# Patient Record
Sex: FEMALE | Race: WHITE | Employment: OTHER | URBAN - NONMETROPOLITAN AREA
[De-identification: names, ages, dates, MRNs, and addresses within clinical notes are randomized per-mention and may not be internally consistent; named-entity substitution may affect disease eponyms.]

---

## 2019-02-26 ENCOUNTER — OFFICE VISIT (OUTPATIENT)
Dept: OBGYN | Age: 64
End: 2019-02-26
Payer: MEDICARE

## 2019-02-26 VITALS
HEART RATE: 76 BPM | TEMPERATURE: 98.2 F | DIASTOLIC BLOOD PRESSURE: 83 MMHG | SYSTOLIC BLOOD PRESSURE: 143 MMHG | BODY MASS INDEX: 26.36 KG/M2 | HEIGHT: 66 IN | WEIGHT: 164 LBS

## 2019-02-26 DIAGNOSIS — Z12.11 SCREEN FOR COLON CANCER: ICD-10-CM

## 2019-02-26 DIAGNOSIS — R79.89 OTHER SPECIFIED ABNORMAL FINDINGS OF BLOOD CHEMISTRY: ICD-10-CM

## 2019-02-26 DIAGNOSIS — Z01.419 ENCOUNTER FOR ROUTINE GYNECOLOGIC EXAMINATION IN MEDICARE PATIENT: ICD-10-CM

## 2019-02-26 DIAGNOSIS — Z12.39 SCREENING FOR BREAST CANCER: ICD-10-CM

## 2019-02-26 DIAGNOSIS — Z12.4 SCREENING FOR CERVICAL CANCER: ICD-10-CM

## 2019-02-26 DIAGNOSIS — Z01.419 ENCOUNTER FOR ROUTINE GYNECOLOGIC EXAMINATION IN MEDICARE PATIENT: Primary | ICD-10-CM

## 2019-02-26 DIAGNOSIS — Z11.51 SCREENING FOR HPV (HUMAN PAPILLOMAVIRUS): ICD-10-CM

## 2019-02-26 LAB
ALBUMIN SERPL-MCNC: 4.5 G/DL (ref 3.5–5.2)
ALP BLD-CCNC: 69 U/L (ref 35–104)
ALT SERPL-CCNC: 9 U/L (ref 5–33)
ANION GAP SERPL CALCULATED.3IONS-SCNC: 14 MMOL/L (ref 7–19)
AST SERPL-CCNC: 14 U/L (ref 5–32)
BILIRUB SERPL-MCNC: 0.6 MG/DL (ref 0.2–1.2)
BUN BLDV-MCNC: 12 MG/DL (ref 8–23)
CALCIUM SERPL-MCNC: 9.1 MG/DL (ref 8.8–10.2)
CHLORIDE BLD-SCNC: 100 MMOL/L (ref 98–111)
CHOLESTEROL, TOTAL: 194 MG/DL (ref 160–199)
CO2: 26 MMOL/L (ref 22–29)
CREAT SERPL-MCNC: 0.7 MG/DL (ref 0.5–0.9)
GFR NON-AFRICAN AMERICAN: >60
GLUCOSE BLD-MCNC: 102 MG/DL (ref 74–109)
HCT VFR BLD CALC: 45.4 % (ref 37–47)
HDLC SERPL-MCNC: 80 MG/DL (ref 65–121)
HEMOGLOBIN: 15 G/DL (ref 12–16)
LDL CHOLESTEROL CALCULATED: 89 MG/DL
MCH RBC QN AUTO: 33.3 PG (ref 27–31)
MCHC RBC AUTO-ENTMCNC: 33 G/DL (ref 33–37)
MCV RBC AUTO: 100.9 FL (ref 81–99)
PDW BLD-RTO: 13.2 % (ref 11.5–14.5)
PLATELET # BLD: 250 K/UL (ref 130–400)
PMV BLD AUTO: 10.4 FL (ref 9.4–12.3)
POTASSIUM SERPL-SCNC: 4.1 MMOL/L (ref 3.5–5)
RBC # BLD: 4.5 M/UL (ref 4.2–5.4)
SODIUM BLD-SCNC: 140 MMOL/L (ref 136–145)
TOTAL PROTEIN: 7.1 G/DL (ref 6.6–8.7)
TRIGL SERPL-MCNC: 126 MG/DL (ref 0–149)
WBC # BLD: 6.6 K/UL (ref 4.8–10.8)

## 2019-02-26 PROCEDURE — G8419 CALC BMI OUT NRM PARAM NOF/U: HCPCS | Performed by: NURSE PRACTITIONER

## 2019-02-26 PROCEDURE — 1090F PRES/ABSN URINE INCON ASSESS: CPT | Performed by: NURSE PRACTITIONER

## 2019-02-26 PROCEDURE — 99204 OFFICE O/P NEW MOD 45 MIN: CPT | Performed by: NURSE PRACTITIONER

## 2019-02-26 PROCEDURE — G8400 PT W/DXA NO RESULTS DOC: HCPCS | Performed by: NURSE PRACTITIONER

## 2019-02-26 PROCEDURE — 3017F COLORECTAL CA SCREEN DOC REV: CPT | Performed by: NURSE PRACTITIONER

## 2019-02-26 PROCEDURE — 1101F PT FALLS ASSESS-DOCD LE1/YR: CPT | Performed by: NURSE PRACTITIONER

## 2019-02-26 PROCEDURE — G0101 CA SCREEN;PELVIC/BREAST EXAM: HCPCS | Performed by: NURSE PRACTITIONER

## 2019-02-26 PROCEDURE — G8484 FLU IMMUNIZE NO ADMIN: HCPCS | Performed by: NURSE PRACTITIONER

## 2019-02-26 PROCEDURE — 4004F PT TOBACCO SCREEN RCVD TLK: CPT | Performed by: NURSE PRACTITIONER

## 2019-02-26 PROCEDURE — G8427 DOCREV CUR MEDS BY ELIG CLIN: HCPCS | Performed by: NURSE PRACTITIONER

## 2019-02-26 PROCEDURE — 4040F PNEUMOC VAC/ADMIN/RCVD: CPT | Performed by: NURSE PRACTITIONER

## 2019-02-26 PROCEDURE — 1123F ACP DISCUSS/DSCN MKR DOCD: CPT | Performed by: NURSE PRACTITIONER

## 2019-02-26 ASSESSMENT — ENCOUNTER SYMPTOMS
ALLERGIC/IMMUNOLOGIC NEGATIVE: 1
RESPIRATORY NEGATIVE: 1
CONSTIPATION: 0
EYES NEGATIVE: 1
DIARRHEA: 0
GASTROINTESTINAL NEGATIVE: 1

## 2019-03-07 ENCOUNTER — HOSPITAL ENCOUNTER (OUTPATIENT)
Dept: WOMENS IMAGING | Age: 64
Discharge: HOME | End: 2019-03-07
Payer: MEDICARE

## 2019-03-07 DIAGNOSIS — Z12.39 SCREENING FOR BREAST CANCER: ICD-10-CM

## 2019-03-07 PROCEDURE — 77063 BREAST TOMOSYNTHESIS BI: CPT

## 2019-03-27 LAB
HPV TYPE 16: NOT DETECTED
HPV TYPE 18: NOT DETECTED
INTERPRETATION: NORMAL
OTHER HIGH RISK HPV: NOT DETECTED
SOURCE: NORMAL

## 2019-11-12 ENCOUNTER — TRANSCRIBE ORDERS (OUTPATIENT)
Dept: ADMINISTRATIVE | Facility: HOSPITAL | Age: 64
End: 2019-11-12

## 2019-11-12 ENCOUNTER — HOSPITAL ENCOUNTER (OUTPATIENT)
Dept: CT IMAGING | Facility: HOSPITAL | Age: 64
Discharge: HOME | End: 2019-11-12
Admitting: ORTHOPAEDIC SURGERY

## 2019-11-12 DIAGNOSIS — M25.511 ACUTE PAIN OF RIGHT SHOULDER: Primary | ICD-10-CM

## 2019-11-12 DIAGNOSIS — M25.511 ACUTE PAIN OF RIGHT SHOULDER: ICD-10-CM

## 2019-11-12 PROCEDURE — 73200 CT UPPER EXTREMITY W/O DYE: CPT

## 2019-11-14 ENCOUNTER — APPOINTMENT (OUTPATIENT)
Dept: PREADMISSION TESTING | Facility: HOSPITAL | Age: 64
End: 2019-11-14

## 2019-11-14 ENCOUNTER — HOSPITAL ENCOUNTER (OUTPATIENT)
Dept: GENERAL RADIOLOGY | Facility: HOSPITAL | Age: 64
Discharge: HOME | End: 2019-11-14
Admitting: ORTHOPAEDIC SURGERY

## 2019-11-14 VITALS
WEIGHT: 162.7 LBS | BODY MASS INDEX: 27.11 KG/M2 | OXYGEN SATURATION: 100 % | HEART RATE: 80 BPM | HEIGHT: 65 IN | SYSTOLIC BLOOD PRESSURE: 142 MMHG | RESPIRATION RATE: 16 BRPM | DIASTOLIC BLOOD PRESSURE: 68 MMHG

## 2019-11-14 LAB
ALBUMIN SERPL-MCNC: 4.4 G/DL (ref 3.5–5.2)
ALBUMIN/GLOB SERPL: 1.5 G/DL
ALP SERPL-CCNC: 82 U/L (ref 39–117)
ALT SERPL W P-5'-P-CCNC: 22 U/L (ref 1–33)
ANION GAP SERPL CALCULATED.3IONS-SCNC: 11 MMOL/L (ref 5–15)
APTT PPP: 29.4 SECONDS (ref 24.1–35)
AST SERPL-CCNC: 23 U/L (ref 1–32)
BASOPHILS # BLD AUTO: 0.06 10*3/MM3 (ref 0–0.2)
BASOPHILS NFR BLD AUTO: 0.7 % (ref 0–1.5)
BILIRUB SERPL-MCNC: 0.8 MG/DL (ref 0.2–1.2)
BILIRUB UR QL STRIP: NEGATIVE
BUN BLD-MCNC: 11 MG/DL (ref 8–23)
BUN/CREAT SERPL: 17.5 (ref 7–25)
CALCIUM SPEC-SCNC: 9.7 MG/DL (ref 8.6–10.5)
CHLORIDE SERPL-SCNC: 97 MMOL/L (ref 98–107)
CLARITY UR: CLEAR
CO2 SERPL-SCNC: 29 MMOL/L (ref 22–29)
COLOR UR: YELLOW
CREAT BLD-MCNC: 0.63 MG/DL (ref 0.57–1)
DEPRECATED RDW RBC AUTO: 42.9 FL (ref 37–54)
EOSINOPHIL # BLD AUTO: 0.12 10*3/MM3 (ref 0–0.4)
EOSINOPHIL NFR BLD AUTO: 1.4 % (ref 0.3–6.2)
ERYTHROCYTE [DISTWIDTH] IN BLOOD BY AUTOMATED COUNT: 12.4 % (ref 12.3–15.4)
GFR SERPL CREATININE-BSD FRML MDRD: 95 ML/MIN/1.73
GLOBULIN UR ELPH-MCNC: 3 GM/DL
GLUCOSE BLD-MCNC: 119 MG/DL (ref 65–99)
GLUCOSE UR STRIP-MCNC: NEGATIVE MG/DL
HCT VFR BLD AUTO: 40.1 % (ref 34–46.6)
HGB BLD-MCNC: 13.7 G/DL (ref 12–15.9)
HGB UR QL STRIP.AUTO: NEGATIVE
IMM GRANULOCYTES # BLD AUTO: 0.02 10*3/MM3 (ref 0–0.05)
IMM GRANULOCYTES NFR BLD AUTO: 0.2 % (ref 0–0.5)
INR PPP: 0.92 (ref 0.91–1.09)
KETONES UR QL STRIP: ABNORMAL
LEUKOCYTE ESTERASE UR QL STRIP.AUTO: NEGATIVE
LYMPHOCYTES # BLD AUTO: 1.67 10*3/MM3 (ref 0.7–3.1)
LYMPHOCYTES NFR BLD AUTO: 19.5 % (ref 19.6–45.3)
MCH RBC QN AUTO: 32 PG (ref 26.6–33)
MCHC RBC AUTO-ENTMCNC: 34.2 G/DL (ref 31.5–35.7)
MCV RBC AUTO: 93.7 FL (ref 79–97)
MONOCYTES # BLD AUTO: 0.64 10*3/MM3 (ref 0.1–0.9)
MONOCYTES NFR BLD AUTO: 7.5 % (ref 5–12)
NEUTROPHILS # BLD AUTO: 6.06 10*3/MM3 (ref 1.7–7)
NEUTROPHILS NFR BLD AUTO: 70.7 % (ref 42.7–76)
NITRITE UR QL STRIP: NEGATIVE
NRBC BLD AUTO-RTO: 0 /100 WBC (ref 0–0.2)
PH UR STRIP.AUTO: <=5 [PH] (ref 5–8)
PLATELET # BLD AUTO: 361 10*3/MM3 (ref 140–450)
PMV BLD AUTO: 10.3 FL (ref 6–12)
POTASSIUM BLD-SCNC: 4.1 MMOL/L (ref 3.5–5.2)
PROT SERPL-MCNC: 7.4 G/DL (ref 6–8.5)
PROT UR QL STRIP: NEGATIVE
PROTHROMBIN TIME: 12.6 SECONDS (ref 11.9–14.6)
RBC # BLD AUTO: 4.28 10*6/MM3 (ref 3.77–5.28)
SODIUM BLD-SCNC: 137 MMOL/L (ref 136–145)
SP GR UR STRIP: 1.02 (ref 1–1.03)
UROBILINOGEN UR QL STRIP: ABNORMAL
WBC NRBC COR # BLD: 8.57 10*3/MM3 (ref 3.4–10.8)

## 2019-11-14 PROCEDURE — 85730 THROMBOPLASTIN TIME PARTIAL: CPT | Performed by: ORTHOPAEDIC SURGERY

## 2019-11-14 PROCEDURE — 80053 COMPREHEN METABOLIC PANEL: CPT | Performed by: ORTHOPAEDIC SURGERY

## 2019-11-14 PROCEDURE — 85025 COMPLETE CBC W/AUTO DIFF WBC: CPT | Performed by: ORTHOPAEDIC SURGERY

## 2019-11-14 PROCEDURE — 85610 PROTHROMBIN TIME: CPT | Performed by: ORTHOPAEDIC SURGERY

## 2019-11-14 PROCEDURE — 81003 URINALYSIS AUTO W/O SCOPE: CPT | Performed by: ORTHOPAEDIC SURGERY

## 2019-11-14 PROCEDURE — 93010 ELECTROCARDIOGRAM REPORT: CPT | Performed by: INTERNAL MEDICINE

## 2019-11-14 PROCEDURE — 93005 ELECTROCARDIOGRAM TRACING: CPT

## 2019-11-14 PROCEDURE — 36415 COLL VENOUS BLD VENIPUNCTURE: CPT

## 2019-11-14 PROCEDURE — 71046 X-RAY EXAM CHEST 2 VIEWS: CPT

## 2019-11-14 PROCEDURE — 87081 CULTURE SCREEN ONLY: CPT | Performed by: ORTHOPAEDIC SURGERY

## 2019-11-14 RX ORDER — HYDROCODONE BITARTRATE AND ACETAMINOPHEN 7.5; 325 MG/1; MG/1
1 TABLET ORAL EVERY 8 HOURS PRN
COMMUNITY
End: 2019-11-19 | Stop reason: HOSPADM

## 2019-11-14 NOTE — DISCHARGE INSTRUCTIONS
DAY OF SURGERY INSTRUCTIONS        YOUR SURGEON: ***GAGAN     PROCEDURE: ***RIGHT REVERSE TOTAL SHOULDER REPLACEMENT    DATE OF SURGERY: ***11/18/2019    ARRIVAL TIME: AS DIRECTED BY OFFICE    YOU MAY TAKE THE FOLLOWING MEDICATION(S) THE MORNING OF SURGERY WITH A SIP OF WATER: ***NORCO      ALL OTHER HOME MEDICATION CHECK WITH YOUR PHYSICIAN                MANAGING PAIN AFTER SURGERY    We know you are probably wondering what your pain will be like after surgery.  Following surgery it is unrealistic to expect you will not have pain.   Pain is how our bodies let us know that something is wrong or cautions us to be careful.  That said, our goal is to make your pain tolerable.    Methods we may use to treat your pain include (oral or IV medications, PCAs, epidurals, nerve blocks, etc.)   While some procedures require IV pain medications for a short time after surgery, transitioning to pain medications by mouth allows for better management of pain.   Your nurse will encourage you to take oral pain medications whenever possible.  IV medications work almost immediately, but only last a short while.  Taking medications by mouth allows for a more constant level of medication in your blood stream for a longer period of time.      Once your pain is out of control it is harder to get back under control.  It is important you are aware when your next dose of pain medication is due.  If you are admitted, your nurse may write the time of your next dose on the white board in your room to help you remember.      We are interested in your pain and encourage you to inform us about aggravating factors during your visit.   Many times a simple repositioning every few hours can make a big difference.    If your physician says it is okay, do not let your pain prevent you from getting out of bed. Be sure to call your nurse for assistance prior to getting up so you do not fall.      Before surgery, please decide your tolerable pain goal.   These faces help describe the pain ratings we use on a 0-10 scale.   Be prepared to tell us your goal and whether or not you take pain or anxiety medications at home.          BEFORE YOU COME TO THE HOSPITAL  (Pre-op instructions)  • Do not eat, drink, smoke or chew gum after midnight the night before surgery.  This also includes no mints.  • Morning of surgery take only the medicines you have been instructed with a sip of water unless otherwise instructed  by your physician.  • Do not shave, wear makeup or dark nail polish.  • Remove all jewelry including rings.  • Leave anything you consider valuable at home.  • Leave your suitcase in the car until after your surgery.  • Bring the following with you if applicable:  o Picture ID and insurance, Medicare or Medicaid cards  o Co-pay/deductible required by insurance (cash, check, credit card)  o Copy of advance directive, living will or power-of- documents if not brought to PAT  o CPAP or BIPAP mask and tubing  o Relaxation aids ( book, magazine), etc.  o Hearing aids                        ON THE DAY OF SURGERY  · On the day of surgery check in at registration located at the main entrance of the hospital.   ? You will be registered and given a beeper with instructions where to wait in the main lobby.  ? When your beeper lights up and vibrates a member of the Outpatient Surgery staff will meet you at the double doors under the stair steps and escort you to your preoperative room.   · You may have cloth compression devices placed on your legs. These help to prevent blood clots and reduce swelling in your legs.  · An IV may be inserted into one of your veins.  · In the operating room, you may be given one or more of the following:  ? A medicine to help you relax (sedative).  ? A medicine to numb the area (local anesthetic).  ? A medicine to make you fall asleep (general anesthetic).  ? A medicine that is injected into an area of your body to numb everything below  "the injection site (regional anesthetic).  · Your surgical site will be marked or identified.  · You may be given an antibiotic through your IV to help prevent infection.  Contact a health care provider if you:  · Develop a fever of more than 100.4°F (38°C) or other feelings of illness during the 48 hours before your surgery.  · Have symptoms that get worse.  Have questions or concerns about your surgery    General Anesthesia/Surgery, Adult  General anesthesia is the use of medicines to make a person \"go to sleep\" (unconscious) for a medical procedure. General anesthesia must be used for certain procedures, and is often recommended for procedures that:  · Last a long time.  · Require you to be still or in an unusual position.  · Are major and can cause blood loss.  The medicines used for general anesthesia are called general anesthetics. As well as making you unconscious for a certain amount of time, these medicines:  · Prevent pain.  · Control your blood pressure.  · Relax your muscles.  Tell a health care provider about:  · Any allergies you have.  · All medicines you are taking, including vitamins, herbs, eye drops, creams, and over-the-counter medicines.  · Any problems you or family members have had with anesthetic medicines.  · Types of anesthetics you have had in the past.  · Any blood disorders you have.  · Any surgeries you have had.  · Any medical conditions you have.  · Any recent upper respiratory, chest, or ear infections.  · Any history of:  ? Heart or lung conditions, such as heart failure, sleep apnea, asthma, or chronic obstructive pulmonary disease (COPD).  ?  service.  ? Depression or anxiety.  · Any tobacco or drug use, including marijuana or alcohol use.  · Whether you are pregnant or may be pregnant.  What are the risks?  Generally, this is a safe procedure. However, problems may occur, including:  · Allergic reaction.  · Lung and heart problems.  · Inhaling food or liquid from the " stomach into the lungs (aspiration).  · Nerve injury.  · Air in the bloodstream, which can lead to stroke.  · Extreme agitation or confusion (delirium) when you wake up from the anesthetic.  · Waking up during your procedure and being unable to move. This is rare.  These problems are more likely to develop if you are having a major surgery or if you have an advanced or serious medical condition. You can prevent some of these complications by answering all of your health care provider's questions thoroughly and by following all instructions before your procedure.  General anesthesia can cause side effects, including:  · Nausea or vomiting.  · A sore throat from the breathing tube.  · Hoarseness.  · Wheezing or coughing.  · Shaking chills.  · Tiredness.  · Body aches.  · Anxiety.  · Sleepiness or drowsiness.  · Confusion or agitation.  RISKS AND COMPLICATIONS OF SURGERY  Your health care provider will discuss possible risks and complications with you before surgery. Common risks and complications include:    · Problems due to the use of anesthetics.  · Blood loss and replacement (does not apply to minor surgical procedures).  · Temporary increase in pain due to surgery.  · Uncorrected pain or problems that the surgery was meant to correct.  · Infection.  · New damage.    What happens before the procedure?    Medicines  Ask your health care provider about:  · Changing or stopping your regular medicines. This is especially important if you are taking diabetes medicines or blood thinners.  · Taking medicines such as aspirin and ibuprofen. These medicines can thin your blood. Do not take these medicines unless your health care provider tells you to take them.  · Taking over-the-counter medicines, vitamins, herbs, and supplements. Do not take these during the week before your procedure unless your health care provider approves them.  General instructions  · Starting 3-6 weeks before the procedure, do not use any products  that contain nicotine or tobacco, such as cigarettes and e-cigarettes. If you need help quitting, ask your health care provider.  · If you brush your teeth on the morning of the procedure, make sure to spit out all of the toothpaste.  · Tell your health care provider if you become ill or develop a cold, cough, or fever.  · If instructed by your health care provider, bring your sleep apnea device with you on the day of your surgery (if applicable).  · Ask your health care provider if you will be going home the same day, the following day, or after a longer hospital stay.  ? Plan to have someone take you home from the hospital or clinic.  ? Plan to have a responsible adult care for you for at least 24 hours after you leave the hospital or clinic. This is important.  What happens during the procedure?  · You will be given anesthetics through both of the following:  ? A mask placed over your nose and mouth.  ? An IV in one of your veins.  · You may receive a medicine to help you relax (sedative).  · After you are unconscious, a breathing tube may be inserted down your throat to help you breathe. This will be removed before you wake up.  · An anesthesia specialist will stay with you throughout your procedure. He or she will:  ? Keep you comfortable and safe by continuing to give you medicines and adjusting the amount of medicine that you get.  ? Monitor your blood pressure, pulse, and oxygen levels to make sure that the anesthetics do not cause any problems.  The procedure may vary among health care providers and hospitals.  What happens after the procedure?  · Your blood pressure, temperature, heart rate, breathing rate, and blood oxygen level will be monitored until the medicines you were given have worn off.  · You will wake up in a recovery area. You may wake up slowly.  · If you feel anxious or agitated, you may be given medicine to help you calm down.  · If you will be going home the same day, your health care  provider may check to make sure you can walk, drink, and urinate.  · Your health care provider will treat any pain or side effects you have before you go home.  · Do not drive for 24 hours if you were given a sedative.  Summary  · General anesthesia is used to keep you still and prevent pain during a procedure.  · It is important to tell your healthcare provider about your medical history and any surgeries you have had, and previous experience with anesthesia.  · Follow your healthcare provider’s instructions about when to stop eating, drinking, or taking certain medicines before your procedure.  · Plan to have someone take you home from the hospital or clinic.  This information is not intended to replace advice given to you by your health care provider. Make sure you discuss any questions you have with your health care provider.  Document Released: 03/26/2009 Document Revised: 08/03/2018 Document Reviewed: 08/03/2018  [employer] Interactive Patient Education © 2019 [employer]       Fall Prevention in Hospitals, Adult  As a hospital patient, your condition and the treatments you receive can increase your risk for falls. Some additional risk factors for falls in a hospital include:  · Being in an unfamiliar environment.  · Being on bed rest.  · Your surgery.  · Taking certain medicines.  · Your tubing requirements, such as intravenous (IV) therapy or catheters.  It is important that you learn how to decrease fall risks while at the hospital. Below are important tips that can help prevent falls.  SAFETY TIPS FOR PREVENTING FALLS  Talk about your risk of falling.  · Ask your health care provider why you are at risk for falling. Is it your medicine, illness, tubing placement, or something else?  · Make a plan with your health care provider to keep you safe from falls.  · Ask your health care provider or pharmacist about side effects of your medicines. Some medicines can make you dizzy or affect your coordination.  Ask  for help.  · Ask for help before getting out of bed. You may need to press your call button.  · Ask for assistance in getting safely to the toilet.  · Ask for a walker or cane to be put at your bedside. Ask that most of the side rails on your bed be placed up before your health care provider leaves the room.  · Ask family or friends to sit with you.  · Ask for things that are out of your reach, such as your glasses, hearing aids, telephone, bedside table, or call button.  Follow these tips to avoid falling:  · Stay lying or seated, rather than standing, while waiting for help.  · Wear rubber-soled slippers or shoes whenever you walk in the hospital.  · Avoid quick, sudden movements.  ¨ Change positions slowly.  ¨ Sit on the side of your bed before standing.  ¨ Stand up slowly and wait before you start to walk.  · Let your health care provider know if there is a spill on the floor.  · Pay careful attention to the medical equipment, electrical cords, and tubes around you.  · When you need help, use your call button by your bed or in the bathroom. Wait for one of your health care providers to help you.  · If you feel dizzy or unsure of your footing, return to bed and wait for assistance.  · Avoid being distracted by the TV, telephone, or another person in your room.  · Do not lean or support yourself on rolling objects, such as IV poles or bedside tables.     This information is not intended to replace advice given to you by your health care provider. Make sure you discuss any questions you have with your health care provider.     Document Released: 12/15/2001 Document Revised: 01/08/2016 Document Reviewed: 08/25/2013  [employer] Interactive Patient Education ©2016 [employer]       Surgical Site Infections FAQs  What is a Surgical Site Infection (SSI)?  A surgical site infection is an infection that occurs after surgery in the part of the body where the surgery took place. Most patients who have surgery do not develop  an infection. However, infections develop in about 1 to 3 out of every 100 patients who have surgery.  Some of the common symptoms of a surgical site infection are:  · Redness and pain around the area where you had surgery  · Drainage of cloudy fluid from your surgical wound  · Fever  Can SSIs be treated?  Yes. Most surgical site infections can be treated with antibiotics. The antibiotic given to you depends on the bacteria (germs) causing the infection. Sometimes patients with SSIs also need another surgery to treat the infection.  What are some of the things that hospitals are doing to prevent SSIs?  To prevent SSIs, doctors, nurses, and other healthcare providers:  · Clean their hands and arms up to their elbows with an antiseptic agent just before the surgery.  · Clean their hands with soap and water or an alcohol-based hand rub before and after caring for each patient.  · May remove some of your hair immediately before your surgery using electric clippers if the hair is in the same area where the procedure will occur. They should not shave you with a razor.  · Wear special hair covers, masks, gowns, and gloves during surgery to keep the surgery area clean.  · Give you antibiotics before your surgery starts. In most cases, you should get antibiotics within 60 minutes before the surgery starts and the antibiotics should be stopped within 24 hours after surgery.  · Clean the skin at the site of your surgery with a special soap that kills germs.  What can I do to help prevent SSIs?  Before your surgery:  · Tell your doctor about other medical problems you may have. Health problems such as allergies, diabetes, and obesity could affect your surgery and your treatment.  · Quit smoking. Patients who smoke get more infections. Talk to your doctor about how you can quit before your surgery.  · Do not shave near where you will have surgery. Shaving with a razor can irritate your skin and make it easier to develop an  infection.  At the time of your surgery:  · Speak up if someone tries to shave you with a razor before surgery. Ask why you need to be shaved and talk with your surgeon if you have any concerns.  · Ask if you will get antibiotics before surgery.  After your surgery:  · Make sure that your healthcare providers clean their hands before examining you, either with soap and water or an alcohol-based hand rub.  · If you do not see your providers clean their hands, please ask them to do so.  · Family and friends who visit you should not touch the surgical wound or dressings.  · Family and friends should clean their hands with soap and water or an alcohol-based hand rub before and after visiting you. If you do not see them clean their hands, ask them to clean their hands.  What do I need to do when I go home from the hospital?  · Before you go home, your doctor or nurse should explain everything you need to know about taking care of your wound. Make sure you understand how to care for your wound before you leave the hospital.  · Always clean your hands before and after caring for your wound.  · Before you go home, make sure you know who to contact if you have questions or problems after you get home.  · If you have any symptoms of an infection, such as redness and pain at the surgery site, drainage, or fever, call your doctor immediately.  If you have additional questions, please ask your doctor or nurse.  Developed and co-sponsored by The Society for Healthcare Epidemiology of [place] (SHEA); Infectious Diseases Society of [place] (IDSA); American Hospital Association; Association for Professionals in Infection Control and Epidemiology (APIC); Centers for Disease Control and Prevention (CDC); and The Joint Commission.     This information is not intended to replace advice given to you by your health care provider. Make sure you discuss any questions you have with your health care provider.     Document Released: 12/23/2014  Document Revised: 01/08/2016 Document Reviewed: 03/02/2016  [employer] Interactive Patient Education ©2016 [employer]           [facility]  CHG 4% Patient Instruction Sheet    Chlorhexidine Before Surgery  Chlorhexidine gluconate (CHG) is a germ-killing (antiseptic) solution that is used to clean the skin. It gets rid of the bacteria that normally live on the skin. Cleaning your skin with CHG before surgery helps lower the risk for infection after surgery.    How to use CHG solution  · You will take 2 showers, one shower the night before surgery, the second shower the morning of surgery before coming to the hospital.  · Use CHG only as told by your health care provider, and follow the instructions on the label.  · Use CHG solution while taking a shower. Follow these steps when using CHG solution (unless your health care provider gives you different instructions):  1. Start the shower.  2. Use your normal soap and shampoo to wash your face and hair.  3. Turn off the shower or move out of the shower stream.  4. Pour the CHG onto a clean washcloth. Do not use any type of brush or rough-edged sponge.  5. Starting at your neck, lather your body down to your toes. Make sure you:  6. Pay special attention to the part of your body where you will be having surgery. Scrub this area for at least 1 minute.  7. Use the full amount of CHG as directed. Usually, this is one half bottle for each shower.  8. Do not use CHG on your head or face. If the solution gets into your ears or eyes, rinse them well with water.  9. Avoid your genital area.  10. Avoid any areas of skin that have broken skin, cuts, or scrapes.  11. Scrub your back and under your arms. Make sure to wash skin folds.  12. Let the lather sit on your skin for 1-2 minutes or as long as told by your health care  provider.  13. Thoroughly rinse your entire body in the shower. Make sure that all body creases and crevices are rinsed well.  14. Dry off with a  clean towel. Do not put any substances on your body afterward, such as powder, lotion, or perfume.  15. Put on clean clothes or pajamas.  16. If it is the night before your surgery, sleep in clean sheets.    What are the risks?  Risks of using CHG include:  · A skin reaction.  · Hearing loss, if CHG gets in your ears.  · Eye injury, if CHG gets in your eyes and is not rinsed out.  · The CHG product catching fire.  Make sure that you avoid smoking and flames after applying CHG to your skin.  Do not use CHG:  · If you have a chlorhexidine allergy or have previously reacted to chlorhexidine.  · On babies younger than 2 months of age.      On the day of surgery, when you are taken to your room in Outpatient Surgery you will be given a CHG prepackaged cloth to wipe the site for your surgery.  How to use CHG prepackaged cloths  · Follow the instructions on the label.  · Use the CHG cloth on clean, dry skin. Follow these steps when using a CHG cloth (unless your health care provider gives you different instructions):  1. Using the CHG cloth, vigorously scrub the part of your body where you will be having surgery. Scrub using a back-and-forth motion for 3 minutes. The area on your body should be completely wet with CHG when you are finished scrubbing.  2. Do not rinse. Discard the cloth and let the area air-dry for 1 minute. Do not put any substances on your body afterward, such as powder, lotion, or perfume.  Contact a health care provider if:  · Your skin gets irritated after scrubbing.  · You have questions about using your solution or cloth.  Get help right away if:  · Your eyes become very red or swollen.  · Your eyes itch badly.  · Your skin itches badly and is red or swollen.  · Your hearing changes.  · You have trouble seeing.  · You have swelling or tingling in your mouth or throat.  · You have trouble breathing.  · You swallow any chlorhexidine.  Summary  · Chlorhexidine gluconate (CHG) is a germ-killing  (antiseptic) solution that is used to clean the skin. Cleaning your skin with CHG before surgery helps lower the risk for infection after surgery.  · You may be given CHG to use at home. It may be in a bottle or in a prepackaged cloth to use on your skin. Carefully follow your health care provider's instructions and the instructions on the product label.  · Do not use CHG if you have a chlorhexidine allergy.  · Contact your health care provider if your skin gets irritated after scrubbing.  This information is not intended to replace advice given to you by your health care provider. Make sure you discuss any questions you have with your health care provider.  Document Released: 09/11/2013 Document Revised: 11/15/2018 Document Reviewed: 11/15/2018  [employer] Interactive Patient Education © 2019 [employer]          PATIENT/FAMILY/RESPONSIBLE PARTY VERBALIZES UNDERSTANDING OF ABOVE EDUCATION.  COPY OF PAIN SCALE GIVEN AND REVIEWED WITH VERBALIZED UNDERSTANDING.

## 2019-11-15 LAB — MRSA SPEC QL CULT: NORMAL

## 2019-11-18 ENCOUNTER — ANESTHESIA EVENT (OUTPATIENT)
Dept: PERIOP | Facility: HOSPITAL | Age: 64
End: 2019-11-18

## 2019-11-18 ENCOUNTER — APPOINTMENT (OUTPATIENT)
Dept: GENERAL RADIOLOGY | Facility: HOSPITAL | Age: 64
End: 2019-11-18

## 2019-11-18 ENCOUNTER — ANESTHESIA (OUTPATIENT)
Dept: PERIOP | Facility: HOSPITAL | Age: 64
End: 2019-11-18

## 2019-11-18 ENCOUNTER — HOSPITAL ENCOUNTER (INPATIENT)
Facility: HOSPITAL | Age: 64
LOS: 1 days | Discharge: HOME | End: 2019-11-19
Attending: ORTHOPAEDIC SURGERY | Admitting: ORTHOPAEDIC SURGERY

## 2019-11-18 DIAGNOSIS — Z78.9 DECREASED ACTIVITIES OF DAILY LIVING (ADL): ICD-10-CM

## 2019-11-18 PROBLEM — S42.291A CLOSED 4-PART FRACTURE OF PROXIMAL END OF RIGHT HUMERUS: Status: ACTIVE | Noted: 2019-11-18

## 2019-11-18 LAB
ABO GROUP BLD: NORMAL
BLD GP AB SCN SERPL QL: NEGATIVE
RH BLD: POSITIVE
T&S EXPIRATION DATE: NORMAL

## 2019-11-18 PROCEDURE — 25010000002 ONDANSETRON PER 1 MG: Performed by: NURSE ANESTHETIST, CERTIFIED REGISTERED

## 2019-11-18 PROCEDURE — 94760 N-INVAS EAR/PLS OXIMETRY 1: CPT

## 2019-11-18 PROCEDURE — 25010000002 VANCOMYCIN 1 G RECONSTITUTED SOLUTION: Performed by: ORTHOPAEDIC SURGERY

## 2019-11-18 PROCEDURE — 25010000002 FENTANYL CITRATE (PF) 100 MCG/2ML SOLUTION: Performed by: NURSE ANESTHETIST, CERTIFIED REGISTERED

## 2019-11-18 PROCEDURE — C1776 JOINT DEVICE (IMPLANTABLE): HCPCS | Performed by: ORTHOPAEDIC SURGERY

## 2019-11-18 PROCEDURE — 86900 BLOOD TYPING SEROLOGIC ABO: CPT | Performed by: ORTHOPAEDIC SURGERY

## 2019-11-18 PROCEDURE — 86850 RBC ANTIBODY SCREEN: CPT | Performed by: ORTHOPAEDIC SURGERY

## 2019-11-18 PROCEDURE — 86901 BLOOD TYPING SEROLOGIC RH(D): CPT | Performed by: ORTHOPAEDIC SURGERY

## 2019-11-18 PROCEDURE — 25010000002 DEXAMETHASONE PER 1 MG: Performed by: NURSE ANESTHETIST, CERTIFIED REGISTERED

## 2019-11-18 PROCEDURE — 25010000002 ROPIVACAINE PER 1 MG: Performed by: ANESTHESIOLOGY

## 2019-11-18 PROCEDURE — 25010000002 MIDAZOLAM PER 1 MG

## 2019-11-18 PROCEDURE — 25010000002 PROPOFOL 10 MG/ML EMULSION: Performed by: NURSE ANESTHETIST, CERTIFIED REGISTERED

## 2019-11-18 PROCEDURE — 73020 X-RAY EXAM OF SHOULDER: CPT

## 2019-11-18 PROCEDURE — 94799 UNLISTED PULMONARY SVC/PX: CPT

## 2019-11-18 PROCEDURE — 25010000002 NEOSTIGMINE 10 MG/10ML SOLUTION 10 ML VIAL: Performed by: NURSE ANESTHETIST, CERTIFIED REGISTERED

## 2019-11-18 PROCEDURE — 25010000002 VANCOMYCIN 1 G RECONSTITUTED SOLUTION 1 EACH VIAL: Performed by: ORTHOPAEDIC SURGERY

## 2019-11-18 PROCEDURE — C9290 INJ, BUPIVACAINE LIPOSOME: HCPCS | Performed by: ORTHOPAEDIC SURGERY

## 2019-11-18 PROCEDURE — 25010000002 CEFAZOLIN PER 500 MG: Performed by: ORTHOPAEDIC SURGERY

## 2019-11-18 PROCEDURE — 25010000003 BUPIVACAINE LIPOSOME 1.3 % SUSPENSION 20 ML VIAL: Performed by: ORTHOPAEDIC SURGERY

## 2019-11-18 PROCEDURE — 0RRJ00Z REPLACEMENT OF RIGHT SHOULDER JOINT WITH REVERSE BALL AND SOCKET SYNTHETIC SUBSTITUTE, OPEN APPROACH: ICD-10-PCS | Performed by: ORTHOPAEDIC SURGERY

## 2019-11-18 DEVICE — GLENOSPHERE UNIVERS REVERS MODULAR L/24 36PLS4: Type: IMPLANTABLE DEVICE | Status: FUNCTIONAL

## 2019-11-18 DEVICE — IMPLANTABLE DEVICE: Type: IMPLANTABLE DEVICE | Status: FUNCTIONAL

## 2019-11-18 DEVICE — BASEPLT GLEN UNIVERS REVERS MODULAR 24MM: Type: IMPLANTABLE DEVICE | Status: FUNCTIONAL

## 2019-11-18 DEVICE — CUP SUT UNIVERS REVERS 36 NTRL: Type: IMPLANTABLE DEVICE | Status: FUNCTIONAL

## 2019-11-18 DEVICE — SUT FW 2 REV CUT 38IN AR7202: Type: IMPLANTABLE DEVICE | Status: FUNCTIONAL

## 2019-11-18 DEVICE — SCRW LK GLEN UNIVERS REVERS PERIPH 5.5X32MM: Type: IMPLANTABLE DEVICE | Status: FUNCTIONAL

## 2019-11-18 DEVICE — SCRW CENTRL GLEN UNIVERS REVERS 25MM: Type: IMPLANTABLE DEVICE | Status: FUNCTIONAL

## 2019-11-18 DEVICE — STEM HUM/SHLDR UNIVERS REVERS CAP/COAT SZ7: Type: IMPLANTABLE DEVICE | Status: FUNCTIONAL

## 2019-11-18 RX ORDER — OXYCODONE AND ACETAMINOPHEN 10; 325 MG/1; MG/1
1 TABLET ORAL EVERY 4 HOURS PRN
Qty: 70 TABLET | Refills: 0 | Status: ON HOLD | OUTPATIENT
Start: 2019-11-18 | End: 2020-11-30

## 2019-11-18 RX ORDER — SODIUM CHLORIDE 0.9 % (FLUSH) 0.9 %
3 SYRINGE (ML) INJECTION EVERY 12 HOURS SCHEDULED
Status: DISCONTINUED | OUTPATIENT
Start: 2019-11-18 | End: 2019-11-19 | Stop reason: HOSPADM

## 2019-11-18 RX ORDER — ONDANSETRON 4 MG/1
4 TABLET, FILM COATED ORAL EVERY 8 HOURS PRN
Qty: 20 TABLET | Refills: 1 | Status: ON HOLD | OUTPATIENT
Start: 2019-11-18 | End: 2020-11-30

## 2019-11-18 RX ORDER — SODIUM CHLORIDE 0.9 % (FLUSH) 0.9 %
3 SYRINGE (ML) INJECTION AS NEEDED
Status: DISCONTINUED | OUTPATIENT
Start: 2019-11-18 | End: 2019-11-18 | Stop reason: HOSPADM

## 2019-11-18 RX ORDER — ROCURONIUM BROMIDE 10 MG/ML
INJECTION, SOLUTION INTRAVENOUS AS NEEDED
Status: DISCONTINUED | OUTPATIENT
Start: 2019-11-18 | End: 2019-11-18 | Stop reason: SURG

## 2019-11-18 RX ORDER — LIDOCAINE HYDROCHLORIDE 20 MG/ML
INJECTION, SOLUTION INFILTRATION; PERINEURAL AS NEEDED
Status: DISCONTINUED | OUTPATIENT
Start: 2019-11-18 | End: 2019-11-18 | Stop reason: SURG

## 2019-11-18 RX ORDER — NALOXONE HCL 0.4 MG/ML
0.4 VIAL (ML) INJECTION
Status: DISCONTINUED | OUTPATIENT
Start: 2019-11-18 | End: 2019-11-19 | Stop reason: HOSPADM

## 2019-11-18 RX ORDER — NALOXONE HCL 0.4 MG/ML
0.4 VIAL (ML) INJECTION AS NEEDED
Status: DISCONTINUED | OUTPATIENT
Start: 2019-11-18 | End: 2019-11-18 | Stop reason: HOSPADM

## 2019-11-18 RX ORDER — ONDANSETRON 2 MG/ML
INJECTION INTRAMUSCULAR; INTRAVENOUS AS NEEDED
Status: DISCONTINUED | OUTPATIENT
Start: 2019-11-18 | End: 2019-11-18 | Stop reason: SURG

## 2019-11-18 RX ORDER — SODIUM CHLORIDE, SODIUM LACTATE, POTASSIUM CHLORIDE, CALCIUM CHLORIDE 600; 310; 30; 20 MG/100ML; MG/100ML; MG/100ML; MG/100ML
100 INJECTION, SOLUTION INTRAVENOUS CONTINUOUS
Status: DISCONTINUED | OUTPATIENT
Start: 2019-11-18 | End: 2019-11-18 | Stop reason: HOSPADM

## 2019-11-18 RX ORDER — DIAZEPAM 5 MG/1
5 TABLET ORAL EVERY 6 HOURS PRN
Status: DISCONTINUED | OUTPATIENT
Start: 2019-11-18 | End: 2019-11-19 | Stop reason: HOSPADM

## 2019-11-18 RX ORDER — NEOSTIGMINE METHYLSULFATE 1 MG/ML
INJECTION, SOLUTION INTRAVENOUS AS NEEDED
Status: DISCONTINUED | OUTPATIENT
Start: 2019-11-18 | End: 2019-11-18 | Stop reason: SURG

## 2019-11-18 RX ORDER — DIPHENHYDRAMINE HCL 25 MG
25 CAPSULE ORAL EVERY 6 HOURS PRN
Status: DISCONTINUED | OUTPATIENT
Start: 2019-11-18 | End: 2019-11-19 | Stop reason: HOSPADM

## 2019-11-18 RX ORDER — ONDANSETRON 2 MG/ML
4 INJECTION INTRAMUSCULAR; INTRAVENOUS EVERY 6 HOURS PRN
Status: DISCONTINUED | OUTPATIENT
Start: 2019-11-18 | End: 2019-11-19 | Stop reason: HOSPADM

## 2019-11-18 RX ORDER — OXYCODONE HYDROCHLORIDE 5 MG/1
10 TABLET ORAL EVERY 4 HOURS PRN
Status: DISCONTINUED | OUTPATIENT
Start: 2019-11-18 | End: 2019-11-19 | Stop reason: HOSPADM

## 2019-11-18 RX ORDER — SODIUM CHLORIDE 0.9 % (FLUSH) 0.9 %
3 SYRINGE (ML) INJECTION EVERY 12 HOURS SCHEDULED
Status: DISCONTINUED | OUTPATIENT
Start: 2019-11-18 | End: 2019-11-18 | Stop reason: HOSPADM

## 2019-11-18 RX ORDER — FENTANYL CITRATE 50 UG/ML
25 INJECTION, SOLUTION INTRAMUSCULAR; INTRAVENOUS AS NEEDED
Status: DISCONTINUED | OUTPATIENT
Start: 2019-11-18 | End: 2019-11-18 | Stop reason: HOSPADM

## 2019-11-18 RX ORDER — SODIUM CHLORIDE 0.9 % (FLUSH) 0.9 %
10 SYRINGE (ML) INJECTION AS NEEDED
Status: DISCONTINUED | OUTPATIENT
Start: 2019-11-18 | End: 2019-11-19 | Stop reason: HOSPADM

## 2019-11-18 RX ORDER — VANCOMYCIN HYDROCHLORIDE 1 G/20ML
INJECTION, POWDER, LYOPHILIZED, FOR SOLUTION INTRAVENOUS AS NEEDED
Status: DISCONTINUED | OUTPATIENT
Start: 2019-11-18 | End: 2019-11-18 | Stop reason: HOSPADM

## 2019-11-18 RX ORDER — DOCUSATE SODIUM 100 MG/1
100 CAPSULE, LIQUID FILLED ORAL 2 TIMES DAILY PRN
Status: DISCONTINUED | OUTPATIENT
Start: 2019-11-18 | End: 2019-11-19 | Stop reason: HOSPADM

## 2019-11-18 RX ORDER — SODIUM CHLORIDE 0.9 % (FLUSH) 0.9 %
3-10 SYRINGE (ML) INJECTION AS NEEDED
Status: DISCONTINUED | OUTPATIENT
Start: 2019-11-18 | End: 2019-11-18 | Stop reason: HOSPADM

## 2019-11-18 RX ORDER — MIDAZOLAM HYDROCHLORIDE 1 MG/ML
1 INJECTION INTRAMUSCULAR; INTRAVENOUS
Status: DISCONTINUED | OUTPATIENT
Start: 2019-11-18 | End: 2019-11-18 | Stop reason: HOSPADM

## 2019-11-18 RX ORDER — CYCLOBENZAPRINE HCL 10 MG
10 TABLET ORAL 3 TIMES DAILY PRN
Qty: 60 TABLET | Refills: 0 | Status: ON HOLD | OUTPATIENT
Start: 2019-11-18 | End: 2020-11-30

## 2019-11-18 RX ORDER — OXYCODONE AND ACETAMINOPHEN 10; 325 MG/1; MG/1
1 TABLET ORAL EVERY 4 HOURS PRN
Status: DISCONTINUED | OUTPATIENT
Start: 2019-11-18 | End: 2019-11-19 | Stop reason: HOSPADM

## 2019-11-18 RX ORDER — ACETAMINOPHEN 650 MG/1
650 SUPPOSITORY RECTAL EVERY 4 HOURS PRN
Status: DISCONTINUED | OUTPATIENT
Start: 2019-11-18 | End: 2019-11-19 | Stop reason: HOSPADM

## 2019-11-18 RX ORDER — MAGNESIUM HYDROXIDE 1200 MG/15ML
LIQUID ORAL AS NEEDED
Status: DISCONTINUED | OUTPATIENT
Start: 2019-11-18 | End: 2019-11-18 | Stop reason: HOSPADM

## 2019-11-18 RX ORDER — ASPIRIN 325 MG
325 TABLET, DELAYED RELEASE (ENTERIC COATED) ORAL 2 TIMES DAILY
Qty: 84 TABLET | Refills: 0 | Status: SHIPPED | OUTPATIENT
Start: 2019-11-18 | End: 2019-12-30

## 2019-11-18 RX ORDER — ONDANSETRON 4 MG/1
4 TABLET, FILM COATED ORAL EVERY 6 HOURS PRN
Status: DISCONTINUED | OUTPATIENT
Start: 2019-11-18 | End: 2019-11-19 | Stop reason: HOSPADM

## 2019-11-18 RX ORDER — LABETALOL HYDROCHLORIDE 5 MG/ML
5 INJECTION, SOLUTION INTRAVENOUS
Status: DISCONTINUED | OUTPATIENT
Start: 2019-11-18 | End: 2019-11-18 | Stop reason: HOSPADM

## 2019-11-18 RX ORDER — MIDAZOLAM HYDROCHLORIDE 1 MG/ML
INJECTION INTRAMUSCULAR; INTRAVENOUS
Status: COMPLETED
Start: 2019-11-18 | End: 2019-11-18

## 2019-11-18 RX ORDER — ASPIRIN 325 MG
325 TABLET, DELAYED RELEASE (ENTERIC COATED) ORAL EVERY 12 HOURS SCHEDULED
Status: DISCONTINUED | OUTPATIENT
Start: 2019-11-19 | End: 2019-11-19 | Stop reason: HOSPADM

## 2019-11-18 RX ORDER — PROPOFOL 10 MG/ML
VIAL (ML) INTRAVENOUS AS NEEDED
Status: DISCONTINUED | OUTPATIENT
Start: 2019-11-18 | End: 2019-11-18 | Stop reason: SURG

## 2019-11-18 RX ORDER — DEXAMETHASONE SODIUM PHOSPHATE 4 MG/ML
INJECTION, SOLUTION INTRA-ARTICULAR; INTRALESIONAL; INTRAMUSCULAR; INTRAVENOUS; SOFT TISSUE AS NEEDED
Status: DISCONTINUED | OUTPATIENT
Start: 2019-11-18 | End: 2019-11-18 | Stop reason: SURG

## 2019-11-18 RX ORDER — ROPIVACAINE HYDROCHLORIDE 5 MG/ML
INJECTION, SOLUTION EPIDURAL; INFILTRATION; PERINEURAL
Status: COMPLETED | OUTPATIENT
Start: 2019-11-18 | End: 2019-11-18

## 2019-11-18 RX ORDER — BISACODYL 10 MG
10 SUPPOSITORY, RECTAL RECTAL DAILY PRN
Status: DISCONTINUED | OUTPATIENT
Start: 2019-11-18 | End: 2019-11-19 | Stop reason: HOSPADM

## 2019-11-18 RX ORDER — DIPHENHYDRAMINE HYDROCHLORIDE 50 MG/ML
25 INJECTION INTRAMUSCULAR; INTRAVENOUS EVERY 6 HOURS PRN
Status: DISCONTINUED | OUTPATIENT
Start: 2019-11-18 | End: 2019-11-19 | Stop reason: HOSPADM

## 2019-11-18 RX ORDER — MIDAZOLAM HYDROCHLORIDE 1 MG/ML
2 INJECTION INTRAMUSCULAR; INTRAVENOUS
Status: DISCONTINUED | OUTPATIENT
Start: 2019-11-18 | End: 2019-11-18 | Stop reason: HOSPADM

## 2019-11-18 RX ORDER — GLYCOPYRROLATE 0.2 MG/ML
INJECTION INTRAMUSCULAR; INTRAVENOUS AS NEEDED
Status: DISCONTINUED | OUTPATIENT
Start: 2019-11-18 | End: 2019-11-18 | Stop reason: SURG

## 2019-11-18 RX ORDER — BISACODYL 5 MG/1
10 TABLET, DELAYED RELEASE ORAL DAILY PRN
Status: DISCONTINUED | OUTPATIENT
Start: 2019-11-18 | End: 2019-11-19 | Stop reason: HOSPADM

## 2019-11-18 RX ORDER — BUPIVACAINE HCL/0.9 % NACL/PF 0.1 %
2 PLASTIC BAG, INJECTION (ML) EPIDURAL EVERY 8 HOURS
Status: COMPLETED | OUTPATIENT
Start: 2019-11-18 | End: 2019-11-19

## 2019-11-18 RX ORDER — IPRATROPIUM BROMIDE AND ALBUTEROL SULFATE 2.5; .5 MG/3ML; MG/3ML
3 SOLUTION RESPIRATORY (INHALATION) ONCE AS NEEDED
Status: DISCONTINUED | OUTPATIENT
Start: 2019-11-18 | End: 2019-11-18 | Stop reason: HOSPADM

## 2019-11-18 RX ORDER — ONDANSETRON 2 MG/ML
4 INJECTION INTRAMUSCULAR; INTRAVENOUS ONCE AS NEEDED
Status: DISCONTINUED | OUTPATIENT
Start: 2019-11-18 | End: 2019-11-18 | Stop reason: HOSPADM

## 2019-11-18 RX ORDER — ACETAMINOPHEN 325 MG/1
650 TABLET ORAL EVERY 4 HOURS PRN
Status: DISCONTINUED | OUTPATIENT
Start: 2019-11-18 | End: 2019-11-19 | Stop reason: HOSPADM

## 2019-11-18 RX ORDER — DOCUSATE SODIUM 100 MG/1
100 CAPSULE, LIQUID FILLED ORAL 2 TIMES DAILY
Qty: 60 CAPSULE | Refills: 1 | Status: ON HOLD | OUTPATIENT
Start: 2019-11-18 | End: 2020-11-30

## 2019-11-18 RX ORDER — HYDROMORPHONE HYDROCHLORIDE 1 MG/ML
0.5 INJECTION, SOLUTION INTRAMUSCULAR; INTRAVENOUS; SUBCUTANEOUS
Status: DISCONTINUED | OUTPATIENT
Start: 2019-11-18 | End: 2019-11-19 | Stop reason: HOSPADM

## 2019-11-18 RX ORDER — NALOXONE HCL 0.4 MG/ML
0.1 VIAL (ML) INJECTION
Status: DISCONTINUED | OUTPATIENT
Start: 2019-11-18 | End: 2019-11-19 | Stop reason: HOSPADM

## 2019-11-18 RX ORDER — ACETAMINOPHEN 500 MG
1000 TABLET ORAL ONCE
Status: COMPLETED | OUTPATIENT
Start: 2019-11-18 | End: 2019-11-18

## 2019-11-18 RX ORDER — METOCLOPRAMIDE HYDROCHLORIDE 5 MG/ML
5 INJECTION INTRAMUSCULAR; INTRAVENOUS
Status: DISCONTINUED | OUTPATIENT
Start: 2019-11-18 | End: 2019-11-18 | Stop reason: HOSPADM

## 2019-11-18 RX ORDER — EPHEDRINE SULFATE 50 MG/ML
INJECTION INTRAVENOUS AS NEEDED
Status: DISCONTINUED | OUTPATIENT
Start: 2019-11-18 | End: 2019-11-18 | Stop reason: SURG

## 2019-11-18 RX ORDER — SODIUM CHLORIDE, SODIUM LACTATE, POTASSIUM CHLORIDE, CALCIUM CHLORIDE 600; 310; 30; 20 MG/100ML; MG/100ML; MG/100ML; MG/100ML
1000 INJECTION, SOLUTION INTRAVENOUS CONTINUOUS
Status: DISCONTINUED | OUTPATIENT
Start: 2019-11-18 | End: 2019-11-18 | Stop reason: HOSPADM

## 2019-11-18 RX ORDER — FENTANYL CITRATE 50 UG/ML
INJECTION, SOLUTION INTRAMUSCULAR; INTRAVENOUS AS NEEDED
Status: DISCONTINUED | OUTPATIENT
Start: 2019-11-18 | End: 2019-11-18 | Stop reason: SURG

## 2019-11-18 RX ORDER — CYCLOBENZAPRINE HCL 10 MG
10 TABLET ORAL 3 TIMES DAILY PRN
Status: DISCONTINUED | OUTPATIENT
Start: 2019-11-18 | End: 2019-11-19 | Stop reason: HOSPADM

## 2019-11-18 RX ADMIN — ONDANSETRON HYDROCHLORIDE 4 MG: 2 SOLUTION INTRAMUSCULAR; INTRAVENOUS at 14:30

## 2019-11-18 RX ADMIN — VASOPRESSIN 1 ML: 20 INJECTION INTRAVENOUS at 12:26

## 2019-11-18 RX ADMIN — EPHEDRINE SULFATE 15 MG: 50 INJECTION INTRAVENOUS at 12:51

## 2019-11-18 RX ADMIN — FENTANYL CITRATE 25 MCG: 50 INJECTION, SOLUTION INTRAMUSCULAR; INTRAVENOUS at 14:35

## 2019-11-18 RX ADMIN — SODIUM CHLORIDE, PRESERVATIVE FREE 3 ML: 5 INJECTION INTRAVENOUS at 20:50

## 2019-11-18 RX ADMIN — EPHEDRINE SULFATE 15 MG: 50 INJECTION INTRAVENOUS at 13:17

## 2019-11-18 RX ADMIN — SODIUM CHLORIDE, POTASSIUM CHLORIDE, SODIUM LACTATE AND CALCIUM CHLORIDE 1000 ML: 600; 310; 30; 20 INJECTION, SOLUTION INTRAVENOUS at 11:57

## 2019-11-18 RX ADMIN — CEFAZOLIN 1 G: 1 INJECTION, POWDER, FOR SOLUTION INTRAMUSCULAR; INTRAVENOUS; PARENTERAL at 12:37

## 2019-11-18 RX ADMIN — ROPIVACAINE HYDROCHLORIDE 20 ML: 5 INJECTION, SOLUTION EPIDURAL; INFILTRATION; PERINEURAL at 12:08

## 2019-11-18 RX ADMIN — MIDAZOLAM HYDROCHLORIDE 2 MG: 1 INJECTION INTRAMUSCULAR; INTRAVENOUS at 12:02

## 2019-11-18 RX ADMIN — PROPOFOL 150 MG: 10 INJECTION, EMULSION INTRAVENOUS at 12:21

## 2019-11-18 RX ADMIN — MIDAZOLAM 2 MG: 1 INJECTION INTRAMUSCULAR; INTRAVENOUS at 12:02

## 2019-11-18 RX ADMIN — DEXAMETHASONE SODIUM PHOSPHATE 4 MG: 4 INJECTION, SOLUTION INTRAMUSCULAR; INTRAVENOUS at 14:30

## 2019-11-18 RX ADMIN — GLYCOPYRROLATE 0.4 MG: 0.2 INJECTION, SOLUTION INTRAMUSCULAR; INTRAVENOUS at 14:37

## 2019-11-18 RX ADMIN — VASOPRESSIN 1 ML: 20 INJECTION INTRAVENOUS at 12:41

## 2019-11-18 RX ADMIN — ACETAMINOPHEN 1000 MG: 500 TABLET, FILM COATED ORAL at 12:04

## 2019-11-18 RX ADMIN — SODIUM CHLORIDE 2 G: 9 INJECTION, SOLUTION INTRAVENOUS at 20:57

## 2019-11-18 RX ADMIN — SODIUM CHLORIDE, POTASSIUM CHLORIDE, SODIUM LACTATE AND CALCIUM CHLORIDE: 600; 310; 30; 20 INJECTION, SOLUTION INTRAVENOUS at 13:51

## 2019-11-18 RX ADMIN — FENTANYL CITRATE 50 MCG: 50 INJECTION, SOLUTION INTRAMUSCULAR; INTRAVENOUS at 12:21

## 2019-11-18 RX ADMIN — NEOSTIGMINE METHYLSULFATE 2 MG: 1 INJECTION INTRAVENOUS at 14:37

## 2019-11-18 RX ADMIN — FENTANYL CITRATE 25 MCG: 50 INJECTION, SOLUTION INTRAMUSCULAR; INTRAVENOUS at 14:25

## 2019-11-18 RX ADMIN — LIDOCAINE HYDROCHLORIDE 40 MG: 20 INJECTION, SOLUTION INFILTRATION; PERINEURAL at 12:21

## 2019-11-18 RX ADMIN — ROCURONIUM BROMIDE 50 MG: 10 INJECTION INTRAVENOUS at 12:21

## 2019-11-18 NOTE — ANESTHESIA PROCEDURE NOTES
Airway  Urgency: elective    Date/Time: 11/18/2019 12:23 PM  Airway not difficult    General Information and Staff    Patient location during procedure: OR  CRNA: Casi Catherine CRNA    Indications and Patient Condition  Indications for airway management: airway protection    Preoxygenated: yes  Mask difficulty assessment: 1 - vent by mask    Final Airway Details  Final airway type: endotracheal airway      Successful airway: ETT  Cuffed: yes   Successful intubation technique: direct laryngoscopy  Facilitating devices/methods: intubating stylet  Endotracheal tube insertion site: oral  Blade: Diogenes  Blade size: 3.5.  ETT size (mm): 7.0  Cormack-Lehane Classification: grade I - full view of glottis  Placement verified by: chest auscultation and capnometry   Cuff volume (mL): 5  Measured from: lips  ETT/EBT  to lips (cm): 21  Number of attempts at approach: 1  Assessment: lips, teeth, and gum same as pre-op and atraumatic intubation

## 2019-11-18 NOTE — ANESTHESIA POSTPROCEDURE EVALUATION
Patient: Martha Bauer    Procedure Summary     Date:  11/18/19 Room / Location:  [facility] OR  /  [facility] OR    Anesthesia Start:  1218 Anesthesia Stop:  1456    Procedure:  RIGHT REVERSE TOTAL SHOULDER REPLACEMENT (Right Shoulder) Diagnosis:  (RIGHT PROXIMAL HUMERUS FRACTURE)    Surgeon:  Prateek Finley MD Provider:  Casi Catherine CRNA    Anesthesia Type:  general with block ASA Status:  2          Anesthesia Type: general with block  Last vitals  BP   126/60 (11/18/19 1537)   Temp   97.9 °F (36.6 °C) (11/18/19 1537)   Pulse   84 (11/18/19 1537)   Resp   16 (11/18/19 1537)     SpO2   94 % (11/18/19 1537)     Post Anesthesia Care and Evaluation    Patient location during evaluation: PACU  Patient participation: complete - patient participated  Level of consciousness: awake and awake and alert  Pain score: 0  Pain management: adequate  Airway patency: patent  Anesthetic complications: No anesthetic complications  PONV Status: none  Cardiovascular status: acceptable  Respiratory status: acceptable  Hydration status: acceptable    Comments: Patient discharged according to acceptable Arlen score per RN assessment. See nursing records for further information.     Blood pressure 126/60, pulse 84, temperature 97.9 °F (36.6 °C), temperature source Oral, resp. rate 16, SpO2 94 %, not currently breastfeeding.

## 2019-11-18 NOTE — ANESTHESIA PROCEDURE NOTES
Peripheral Block    Pre-sedation assessment completed: 11/18/2019 12:05 PM    Patient reassessed immediately prior to procedure    Patient location during procedure: holding area  Start time: 11/18/2019 12:07 PM  Stop time: 11/18/2019 12:09 PM  Reason for block: procedure for pain, at surgeon's request, post-op pain management and Request by Dr. Finley  Performed by  Anesthesiologist: Chilo Francisco MD  Preanesthetic Checklist  Completed: patient identified, site marked, surgical consent, pre-op evaluation, timeout performed, IV checked, risks and benefits discussed and monitors and equipment checked  Prep:  Pt Position: supine  Sterile barriers:gloves  Prep: ChloraPrep  Patient monitoring: blood pressure monitoring, continuous pulse oximetry and EKG  Procedure  Sedation:yes  Performed under: MAC  Guidance:ultrasound guided, nerve stimulator and Brachial plexus identified and local anesthetic seen surrounding nerves  ULTRASOUND INTERPRETATION. Using ultrasound guidance a 22 G gauge needle was placed in close proximity to the nerve, at which point, under ultrasound guidance anesthetic was injected in the area of the nerve and spread of the anesthesia was seen on ultrasound in close proximity thereto.  There were no abnormalities seen on ultrasound; a digital image was taken; and the patient tolerated the procedure with no complications. Images:still images obtained (picture printed and placed in patients chart)  Loss of twitch: 0.5 mA  Laterality:right  Block Type:interscalene  Injection Technique:single-shot  Needle Type:echogenic  Needle Gauge:22 G  Resistance on Injection: none    Medications Used: ropivacaine (NAROPIN) injection 0.5 %, 20 mL  Med admintered at 11/18/2019 12:08 PM      Post Assessment  Injection Assessment: negative aspiration for heme, no paresthesia on injection and incremental injection  Patient Tolerance:comfortable throughout block  Complications:no

## 2019-11-18 NOTE — BRIEF OP NOTE
TOTAL SHOULDER REVERSE ARTHROPLASTY  Progress Note    Martha Bauer  11/18/2019    Pre-op Diagnosis:   RIGHT PROXIMAL HUMERUS FRACTURE       Post-Op Diagnosis Codes:   SAME, 4-PART    Procedure/CPT® Codes:      Procedure(s):  RIGHT REVERSE TOTAL SHOULDER REPLACEMENT    Surgeon(s):  Prateek Finley MD    Anesthesia: Choice    Staff:   Circulator: Gisel Jurado RN; Monalisa Mclaughlin RN  Scrub Person: Zoë Pruitt; Erick Bales  Assistant: Tej Nice    Estimated Blood Loss: 200ml    Urine Voided: * No values recorded between 11/18/2019 12:17 PM and 11/18/2019  2:51 PM *    Specimens:                None      Drains:  None    Findings: Displaced 4 part right proximal humerus fracture with head split    Complications: Bleeding      Prateek Finley MD     Date: 11/18/2019  Time: 3:12 PM

## 2019-11-18 NOTE — PLAN OF CARE
Problem: Patient Care Overview  Goal: Plan of Care Review  Outcome: Ongoing (interventions implemented as appropriate)   11/18/19 3893   Coping/Psychosocial   Plan of Care Reviewed With patient;family   Plan of Care Review   Progress improving   OTHER   Outcome Summary Pt is A&Ox4. VSS. Denies pain due to nerve block received during surgery. RUE bruised and immobilzier in use. Mepilex dressing CDI. DTV by 9pm. Safety maintained. Will cont to monitor.      Goal: Individualization and Mutuality  Outcome: Ongoing (interventions implemented as appropriate)    Goal: Interprofessional Rounds/Family Conf  Outcome: Ongoing (interventions implemented as appropriate)      Problem: Fall Risk (Adult)  Goal: Identify Related Risk Factors and Signs and Symptoms  Outcome: Ongoing (interventions implemented as appropriate)    Goal: Absence of Fall  Outcome: Ongoing (interventions implemented as appropriate)      Problem: Shoulder Arthroplasty (Adult)  Goal: Signs and Symptoms of Listed Potential Problems Will be Absent, Minimized or Managed (Shoulder Arthroplasty)  Outcome: Ongoing (interventions implemented as appropriate)    Goal: Anesthesia/Sedation Recovery  Outcome: Outcome(s) achieved Date Met: 11/18/19

## 2019-11-18 NOTE — OP NOTE
Patient Name: Angelita  MRN: [id number]  : 1955      DATE of SURGERY: 2019    SURGEON: Prateek Finley MD    ASSISTANT: NONE    PREOPERATIVE DIAGNOSIS: Acute traumatic displaced right 4 part fracture of the surgical neck of the  humerus, initial encounter for closed fracture     POSTOPERATIVE DIAGNOSIS:  Acute traumatic displaced right 4 part fracture of the surgical neck of the  humerus, initial encounter for closed fracture    PROCEDURE PERFORMED: Right Reverse Total Shoulder Arthroplasty    IMPLANTS: [employer] Revers      Baseplate:  MGS 24 mm                Glenosphere: 36 +4 mm Lat      Poly: 36 +3 mm + 9 mm spacer      Suture cup:  36 mm Neutral              Stem: Size 7    ANESTHESIA USED: General endotrachial anesthesia, interscalene block    OPERATIVE INDICATIONS:  63 y.o. female status post a traumatic injury to the shoulder who .  Xrays of the shoulder showed a severely displaced and comminuted proximal humerus fracture. Given the patient's age and fracture displacement, I recommended the above named surgery.  The surgical indications were to relieve pain, improve function, and prevent future disability.  Risks included, but were not limited to, that of anesthesia, bleeding, infection, pain, damage to local structures, need for further surgery, failure of repair, stiffness, failure of implants, and loss of function.      ESTIMATED BLOOD LOSS: 200 mL    DRAINS: none     COMPLICATIONS: Bleeding    SPECIMENS: none    FINDINGS: see op note    PROCEDURE in DETAIL:  The patient was seen in the preoperative holding room, once again the informed consent was reviewed with the patient and signed.  The site of surgery was marked with the patient's agreement.  After being transported to the operating room, a timeout was performed identifying the correct patient as well as the operative site.  Dose appropriate IV antibiotics were given prior to incision.  The patient was positioned in the beach  "chair position, all bony prominences were protected and a sterile prep and drape was performed.  The surgical site was draped with Ioban dressing.    A deltopectoral approach to the right shoulder joint was utilized as soft tissue was dissected down the level of the cephalic vein which was taken laterally along with the deltoid.  The biceps tendon was located, tenodesed to the superior border of the pectoralis major tendon insertion.  The rotator interval was opened noting the severe fracture of the proximal humerus.  A tagging stitch was placed in the subscapularis and with progressive external rotation of the shoulder, the tendon and underlying capsule were peeled from the less tuberosity fragment.  The greater tuberosity and humeral head was removed along with multiple small bone fragments.  The axillary nerve was palpated and protected, verified with a \"tug test.\"      Strategic retractors were placed surround the glenoid, the axillary nerve was protected, and a complete capsulectomy was performed.  The labrum was excised.  A guidepin was placed in the inferior-central aspect of the glenoid, following by a reaming device, and drilling of the central peg hole.  A baseplate was impacted and central, superior, and inferior locking screws were placed.  The glenosphere was then impacted without complication.    Attention was turned back to the humeral side where reamers were inserted into the humeral canal.  The humeral canal was irrigated and dried thoroughly followed by insertion of progressively sized broaches until a stable fit.  It was not felt that cement was needed.  The stem was inserted at 20 degrees of retroversion approximately 5 cm proximal to the pec major insertion.  Trial polyethylenes were placed until range of motion and stability were adequate.  The conjoint tendon showed increased tension. With traction of the shoulder, the entire scapula was translating without dissociation of the " polyethylene.    The final implants were placed and the shoulder was once again reduced showing excellent stability and range of motion.    The incision was thoroughly irrigated, followed by closure in layers.  The skin was closed with adhesive glue.  A sterile dressing and sling were placed.  Counts were correct.    The patient was awakened by anesthesia, transported to the recovery room in stable condition.    POSTOPERATIVE PLAN:  1) Admit inpatient for pain control, neurovascular monitoring  2) Discharge home once pain is controlled  3) Reverse total shoulder protocol     Electronically signed by Prateek Finley MD on 11/18/2019 at 11:59 AM

## 2019-11-18 NOTE — ANESTHESIA PREPROCEDURE EVALUATION
Anesthesia Evaluation     Patient summary reviewed   no history of anesthetic complications:  NPO Solid Status: > 8 hours  NPO Liquid Status: > 8 hours           Airway   Mallampati: II  TM distance: >3 FB  Neck ROM: full  No difficulty expected  Dental          Pulmonary    (+) a smoker Current Abstained day of surgery,   Cardiovascular - negative cardio ROS  Exercise tolerance: good (4-7 METS)        Neuro/Psych- negative ROS  GI/Hepatic/Renal/Endo - negative ROS     Musculoskeletal (-) negative ROS    Abdominal    Substance History      OB/GYN          Other - negative ROS                       Anesthesia Plan    ASA 2     general with block     intravenous induction     Anesthetic plan, all risks, benefits, and alternatives have been provided, discussed and informed consent has been obtained with: patient.

## 2019-11-18 NOTE — DISCHARGE INSTRUCTIONS
UPPER EXTREMITY POST-OP INSTRUCTIONS - DR. FARAH    IMPORTANT PHONE NUMBERS:  • For emergencies, please call [phone]  • You may reach Dr. Farah and clinical staff at [phone]- M-F 8:00 am-5:00 pm  • After 5pm or on the weekends, please call [phone]  • Call immediately if you have any of the following symptoms:     Elevated temperature above 101.5 degrees for more than 48 hours after surgery     Persistent drainage from wound     Severe pain around surgical site    Sling use: The sling is provided for your comfort and to ensure proper healing of your repair following surgery. Please place the abduction pillow with the curved side against your side and the sling on the side of the pillow. Your surgery requires that you wear the sling if noted below.  __X__ At all times except bathing, dressing, and therapy. Also wear the sling during sleep.    Bathing:  ___Keep your dressing in place until follow up and shower on the 4th day after surgery (Ex. Monday surgery, shower on Friday)  ** DO NOT SOAK your dressing or incision in a tub.    Dressings: Keep dressing/splint intact unless instructed otherwise below. SOME DRAINAGE IS NORMAL!    • DO NOT touch or apply ointment to the incision.    • DO NOT remove the steri-strips over the incisions (if you have steri-strips). They will         generally fall off on their own or can be removed 1 weeksafter surgery.    • If you have yellow gauze and it comes off, do not worry about it. Leave them off.   • Signs of infection that warrant a phone call to our clinical line:     o Excessive drainage or redness     o Red streaking coming away from the incision  o Increased pain  o Increased temperature above 101 degrees    Physical Therapy:        *  Your physical therapy status will be discussed with you postoperatively and at your first post-op appointment. Some injuries will not require physical therapy.      *  If you have a shoulder manipulation, please  schedule therapy for the next day    Medications: You will be discharged with the appropriate medications following your surgery. Fill these at the pharmacy and take them as directed on the label. Not all of the medications below may be prescribed. Occasionally, other medications may be prescribed with specific instructions.    Percocet/Lortab (oxycodone/hydrocodone with tylenol) - Pain Medication, will cause drowsiness, possibly itchiness (this is NOT an allergy - use benadryl or an over the counter allergy medication such as Claritin or Zyrtec)     o Take 1-2 tablets every 4-6 hours. DO NOT EXCEED 4,000mg of Tylenol in 24 hours.  **DO NOT MIX WITH ALCOHOL, DRIVE WHILE TAKING, OR TAKE with extra TYLENOL**    Colace (Docusate) - stool softener, used for constipation. Take this only if you feel constipated.    Zofran (Ondansetron) or Phenergan - Anti-nausea medication, will cause drowsiness    **If you are running low on pain medications, please notify us if you need a refill 24-48 hours prior to when you run out, so we can make arrangements to refill the prescription for you if we determine is necessary

## 2019-11-19 VITALS
RESPIRATION RATE: 18 BRPM | HEART RATE: 88 BPM | SYSTOLIC BLOOD PRESSURE: 130 MMHG | TEMPERATURE: 98.9 F | DIASTOLIC BLOOD PRESSURE: 65 MMHG | OXYGEN SATURATION: 94 %

## 2019-11-19 LAB
ANION GAP SERPL CALCULATED.3IONS-SCNC: 10 MMOL/L (ref 5–15)
BUN BLD-MCNC: 11 MG/DL (ref 8–23)
BUN/CREAT SERPL: 17.2 (ref 7–25)
CALCIUM SPEC-SCNC: 8.9 MG/DL (ref 8.6–10.5)
CHLORIDE SERPL-SCNC: 101 MMOL/L (ref 98–107)
CO2 SERPL-SCNC: 26 MMOL/L (ref 22–29)
CREAT BLD-MCNC: 0.64 MG/DL (ref 0.57–1)
GFR SERPL CREATININE-BSD FRML MDRD: 93 ML/MIN/1.73
GLUCOSE BLD-MCNC: 120 MG/DL (ref 65–99)
HCT VFR BLD AUTO: 33.3 % (ref 34–46.6)
HGB BLD-MCNC: 11.5 G/DL (ref 12–15.9)
POTASSIUM BLD-SCNC: 3.8 MMOL/L (ref 3.5–5.2)
SODIUM BLD-SCNC: 137 MMOL/L (ref 136–145)

## 2019-11-19 PROCEDURE — 85018 HEMOGLOBIN: CPT | Performed by: ORTHOPAEDIC SURGERY

## 2019-11-19 PROCEDURE — 97166 OT EVAL MOD COMPLEX 45 MIN: CPT

## 2019-11-19 PROCEDURE — 25010000002 CEFAZOLIN PER 500 MG: Performed by: ORTHOPAEDIC SURGERY

## 2019-11-19 PROCEDURE — 80048 BASIC METABOLIC PNL TOTAL CA: CPT | Performed by: ORTHOPAEDIC SURGERY

## 2019-11-19 PROCEDURE — 85014 HEMATOCRIT: CPT | Performed by: ORTHOPAEDIC SURGERY

## 2019-11-19 PROCEDURE — 25010000002 MORPHINE PER 10 MG: Performed by: ORTHOPAEDIC SURGERY

## 2019-11-19 RX ADMIN — MORPHINE SULFATE 4 MG: 4 INJECTION, SOLUTION INTRAMUSCULAR; INTRAVENOUS at 06:25

## 2019-11-19 RX ADMIN — CYCLOBENZAPRINE 10 MG: 10 TABLET, FILM COATED ORAL at 05:54

## 2019-11-19 RX ADMIN — OXYCODONE HYDROCHLORIDE AND ACETAMINOPHEN 1 TABLET: 10; 325 TABLET ORAL at 04:46

## 2019-11-19 RX ADMIN — ASPIRIN 325 MG: 325 TABLET, COATED ORAL at 09:12

## 2019-11-19 RX ADMIN — SODIUM CHLORIDE 2 G: 9 INJECTION, SOLUTION INTRAVENOUS at 04:46

## 2019-11-19 RX ADMIN — OXYCODONE HYDROCHLORIDE AND ACETAMINOPHEN 1 TABLET: 10; 325 TABLET ORAL at 09:12

## 2019-11-19 NOTE — DISCHARGE SUMMARY
NAME: Martha Bauer  : 1955  MRN: [id number]      Admission Date: 2019    Discharge Date:     Final Diagnoses: Closed 4-part fracture of proximal end of right humerus [S42.291A]    Procedures: R rTSA    Consultations: Medicine    Reason for Admission: R 4-part proximal humerus fracture    Hospital Course:  The patient was admitted with the above named diagnosis, surgery was performed and tolerated well.  At the time of discharge, the patient was afebrile, vitals stable, pain was controlled with oral medication, they were tolerating a by mouth diet, and voiding appropriately. Physical therapy and occupational therapy were consulted. Given these findings they were deemed suitable to be discharged.    Disposition: Home    Activity: NWB w/ NO ROM R SHOULDER    Wound Instructions: see postop instructions    Diet: regular    Resume home meds:   Prior to Admission medications    Medication Sig Start Date End Date Taking? Authorizing Provider   HYDROcodone-acetaminophen (NORCO) 7.5-325 MG per tablet Take 1 tablet by mouth Every 8 (Eight) Hours As Needed for Moderate Pain .   Yes Provider, MD Chris   aspirin  MG tablet Take 1 tablet by mouth 2 (Two) Times a Day for 42 days. 19  Prateek Finley MD   cyclobenzaprine (FLEXERIL) 10 MG tablet Take 1 tablet by mouth 3 (Three) Times a Day As Needed for Muscle Spasms. 19   Prateek Finley MD   docusate sodium (COLACE) 100 MG capsule Take 1 capsule by mouth 2 (Two) Times a Day. 19   Prateek Finley MD   ondansetron (ZOFRAN) 4 MG tablet Take 1 tablet by mouth Every 8 (Eight) Hours As Needed for Nausea or Vomiting. 19   Prateek Finley MD   oxyCODONE-acetaminophen (PERCOCET)  MG per tablet Take 1 tablet by mouth Every 4 (Four) Hours As Needed for Moderate Pain . 19   Prateek Finley MD       Prescriptions for:  Percocet 10/325, #70    Return to Clinic: 2 weeks    Xrays: yes

## 2019-11-19 NOTE — PROGRESS NOTES
Orthopedic Surgery Right rTSA Progress Note        Martha Bauer  2019  POD# 1 Day Post-Op    Subjective:     Interval: Block as worn off and patient demonstrates intact neurovascular status of the right upper extremity    Objective:     General: A&O x3, NAD, No signs or symptoms of PE.   Wound: clean, dry, intact             Dressing: Clean, Dry, Intact   Extremity: Distal NVI, Soft throughout           DVT Exam: No evidence of DVT seen on physical exam.                   Data Review:  Vitals:  /65 (BP Location: Left arm, Patient Position: Lying)   Pulse 88   Temp 98.9 °F (37.2 °C) (Oral)   Resp 18   SpO2 94%   Breastfeeding? No   Temp (24hrs), Av.3 °F (36.8 °C), Min:97.5 °F (36.4 °C), Max:99.4 °F (37.4 °C)      I/O (24Hr):    Intake/Output Summary (Last 24 hours) at 2019 0738  Last data filed at 2019 1922  Gross per 24 hour   Intake 1360 ml   Output --   Net 1360 ml       Labs:  Lab Results (last 72 hours)     Procedure Component Value Units Date/Time    Basic Metabolic Panel [[id number]]  (Abnormal) Collected:  19    Specimen:  Blood Updated:  19     Glucose 120 mg/dL      BUN 11 mg/dL      Creatinine 0.64 mg/dL      Sodium 137 mmol/L      Potassium 3.8 mmol/L      Chloride 101 mmol/L      CO2 26.0 mmol/L      Calcium 8.9 mg/dL      eGFR Non African Amer 93 mL/min/1.73      BUN/Creatinine Ratio 17.2     Anion Gap 10.0 mmol/L     Narrative:       GFR Normal >60  Chronic Kidney Disease <60  Kidney Failure <15    Hemoglobin & Hematocrit, Blood [[id number]]  (Abnormal) Collected:  19    Specimen:  Blood Updated:  19     Hemoglobin 11.5 g/dL      Hematocrit 33.3 %           Assessment:     <principal problem not specified>  POD 1 Day Post-Op RIGHT REVERSE TOTAL SHOULDER REPLACEMENT (Right)    Plan:      1:  DVT prophylaxis, ICE, elevate  2:  Pain control  3:  Physical therapy/Occupation therapy  4:  Anticipate discharge today if pain  well controlled  5:  NWB w/ NO ROM of RIGHT SHOULDER operative extremity    Electronically signed by Prateek Finley MD on 11/19/2019 at 7:38 AM

## 2019-11-19 NOTE — PLAN OF CARE
Problem: Patient Care Overview  Goal: Plan of Care Review  Outcome: Ongoing (interventions implemented as appropriate)   11/19/19 0813   Coping/Psychosocial   Plan of Care Reviewed With patient   Plan of Care Review   Progress improving   OTHER   Outcome Summary VSS. NV check wnl. Drsg c/d/i, immobilizer in place. Up voiding. Block has worn off, using po and iv pain med at this time, with ice. Refused SCD. Safety maintained.      11/19/19 0813   Coping/Psychosocial   Plan of Care Reviewed With patient   Plan of Care Review   Progress improving   OTHER   Outcome Summary VSS. NV check wnl. Drsg c/d/i, immobilizer in place. Up voiding. Block has worn off, using po and iv pain med at this time, with ice. Refused SCD. Safety maintained.       Problem: Fall Risk (Adult)  Goal: Identify Related Risk Factors and Signs and Symptoms  Outcome: Ongoing (interventions implemented as appropriate)    Goal: Absence of Fall  Outcome: Ongoing (interventions implemented as appropriate)      Problem: Shoulder Arthroplasty (Adult)  Goal: Signs and Symptoms of Listed Potential Problems Will be Absent, Minimized or Managed (Shoulder Arthroplasty)  Outcome: Ongoing (interventions implemented as appropriate)

## 2019-11-19 NOTE — THERAPY DISCHARGE NOTE
Acute Care - Occupational Therapy Initial Eval/Discharge  [facility]     Patient Name: Martha Bauer  : 1955  MRN: [id number]  Today's Date: 2019  Onset of Illness/Injury or Date of Surgery: 19  Date of Referral to OT: 19  Referring Physician: Dr. Finley      Admit Date: 2019       ICD-10-CM ICD-9-CM   1. Decreased activities of daily living (ADL) Z78.9 V49.89     Patient Active Problem List   Diagnosis   • Closed 4-part fracture of proximal end of right humerus     Past Medical History:   Diagnosis Date   • Cataract     BIALTERAL     Past Surgical History:   Procedure Laterality Date   • BREAST SURGERY      TIMES 4   •  SECTION     • CYST REMOVAL      LEG   • TONSILLECTOMY     • TOTAL SHOULDER ARTHROPLASTY W/ DISTAL CLAVICLE EXCISION Right 2019    Procedure: RIGHT REVERSE TOTAL SHOULDER REPLACEMENT;  Surgeon: Prateek Finley MD;  Location: [facility];  Service: Orthopedics          OT ASSESSMENT FLOWSHEET (last 12 hours)      Occupational Therapy Evaluation     Row Name 19 0745                   OT Evaluation Time/Intention    Subjective Information  complains of;pain  -CS        Document Type  evaluation  -CS        Mode of Treatment  occupational therapy  -CS        Patient Effort  good  -CS           General Information    Patient Profile Reviewed?  yes  -CS        Onset of Illness/Injury or Date of Surgery  19  -CS        Referring Physician  Dr. Finley  -CS        Patient Observations  alert;cooperative;agree to therapy  -CS        Patient/Family Observations  family present  -CS        General Observations of Patient  Pt fowlers in bed, brace on RUE, cont pulse ox.  -CS        Prior Level of Function  independent:;all household mobility;community mobility;ADL's;dressing;bathing  -CS        Equipment Currently Used at Home  shower chair  -CS        Pertinent History of Current Functional Problem  R humeral neck fx, s/p R reverse TSA  -CS         Existing Precautions/Restrictions  fall;shoulder;non-weight bearing RUE  -CS        Risks Reviewed  patient:;LOB;nausea/vomiting;dizziness;increased discomfort  -CS        Benefits Reviewed  patient:;improve function;increase independence;increase strength;increase balance;decrease pain  -CS        Barriers to Rehab  medically complex  -CS           Relationship/Environment    Lives With  alone  -CS        Family Caregiver if Needed  child(víctor), adult;sibling(s)  -CS           Resource/Environmental Concerns    Current Living Arrangements  home/apartment/condo  -CS           Home Main Entrance    Number of Stairs, Main Entrance  two  -CS           Cognitive Assessment/Interventions    Additional Documentation  Cognitive Assessment/Intervention (Group)  -CS           Cognitive Assessment/Intervention- PT/OT    Affect/Mental Status (Cognitive)  WNL  -CS        Orientation Status (Cognition)  oriented x 4  -CS        Follows Commands (Cognition)  WNL  -CS        Cognitive Function (Cognitive)  WNL  -CS        Personal Safety Interventions  fall prevention program maintained;gait belt;nonskid shoes/slippers when out of bed;supervised activity  -CS           Bed Mobility Assessment/Treatment    Bed Mobility Assessment/Treatment  supine-sit  -CS        Supine-Sit [place] (Bed Mobility)  conditional independence  -CS        Assistive Device (Bed Mobility)  bed rails;head of bed elevated  -CS           Functional Mobility    Functional Mobility- Comment  Deferred due to pt dizziness and light headedness upon standing.  BP in sitting position 105/54.  -CS           Transfer Assessment/Treatment    Transfer Assessment/Treatment  sit-stand transfer;stand-sit transfer  -CS           Sit-Stand Transfer    Sit-Stand [place] (Transfers)  supervision;verbal cues  -CS           Stand-Sit Transfer    Stand-Sit [place] (Transfers)  supervision;verbal cues  -CS           ADL Assessment/Intervention    BADL  Assessment/Intervention  upper body dressing;lower body dressing;bathing  -CS           Bathing Assessment/Intervention    Comment (Bathing)  OT provided edu to pt regarding technique to lean forward allowing gravity to assist her RUE away from her body to bathe axillary region and wanda deodorant  -CS           Upper Body Dressing Assessment/Training    Upper Body Dressing [place] Level  don;front opening garment;minimum assist (75% patient effort);verbal cues  -CS        Assistive Devices (Upper Body Dressing)  one hand technique  -CS        Upper Body Dressing Position  edge of bed sitting  -CS           Lower Body Dressing Assessment/Training    Lower Body Dressing [place] Level  don;doff;shoes/slippers;socks;minimum assist (75% patient effort);verbal cues  -CS        Lower Body Dressing Position  edge of bed sitting  -CS           General ROM    GENERAL ROM COMMENTS  RUE elbow, wrist, and hand AROM WFL, RUE shoulder deferred, LUE AROM WFL  -CS           MMT (Manual Muscle Testing)    General MMT Comments  LUE functional strength: 4+/5, RUE Deferred  -CS           Sensory Assessment/Intervention    Sensory General Assessment  no sensation deficits identified  -CS           Positioning and Restraints    Pre-Treatment Position  in bed  -CS        Post Treatment Position  bed  -CS        In Bed  sitting;call light within reach;encouraged to call for assist;with family/caregiver;with brace  -CS           Pain Assessment    Additional Documentation  Pain Scale: Numbers Pre/Post-Treatment (Group)  -CS           Pain Scale: Numbers Pre/Post-Treatment    Pain Scale: Numbers, Pretreatment  10/10  -CS        Pain Location - Side  Right  -CS        Pain Location  shoulder  -CS        Pain Intervention(s)  Medication (See MAR);Repositioned;Ambulation/increased activity  -CS           Orthotics & Prosthetics Management    Orthosis Location  upper extremity orthosis;arm orthosis  -CS        Additional Documentation   Orthosis Location (Row)  -CS           Upper Extremity Orthosis Management    Type (Upper Extremity Orthosis)  R shoulder immobilizer sling  -CS        Therapeutic Indications (Upper Extremity Orthosis)  post-op positioning/protection;stabilization and support;pain management  -CS        Wearing Schedule (Upper Extremity Orthosis)  wear full time;remove for hygiene/bathing  -CS        Orthosis Training (Upper Extremity Orthosis)  patient and caregiver;all orthosis skills;able to verbalize training  -CS           Wound 11/18/19 1311 Right shoulder Incision    Wound - Properties Group Date first assessed: 11/18/19  - Time first assessed: 1311  -SH Side: Right  -SH Location: shoulder  -SH Primary Wound Type: Incision  -SH       Plan of Care Review    Plan of Care Reviewed With  patient  -CS           Clinical Impression (OT)    Date of Referral to OT  11/18/19  -        OT Diagnosis  Impaired ADLs and functional mobility  -CS        Patient/Family Goals Statement (OT Eval)  to go home  -CS        Criteria for Skilled Therapeutic Interventions Met (OT Eval)  no discharging from hospital  -CS        Therapy Frequency (OT Eval)  evaluation only  -CS        Care Plan Review (OT)  evaluation/treatment results reviewed  -CS        Care Plan Review, Other Participant (OT Eval)  sibling  -CS        Anticipated Discharge Disposition (OT)  home with assist  -CS           Vital Signs    Intra Systolic BP Rehab  105  -CS        Intra Treatment Diastolic BP  54  -CS           Living Environment    Home Accessibility  other (see comments);stairs to enter home walk in shower  -CS          User Key  (r) = Recorded By, (t) = Taken By, (c) = Cosigned By    Initials Name Effective Dates     Shelia Vasquez OTR/L, CNT 04/02/19 -      Gisel Jurado RN 08/02/16 -               OT Recommendation and Plan  Outcome Summary/Treatment Plan (OT)  Anticipated Discharge Disposition (OT): home with assist  Therapy Frequency (OT Eval):  evaluation only  Plan of Care Review  Plan of Care Reviewed With: patient  Plan of Care Reviewed With: patient  Outcome Summary: OT evaluation completed.  Pt demo no further need for inpatient skilled OT services as pt is discharging home with her family today.  Pt required min A for LB dressing, UB dressing with front open garment, mod-max A for adjusting shoulder immobilizer sling, and SBA/CGA for functional transfers.  Pt was unable to complete functional mobility during evaluation due to dizziness, light headedness, and likely drop in BP.  BP after standing and in sitting position 105/54.  Orthostatic hypotension safety education provided to pt regarding slow transitions and waiting prior to steping away from a surface she was sitting on.  Shoulder immobilizer sling adjusted and fit to pt's comfort and pt educated on wear/care and donning/doffing.  OT will sign off at this time as pt is discharging home with assit.         Outcome Measures     Row Name 11/19/19 0723             How much help from another is currently needed...    Putting on and taking off regular lower body clothing?  3  -CS      Bathing (including washing, rinsing, and drying)  3  -CS      Toileting (which includes using toilet bed pan or urinal)  3  -CS      Putting on and taking off regular upper body clothing  3  -CS      Taking care of personal grooming (such as brushing teeth)  3  -CS      Eating meals  3  -CS      AM-PAC 6 Clicks Score (OT)  18  -CS         Functional Assessment    Outcome Measure Options  AM-PAC 6 Clicks Daily Activity (OT)  -CS        User Key  (r) = Recorded By, (t) = Taken By, (c) = Cosigned By    Initials Name Provider Type    CS Shelia Vasquez, OTR/L, CNT Occupational Therapist          Time Calculation:   Time Calculation- OT     Row Name 11/19/19 1213             Time Calculation- OT    OT Start Time  0745  -CS      OT Stop Time  0840  -CS      OT Time Calculation (min)  55 min  -CS      OT Received On  11/19/19   -        User Key  (r) = Recorded By, (t) = Taken By, (c) = Cosigned By    Initials Name Provider Type    CS Shelia Vasquez OTR/L, IBAN Occupational Therapist        Therapy Suggested Charges     Code   Minutes Charges    None           Therapy Charges for Today     Code Description Service Date Service Provider Modifiers Qty    73123169191 HC OT EVAL MOD COMPLEXITY 4 11/19/2019 Shelia Vasquez OTR/L, CNT GO 1               OT Discharge Summary  Anticipated Discharge Disposition (OT): home with assist  Reason for Discharge: Discharge from facility  Outcomes Achieved: Discharge from facility occurred on same date as evluation  Discharge Destination: Home with assist    LELO Nguyen/L, CNT  11/19/2019

## 2019-11-19 NOTE — PLAN OF CARE
Problem: Patient Care Overview  Goal: Plan of Care Review  Outcome: Outcome(s) achieved Date Met: 11/19/19 11/19/19 1210   Coping/Psychosocial   Plan of Care Reviewed With patient   Plan of Care Review   Progress improving   OTHER   Outcome Summary OT evaluation completed. Pt demo no further need for inpatient skilled OT services as pt is discharging home with her family today. Pt required min A for LB dressing, UB dressing with front open garment, mod-max A for adjusting shoulder immobilizer sling, and SBA/CGA for functional transfers. Pt was unable to complete functional mobility during evaluation due to dizziness, light headedness, and likely drop in BP. BP after standing and in sitting position 105/54. Orthostatic hypotension safety education provided to pt regarding slow transitions and waiting prior to steping away from a surface she was sitting on. Shoulder immobilizer sling adjusted and fit to pt's comfort and pt educated on wear/care and donning/doffing. OT will sign off at this time as pt is discharging home with assit.

## 2020-11-26 ENCOUNTER — APPOINTMENT (OUTPATIENT)
Dept: CT IMAGING | Facility: HOSPITAL | Age: 65
End: 2020-11-26

## 2020-11-26 ENCOUNTER — APPOINTMENT (OUTPATIENT)
Dept: CARDIOLOGY | Facility: HOSPITAL | Age: 65
End: 2020-11-26

## 2020-11-26 ENCOUNTER — HOSPITAL ENCOUNTER (INPATIENT)
Facility: HOSPITAL | Age: 65
LOS: 3 days | Discharge: NURSING, REHAB OR LONG-TERM CARE | End: 2020-11-30
Attending: EMERGENCY MEDICINE | Admitting: FAMILY MEDICINE

## 2020-11-26 ENCOUNTER — APPOINTMENT (OUTPATIENT)
Dept: GENERAL RADIOLOGY | Facility: HOSPITAL | Age: 65
End: 2020-11-26

## 2020-11-26 DIAGNOSIS — G45.9 TIA (TRANSIENT ISCHEMIC ATTACK): Primary | ICD-10-CM

## 2020-11-26 DIAGNOSIS — R13.12 OROPHARYNGEAL DYSPHAGIA: ICD-10-CM

## 2020-11-26 DIAGNOSIS — R26.9 GAIT ABNORMALITY: ICD-10-CM

## 2020-11-26 DIAGNOSIS — Z78.9 DECREASED ACTIVITIES OF DAILY LIVING (ADL): ICD-10-CM

## 2020-11-26 DIAGNOSIS — R91.1 PULMONARY NODULE: ICD-10-CM

## 2020-11-26 DIAGNOSIS — Z74.09 IMPAIRED MOBILITY: ICD-10-CM

## 2020-11-26 LAB
ALBUMIN SERPL-MCNC: 4.2 G/DL (ref 3.5–5.2)
ALBUMIN/GLOB SERPL: 1.9 G/DL
ALP SERPL-CCNC: 99 U/L (ref 39–117)
ALT SERPL W P-5'-P-CCNC: 17 U/L (ref 1–33)
AMPHET+METHAMPHET UR QL: NEGATIVE
AMPHETAMINES UR QL: NEGATIVE
ANION GAP SERPL CALCULATED.3IONS-SCNC: 8 MMOL/L (ref 5–15)
APTT PPP: 24.7 SECONDS (ref 24.1–35)
AST SERPL-CCNC: 19 U/L (ref 1–32)
BACTERIA UR QL AUTO: ABNORMAL /HPF
BARBITURATES UR QL SCN: NEGATIVE
BASOPHILS # BLD AUTO: 0.07 10*3/MM3 (ref 0–0.2)
BASOPHILS NFR BLD AUTO: 0.7 % (ref 0–1.5)
BENZODIAZ UR QL SCN: NEGATIVE
BILIRUB SERPL-MCNC: 0.4 MG/DL (ref 0–1.2)
BILIRUB UR QL STRIP: NEGATIVE
BUN SERPL-MCNC: 15 MG/DL (ref 8–23)
BUN/CREAT SERPL: 22.7 (ref 7–25)
BUPRENORPHINE SERPL-MCNC: NEGATIVE NG/ML
CALCIUM SPEC-SCNC: 9.3 MG/DL (ref 8.6–10.5)
CANNABINOIDS SERPL QL: NEGATIVE
CHLORIDE SERPL-SCNC: 106 MMOL/L (ref 98–107)
CLARITY UR: CLEAR
CO2 SERPL-SCNC: 27 MMOL/L (ref 22–29)
COCAINE UR QL: NEGATIVE
COLOR UR: YELLOW
CREAT SERPL-MCNC: 0.66 MG/DL (ref 0.57–1)
DEPRECATED RDW RBC AUTO: 42.9 FL (ref 37–54)
EOSINOPHIL # BLD AUTO: 0.04 10*3/MM3 (ref 0–0.4)
EOSINOPHIL NFR BLD AUTO: 0.4 % (ref 0.3–6.2)
ERYTHROCYTE [DISTWIDTH] IN BLOOD BY AUTOMATED COUNT: 13.3 % (ref 12.3–15.4)
GFR SERPL CREATININE-BSD FRML MDRD: 90 ML/MIN/1.73
GLOBULIN UR ELPH-MCNC: 2.2 GM/DL
GLUCOSE SERPL-MCNC: 127 MG/DL (ref 65–99)
GLUCOSE UR STRIP-MCNC: NEGATIVE MG/DL
HCT VFR BLD AUTO: 41.5 % (ref 34–46.6)
HGB BLD-MCNC: 14 G/DL (ref 12–15.9)
HGB UR QL STRIP.AUTO: NEGATIVE
HYALINE CASTS UR QL AUTO: ABNORMAL /LPF
IMM GRANULOCYTES # BLD AUTO: 0.04 10*3/MM3 (ref 0–0.05)
IMM GRANULOCYTES NFR BLD AUTO: 0.4 % (ref 0–0.5)
INR PPP: 1 (ref 0.91–1.09)
KETONES UR QL STRIP: ABNORMAL
LEUKOCYTE ESTERASE UR QL STRIP.AUTO: ABNORMAL
LYMPHOCYTES # BLD AUTO: 1.08 10*3/MM3 (ref 0.7–3.1)
LYMPHOCYTES NFR BLD AUTO: 11.2 % (ref 19.6–45.3)
MCH RBC QN AUTO: 29.6 PG (ref 26.6–33)
MCHC RBC AUTO-ENTMCNC: 33.7 G/DL (ref 31.5–35.7)
MCV RBC AUTO: 87.7 FL (ref 79–97)
METHADONE UR QL SCN: NEGATIVE
MONOCYTES # BLD AUTO: 0.28 10*3/MM3 (ref 0.1–0.9)
MONOCYTES NFR BLD AUTO: 2.9 % (ref 5–12)
NEUTROPHILS NFR BLD AUTO: 8.11 10*3/MM3 (ref 1.7–7)
NEUTROPHILS NFR BLD AUTO: 84.4 % (ref 42.7–76)
NITRITE UR QL STRIP: NEGATIVE
NRBC BLD AUTO-RTO: 0 /100 WBC (ref 0–0.2)
OPIATES UR QL: POSITIVE
OXYCODONE UR QL SCN: NEGATIVE
PCP UR QL SCN: NEGATIVE
PH UR STRIP.AUTO: 5.5 [PH] (ref 5–8)
PLATELET # BLD AUTO: 283 10*3/MM3 (ref 140–450)
PMV BLD AUTO: 10.2 FL (ref 6–12)
POTASSIUM SERPL-SCNC: 4 MMOL/L (ref 3.5–5.2)
PROPOXYPH UR QL: NEGATIVE
PROT SERPL-MCNC: 6.4 G/DL (ref 6–8.5)
PROT UR QL STRIP: NEGATIVE
PROTHROMBIN TIME: 12.8 SECONDS (ref 11.9–14.6)
RBC # BLD AUTO: 4.73 10*6/MM3 (ref 3.77–5.28)
RBC # UR: ABNORMAL /HPF
REF LAB TEST METHOD: ABNORMAL
SARS-COV-2 RDRP RESP QL NAA+PROBE: NOT DETECTED
SODIUM SERPL-SCNC: 141 MMOL/L (ref 136–145)
SP GR UR STRIP: 1.02 (ref 1–1.03)
SQUAMOUS #/AREA URNS HPF: ABNORMAL /HPF
TRICYCLICS UR QL SCN: NEGATIVE
UROBILINOGEN UR QL STRIP: ABNORMAL
WBC # BLD AUTO: 9.62 10*3/MM3 (ref 3.4–10.8)
WBC UR QL AUTO: ABNORMAL /HPF

## 2020-11-26 PROCEDURE — 93306 TTE W/DOPPLER COMPLETE: CPT | Performed by: INTERNAL MEDICINE

## 2020-11-26 PROCEDURE — 80306 DRUG TEST PRSMV INSTRMNT: CPT | Performed by: PSYCHIATRY & NEUROLOGY

## 2020-11-26 PROCEDURE — 70450 CT HEAD/BRAIN W/O DYE: CPT

## 2020-11-26 PROCEDURE — 99285 EMERGENCY DEPT VISIT HI MDM: CPT

## 2020-11-26 PROCEDURE — G0378 HOSPITAL OBSERVATION PER HR: HCPCS

## 2020-11-26 PROCEDURE — 85025 COMPLETE CBC W/AUTO DIFF WBC: CPT | Performed by: EMERGENCY MEDICINE

## 2020-11-26 PROCEDURE — 81001 URINALYSIS AUTO W/SCOPE: CPT | Performed by: EMERGENCY MEDICINE

## 2020-11-26 PROCEDURE — 85610 PROTHROMBIN TIME: CPT | Performed by: EMERGENCY MEDICINE

## 2020-11-26 PROCEDURE — 85730 THROMBOPLASTIN TIME PARTIAL: CPT | Performed by: EMERGENCY MEDICINE

## 2020-11-26 PROCEDURE — 25010000002 PERFLUTREN 6.52 MG/ML SUSPENSION: Performed by: FAMILY MEDICINE

## 2020-11-26 PROCEDURE — 36415 COLL VENOUS BLD VENIPUNCTURE: CPT

## 2020-11-26 PROCEDURE — 25010000002 MORPHINE SULFATE (PF) 2 MG/ML SOLUTION: Performed by: FAMILY MEDICINE

## 2020-11-26 PROCEDURE — 99223 1ST HOSP IP/OBS HIGH 75: CPT | Performed by: PSYCHIATRY & NEUROLOGY

## 2020-11-26 PROCEDURE — 93010 ELECTROCARDIOGRAM REPORT: CPT | Performed by: INTERNAL MEDICINE

## 2020-11-26 PROCEDURE — 93306 TTE W/DOPPLER COMPLETE: CPT

## 2020-11-26 PROCEDURE — 80053 COMPREHEN METABOLIC PANEL: CPT | Performed by: EMERGENCY MEDICINE

## 2020-11-26 PROCEDURE — 71045 X-RAY EXAM CHEST 1 VIEW: CPT

## 2020-11-26 PROCEDURE — 93005 ELECTROCARDIOGRAM TRACING: CPT | Performed by: EMERGENCY MEDICINE

## 2020-11-26 PROCEDURE — 72125 CT NECK SPINE W/O DYE: CPT

## 2020-11-26 PROCEDURE — 99284 EMERGENCY DEPT VISIT MOD MDM: CPT

## 2020-11-26 PROCEDURE — 87635 SARS-COV-2 COVID-19 AMP PRB: CPT | Performed by: EMERGENCY MEDICINE

## 2020-11-26 RX ORDER — SODIUM CHLORIDE 0.9 % (FLUSH) 0.9 %
10 SYRINGE (ML) INJECTION AS NEEDED
Status: DISCONTINUED | OUTPATIENT
Start: 2020-11-26 | End: 2020-11-30 | Stop reason: HOSPADM

## 2020-11-26 RX ORDER — SODIUM CHLORIDE 0.9 % (FLUSH) 0.9 %
10 SYRINGE (ML) INJECTION AS NEEDED
Status: DISCONTINUED | OUTPATIENT
Start: 2020-11-26 | End: 2020-11-26

## 2020-11-26 RX ORDER — ASPIRIN 300 MG/1
300 SUPPOSITORY RECTAL DAILY
Status: DISCONTINUED | OUTPATIENT
Start: 2020-11-26 | End: 2020-11-30 | Stop reason: HOSPADM

## 2020-11-26 RX ORDER — ASPIRIN 81 MG/1
81 TABLET, CHEWABLE ORAL DAILY
Status: DISCONTINUED | OUTPATIENT
Start: 2020-11-26 | End: 2020-11-30 | Stop reason: HOSPADM

## 2020-11-26 RX ORDER — CYCLOBENZAPRINE HCL 10 MG
10 TABLET ORAL 3 TIMES DAILY PRN
Status: DISCONTINUED | OUTPATIENT
Start: 2020-11-26 | End: 2020-11-30 | Stop reason: HOSPADM

## 2020-11-26 RX ORDER — SODIUM CHLORIDE 0.9 % (FLUSH) 0.9 %
10 SYRINGE (ML) INJECTION EVERY 12 HOURS SCHEDULED
Status: DISCONTINUED | OUTPATIENT
Start: 2020-11-26 | End: 2020-11-30 | Stop reason: HOSPADM

## 2020-11-26 RX ORDER — ATORVASTATIN CALCIUM 40 MG/1
40 TABLET, FILM COATED ORAL NIGHTLY
Status: DISCONTINUED | OUTPATIENT
Start: 2020-11-26 | End: 2020-11-27

## 2020-11-26 RX ORDER — ONDANSETRON 2 MG/ML
4 INJECTION INTRAMUSCULAR; INTRAVENOUS EVERY 6 HOURS PRN
Status: DISCONTINUED | OUTPATIENT
Start: 2020-11-26 | End: 2020-11-30 | Stop reason: HOSPADM

## 2020-11-26 RX ORDER — FAMOTIDINE 20 MG/1
20 TABLET, FILM COATED ORAL 2 TIMES DAILY
Status: DISCONTINUED | OUTPATIENT
Start: 2020-11-26 | End: 2020-11-30 | Stop reason: HOSPADM

## 2020-11-26 RX ORDER — MORPHINE SULFATE 2 MG/ML
1 INJECTION, SOLUTION INTRAMUSCULAR; INTRAVENOUS EVERY 4 HOURS PRN
Status: DISCONTINUED | OUTPATIENT
Start: 2020-11-26 | End: 2020-11-30 | Stop reason: HOSPADM

## 2020-11-26 RX ORDER — SODIUM CHLORIDE 9 MG/ML
40 INJECTION, SOLUTION INTRAVENOUS CONTINUOUS
Status: DISCONTINUED | OUTPATIENT
Start: 2020-11-26 | End: 2020-11-29

## 2020-11-26 RX ADMIN — SODIUM CHLORIDE, PRESERVATIVE FREE 10 ML: 5 INJECTION INTRAVENOUS at 20:31

## 2020-11-26 RX ADMIN — SODIUM CHLORIDE, PRESERVATIVE FREE 10 ML: 5 INJECTION INTRAVENOUS at 14:17

## 2020-11-26 RX ADMIN — ASPIRIN 81 MG: 81 TABLET, CHEWABLE ORAL at 13:58

## 2020-11-26 RX ADMIN — CYCLOBENZAPRINE 10 MG: 10 TABLET, FILM COATED ORAL at 13:58

## 2020-11-26 RX ADMIN — PERFLUTREN 8.48 MG: 6.52 INJECTION, SUSPENSION INTRAVENOUS at 15:17

## 2020-11-26 RX ADMIN — SODIUM CHLORIDE 75 ML/HR: 9 INJECTION, SOLUTION INTRAVENOUS at 21:32

## 2020-11-26 RX ADMIN — MORPHINE SULFATE 1 MG: 2 INJECTION, SOLUTION INTRAMUSCULAR; INTRAVENOUS at 16:50

## 2020-11-27 ENCOUNTER — APPOINTMENT (OUTPATIENT)
Dept: GENERAL RADIOLOGY | Facility: HOSPITAL | Age: 65
End: 2020-11-27

## 2020-11-27 ENCOUNTER — APPOINTMENT (OUTPATIENT)
Dept: ULTRASOUND IMAGING | Facility: HOSPITAL | Age: 65
End: 2020-11-27

## 2020-11-27 ENCOUNTER — APPOINTMENT (OUTPATIENT)
Dept: MRI IMAGING | Facility: HOSPITAL | Age: 65
End: 2020-11-27

## 2020-11-27 PROBLEM — E78.49 OTHER HYPERLIPIDEMIA: Status: ACTIVE | Noted: 2020-11-27

## 2020-11-27 PROBLEM — R13.12 OROPHARYNGEAL DYSPHAGIA: Status: ACTIVE | Noted: 2020-11-27

## 2020-11-27 PROBLEM — Z72.0 TOBACCO USE: Status: ACTIVE | Noted: 2020-11-27

## 2020-11-27 LAB
ALBUMIN SERPL-MCNC: 3.9 G/DL (ref 3.5–5.2)
ALBUMIN/GLOB SERPL: 1.8 G/DL
ALP SERPL-CCNC: 91 U/L (ref 39–117)
ALT SERPL W P-5'-P-CCNC: 14 U/L (ref 1–33)
ANION GAP SERPL CALCULATED.3IONS-SCNC: 8 MMOL/L (ref 5–15)
AST SERPL-CCNC: 16 U/L (ref 1–32)
BH CV ECHO MEAS - AO MAX PG (FULL): -0.81 MMHG
BH CV ECHO MEAS - AO MAX PG: 8.1 MMHG
BH CV ECHO MEAS - AO MEAN PG (FULL): 0 MMHG
BH CV ECHO MEAS - AO MEAN PG: 4 MMHG
BH CV ECHO MEAS - AO ROOT AREA (BSA CORRECTED): 1.7
BH CV ECHO MEAS - AO ROOT AREA: 8 CM^2
BH CV ECHO MEAS - AO ROOT DIAM: 3.2 CM
BH CV ECHO MEAS - AO V2 MAX: 142 CM/SEC
BH CV ECHO MEAS - AO V2 MEAN: 96.7 CM/SEC
BH CV ECHO MEAS - AO V2 VTI: 27.4 CM
BH CV ECHO MEAS - AVA(I,A): 3.6 CM^2
BH CV ECHO MEAS - AVA(I,D): 3.6 CM^2
BH CV ECHO MEAS - AVA(V,A): 3.3 CM^2
BH CV ECHO MEAS - AVA(V,D): 3.3 CM^2
BH CV ECHO MEAS - BSA(HAYCOCK): 1.9 M^2
BH CV ECHO MEAS - BSA: 1.9 M^2
BH CV ECHO MEAS - BZI_BMI: 27.4 KILOGRAMS/M^2
BH CV ECHO MEAS - BZI_METRIC_HEIGHT: 167.6 CM
BH CV ECHO MEAS - BZI_METRIC_WEIGHT: 77.1 KG
BH CV ECHO MEAS - EDV(CUBED): 68.9 ML
BH CV ECHO MEAS - EDV(MOD-SP4): 123 ML
BH CV ECHO MEAS - EDV(TEICH): 74.2 ML
BH CV ECHO MEAS - EF(CUBED): 74.8 %
BH CV ECHO MEAS - EF(MOD-SP4): 85.3 %
BH CV ECHO MEAS - EF(TEICH): 67.2 %
BH CV ECHO MEAS - ESV(CUBED): 17.4 ML
BH CV ECHO MEAS - ESV(MOD-SP4): 18.1 ML
BH CV ECHO MEAS - ESV(TEICH): 24.4 ML
BH CV ECHO MEAS - FS: 36.8 %
BH CV ECHO MEAS - IVS/LVPW: 1.6
BH CV ECHO MEAS - IVSD: 1.3 CM
BH CV ECHO MEAS - LAT PEAK E' VEL: 9.6 CM/SEC
BH CV ECHO MEAS - LV DIASTOLIC VOL/BSA (35-75): 65.9 ML/M^2
BH CV ECHO MEAS - LV MASS(C)D: 146.3 GRAMS
BH CV ECHO MEAS - LV MASS(C)DI: 78.4 GRAMS/M^2
BH CV ECHO MEAS - LV MAX PG: 8.9 MMHG
BH CV ECHO MEAS - LV MEAN PG: 4 MMHG
BH CV ECHO MEAS - LV SYSTOLIC VOL/BSA (12-30): 9.7 ML/M^2
BH CV ECHO MEAS - LV V1 MAX: 149 CM/SEC
BH CV ECHO MEAS - LV V1 MEAN: 95.2 CM/SEC
BH CV ECHO MEAS - LV V1 VTI: 31.5 CM
BH CV ECHO MEAS - LVIDD: 4.1 CM
BH CV ECHO MEAS - LVIDS: 2.6 CM
BH CV ECHO MEAS - LVLD AP4: 8.1 CM
BH CV ECHO MEAS - LVLS AP4: 6 CM
BH CV ECHO MEAS - LVOT AREA (M): 3.1 CM^2
BH CV ECHO MEAS - LVOT AREA: 3.1 CM^2
BH CV ECHO MEAS - LVOT DIAM: 2 CM
BH CV ECHO MEAS - LVPWD: 0.84 CM
BH CV ECHO MEAS - MED PEAK E' VEL: 9.68 CM/SEC
BH CV ECHO MEAS - MV A MAX VEL: 79.1 CM/SEC
BH CV ECHO MEAS - MV DEC SLOPE: 226 CM/SEC^2
BH CV ECHO MEAS - MV DEC TIME: 0.28 SEC
BH CV ECHO MEAS - MV E MAX VEL: 64.5 CM/SEC
BH CV ECHO MEAS - MV E/A: 0.82
BH CV ECHO MEAS - RAP SYSTOLE: 5 MMHG
BH CV ECHO MEAS - RVSP: 14.5 MMHG
BH CV ECHO MEAS - SI(AO): 118.1 ML/M^2
BH CV ECHO MEAS - SI(CUBED): 27.6 ML/M^2
BH CV ECHO MEAS - SI(LVOT): 53 ML/M^2
BH CV ECHO MEAS - SI(MOD-SP4): 56.2 ML/M^2
BH CV ECHO MEAS - SI(TEICH): 26.7 ML/M^2
BH CV ECHO MEAS - SV(AO): 220.4 ML
BH CV ECHO MEAS - SV(CUBED): 51.5 ML
BH CV ECHO MEAS - SV(LVOT): 99 ML
BH CV ECHO MEAS - SV(MOD-SP4): 104.9 ML
BH CV ECHO MEAS - SV(TEICH): 49.9 ML
BH CV ECHO MEAS - TR MAX VEL: 154 CM/SEC
BH CV ECHO MEASUREMENTS AVERAGE E/E' RATIO: 6.69
BILIRUB SERPL-MCNC: 0.6 MG/DL (ref 0–1.2)
BUN SERPL-MCNC: 12 MG/DL (ref 8–23)
BUN/CREAT SERPL: 18.5 (ref 7–25)
CALCIUM SPEC-SCNC: 9 MG/DL (ref 8.6–10.5)
CHLORIDE SERPL-SCNC: 108 MMOL/L (ref 98–107)
CHOLEST SERPL-MCNC: 194 MG/DL (ref 0–200)
CO2 SERPL-SCNC: 27 MMOL/L (ref 22–29)
CREAT SERPL-MCNC: 0.65 MG/DL (ref 0.57–1)
DEPRECATED RDW RBC AUTO: 41.6 FL (ref 37–54)
ERYTHROCYTE [DISTWIDTH] IN BLOOD BY AUTOMATED COUNT: 13.3 % (ref 12.3–15.4)
GFR SERPL CREATININE-BSD FRML MDRD: 91 ML/MIN/1.73
GLOBULIN UR ELPH-MCNC: 2.2 GM/DL
GLUCOSE BLDC GLUCOMTR-MCNC: 113 MG/DL (ref 70–130)
GLUCOSE SERPL-MCNC: 102 MG/DL (ref 65–99)
HBA1C MFR BLD: 5.4 % (ref 4.8–5.6)
HCT VFR BLD AUTO: 38.7 % (ref 34–46.6)
HDLC SERPL-MCNC: 44 MG/DL (ref 40–60)
HGB BLD-MCNC: 13.3 G/DL (ref 12–15.9)
LDLC SERPL CALC-MCNC: 121 MG/DL (ref 0–100)
LDLC/HDLC SERPL: 2.68 {RATIO}
LEFT ATRIUM VOLUME INDEX: 17.2 ML/M2
LEFT ATRIUM VOLUME: 32.1 CM3
MAGNESIUM SERPL-MCNC: 2 MG/DL (ref 1.6–2.4)
MCH RBC QN AUTO: 29.4 PG (ref 26.6–33)
MCHC RBC AUTO-ENTMCNC: 34.4 G/DL (ref 31.5–35.7)
MCV RBC AUTO: 85.6 FL (ref 79–97)
PLATELET # BLD AUTO: 269 10*3/MM3 (ref 140–450)
PMV BLD AUTO: 10.5 FL (ref 6–12)
POTASSIUM SERPL-SCNC: 3.4 MMOL/L (ref 3.5–5.2)
PROT SERPL-MCNC: 6.1 G/DL (ref 6–8.5)
QT INTERVAL: 402 MS
QTC INTERVAL: 442 MS
RBC # BLD AUTO: 4.52 10*6/MM3 (ref 3.77–5.28)
SODIUM SERPL-SCNC: 143 MMOL/L (ref 136–145)
TRIGL SERPL-MCNC: 161 MG/DL (ref 0–150)
TSH SERPL DL<=0.05 MIU/L-ACNC: 1.32 UIU/ML (ref 0.27–4.2)
VLDLC SERPL-MCNC: 29 MG/DL (ref 5–40)
WBC # BLD AUTO: 7.45 10*3/MM3 (ref 3.4–10.8)

## 2020-11-27 PROCEDURE — G0378 HOSPITAL OBSERVATION PER HR: HCPCS

## 2020-11-27 PROCEDURE — 92612 ENDOSCOPY SWALLOW (FEES) VID: CPT | Performed by: SPEECH-LANGUAGE PATHOLOGIST

## 2020-11-27 PROCEDURE — 80061 LIPID PANEL: CPT | Performed by: FAMILY MEDICINE

## 2020-11-27 PROCEDURE — 83735 ASSAY OF MAGNESIUM: CPT | Performed by: FAMILY MEDICINE

## 2020-11-27 PROCEDURE — 99232 SBSQ HOSP IP/OBS MODERATE 35: CPT | Performed by: PSYCHIATRY & NEUROLOGY

## 2020-11-27 PROCEDURE — 93880 EXTRACRANIAL BILAT STUDY: CPT | Performed by: SURGERY

## 2020-11-27 PROCEDURE — 93880 EXTRACRANIAL BILAT STUDY: CPT

## 2020-11-27 PROCEDURE — 80053 COMPREHEN METABOLIC PANEL: CPT | Performed by: FAMILY MEDICINE

## 2020-11-27 PROCEDURE — 84443 ASSAY THYROID STIM HORMONE: CPT | Performed by: FAMILY MEDICINE

## 2020-11-27 PROCEDURE — 83036 HEMOGLOBIN GLYCOSYLATED A1C: CPT | Performed by: FAMILY MEDICINE

## 2020-11-27 PROCEDURE — 97166 OT EVAL MOD COMPLEX 45 MIN: CPT

## 2020-11-27 PROCEDURE — 85027 COMPLETE CBC AUTOMATED: CPT | Performed by: FAMILY MEDICINE

## 2020-11-27 PROCEDURE — 82962 GLUCOSE BLOOD TEST: CPT

## 2020-11-27 PROCEDURE — 92610 EVALUATE SWALLOWING FUNCTION: CPT | Performed by: SPEECH-LANGUAGE PATHOLOGIST

## 2020-11-27 PROCEDURE — 70551 MRI BRAIN STEM W/O DYE: CPT

## 2020-11-27 RX ORDER — POTASSIUM CHLORIDE 750 MG/1
40 CAPSULE, EXTENDED RELEASE ORAL 2 TIMES DAILY
Status: COMPLETED | OUTPATIENT
Start: 2020-11-27 | End: 2020-11-27

## 2020-11-27 RX ORDER — ATORVASTATIN CALCIUM 40 MG/1
80 TABLET, FILM COATED ORAL NIGHTLY
Status: DISCONTINUED | OUTPATIENT
Start: 2020-11-27 | End: 2020-11-30 | Stop reason: HOSPADM

## 2020-11-27 RX ADMIN — POTASSIUM CHLORIDE 40 MEQ: 750 CAPSULE, EXTENDED RELEASE ORAL at 21:04

## 2020-11-27 RX ADMIN — FAMOTIDINE 20 MG: 20 TABLET, FILM COATED ORAL at 21:04

## 2020-11-27 RX ADMIN — ATORVASTATIN CALCIUM 80 MG: 40 TABLET, FILM COATED ORAL at 21:04

## 2020-11-27 RX ADMIN — POTASSIUM CHLORIDE 40 MEQ: 750 CAPSULE, EXTENDED RELEASE ORAL at 12:00

## 2020-11-27 RX ADMIN — SODIUM CHLORIDE, PRESERVATIVE FREE 10 ML: 5 INJECTION INTRAVENOUS at 21:04

## 2020-11-27 RX ADMIN — ASPIRIN 81 MG: 81 TABLET, CHEWABLE ORAL at 12:00

## 2020-11-28 ENCOUNTER — APPOINTMENT (OUTPATIENT)
Dept: CT IMAGING | Facility: HOSPITAL | Age: 65
End: 2020-11-28

## 2020-11-28 PROBLEM — R13.12 OROPHARYNGEAL DYSPHAGIA: Chronic | Status: ACTIVE | Noted: 2020-11-27

## 2020-11-28 PROBLEM — G45.9 TIA (TRANSIENT ISCHEMIC ATTACK): Chronic | Status: ACTIVE | Noted: 2020-11-26

## 2020-11-28 PROBLEM — Z72.0 TOBACCO USE: Chronic | Status: ACTIVE | Noted: 2020-11-27

## 2020-11-28 PROBLEM — E78.49 OTHER HYPERLIPIDEMIA: Chronic | Status: ACTIVE | Noted: 2020-11-27

## 2020-11-28 LAB
ALBUMIN SERPL-MCNC: 4.1 G/DL (ref 3.5–5.2)
ALBUMIN/GLOB SERPL: 2 G/DL
ALP SERPL-CCNC: 88 U/L (ref 39–117)
ALT SERPL W P-5'-P-CCNC: 13 U/L (ref 1–33)
ANION GAP SERPL CALCULATED.3IONS-SCNC: 8 MMOL/L (ref 5–15)
AST SERPL-CCNC: 22 U/L (ref 1–32)
BASOPHILS # BLD AUTO: 0.04 10*3/MM3 (ref 0–0.2)
BASOPHILS NFR BLD AUTO: 0.6 % (ref 0–1.5)
BILIRUB SERPL-MCNC: 0.8 MG/DL (ref 0–1.2)
BUN SERPL-MCNC: 12 MG/DL (ref 8–23)
BUN/CREAT SERPL: 20.7 (ref 7–25)
CALCIUM SPEC-SCNC: 9.1 MG/DL (ref 8.6–10.5)
CHLORIDE SERPL-SCNC: 111 MMOL/L (ref 98–107)
CO2 SERPL-SCNC: 25 MMOL/L (ref 22–29)
CREAT SERPL-MCNC: 0.58 MG/DL (ref 0.57–1)
CRP SERPL-MCNC: 0.31 MG/DL (ref 0–0.5)
DEPRECATED RDW RBC AUTO: 41.7 FL (ref 37–54)
EOSINOPHIL # BLD AUTO: 0.16 10*3/MM3 (ref 0–0.4)
EOSINOPHIL NFR BLD AUTO: 2.5 % (ref 0.3–6.2)
ERYTHROCYTE [DISTWIDTH] IN BLOOD BY AUTOMATED COUNT: 13.4 % (ref 12.3–15.4)
ERYTHROCYTE [SEDIMENTATION RATE] IN BLOOD: 2 MM/HR (ref 0–20)
GFR SERPL CREATININE-BSD FRML MDRD: 104 ML/MIN/1.73
GLOBULIN UR ELPH-MCNC: 2.1 GM/DL
GLUCOSE SERPL-MCNC: 108 MG/DL (ref 65–99)
HCT VFR BLD AUTO: 39.5 % (ref 34–46.6)
HGB BLD-MCNC: 13.5 G/DL (ref 12–15.9)
IMM GRANULOCYTES # BLD AUTO: 0.01 10*3/MM3 (ref 0–0.05)
IMM GRANULOCYTES NFR BLD AUTO: 0.2 % (ref 0–0.5)
LYMPHOCYTES # BLD AUTO: 1.96 10*3/MM3 (ref 0.7–3.1)
LYMPHOCYTES NFR BLD AUTO: 30.6 % (ref 19.6–45.3)
MCH RBC QN AUTO: 29.6 PG (ref 26.6–33)
MCHC RBC AUTO-ENTMCNC: 34.2 G/DL (ref 31.5–35.7)
MCV RBC AUTO: 86.6 FL (ref 79–97)
MONOCYTES # BLD AUTO: 0.47 10*3/MM3 (ref 0.1–0.9)
MONOCYTES NFR BLD AUTO: 7.3 % (ref 5–12)
NEUTROPHILS NFR BLD AUTO: 3.76 10*3/MM3 (ref 1.7–7)
NEUTROPHILS NFR BLD AUTO: 58.8 % (ref 42.7–76)
NRBC BLD AUTO-RTO: 0 /100 WBC (ref 0–0.2)
PLATELET # BLD AUTO: 271 10*3/MM3 (ref 140–450)
PMV BLD AUTO: 10.5 FL (ref 6–12)
POTASSIUM SERPL-SCNC: 4.2 MMOL/L (ref 3.5–5.2)
PROT SERPL-MCNC: 6.2 G/DL (ref 6–8.5)
RBC # BLD AUTO: 4.56 10*6/MM3 (ref 3.77–5.28)
SODIUM SERPL-SCNC: 144 MMOL/L (ref 136–145)
WBC # BLD AUTO: 6.4 10*3/MM3 (ref 3.4–10.8)

## 2020-11-28 PROCEDURE — 85025 COMPLETE CBC W/AUTO DIFF WBC: CPT | Performed by: FAMILY MEDICINE

## 2020-11-28 PROCEDURE — 97110 THERAPEUTIC EXERCISES: CPT

## 2020-11-28 PROCEDURE — 86140 C-REACTIVE PROTEIN: CPT | Performed by: PSYCHIATRY & NEUROLOGY

## 2020-11-28 PROCEDURE — 80053 COMPREHEN METABOLIC PANEL: CPT | Performed by: FAMILY MEDICINE

## 2020-11-28 PROCEDURE — 97162 PT EVAL MOD COMPLEX 30 MIN: CPT

## 2020-11-28 PROCEDURE — 99233 SBSQ HOSP IP/OBS HIGH 50: CPT | Performed by: PSYCHIATRY & NEUROLOGY

## 2020-11-28 PROCEDURE — 70498 CT ANGIOGRAPHY NECK: CPT

## 2020-11-28 PROCEDURE — 70450 CT HEAD/BRAIN W/O DYE: CPT

## 2020-11-28 PROCEDURE — 97530 THERAPEUTIC ACTIVITIES: CPT

## 2020-11-28 PROCEDURE — 70496 CT ANGIOGRAPHY HEAD: CPT

## 2020-11-28 PROCEDURE — 99233 SBSQ HOSP IP/OBS HIGH 50: CPT | Performed by: FAMILY MEDICINE

## 2020-11-28 PROCEDURE — 0 IOPAMIDOL PER 1 ML: Performed by: FAMILY MEDICINE

## 2020-11-28 PROCEDURE — 85651 RBC SED RATE NONAUTOMATED: CPT | Performed by: PSYCHIATRY & NEUROLOGY

## 2020-11-28 RX ORDER — SODIUM CHLORIDE 9 MG/ML
75 INJECTION, SOLUTION INTRAVENOUS CONTINUOUS
Status: DISCONTINUED | OUTPATIENT
Start: 2020-11-28 | End: 2020-11-29

## 2020-11-28 RX ORDER — LEVETIRACETAM 5 MG/ML
500 INJECTION INTRAVASCULAR EVERY 12 HOURS SCHEDULED
Status: DISCONTINUED | OUTPATIENT
Start: 2020-11-28 | End: 2020-11-28

## 2020-11-28 RX ORDER — CLOPIDOGREL BISULFATE 75 MG/1
75 TABLET ORAL DAILY
Status: DISCONTINUED | OUTPATIENT
Start: 2020-11-28 | End: 2020-11-30 | Stop reason: HOSPADM

## 2020-11-28 RX ADMIN — SODIUM CHLORIDE 75 ML/HR: 9 INJECTION, SOLUTION INTRAVENOUS at 16:18

## 2020-11-28 RX ADMIN — CLOPIDOGREL BISULFATE 75 MG: 75 TABLET, FILM COATED ORAL at 16:18

## 2020-11-28 RX ADMIN — FAMOTIDINE 20 MG: 20 TABLET, FILM COATED ORAL at 09:27

## 2020-11-28 RX ADMIN — FAMOTIDINE 20 MG: 20 TABLET, FILM COATED ORAL at 20:16

## 2020-11-28 RX ADMIN — ASPIRIN 81 MG: 81 TABLET, CHEWABLE ORAL at 09:27

## 2020-11-28 RX ADMIN — ATORVASTATIN CALCIUM 80 MG: 40 TABLET, FILM COATED ORAL at 20:16

## 2020-11-28 RX ADMIN — SODIUM CHLORIDE, PRESERVATIVE FREE 10 ML: 5 INJECTION INTRAVENOUS at 09:27

## 2020-11-28 RX ADMIN — IOPAMIDOL 100 ML: 755 INJECTION, SOLUTION INTRAVENOUS at 16:15

## 2020-11-29 ENCOUNTER — APPOINTMENT (OUTPATIENT)
Dept: NEUROLOGY | Facility: HOSPITAL | Age: 65
End: 2020-11-29

## 2020-11-29 PROBLEM — I63.9 LEFT PONTINE STROKE (HCC): Status: ACTIVE | Noted: 2020-11-29

## 2020-11-29 PROBLEM — I63.50 RIGHT PONTINE STROKE (HCC): Status: ACTIVE | Noted: 2020-11-29

## 2020-11-29 PROBLEM — G45.9 TIA (TRANSIENT ISCHEMIC ATTACK): Chronic | Status: RESOLVED | Noted: 2020-11-26 | Resolved: 2020-11-29

## 2020-11-29 LAB
ANION GAP SERPL CALCULATED.3IONS-SCNC: 9 MMOL/L (ref 5–15)
BUN SERPL-MCNC: 13 MG/DL (ref 8–23)
BUN/CREAT SERPL: 21 (ref 7–25)
CALCIUM SPEC-SCNC: 9.3 MG/DL (ref 8.6–10.5)
CHLORIDE SERPL-SCNC: 108 MMOL/L (ref 98–107)
CO2 SERPL-SCNC: 26 MMOL/L (ref 22–29)
CREAT SERPL-MCNC: 0.62 MG/DL (ref 0.57–1)
GFR SERPL CREATININE-BSD FRML MDRD: 96 ML/MIN/1.73
GLUCOSE SERPL-MCNC: 95 MG/DL (ref 65–99)
POTASSIUM SERPL-SCNC: 3.9 MMOL/L (ref 3.5–5.2)
SODIUM SERPL-SCNC: 143 MMOL/L (ref 136–145)

## 2020-11-29 PROCEDURE — 80048 BASIC METABOLIC PNL TOTAL CA: CPT | Performed by: FAMILY MEDICINE

## 2020-11-29 PROCEDURE — 95819 EEG AWAKE AND ASLEEP: CPT

## 2020-11-29 PROCEDURE — 95819 EEG AWAKE AND ASLEEP: CPT | Performed by: PSYCHIATRY & NEUROLOGY

## 2020-11-29 PROCEDURE — 97530 THERAPEUTIC ACTIVITIES: CPT

## 2020-11-29 PROCEDURE — 97535 SELF CARE MNGMENT TRAINING: CPT

## 2020-11-29 RX ADMIN — ATORVASTATIN CALCIUM 80 MG: 40 TABLET, FILM COATED ORAL at 21:28

## 2020-11-29 RX ADMIN — CLOPIDOGREL BISULFATE 75 MG: 75 TABLET, FILM COATED ORAL at 09:13

## 2020-11-29 RX ADMIN — FAMOTIDINE 20 MG: 20 TABLET, FILM COATED ORAL at 21:28

## 2020-11-29 RX ADMIN — FAMOTIDINE 20 MG: 20 TABLET, FILM COATED ORAL at 09:13

## 2020-11-29 RX ADMIN — SODIUM CHLORIDE, PRESERVATIVE FREE 10 ML: 5 INJECTION INTRAVENOUS at 09:13

## 2020-11-29 RX ADMIN — SODIUM CHLORIDE, PRESERVATIVE FREE 10 ML: 5 INJECTION INTRAVENOUS at 21:28

## 2020-11-29 RX ADMIN — ASPIRIN 81 MG: 81 TABLET, CHEWABLE ORAL at 09:13

## 2020-11-29 RX ADMIN — SODIUM CHLORIDE 75 ML/HR: 9 INJECTION, SOLUTION INTRAVENOUS at 05:21

## 2020-11-30 ENCOUNTER — HOSPITAL ENCOUNTER (INPATIENT)
Age: 65
LOS: 17 days | Discharge: HOME HEALTH | DRG: 057 | End: 2020-12-17
Attending: PSYCHIATRY & NEUROLOGY | Admitting: PSYCHIATRY & NEUROLOGY
Payer: MEDICARE

## 2020-11-30 VITALS
RESPIRATION RATE: 16 BRPM | DIASTOLIC BLOOD PRESSURE: 70 MMHG | BODY MASS INDEX: 27.32 KG/M2 | HEIGHT: 66 IN | OXYGEN SATURATION: 100 % | TEMPERATURE: 98.2 F | SYSTOLIC BLOOD PRESSURE: 114 MMHG | WEIGHT: 169.97 LBS | HEART RATE: 78 BPM

## 2020-11-30 PROBLEM — I63.9 ACUTE ISCHEMIC STROKE (HCC): Status: ACTIVE | Noted: 2020-11-30

## 2020-11-30 PROBLEM — R53.1 WEAKNESS: Status: ACTIVE | Noted: 2020-11-30

## 2020-11-30 PROBLEM — I69.391 DYSPHAGIA DUE TO RECENT CEREBRAL INFARCTION: Status: ACTIVE | Noted: 2020-11-30

## 2020-11-30 PROBLEM — R91.1 PULMONARY NODULE: Status: ACTIVE | Noted: 2020-11-30

## 2020-11-30 LAB
ANION GAP SERPL CALCULATED.3IONS-SCNC: 8 MMOL/L (ref 5–15)
BUN SERPL-MCNC: 12 MG/DL (ref 8–23)
BUN/CREAT SERPL: 19.7 (ref 7–25)
CALCIUM SPEC-SCNC: 9.3 MG/DL (ref 8.6–10.5)
CHLORIDE SERPL-SCNC: 106 MMOL/L (ref 98–107)
CO2 SERPL-SCNC: 28 MMOL/L (ref 22–29)
CREAT SERPL-MCNC: 0.61 MG/DL (ref 0.57–1)
GFR SERPL CREATININE-BSD FRML MDRD: 98 ML/MIN/1.73
GLUCOSE SERPL-MCNC: 100 MG/DL (ref 65–99)
POTASSIUM SERPL-SCNC: 4 MMOL/L (ref 3.5–5.2)
SARS-COV-2 RNA PNL SPEC NAA+PROBE: NOT DETECTED
SODIUM SERPL-SCNC: 142 MMOL/L (ref 136–145)

## 2020-11-30 PROCEDURE — 80048 BASIC METABOLIC PNL TOTAL CA: CPT | Performed by: FAMILY MEDICINE

## 2020-11-30 PROCEDURE — 99232 SBSQ HOSP IP/OBS MODERATE 35: CPT | Performed by: CLINICAL NURSE SPECIALIST

## 2020-11-30 PROCEDURE — 92526 ORAL FUNCTION THERAPY: CPT | Performed by: SPEECH-LANGUAGE PATHOLOGIST

## 2020-11-30 PROCEDURE — 97530 THERAPEUTIC ACTIVITIES: CPT

## 2020-11-30 PROCEDURE — 97110 THERAPEUTIC EXERCISES: CPT

## 2020-11-30 PROCEDURE — 1180000000 HC REHAB R&B

## 2020-11-30 PROCEDURE — 87635 SARS-COV-2 COVID-19 AMP PRB: CPT | Performed by: INTERNAL MEDICINE

## 2020-11-30 PROCEDURE — 6370000000 HC RX 637 (ALT 250 FOR IP): Performed by: PSYCHIATRY & NEUROLOGY

## 2020-11-30 PROCEDURE — 6360000002 HC RX W HCPCS: Performed by: PSYCHIATRY & NEUROLOGY

## 2020-11-30 PROCEDURE — 97535 SELF CARE MNGMENT TRAINING: CPT

## 2020-11-30 RX ORDER — CYCLOBENZAPRINE HCL 10 MG
10 TABLET ORAL 3 TIMES DAILY PRN
Status: ON HOLD | COMMUNITY
End: 2020-12-16 | Stop reason: HOSPADM

## 2020-11-30 RX ORDER — POLYETHYLENE GLYCOL 3350 17 G/17G
17 POWDER, FOR SOLUTION ORAL DAILY PRN
Status: DISCONTINUED | OUTPATIENT
Start: 2020-11-30 | End: 2020-12-17 | Stop reason: HOSPADM

## 2020-11-30 RX ORDER — CLOPIDOGREL BISULFATE 75 MG/1
75 TABLET ORAL DAILY
Qty: 30 TABLET
Start: 2020-12-01

## 2020-11-30 RX ORDER — ATORVASTATIN CALCIUM 80 MG/1
80 TABLET, FILM COATED ORAL NIGHTLY
Status: DISCONTINUED | OUTPATIENT
Start: 2020-11-30 | End: 2020-12-17 | Stop reason: HOSPADM

## 2020-11-30 RX ORDER — CLOPIDOGREL BISULFATE 75 MG/1
75 TABLET ORAL DAILY
Status: DISCONTINUED | OUTPATIENT
Start: 2020-12-01 | End: 2020-12-17 | Stop reason: HOSPADM

## 2020-11-30 RX ORDER — CLOPIDOGREL BISULFATE 75 MG/1
75 TABLET ORAL DAILY
Status: ON HOLD | COMMUNITY
Start: 2020-12-01 | End: 2020-12-16 | Stop reason: SDUPTHER

## 2020-11-30 RX ORDER — ATORVASTATIN CALCIUM 80 MG/1
80 TABLET, FILM COATED ORAL NIGHTLY
Status: ON HOLD | COMMUNITY
Start: 2020-11-30 | End: 2020-12-16 | Stop reason: SDUPTHER

## 2020-11-30 RX ORDER — ASPIRIN 81 MG/1
81 TABLET, CHEWABLE ORAL DAILY
Start: 2020-12-01

## 2020-11-30 RX ORDER — CYCLOBENZAPRINE HCL 10 MG
10 TABLET ORAL 3 TIMES DAILY PRN
Qty: 9 TABLET | Refills: 0 | Status: SHIPPED | OUTPATIENT
Start: 2020-11-30

## 2020-11-30 RX ORDER — ATORVASTATIN CALCIUM 80 MG/1
80 TABLET, FILM COATED ORAL NIGHTLY
Start: 2020-11-30

## 2020-11-30 RX ORDER — ASPIRIN 81 MG/1
81 TABLET, CHEWABLE ORAL DAILY
Status: DISCONTINUED | OUTPATIENT
Start: 2020-12-01 | End: 2020-12-17 | Stop reason: HOSPADM

## 2020-11-30 RX ORDER — ACETAMINOPHEN 325 MG/1
650 TABLET ORAL EVERY 4 HOURS PRN
Status: DISCONTINUED | OUTPATIENT
Start: 2020-11-30 | End: 2020-12-17 | Stop reason: HOSPADM

## 2020-11-30 RX ORDER — ASPIRIN 81 MG/1
81 TABLET, CHEWABLE ORAL DAILY
Status: ON HOLD | COMMUNITY
Start: 2020-12-01 | End: 2020-12-16 | Stop reason: SDUPTHER

## 2020-11-30 RX ORDER — CYCLOBENZAPRINE HCL 10 MG
10 TABLET ORAL 3 TIMES DAILY PRN
Status: DISCONTINUED | OUTPATIENT
Start: 2020-11-30 | End: 2020-12-17 | Stop reason: HOSPADM

## 2020-11-30 RX ADMIN — CLOPIDOGREL BISULFATE 75 MG: 75 TABLET, FILM COATED ORAL at 09:04

## 2020-11-30 RX ADMIN — SODIUM CHLORIDE, PRESERVATIVE FREE 10 ML: 5 INJECTION INTRAVENOUS at 09:06

## 2020-11-30 RX ADMIN — ENOXAPARIN SODIUM 40 MG: 40 INJECTION SUBCUTANEOUS at 20:46

## 2020-11-30 RX ADMIN — CYCLOBENZAPRINE 10 MG: 10 TABLET, FILM COATED ORAL at 03:14

## 2020-11-30 RX ADMIN — ASPIRIN 81 MG: 81 TABLET, CHEWABLE ORAL at 09:04

## 2020-11-30 RX ADMIN — FAMOTIDINE 20 MG: 20 TABLET, FILM COATED ORAL at 09:04

## 2020-11-30 RX ADMIN — ATORVASTATIN CALCIUM 80 MG: 80 TABLET, FILM COATED ORAL at 20:46

## 2020-11-30 ASSESSMENT — PAIN SCALES - GENERAL: PAINLEVEL_OUTOF10: 0

## 2020-12-01 PROBLEM — F17.200 SMOKER: Status: ACTIVE | Noted: 2020-12-01

## 2020-12-01 PROBLEM — M25.511 CHRONIC RIGHT SHOULDER PAIN: Status: ACTIVE | Noted: 2020-12-01

## 2020-12-01 PROBLEM — G89.29 CHRONIC RIGHT SHOULDER PAIN: Status: ACTIVE | Noted: 2020-12-01

## 2020-12-01 PROBLEM — R26.9 GAIT ABNORMALITY: Status: ACTIVE | Noted: 2020-12-01

## 2020-12-01 PROBLEM — R47.1 DYSARTHRIA: Status: ACTIVE | Noted: 2020-12-01

## 2020-12-01 LAB
ALBUMIN SERPL-MCNC: 4.1 G/DL (ref 3.5–5.2)
ALP BLD-CCNC: 89 U/L (ref 35–104)
ALT SERPL-CCNC: 11 U/L (ref 5–33)
ANION GAP SERPL CALCULATED.3IONS-SCNC: 10 MMOL/L (ref 7–19)
AST SERPL-CCNC: 15 U/L (ref 5–32)
BACTERIA: NEGATIVE /HPF
BILIRUB SERPL-MCNC: 0.7 MG/DL (ref 0.2–1.2)
BILIRUBIN URINE: NEGATIVE
BLOOD, URINE: NEGATIVE
BUN BLDV-MCNC: 13 MG/DL (ref 8–23)
CALCIUM SERPL-MCNC: 9.4 MG/DL (ref 8.8–10.2)
CHLORIDE BLD-SCNC: 105 MMOL/L (ref 98–111)
CLARITY: CLEAR
CO2: 27 MMOL/L (ref 22–29)
COLOR: YELLOW
CREAT SERPL-MCNC: 0.7 MG/DL (ref 0.5–0.9)
CRYSTALS, UA: ABNORMAL /HPF
EPITHELIAL CELLS, UA: 2 /HPF (ref 0–5)
GFR AFRICAN AMERICAN: >59
GFR NON-AFRICAN AMERICAN: >60
GLUCOSE BLD-MCNC: 95 MG/DL (ref 74–109)
GLUCOSE URINE: NEGATIVE MG/DL
HCT VFR BLD CALC: 41.4 % (ref 37–47)
HEMOGLOBIN: 14 G/DL (ref 12–16)
HYALINE CASTS: 2 /HPF (ref 0–8)
INR BLD: 1.04 (ref 0.88–1.18)
KETONES, URINE: NEGATIVE MG/DL
LEUKOCYTE ESTERASE, URINE: ABNORMAL
MCH RBC QN AUTO: 30 PG (ref 27–31)
MCHC RBC AUTO-ENTMCNC: 33.8 G/DL (ref 33–37)
MCV RBC AUTO: 88.7 FL (ref 81–99)
NITRITE, URINE: NEGATIVE
PDW BLD-RTO: 13.3 % (ref 11.5–14.5)
PH UA: 5 (ref 5–8)
PLATELET # BLD: 259 K/UL (ref 130–400)
PMV BLD AUTO: 10.1 FL (ref 9.4–12.3)
POTASSIUM SERPL-SCNC: 3.6 MMOL/L (ref 3.5–5)
PREALBUMIN: 19 MG/DL (ref 20–40)
PREALBUMIN: 19 MG/DL (ref 20–40)
PROTEIN UA: NEGATIVE MG/DL
PROTHROMBIN TIME: 13.5 SEC (ref 12–14.6)
RBC # BLD: 4.67 M/UL (ref 4.2–5.4)
RBC UA: 2 /HPF (ref 0–4)
SODIUM BLD-SCNC: 142 MMOL/L (ref 136–145)
SPECIFIC GRAVITY UA: 1.02 (ref 1–1.03)
TOTAL PROTEIN: 6.2 G/DL (ref 6.6–8.7)
TSH REFLEX FT4: 3.47 UIU/ML (ref 0.35–5.5)
UROBILINOGEN, URINE: 1 E.U./DL
VITAMIN B-12: 255 PG/ML (ref 211–946)
WBC # BLD: 6.3 K/UL (ref 4.8–10.8)
WBC UA: 10 /HPF (ref 0–5)

## 2020-12-01 PROCEDURE — 81001 URINALYSIS AUTO W/SCOPE: CPT

## 2020-12-01 PROCEDURE — 87086 URINE CULTURE/COLONY COUNT: CPT

## 2020-12-01 PROCEDURE — 97535 SELF CARE MNGMENT TRAINING: CPT

## 2020-12-01 PROCEDURE — 92610 EVALUATE SWALLOWING FUNCTION: CPT

## 2020-12-01 PROCEDURE — 84134 ASSAY OF PREALBUMIN: CPT

## 2020-12-01 PROCEDURE — 84443 ASSAY THYROID STIM HORMONE: CPT

## 2020-12-01 PROCEDURE — 6370000000 HC RX 637 (ALT 250 FOR IP): Performed by: PSYCHIATRY & NEUROLOGY

## 2020-12-01 PROCEDURE — 97116 GAIT TRAINING THERAPY: CPT

## 2020-12-01 PROCEDURE — 85610 PROTHROMBIN TIME: CPT

## 2020-12-01 PROCEDURE — 99223 1ST HOSP IP/OBS HIGH 75: CPT | Performed by: PSYCHIATRY & NEUROLOGY

## 2020-12-01 PROCEDURE — 85027 COMPLETE CBC AUTOMATED: CPT

## 2020-12-01 PROCEDURE — 82607 VITAMIN B-12: CPT

## 2020-12-01 PROCEDURE — 80053 COMPREHEN METABOLIC PANEL: CPT

## 2020-12-01 PROCEDURE — 97161 PT EVAL LOW COMPLEX 20 MIN: CPT

## 2020-12-01 PROCEDURE — 92526 ORAL FUNCTION THERAPY: CPT

## 2020-12-01 PROCEDURE — 92522 EVALUATE SPEECH PRODUCTION: CPT

## 2020-12-01 PROCEDURE — 97110 THERAPEUTIC EXERCISES: CPT

## 2020-12-01 PROCEDURE — 1180000000 HC REHAB R&B

## 2020-12-01 PROCEDURE — 36415 COLL VENOUS BLD VENIPUNCTURE: CPT

## 2020-12-01 PROCEDURE — 6360000002 HC RX W HCPCS: Performed by: PSYCHIATRY & NEUROLOGY

## 2020-12-01 PROCEDURE — 97165 OT EVAL LOW COMPLEX 30 MIN: CPT

## 2020-12-01 RX ADMIN — ENOXAPARIN SODIUM 40 MG: 40 INJECTION SUBCUTANEOUS at 20:47

## 2020-12-01 RX ADMIN — ASPIRIN 81 MG: 81 TABLET, CHEWABLE ORAL at 07:45

## 2020-12-01 RX ADMIN — ATORVASTATIN CALCIUM 80 MG: 80 TABLET, FILM COATED ORAL at 20:47

## 2020-12-01 RX ADMIN — CLOPIDOGREL BISULFATE 75 MG: 75 TABLET ORAL at 07:45

## 2020-12-01 ASSESSMENT — PAIN SCALES - GENERAL
PAINLEVEL_OUTOF10: 0

## 2020-12-01 NOTE — PLAN OF CARE
Problem: Falls - Risk of:  Goal: Will remain free from falls  Description: Will remain free from falls  11/30/2020 2323 by Lester Cancino RN  Outcome: Ongoing  11/30/2020 2002 by Lester Cancino RN  Outcome: Ongoing  Goal: Absence of physical injury  Description: Absence of physical injury  11/30/2020 2323 by Lester Cancino RN  Outcome: Ongoing  11/30/2020 2002 by Lester Cancino RN  Outcome: Ongoing

## 2020-12-01 NOTE — PLAN OF CARE
[address] TREATMENT PLAN      Kelechi Brown    : 1955  Acct #: [de-identified]  MRN: [id number]   PHYSICIAN:  Darren Saavedra MD  Primary Problem    Patient Active Problem List   Diagnosis    Acute ischemic stroke ([facility] [address]    Weakness    Dysphagia due to recent cerebral infarction    Dysarthria    Chronic right shoulder pain    Smoker    Gait abnormality       Rehabilitation Diagnosis:     Acute ischemic stroke ([facility] [address] [I63.9]       ADMIT DATE:2020   CARE PLAN     NURSING:  Naresh Virgenir while on this unit will:     [x] Be continent of bowel and bladder      [x] Have an adequate number of bowel movements   [x] Urinate with no urinary retention >300ml in bladder   [] Complete bladder protocol with servin removal   [x] Maintain O2 SATs at ___%   [x] Have pain managed while on ARU        [x] Be pain free by discharge    [x] Have no skin breakdown while on ARU   [] Have improved skin integrity via wound measurements   [] Have no signs/symptoms of infection at the wound site  [x] Be free from injury during hospitalization   [x] Complete education with patient/family with understanding demonstrated for:  [] Adjustment   [] Other:   Nursing interventions may include bowel/bladder training, education for medical assistive devices, medication education, O2 saturation management, energy conservation, stress management techniques, fall prevention, alarms protocol, seating and positioning, skin/wound care, pressure relief instruction,dressing changes,  infection protection, DVT prophylaxis, and/or assistance with in room safety with transfers to bed, toilet, wheelchair, shower as well as bathroom activities and hygiene.      Patient/caregiver education for:   [] Disease/sustained injury/management      [] Medication Use   [] Surgical intervention   [] Safety   [] Body mechanics and or joint protection   [] Health maintenance       IRF-DELTA  Bladder and Bowel Continence  Bladder Continence: Always continent  Bowel Continence: Always continent  Swallowing/Nutritional Status  Swallowing/Nutritional Status: Modified food consistency/supervision    PHYSICAL THERAPY:  Goals:                  Short term goals  Time Frame for Short term goals: 1 week  Short term goal 1: pt amb 48' with RW, CGA, step-to, 3pt pattern, RLE leading  Short term goal 2: Pt CGA with bed mobility  Short term goal 3: Pt min A with supine <> sit  Short term goal 4: Pt min A with car transfer  Short term goal 5: Pt min A with 4, 4\" steps, step-to gait pattern, LLE leading ascending            Long term goals  Time Frame for Long term goals : 2 weeks  Long term goal 1: pt amb 150' with RW, ind, with step-to gait pattern, RLE leading  Long term goal 2: pt independent with bed mobility  Long term goal 3: pt independent with sit<>stand   Long term goal 4: Pt independent with car TF  Long term goal 5: Pt min A with 4, 4\" steps, step-to gait pattern, LLE leading ascending  These goals were reviewed with this patient at the time of assessment and Ivette Post is in agreement.      Plan of Care: Frequency:  [x] 5 days per week, 60-90 minutes per day                             []  5 days per week, 60 minutes per day               Current Treatment Recommendations: Strengthening, ROM, Balance Training, Functional Mobility Training, Transfer Training, ADL/Self-care Training, Endurance Training, Wheelchair Mobility Training, Gait Training, Stair training, Cognitive Reorientation, Pain Management, Home Exercise Program, Safety Education & Training, Patient/Caregiver Education & Training, Equipment Evaluation, Education, & procurement, Modalities    IRF-DELTA  Roll Left and Right  Assistance Needed: Partial/moderate assistance  CARE Score: 3  Discharge Goal: Independent  Sit to Lying  Assistance Needed: Partial/moderate assistance  CARE Score: 3  Discharge Goal: Independent  Lying to Sitting on Side of Bed  Assistance Needed: Partial/moderate assistance  CARE Score: 3  Discharge Goal: Independent  Sit to Stand  Assistance Needed: Supervision or touching assistance  CARE Score: 4  Discharge Goal: Independent  Chair/Bed-to-Chair Transfer  Assistance Needed: Partial/moderate assistance  CARE Score: 3  Discharge Goal: Independent  Car Transfer  Reason if not Attempted: Not attempted due to medical condition or safety concerns  CARE Score: 88  Discharge Goal: Independent  Walk 10 Feet  Assistance Needed: Partial/moderate assistance  CARE Score: 3  Discharge Goal: Independent  Walk 50 Feet with Two Turns  Reason if not Attempted: Not attempted due to medical condition or safety concerns  CARE Score: 88  Discharge Goal: Independent  Walk 150 Feet  Reason if not Attempted: Not attempted due to medical condition or safety concerns  CARE Score: 88  Discharge Goal: Independent  Walking 10 Feet on Uneven Surfaces  Reason if not Attempted: Not attempted due to medical condition or safety concerns  CARE Score: 88  Discharge Goal: Not Attempted  1 Step (Curb)  Reason if not Attempted: Not attempted due to medical condition or safety concerns  CARE Score: 88  Discharge Goal: Independent  4 Steps  Reason if not Attempted: Not attempted due to medical condition or safety concerns  CARE Score: 88  Discharge Goal: Independent  12 Steps  Reason if not Attempted: Not attempted due to medical condition or safety concerns  CARE Score: 88  Wheel 50 Feet with Two Turns  Reason if not Attempted: Not attempted due to medical condition or safety concerns  CARE Score: 88  Discharge Goal: Independent  Wheel 150 Feet  Reason if not Attempted: Not attempted due to medical condition or safety concerns  CARE Score: 88  Discharge Goal: Independent    OCCUPATIONAL THERAPY:  Goals:             Short term goals  Time Frame for Short term goals: 1 week  Short term goal 1: pt will complete UB dressing with setup  Short term goal 2: pt will complete LB dressing with min A  Short term goal 3: pt will complete overall toileting with min A  Short term goal 4: pt will complete overall bathing with min A  Short term goal 5: pt will complete simple home making task with min A using recommended mobility means  Short term goal 6: pt will complete 1 handed standing level task for 3 mins with min A  Short term goal 7: pt will complete R GM/FM acts x 5 occasions to increase coordination for ADLs :  Long term goals  Time Frame for Long term goals : 3-4 weeks  Long term goal 1: pt will complete overall dressing with modified independence  Long term goal 2: pt will complete overall toileting with modified independence  Long term goal 3: pt will complete overall bathing with modified independence  Long term goal 4: pt will complete simple ambulatory home making task with modified independence  Long term goal 5: pt will complete HEP with independence  Long term goals 6: verbalize DME for home :    These goals were reviewed with this patient at the time of assessment and Isiah Shauna is in agreement    Plan of Care: Frequency:   [x] 5 days per week, 60-90 minutes per day     [] 5 days per week, 60 minutes per day                Plan  Specific instructions for Next Treatment: social functional, 9 hole peg test,  strength  Current Treatment Recommendations: Self-Care / ADL, Safety Education & Training, Endurance Training, Positioning, Functional Mobility Training, Balance Training, Equipment Evaluation, Education, & procurement, ROM, Home Management Training, Patient/Caregiver Education & Training, Strengthening            IRF-DELTA  Eating  Assistance Needed: Supervision or touching assistance  CARE Score: 4  Discharge Goal: Independent  Oral Hygiene  Assistance Needed: Supervision or touching assistance  CARE Score: 4  Discharge Goal: Independent  Toileting Hygiene  Assistance Needed: Substantial/maximal assistance  Comment: assistance with pants up/down  CARE Score: 2  Discharge Goal: Independent  Shower/Bathe Self  Assistance Needed: Partial/moderate assistance  CARE Score: 3  Discharge Goal: Independent  Upper Body Dressing  Assistance Needed: Partial/moderate assistance  CARE Score: 3  Discharge Goal: Independent  Lower Body Dressing  Assistance Needed: Substantial/maximal assistance  CARE Score: 2  Discharge Goal: Independent  Putting On/Taking Off Footwear  Assistance Needed: Substantial/maximal assistance  CARE Score: 2  Discharge Goal: Independent  Toilet Transfer  Assistance Needed: Partial/moderate assistance  CARE Score: 3  Discharge Goal: Independent  Picking Up Object  Reason if not Attempted: Not attempted due to medical condition or safety concerns  CARE Score: 88  Discharge Goal: Set-up or clean-up assistance  Treatments may include IADL retraining, strengthening, safety education and training, patient/caregiver education and training, equipment evaluation/ training/procurement, neuromuscular reeducation, wheelchair mobility training. SPEECH THERAPY:   Plan of Care and Goals:   LTG Goal 1: The patient will develop functional and intelligible speech and utilize compensatory strategiesthrough the use of adequate labial and lingual function, increased articulatory precision. LTG 2: The patient will maintain adequate hydration/nutrition with optimum safety and efficiency ofswallowing function on P.O. intake without overt signs and symptoms of aspiration for thehighest appropriate diet level                  Short-term Goals  Goal 1: The patient will tolerate minced and moist diet consistency with nectar thick liquids with min/no overt s/s of aspiration. Goal 2: The patient will complete pharyngeal strengthening exercises with min verbal cues at 80% accuracy to improve airway protection. Goal 3: The patient will complete daily oral motor exercises to improve labial/lingual/buccal strength and ROM to improve oral phase of swallow and speech intelligibility.   Goal 4: The patient will use the (over articulation/slow rate/writing key word/elongation of thevowel/increased loudness/phrasing) strategy to improve speech intelligibility withwords/phrases/sentences/in conversation with 100% accuracy. Goal 5: Patient will complete repeat FEES for potential upgrade       IRF-DELTA  Hearing, Speech, and Vision  Expression of Ideas and Wants: Without difficulty  Understanding Verbal and Non-Verbal Content: Understands  Cognitive Patterns  Cognitive Pattern Assessment Used: BIMS  Repetition of Three Words (First Attempt): 3  Temporal Orientation: Year: Correct  Temporal Orientation: Month: Accurate within 5 days  Temporal Orientation: Day: Correct  Able to recall \"sock: Yes, no cue required  Able to recall \"blue\": Yes, no cue required  Able to recall \"bed\": Yes, no cue required  BIMS Summary Score: 15          Plan of Care:  Frequency:   [x] 5 days per week, 30 minutes per day                            [] Not appropriate for Speech Therapy  Treatments may include speech/language/communication therapy, cognitive training, group therapy, education, and/or dysphagia therapy based on the above goals. These goals were reviewed with this patient at the time of assessment and Iza Daniels is in agreement. CASE MANAGEMENT:  Goals:   Assist patient/family with discharge planning, patient/family counseling,  and coordination with insurance during ARU stay. Patient Goals: regain control of body, chewing and swallow to be most independent               Activities Prior to Admit:                         Iza Daniels will be seen a minimum of 3 hours of therapy per day/a minimum of 5 out of 7 days per week. [] In this rare instance due to the nature of this patient's medical involvement, this patient will be seen 15 hours per week (900 minutes within a 7 day period).     Treatments may include therapeutic exercises, gait training, neuromuscular re-ed, transfer training, community reintegration, bed Martina Beltran on 12/1/2020 at 4:34 PM    PT:Electronically signed by Kaitlyn Teran, PT on 12/1/2020 at 4:24 PM      RN: Electronically signed by Candance Bathe, RN on 11/30/2020 at 10:42 PM    ST: Electronically signed by NANCY Ramos on 12/1/2020 at 12:34 PM

## 2020-12-01 NOTE — PROGRESS NOTES
4 Eyes Skin Assessment    Steve Monreal is being assessed upon: Admission    I agree that I, HANG Mcfarland, along with Gretchen Menchaca RN (either 2 RN's or 1 LPN and 1 RN) have performed a thorough Head to Toe Skin Assessment on the patient. ALL assessment sites listed below have been assessed. Areas assessed by both nurses:     [x]   Head, Face, and Ears   [x]   Shoulders, Back, and Chest  [x]   Arms, Elbows, and Hands   [x]   Coccyx, Sacrum, and Ischium  [x]   Legs, Feet, and Heels    Does the Patient have Skin Breakdown?  No    Ravi Prevention initiated: Yes  Wound Care Orders initiated: No    WO nurse consulted for Pressure Injury (Stage 3,4, Unstageable, DTI, NWPT, and Complex wounds) and New or Established Ostomies: No        Primary Nurse eSignature: [employer], RN on 11/30/2020 at 8:07 PM      Co-Signer eSignature: Electronically signed by Trina Stanley RN on 12/1/20 at 12:34 AM CST

## 2020-12-01 NOTE — PATIENT CARE CONFERENCE
[facility] ACUTE INPATIENT REHABILITATION  TEAM CONFERENCE NOTE    Date: 2020  Patient Name: Nahed Bourgeois        MRN: [id number]    : 1955  (75 y.o.)  Gender: female             PHYSICAL THERAPY:  Evaluate today      SPEECH THERAPY:  Evaluate today      OCCUPATIONAL THERAPY:  Evaluate today      NUTRITION  Current Wt: Weight: 164 lb (74.4 kg) / Body mass index is 26.47 kg/m². Admission Wt: Admission Body Weight: 164 lb (74.4 kg)  Oral Diet Orders:     Oral Nutrition Supplement (ONS) Orders:    Please see nutrition note for details. NURSING    Wounds/Incisions/Ulcers: No skin issues identified     Ravi Scale Score: 19    Pain: No pain concerns to address    Consultations/Labs/X-rays:     Family Education: Family available and participating in education     Fall Risk:  Nelson Score: 72    Fall in the last week?no    Other Nursing Issues: Dysphagia diet. NTL. ASA/Plavix. Cont B/B. FWB. BM 26. Up x1-2 assist walker. A/O x4. Pills WAS. SOCIAL WORK/CASE MANAGEMENT  Assessment: Lived independently previously; As her home has 4 steps at entrance, she is welcomed to come to her daughter's home for assistance. Has family support    Discharge Plan   Estimated Length of Stay: to be determined  Destination: home health    Pass: No    Services at Discharge: Home Health   Based on needs at discharge    Equipment at Discharge: to be determined    Progress made in the prior week:  1. N/A, new patient  2.   3.  4.  5.      Goals for following week:  1.  Establish treatment plan  2.   3.   4.   5.     Factors facilitating achievement of predicted outcomes: Family support, alert and oriented  Barriers to the achievement of predicted outcomes: Upper extremity weakness, Lower extremity weakness and dysphagia    Team Members Present at Conference:  : Preet [place]   Occupational Therapist: Jonathan Flowers OTR/L  Physical Therapist: Drake Agee PT,DPT  Speech Therapist: Sammy Love

## 2020-12-01 NOTE — PROGRESS NOTES
Nutrition Assessment     Type and Reason for Visit: Initial, Positive Nutrition Screen    Nutrition Assessment:  Positive nutrition screen for difficulty swallowing. Pt being treated by SLP. Minced/moist diet with NTL recommended at this time. Pt presents adequately nourished with PO intake >75% and stable wt status. Will monitor nutrition progression and implement intervention as needed. Malnutrition Assessment:  Malnutrition Status: No malnutrition    Current Nutrition Therapies:    DIET DYSPHAGIA MINCED AND MOIST;  Mildly Thick (Nectar)    Anthropometric Measures:  · Height: 5' 6\" (167.6 cm)  · Current Body Wt: 164 lb (74.4 kg)   · BMI: 26.5    Nutrition Diagnosis:   · Swallowing difficulty related to cognitive or neurological impairment, acute injury/trauma as evidenced by swallow study results      Nutrition Interventions:   Food and/or Nutrient Delivery:  Continue Current Diet  Nutrition Education/Counseling:  No recommendation at this time   Coordination of Nutrition Care:  Continue to monitor while inpatient    Goals:  PO intake >50%, wt stable       Nutrition Monitoring and Evaluation:   Behavioral-Environmental Outcomes:  None Identified   Food/Nutrient Intake Outcomes:  Food and Nutrient Intake  Physical Signs/Symptoms Outcomes:  Biochemical Data, Chewing or Swallowing, Nutrition Focused Physical Findings, Skin, Weight     Discharge Planning:    No discharge needs at this time     Electronically signed by Bernie Mendoza RD, LD on 12/1/20 at 1:46 PM CST    Contact: [phone]

## 2020-12-01 NOTE — PROGRESS NOTES
[id number]. Presented with: cva  Mental Status: Patient is oriented and alert. Vitals:    11/30/20 1838   BP: 130/84   Pulse: 56   Resp: 16   Temp: 96.9 °F (36.1 °C)   SpO2: 94%     Patient safety contract and falls prevention contract reviewed with patient Yes. Oriented Patient and Family to room. Call light within reach. Yes.   Needs, issues or concerns expressed at this time: no.      Electronically signed by Rafa Brannon RN on 11/30/2020 at 7:10 PM

## 2020-12-01 NOTE — THERAPY DISCHARGE NOTE
Acute Care - Physical Therapy Discharge Summary  [facility]       Patient Name: Martha Bauer  : 1955  MRN: [id number]    Today's Date: 2020  Onset of Illness/Injury or Date of Surgery: 20       Referring Physician: Dr. Maxwell      Admit Date: 2020      PT Recommendation and Plan    Visit Dx:    ICD-10-CM ICD-9-CM   1. TIA (transient ischemic attack)  G45.9 435.9   2. Decreased activities of daily living (ADL)  Z78.9 V49.89   3. Oropharyngeal dysphagia  R13.12 787.22   4. Impaired mobility  Z74.09 799.89   5. Gait abnormality  R26.9 781.2   6. Pulmonary nodule  R91.1 793.11       Outcome Measures     Row Name 20 0754 20 0850          How much help from another is currently needed...    Putting on and taking off regular lower body clothing?  2  -TS (r) MR (t) TS (c)  3  -CJ     Bathing (including washing, rinsing, and drying)  3  -TS (r) MR (t) TS (c)  2  -CJ     Toileting (which includes using toilet bed pan or urinal)  3  -TS (r) MR (t) TS (c)  3  -CJ     Putting on and taking off regular upper body clothing  3  -TS (r) MR (t) TS (c)  3  -CJ     Taking care of personal grooming (such as brushing teeth)  3  -TS (r) MR (t) TS (c)  3  -CJ     Eating meals  3  -TS (r) MR (t) TS (c)  3  -CJ     AM-PAC 6 Clicks Score (OT)  17  -TS (r) MR (t)  17  -CJ        Functional Assessment    Outcome Measure Options  --  AM-PAC 6 Clicks Daily Activity (OT)  -CJ       User Key  (r) = Recorded By, (t) = Taken By, (c) = Cosigned By    Initials Name Provider Type    Erick Ferrera COTA/L Occupational Therapy Assistant    TS Casi Mendez COTA/L Occupational Therapy Assistant    Alejandro Allison OT Student              Rehab Goal Summary     Row Name 20 1508 20 0700          Bed Mobility Goal 1 (PT)    Activity/Assistive Device (Bed Mobility Goal 1, PT)  bed mobility activities, all  -AB  --     [place] Level/Cues Needed (Bed Mobility Goal 1, PT)  standby  assist  -AB  --     Time Frame (Bed Mobility Goal 1, PT)  long term goal (LTG);10 days  -AB  --     Progress/Outcomes (Bed Mobility Goal 1, PT)  goal not met  -AB  --        Transfer Goal 1 (PT)    Activity/Assistive Device (Transfer Goal 1, PT)  sit-to-stand/stand-to-sit;bed-to-chair/chair-to-bed  -AB  --     [place] Level/Cues Needed (Transfer Goal 1, PT)  contact guard assist  -AB  --     Time Frame (Transfer Goal 1, PT)  long term goal (LTG);10 days  -AB  --     Progress/Outcome (Transfer Goal 1, PT)  goal not met  -AB  --        Gait Training Goal 1 (PT)    Activity/Assistive Device (Gait Training Goal 1, PT)  gait (walking locomotion);assistive device use;decrease fall risk;diminish gait deviation;improve balance and speed;increase endurance/gait distance;increase energy conservation;walker, sugar  -AB  --     [place] Level (Gait Training Goal 1, PT)  contact guard assist  -AB  --     Distance (Gait Training Goal 1, PT)  15-20 ft  -AB  --     Time Frame (Gait Training Goal 1, PT)  long term goal (LTG);10 days  -AB  --     Progress/Outcome (Gait Training Goal 1, PT)  goal not met  -AB  --        ROM Goal 1 (PT)    ROM Goal 1 (PT)  Balance: pt to maintain static balance w/out UE support w/ CGA  -AB  --     Time Frame (ROM Goal 1, PT)  long-term goal (LTG);other (see comments) 10 days  -AB  --     Progress/Outcome (ROM Goal 1, PT)  goal not met  -AB  --        Grooming Goal 1 (OT)    Activity/Device (Grooming Goal 1, OT)  --  grooming skills, all  -TS     [place] (Grooming Goal 1, OT)  --  standby assist  -TS     Time Frame (Grooming Goal 1, OT)  --  long term goal (LTG);10 days  -TS     Progress/Outcome (Grooming Goal 1, OT)  --  goal not met  -TS        Strength Goal 1 (OT)    Strength Goal 1 (OT)  --  Increase BUE strength to 4+/5  -TS     Time Frame (Strength Goal 1, OT)  --  long term goal (LTG);10 days  -TS     Progress/Outcome (Strength Goal 1, OT)  --  goal not met  -TS        Balance Goal 1  (OT)    Activity/Assistive Device (Balance Goal 1, OT)  --  standing, dynamic  -TS     [place] Level/Cues Needed (Balance Goal 1, OT)  --  contact guard assist  -TS     Time Frame (Balance Goal 1, OT)  --  long term goal (LTG);10 days  -TS     Progress/Outcomes (Balance Goal 1, OT)  --  goal not met  -TS        Coordination Goal 1 (OT)    Activity/Assistive Device (Coordination Goal 1, OT)  --  FM task;GM task  -TS     [place] Level/Cues Needed (Coordination Goal 1, OT)  --  supervision required  -TS     Time Frame (Coordination Goal 1, OT)  --  long term goal (LTG);10 days  -TS     Progress/Outcomes (Coordination Goal 1, OT)  --  goal not met  -TS       User Key  (r) = Recorded By, (t) = Taken By, (c) = Cosigned By    Initials Name Provider Type Discipline    Vanessa Baez PTA Physical Therapy Assistant PT    TS Casi Mendez, CAMPOS/L Occupational Therapy Assistant OT              PT Discharge Summary  Anticipated Discharge Disposition (PT): skilled nursing facility, inpatient rehabilitation facility  Reason for Discharge: Discharge from facility  Outcomes Achieved: Refer to plan of care for updates on goals achieved  Discharge Destination: Inpatient rehabilitation facility      Vanessa Chiang PTA   12/1/2020

## 2020-12-01 NOTE — PROGRESS NOTES
Sensation  Overall Sensation Status: WFL       Eating  Assistance Needed: Supervision or touching assistance  CARE Score: 4  Discharge Goal: Independent    Oral Hygiene  Assistance Needed: Supervision or touching assistance  CARE Score: 4  Discharge Goal: Independent     Toileting Hygiene  Assistance Needed: Substantial/maximal assistance  Comment: assistance with pants up/down  CARE Score: 2  Discharge Goal: Independent     Shower/Bathe Self  Assistance Needed: Partial/moderate assistance  CARE Score: 3  Discharge Goal: Independent     Upper Body Dressing  Assistance Needed: Partial/moderate assistance  CARE Score: 3  Discharge Goal: Independent     Lower Body Dressing  Assistance Needed: Substantial/maximal assistance  CARE Score: 2  Discharge Goal: Independent     Putting On/Taking Off Footwear  Assistance Needed: Substantial/maximal assistance  CARE Score: 2  Discharge Goal: Independent     Toilet Transfer  Assistance Needed: Partial/moderate assistance  CARE Score: 3  Discharge Goal: Independent     Picking Up Object  Reason if not Attempted: Not attempted due to medical condition or safety concerns  CARE Score: 88  Discharge Goal: Set-up or clean-up assistance    12/01/20 0910   Assessment   Performance deficits / Impairments Decreased functional mobility ; Decreased endurance;Decreased coordination;Decreased ADL status; Decreased fine motor control;Decreased high-level IADLs;Decreased strength;Decreased balance;Decreased ROM   Assessment Pt benefits from further skilled therapy to address ADLs, functional mobility, home management, and DME training in order to increase independence for home after d/c   Treatment Diagnosis CVA with right hemiparesis   Prognosis Good   Decision Making Low Complexity   Discharge Recommendations Home with Home health OT         Plan   Plan  Specific instructions for Next Treatment: social functional, 9 hole peg test,  strength  Current Treatment Recommendations: Self-Care / ADL, Safety Education & Training, Endurance Training, Positioning, Functional Mobility Training, Balance Training, Equipment Evaluation, Education, & procurement, ROM, Home Management Training, Patient/Caregiver Education & Training, Strengthening      Goals  Short term goals  Time Frame for Short term goals: 1 week  Short term goal 1: pt will complete UB dressing with setup  Short term goal 2: pt will complete LB dressing with min A  Short term goal 3: pt will complete overall toileting with min A  Short term goal 4: pt will complete overall bathing with min A  Short term goal 5: pt will complete simple home making task with min A using recommended mobility means  Short term goal 6: pt will complete 1 handed standing level task for 3 mins with min A  Short term goal 7: pt will complete R GM/FM acts x 5 occasions to increase coordination for ADLs  Long term goals  Time Frame for Long term goals : 3-4 weeks  Long term goal 1: pt will complete overall dressing with modified independence  Long term goal 2: pt will complete overall toileting with modified independence  Long term goal 3: pt will complete overall bathing with modified independence  Long term goal 4: pt will complete simple ambulatory home making task with modified independence  Long term goal 5: pt will complete HEP with independence  Long term goals 6: verbalize DME for home  Patient Goals   Patient goals : return home       Therapy Time   Individual Concurrent Group Co-treatment   Time In 0900         Time Out 1000         Minutes 60         Timed Code Treatment Minutes: 45 Minutes     Electronically signed by Lizzette Stallings OT on 12/1/2020 at 4:27 PM
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
## 2020-12-01 NOTE — PROGRESS NOTES
[facility]  SPEECH THERAPY  [facility] 8 REHAB UNIT  Speech - Language - Cognitive Evaluation    TIME    [id number]  Timed Code Treatment Minutes: 15 Minutes      Name: Max Cesar  YOB: 1955  Gender: female  Referring Physician: Colby Yuen diagnosis:Cerebral infarction due to unspecified occlusion or stenosis of unspecified cerebral artery     Secondary diagnoses:Hemiplegia and hemiparesis following cerebral infarction affecting left non-dominant side,Dysarthria following cerebral infarction,Dysphagia following cerebral infarction,Dysphagia, oropharyngeal phase,Problems related to living alone,Hyperlipidemia, unspecified,Nicotine dependence, unspecified, uncomplicated    History of Present Illness/Injury: This 75 y.o. female pt admitted to [facility] on 11/26/2020 w/R sided weakness. Pt woke up on am of 11/26 around 5am and got up, went to the kitchen to make coffee and fell after diffuse tingling an R sided weakness noted. At baseline, she does not move her R arm well proximally due to fx in 11/2019. She managed to unlock her door before going back to be, in case she would need help. she stayed in bed for 4 hours and noted she was stronger, however, she  got up again around 10 am and fell again due to R sided weakness. She presented to [facility] ED where on presentation she was out of tPA window and NIHSS was 0, but then she developed worsening R sided weakness again. She was related that she can choke on her saliva when she swallows. Pt was alert & oriented x 3 w/ mild dysarthria. Pt also noted to have new onset tremor. MRI done on 11/27 showed acute ischemia in the bilateral brendon. Over the weekend, she had worsening R sided weakness. EEG was done and read as normal. She is on a mechanical soft diet w/nectar thick liquids. She is now medically stable and is participating with therapy.  She is ready to begin rehab w/plan of returning home after rehab dc w/support from her family (per neurology note)    Prior Level of Functioning: Patient was completing all ADL's independently. Patient lived on her own prior to this. Additional Assessments:   [facility] assessment was administered to assess attention, problem solving/reasoning, simple math calculations, executive function, memory, and orientation. Patient scored a 29/30 which falls in the normal range. Overall impression: Patients expressive/receptive language, and cognition are [facility]. Patient presents with mild dysarthria characterized by imprecise articulations and distortions. Recommend speech services for 5/6x a week for 30 minute sessions to continue to address dysarthria and swallowing. Precautions: Fall/aspiration     Subjective:  Patient alert and cooperative with all evaluation tasks. Patient seen in her room for treatment. Receptive: Auditory Comprehension:  Patient answering simple/complex questions with 100% accuracy. Patient was able to follow simple/complex commands with no difficulty. Visual/Reading Comprehension:   Patient's reading comprehension was [facility]. Patient scanning from L/R during all tasks with no difficulty. Expressive Skills/ Verbal Expression:  Primary Mode of Expression: Verbal  Patient completed divergent naming task within the SLUMS naming over 15 items within a minute. Patient completing repetition, automatic tasks, and convergent/divergent naming tasks with 100% accuracy. Thought organization for conversational discourse was [facility]. Expressive language at this time is [facility]. Motor Speech/Dysarthria:   Motor Speech: Within Functional Limits  Mild dysarthria characterized by imprecise articulations and distortions. Speech intelligibility ranks at % for unknown context and unfamiliar listeners.        Oral Motor:  Labial Strength: Reduced  Labial coordination: Reduced   Lingual Coordination: Reduced      Decreased lingual coordination characterized by slow movements. Attention/ Orientation/ Memory:    Overall Orientation Status: Within Functional Limits  Attention/concentration: Attention: Within Functional Limits  Orientation of person, place, time and biographical information was [facility]. Attention during structured unstructured tasks was [facility]. Patient completed short paragraph recall within the SLUMS with a score of 8/8. Patient recalling 4/5 unrelated objects after distraction and delay. Problem Solving:   Problem Solving: Within Functional Limits    Abstract reasoning:    Abstract Reasoning: Within Functional Limits    Safety Awareness:   Safety/Judgement: Within Functional Limits    Number Sequencing:  Numeric Reasoning: Within Functional Limits    Pragmatics: Pragmatics: Within functional limits    Writing:   Dominant Hand: Right  Within functional limits      REHAB POTENTIAL: [x] Excellent [] Good [] Fair  [] Poor    EDUCATION:   Learner: [x]Patient [] Significant other [] Son/Daughter [] Parent     [] Other:   Education: [x] Reviewed results and recommendations of this evaluation     [] Reviewed diet and strategies     [] Reviewed signs, symptoms and risk of aspiration     [] Demonstrated how to thick liquid appropriately. [] Reviewed goals and Plan of Care     [] OTHER:   Method: [x] Discussion [x] Demonstration [] Hand-out     [] OTHER:   Evaluation of Education:     [x] Verbalizes understanding [] Demonstrates with assistance     [] Demonstrates without assistance []Needs further instruction     [] No evidence of learning  [] Family not present    PLAN / TREATMENT RECOMMENDATIONS:  [] No further speech therapy services indicated. [x]  Further Speech therapy services reccommended       See POC for Goals.

## 2020-12-01 NOTE — PROGRESS NOTES
Details: Pt able to navigate turns but did not demonstrate adequate UE strength d/t fatigue to propel w/c independently   Distance: 30'(1 turn)  WHEELCHAIR PROPULSION 2     AMBULATION 1  Ambulation 1  Surface: level tile  Device: Parallel Bars  Other Apparatus: Wheelchair follow  Assistance: Minimal assistance, Moderate assistance(Assist needed for RLE foot position; pt can advance ft ind )  Quality of Gait: Pt amb with 3 pt, step-to pattern, RLE leading in the II bars. Was able to self-correct trunk and postural alignment during amb, requiring min VC. Pt needed min A with foot placement, and VC to lock Rknee prior to LLE advancement. Pt at first took shortened steps but with VC to maintain upright posture was able to increase RLE step length.    Gait Deviations: Decreased step height, Decreased step length, Slow Mohinder  Distance: 2x12'  Comments: 2nd bout pt amb with improved upright posture and increased RLE step length, as well as increased mohinder compared to first bout; min A needed to place RLE (abd) and block R knee - however pt able to lock out R knee ind if not fatigued   AMBULATION 2     STAIRS       GOALS:  Short term goals  Time Frame for Short term goals: 1 week  Short term goal 1: pt amb 48' with RW, CGA, step-to, 3pt pattern, RLE leading  Short term goal 2: Pt CGA with bed mobility  Short term goal 3: Pt min A with supine <> sit  Short term goal 4: Pt min A with car transfer  Short term goal 5: Pt min A with 4, 4\" steps, step-to gait pattern, LLE leading ascending    Long term goals  Time Frame for Long term goals : 2 weeks  Long term goal 1: pt amb 150' with RW, ind, with step-to gait pattern, RLE leading  Long term goal 2: pt independent with bed mobility  Long term goal 3: pt independent with sit<>stand   Long term goal 4: Pt independent with car TF  Long term goal 5: Pt min A with 4, 4\" steps, step-to gait pattern, LLE leading ascending  HOME LIVING:                    ASSESSMENT (IMPAIRMENTS/BARRIERS): Body structures, Functions, Activity limitations: Decreased functional mobility , Decreased ADL status, Decreased strength, Decreased endurance, Decreased balance, Decreased coordination, Decreased posture  Assessment: Pt would benefit from skilled physical therapy to address deficits in ambulatory endurance and other mobility deficits. Pt was globally Mod A to Min A for TF and ambulation. (See amb notes for more details). At next session continue with gait training in the II bars and LE ex, as well as work on stand pivot TF to the L. Pt may be good canidate for AFO pending how gait training progresses. Activity Tolerance: Patient limited by fatigue, Patient limited by endurance     PLAN:  Plan  Current Treatment Recommendations: Strengthening, ROM, Balance Training, Functional Mobility Training, Transfer Training, ADL/Self-care Training, Endurance Training, Wheelchair Mobility Training, Gait Training, Stair training, Cognitive Reorientation, Pain Management, Home Exercise Program, Safety Education & Training, Patient/Caregiver Education & Training, Equipment Evaluation, Education, & procurement, Modalities     PATIENT REQUIRES AND IS REASONABLY EXPECTED TO ACTIVELY PARTICIPATE IN AT LEAST 3 HOURS OF INTENSIVE THERAPY PER DAY AT LEAST 5 DAYS PER WEEK, AND BE EXPECTED TO MAKE MEASURABLE IMPROVEMENT THAT WILL BE OF PRACTICAL VALUE TO IMPROVE THE PATIENT'S FUNCTIONAL CAPACITY OR ADAPTATION TO IMPAIRMENTS.    PATIENT GOAL FOR REHAB:  RETURN TO PRIOR LEVEL OF FUNCTION       IRF/DELTA  Roll Left and Right  Assistance Needed: Partial/moderate assistance  CARE Score: 3  Discharge Goal: Independent    Sit to Lying  Assistance Needed: Partial/moderate assistance  CARE Score: 3  Discharge Goal: Independent    Lying to Sitting on Side of Bed  Assistance Needed: Partial/moderate assistance  CARE Score: 3  Discharge Goal: Independent    Sit to Stand  Assistance Needed: Supervision or touching assistance  CARE Score: 4  Discharge Goal: Independent    Chair/Bed-to-Chair Transfer  Assistance Needed: Partial/moderate assistance  CARE Score: 3  Discharge Goal: Independent    Car Transfer  Reason if not Attempted: Not attempted due to medical condition or safety concerns  CARE Score: 88  Discharge Goal: Independent    Walk 10 Feet  Assistance Needed: Partial/moderate assistance  CARE Score: 3  Discharge Goal: Independent    Walk 50 Feet with Two Turns  Reason if not Attempted: Not attempted due to medical condition or safety concerns  CARE Score: 88  Discharge Goal: Independent    Walk 150 Feet  Reason if not Attempted: Not attempted due to medical condition or safety concerns  CARE Score: 88  Discharge Goal: Independent    Walking 10 Feet on Uneven Surfaces  Reason if not Attempted: Not attempted due to medical condition or safety concerns  CARE Score: 88  Discharge Goal: Not Attempted    1 Step (Curb)  Reason if not Attempted: Not attempted due to medical condition or safety concerns  CARE Score: 88  Discharge Goal: Independent    4 Steps  Reason if not Attempted: Not attempted due to medical condition or safety concerns  CARE Score: 88  Discharge Goal: Independent    12 Steps  Reason if not Attempted: Not attempted due to medical condition or safety concerns  CARE Score: 88    Wheel 50 Feet with Two Turns  Reason if not Attempted: Not attempted due to medical condition or safety concerns  CARE Score: 88  Discharge Goal: Independent    Wheel 150 Feet  Reason if not Attempted: Not attempted due to medical condition or safety concerns  CARE Score: 88  Discharge Goal: Independent      LAST TREATMENT TIME  PT Individual Minutes  Time In: 1100  Time Out: 1200  Minutes: 60

## 2020-12-01 NOTE — PROGRESS NOTES
4 Eyes Skin Assessment    Gurjit Boone is being assessed upon: Admission    I agree that I, Tim Churchill, along with Roro Hendrickson RN (either 2 RN's or 1 LPN and 1 RN) have performed a thorough Head to Toe Skin Assessment on the patient. ALL assessment sites listed below have been assessed. Areas assessed by both nurses:     [x]   Head, Face, and Ears   [x]   Shoulders, Back, and Chest  [x]   Arms, Elbows, and Hands   [x]   Coccyx, Sacrum, and Ischium  [x]   Legs, Feet, and Heels    Does the Patient have Skin Breakdown?  No    Ravi Prevention initiated: No  Wound Care Orders initiated: No    WOC nurse consulted for Pressure Injury (Stage 3,4, Unstageable, DTI, NWPT, and Complex wounds) and New or Established Ostomies: No        Primary Nurse eSignature: Tim Churchill RN on 11/30/2020 at 8:07 PM      Co-Signer eSignature: {Esignature:[id number]}

## 2020-12-01 NOTE — THERAPY DISCHARGE NOTE
Acute Care - Occupational Therapy Discharge Summary  [facility]     Patient Name: Martha Bauer  : 1955  MRN: [id number]    Today's Date: 2020  Onset of Illness/Injury or Date of Surgery: 20    Date of Referral to OT: 20  Referring Physician: Dr. Maxwell      Admit Date: 2020        OT Recommendation and Plan    Visit Dx:    ICD-10-CM ICD-9-CM   1. TIA (transient ischemic attack)  G45.9 435.9   2. Decreased activities of daily living (ADL)  Z78.9 V49.89   3. Oropharyngeal dysphagia  R13.12 787.22   4. Impaired mobility  Z74.09 799.89   5. Gait abnormality  R26.9 781.2   6. Pulmonary nodule  R91.1 793.11               Rehab Goal Summary     Row Name 20 0700             Grooming Goal 1 (OT)    Activity/Device (Grooming Goal 1, OT)  grooming skills, all  -TS      [place] (Grooming Goal 1, OT)  standby assist  -TS      Time Frame (Grooming Goal 1, OT)  long term goal (LTG);10 days  -TS      Progress/Outcome (Grooming Goal 1, OT)  goal not met  -TS         Strength Goal 1 (OT)    Strength Goal 1 (OT)  Increase BUE strength to 4+/5  -TS      Time Frame (Strength Goal 1, OT)  long term goal (LTG);10 days  -TS      Progress/Outcome (Strength Goal 1, OT)  goal not met  -TS         Balance Goal 1 (OT)    Activity/Assistive Device (Balance Goal 1, OT)  standing, dynamic  -TS      [place] Level/Cues Needed (Balance Goal 1, OT)  contact guard assist  -TS      Time Frame (Balance Goal 1, OT)  long term goal (LTG);10 days  -TS      Progress/Outcomes (Balance Goal 1, OT)  goal not met  -TS         Coordination Goal 1 (OT)    Activity/Assistive Device (Coordination Goal 1, OT)  FM task;GM task  -TS      [place] Level/Cues Needed (Coordination Goal 1, OT)  supervision required  -TS      Time Frame (Coordination Goal 1, OT)  long term goal (LTG);10 days  -TS      Progress/Outcomes (Coordination Goal 1, OT)  goal not met  -TS        User Key  (r) = Recorded By, (t) = Taken By,  (c) = Cosigned By    Initials Name Provider Type Discipline     Casi Mendez COTA/L Occupational Therapy Assistant OT          Outcome Measures     Row Name 11/30/20 0754 11/29/20 0850 11/28/20 1011       How much help from another is currently needed...    Putting on and taking off regular lower body clothing?  2  -TS (r) MR (t) TS (c)  3  -CJ  3  -CJ    Bathing (including washing, rinsing, and drying)  3  -TS (r) MR (t) TS (c)  2  -CJ  3  -CJ    Toileting (which includes using toilet bed pan or urinal)  3  -TS (r) MR (t) TS (c)  3  -CJ  3  -CJ    Putting on and taking off regular upper body clothing  3  -TS (r) MR (t) TS (c)  3  -CJ  3  -CJ    Taking care of personal grooming (such as brushing teeth)  3  -TS (r) MR (t) TS (c)  3  -CJ  3  -CJ    Eating meals  3  -TS (r) MR (t) TS (c)  3  -CJ  3  -CJ    AM-PAC 6 Clicks Score (OT)  17  -TS (r) MR (t)  17  -CJ  18  -CJ       Functional Assessment    Outcome Measure Options  --  AM-PAC 6 Clicks Daily Activity (OT)  -CJ  AM-PAC 6 Clicks Daily Activity (OT)  -CJ      User Key  (r) = Recorded By, (t) = Taken By, (c) = Cosigned By    Initials Name Provider Type    CJ Erick Atkins COTA/L Occupational Therapy Assistant    Casi Can COTA/L Occupational Therapy Assistant    Alejandro Allison OT Student          Timed Therapy Charges  Total Units: 5    Charges  Total Units: 5    Procedure Name Documented Minutes Units Code    HC OT SELF CARE/MGMT/TRAIN EA 15 MIN 69  5    69450 (CPT®)               Documented Minutes  Total Minutes: 69    Therapy Provided Minutes    74892 - OT Self Care/Mgmt Minutes 69                Therapy Charges for Today     Code Description Service Date Service Provider Modifiers Qty    [id number] HC OT SELF CARE/MGMT/TRAIN EA 15 MIN 11/30/2020 Casi Mendez COTA/L GO 5          OT Discharge Summary  Anticipated Discharge Disposition (OT): inpatient rehabilitation facility  Reason for Discharge: Discharge from  facility  Outcomes Achieved: Refer to plan of care for updates on goals achieved  Discharge Destination: Inpatient rehabilitation facility      KAILA Anderson  12/1/2020

## 2020-12-01 NOTE — PROGRESS NOTES
whole with puree. Treatment Plan  Requires SLP Intervention: Yes    Recommended Diet and Intervention  Diet Solids Recommendation: Dysphagia Minced and Moist (Dysphagia II)  Liquid Consistency Recommendation: Mildly Thick (Nectar)  Recommended Form of Meds: Whole with puree    Compensatory Swallowing Strategies  Compensatory Swallowing Strategies: Alternate solids and liquids;Small bites/sips; Remain upright for 30-45 minutes after meals;Upright as possible for all oral intake;Eat/Feed slowly    General  Chart Reviewed: Yes  Behavior/Cognition: Cooperative; Alert  Respiratory Status: Room air  Communication Observation: Functional  Follows Directions: Simple  Patient Positioning: Upright in bed  Consistencies Administered: Dysphagia Minced and Moist (Dysphagia II); Dysphagia Pureed (Dysphagia I); Nectar - cup      Oral Motor Deficits  Oral/Motor  Oral Motor: Exceptions to [facility]  Labial ROM: Reduced right  Labial Strength: Reduced  Lingual Strength: Reduced  Lingual Coordination: Reduced    Oral Phase Dysfunction  Oral Phase  Oral Phase: Exceptions  Oral Phase  Oral Phase - Comment: Mild decreased oral prep and mastication for more solid consistencies. Indicators of Pharyngeal Phase Dysfunction   Pharyngeal Phase  Pharyngeal Phase: Exceptions  Indicators of Pharyngeal Phase Dysfunction  Decreased Laryngeal Elevation: All  Throat Clearing - Delayed: Nectar - cup  Pharyngeal Phase   Pharyngeal: Patient presenting with mild/mod decreased and sluggish laryngeal elevation for airway protection. Did note delayed throat clear after nectar thick liquids via cup x2.     Education  Patient Education ResponseVeryl Kimberton understanding     SLP Total Treatment Time  Timed Code Treatment Minutes: 15 Minutes    Electronically signed by Roxy Cabot, SLP on 12/1/2020 at 12:22 PM

## 2020-12-02 PROCEDURE — 6360000002 HC RX W HCPCS: Performed by: PSYCHIATRY & NEUROLOGY

## 2020-12-02 PROCEDURE — 97116 GAIT TRAINING THERAPY: CPT

## 2020-12-02 PROCEDURE — 92526 ORAL FUNCTION THERAPY: CPT

## 2020-12-02 PROCEDURE — 97110 THERAPEUTIC EXERCISES: CPT

## 2020-12-02 PROCEDURE — 92507 TX SP LANG VOICE COMM INDIV: CPT

## 2020-12-02 PROCEDURE — 99232 SBSQ HOSP IP/OBS MODERATE 35: CPT | Performed by: PSYCHIATRY & NEUROLOGY

## 2020-12-02 PROCEDURE — 6370000000 HC RX 637 (ALT 250 FOR IP): Performed by: PSYCHIATRY & NEUROLOGY

## 2020-12-02 PROCEDURE — 97530 THERAPEUTIC ACTIVITIES: CPT

## 2020-12-02 PROCEDURE — 1180000000 HC REHAB R&B

## 2020-12-02 PROCEDURE — 97535 SELF CARE MNGMENT TRAINING: CPT

## 2020-12-02 RX ORDER — ONDANSETRON 4 MG/1
4 TABLET, ORALLY DISINTEGRATING ORAL EVERY 8 HOURS PRN
Status: DISCONTINUED | OUTPATIENT
Start: 2020-12-02 | End: 2020-12-17 | Stop reason: HOSPADM

## 2020-12-02 RX ADMIN — ATORVASTATIN CALCIUM 80 MG: 80 TABLET, FILM COATED ORAL at 21:27

## 2020-12-02 RX ADMIN — CYCLOBENZAPRINE 10 MG: 10 TABLET, FILM COATED ORAL at 21:32

## 2020-12-02 RX ADMIN — ENOXAPARIN SODIUM 40 MG: 40 INJECTION SUBCUTANEOUS at 21:27

## 2020-12-02 RX ADMIN — ONDANSETRON 4 MG: 4 TABLET, ORALLY DISINTEGRATING ORAL at 04:39

## 2020-12-02 RX ADMIN — ASPIRIN 81 MG: 81 TABLET, CHEWABLE ORAL at 08:23

## 2020-12-02 RX ADMIN — CLOPIDOGREL BISULFATE 75 MG: 75 TABLET ORAL at 08:23

## 2020-12-02 ASSESSMENT — PAIN SCALES - GENERAL
PAINLEVEL_OUTOF10: 0
PAINLEVEL_OUTOF10: 0

## 2020-12-02 NOTE — PROGRESS NOTES
12/02/20 1116   Restrictions/Precautions   Restrictions/Precautions Weight Bearing; Fall Risk   Lower Extremity Weight Bearing Restrictions   Right Lower Extremity Weight Bearing Weight Bearing As Tolerated  (R sided weakness )   Left Lower Extremity Weight Bearing Weight Bearing As Tolerated   General   Additional Pertinent Hx Hemiplegia/hemiparesis following cerebral infarction affecting left non-dominant side (R side UE and LE weakness); Dysarthria; Dysphagia, oropharyngeal phase; Hyperlipidemais, unspecified; Nicotine dependence, unspecified, uncomplicated; Surgical Hx of breast surgery x4, C-sectionx1, cyst removal from leg, tonsillectomy, R total shoulder arthroplasty w/ distal clavicle excision on 11/1/8/2019   Diagnosis CVA (acute ischemia bilateral brendon), R sided weakness    Subjective   Subjective Pt agreed to therapy and stated she had a little difficulty sleeping last night d/t indigestion/food feeling stuck in esophagus. Bed Mobility   Scooting Stand by assistance   Transfers   Sit to Stand Minimal Assistance; Moderate Assistance  (Pt able to position ft,may need min help, VC to lean FWD )   Stand to sit Contact guard assistance  (Pt demonstrates decreased eccentric control, shaheen when tired )   Comment Overall, TF from sit to stand smoother today than yesterday - pt cued to use both legs evenly as much as possible rather than depending soley on L LE with TF, VC to lean forward. Work on lowering slowly with stand to sit to improve eccentric control. Ambulation   Ambulation? Yes   WB Status WBAT   More Ambulation? Yes   Ambulation 1   Surface level tile   Device Parallel Bars   Other Apparatus Wheelchair follow   Assistance Minimal assistance   Quality of Gait Pt amb with 3 pt, step-to pattern, RLE leading in the II bars. Was able to self-correct trunk and postural alignment during amb, requiring min VC.  Pt needs occasional min A with RLE foot placement but has improved in abd RLE and keeping toes facing forward when advancing RLE. Pt took larger steps today (about 1 foot length/step). Still requires min VC to look straight ahead and maintain upright posture but once verbally cued 2x able to maintain. Pt also needed min VC to appropriately position UE/hands on II bars - sometimes forgot to advance them, or advanced them too far forward. Pt demonstrated improved endurance with 3 bouts of walking, and was also able to ind lockout R knee - clinician still gaurded in case of buckling. Pt partially buckled with both LE 1 time - pt was cued to keep feet closer to hip width apart and engage their glutes/quads to stand tall. Gait Deviations Decreased step length   Distance 3x12'   Comments decreased R ankle DF; decreased foot slap on LLE compared to yesterday   Other exercises   Other exercises? Yes   Other exercises 1 Weight shifting in II bars, x2 of 30-40sec    Other exercises 2 Mini Squats in II bars, 2x5     Conditions Requiring Skilled Therapeutic Intervention   Body structures, Functions, Activity limitations Decreased functional mobility ; Decreased ADL status; Decreased strength;Decreased endurance;Decreased balance;Decreased coordination;Decreased posture   Assessment Pt demonstrated improved endurance with amb and WB through RLE with TF today requiring Do to Mod A. Pt needs VC and reminders to lean FWD with sit to stand TF. See amb notes for more details on amb. At next session continue weight shift training and WB through RLE - possibly intro mirror therapy for RLE ankle DF. Treatment Diagnosis Interference with activity   Activity Tolerance   Activity Tolerance Patient Tolerated treatment well;Patient limited by fatigue;Patient limited by endurance   Safety Devices   Type of devices Chair alarm in place; Left in chair;Patient at risk for falls

## 2020-12-02 NOTE — PROGRESS NOTES
Patient:   Uriel Story  MR#:    [id number]   Room:    [address] [place] [address]   YOB: 1955  Date of Progress Note: 12/2/2020  Time of Note                           9:34 AM  Consulting Physician:   Anup Morocho M.D. Attending Physician:  Anup Morocho MD     Chief complaint Acute ischemic stroke     S:This 75 y.o. female  pt admitted to [facility] on 11/26/2020 w/R sided weakness. Pt woke up on am of 11/26 around 5am and got up, went to the kitchen to make coffee and fell after diffuse tingling an R sided weakness noted. At baseline, she does not move her R arm well proximally due to fx in 11/2019. She managed to unlock her door before going back to be, in case she would need help. she stayed in bed for 4 hours and noted she was stronger, however, she  got up again around 10 am and fell again due to R sided weakness. She presented to [facility] ED where on presentation she was out of tPA window and NIHSS was 0, but then she developed worsening R sided weakness again. She was related that she can choke on her saliva when she swallows. Pt was alert & oriented x 3 w/ mild dysarthria. Pt also noted to have new onset tremor. MRI done on 11/27 showed acute ischemia in the bilateral brendon. Over the weekend, she had worsening R sided weakness. EEG was done and read as normal. She is on a mechanical soft diet w/nectar thick liquids. She is now medically stable and is participating with therapy.  She is ready to begin rehab w/plan of returning home after rehab dc w/support from her family    REVIEW OF SYSTEMS:  Constitutional: No fevers No chills  Neck:No stiffness  Respiratory: No shortness of breath  Cardiovascular: No chest pain No palpitations  Gastrointestinal: No abdominal pain    Genitourinary: No Dysuria  Neurological: No headache, no confusion    Past Medical History:      Diagnosis Date    Abnormal Pap smear of cervix     1991    History of cryosurgery 1991       Past Surgical History: Procedure Laterality Date    BREAST CYST EXCISION Bilateral     X4      SECTION  1981    JOINT REPLACEMENT      right shoulder    TONSILLECTOMY         Medications in Hospital:      Current Facility-Administered Medications:     ondansetron (ZOFRAN-ODT) disintegrating tablet 4 mg, 4 mg, Oral, Q8H PRN, Dafne Stapleton MD, 4 mg at 20 0439    aspirin chewable tablet 81 mg, 81 mg, Oral, Daily, Dafne Stapleton MD, 81 mg at 20 0823    atorvastatin (LIPITOR) tablet 80 mg, 80 mg, Oral, Nightly, Dafne Stapleton MD, 80 mg at 20    clopidogrel (PLAVIX) tablet 75 mg, 75 mg, Oral, Daily, Dafne Stapleton MD, 75 mg at 20 7116    cyclobenzaprine (FLEXERIL) tablet 10 mg, 10 mg, Oral, TID PRN, Dafne Stapleton MD    acetaminophen (TYLENOL) tablet 650 mg, 650 mg, Oral, Q4H PRN, Dafne Stapleton MD    polyethylene glycol (GLYCOLAX) packet 17 g, 17 g, Oral, Daily PRN, Dafne Stapleton MD    enoxaparin (LOVENOX) injection 40 mg, 40 mg, Subcutaneous, Q24H, Dafne Stapleton MD, 40 mg at 20    Allergies:  Patient has no known allergies. Social History:   TOBACCO:   reports that she has been smoking. She has never used smokeless tobacco.  ETOH:   reports current alcohol use. Family History:       Problem Relation Age of Onset    Heart Attack Maternal Grandmother     Colon Cancer Father     Colon Cancer Mother          PHYSICAL EXAM:  /72   Pulse 56   Temp 97.2 °F (36.2 °C) (Temporal)   Resp 16   Ht 5' 6\" (1.676 m)   Wt 164 lb (74.4 kg)   SpO2 95%   Breastfeeding No   BMI 26.47 kg/m²     Constitutional - well developed, well nourished.    Eyes - conjunctiva normal.   Ear, nose, throat - No scars, masses, or lesions over external nose or ears, no atrophy of tongue  Neck-symmetric, no masses noted, no jugular vein distension  Respiration- chest wall appears symmetric, good expansion,   normal effort without use of accessory muscles  Musculoskeletal - no significant wasting (64 y.o.,female)  MRN:  [id number]     HEIGHT:  Height: 5' 6\" (167.6 cm)  WEIGHT:  Weight: 164 lb (74.4 kg)     PATIENT DIAGNOSIS(ES):    Diagnosis: CVA (acute ischemia bilateral brendon), R sided weakness      Additional Pertinent Hx: Hemiplegia/hemiparesis following cerebral infarction affecting left non-dominant side (R side UE and LE weakness); Dysarthria; Dysphagia, oropharyngeal phase; Hyperlipidemais, unspecified; Nicotine dependence, unspecified, uncomplicated; Surgical Hx of breast surgery x4, C-sectionx1, cyst removal from leg, tonsillectomy, R total shoulder arthroplasty w/ distal clavicle excision on 11/1/8/2019  RESTRICTIONS/PRECAUTIONS:    Restrictions/Precautions  Restrictions/Precautions: Weight Bearing, Fall Risk(Aspiration risk )  OVERALL  ORIENTATION STATUS:  PAIN:  Pain Level: 0                    NEUROLOGICAL                 Sensation  Overall Sensation Status: WFL  STRENGTH  Strength RLE  Strength RLE: Exception  Comment: Grossly 3/5; DF 2-,+/5  Strength LLE  Strength LLE: Exception  Comment: Grossly 4/5  ROM  POSTURE/BALANCE  Balance  Sitting - Static: Fair, +  Sitting - Dynamic: Fair  Standing - Static: Fair  Standing - Dynamic: Fair, -       ACTIVITY TOLERANCE  Activity Tolerance  Activity Tolerance: Patient limited by fatigue, Patient limited by endurance      BED MOBILITY  Bed Mobility  Bridging: Minimal assistance  Rolling: Moderate assistance  Supine to Sit: Moderate assistance(Assist needed to help manage RLE)  Sit to Supine:  Moderate assistance(Assist needed to help manage RLE)  Scooting: Stand by assistance  TRANSFERS  Sit to Stand: Minimal Assistance      Bed to Chair: Moderate assistance(Squat pivot going to the L )        WHEELCHAIR PROPULSION 1  Propulsion 1  Propulsion: Manual  Level: Level Tile  Method: SILVIANO FORTE  Level of Assistance: Minimal assistance  Description/ Details: Pt able to navigate turns but did not demonstrate adequate UE strength d/t fatigue to propel w/c independently Distance: 30'(1 turn)  WHEELCHAIR PROPULSION 2  AMBULATION 1  Ambulation 1  Surface: level tile  Device: Parallel Bars  Other Apparatus: Wheelchair follow  Assistance: Minimal assistance, Moderate assistance(Assist needed for RLE foot position; pt can advance ft ind )  Quality of Gait: Pt amb with 3 pt, step-to pattern, RLE leading in the II bars. Was able to self-correct trunk and postural alignment during amb, requiring min VC. Pt needed min A with foot placement, and VC to lock Rknee prior to LLE advancement. Pt at first took shortened steps but with VC to maintain upright posture was able to increase RLE step length. Gait Deviations: Decreased step height, Decreased step length, Slow Mohinder  Distance: 2x12'  Comments: 2nd bout pt amb with improved upright posture and increased RLE step length, as well as increased mohinder compared to first bout; min A needed to place RLE (abd) and block R knee - however pt able to lock out R knee ind if not fatigued   AMBULATION 2  STAIRS     GOALS:  Short term goals  Time Frame for Short term goals: 1 week  Short term goal 1: pt amb 48' with RW, CGA, step-to, 3pt pattern, RLE leading  Short term goal 2: Pt CGA with bed mobility  Short term goal 3: Pt min A with supine <> sit  Short term goal 4: Pt min A with car transfer  Short term goal 5: Pt min A with 4, 4\" steps, step-to gait pattern, LLE leading ascending     Long term goals  Time Frame for Long term goals : 2 weeks  Long term goal 1: pt amb 150' with RW, ind, with step-to gait pattern, RLE leading  Long term goal 2: pt independent with bed mobility  Long term goal 3: pt independent with sit<>stand   Long term goal 4: Pt independent with car TF  Long term goal 5: Pt min A with 4, 4\" steps, step-to gait pattern, LLE leading ascending  HOME LIVING:  ASSESSMENT (IMPAIRMENTS/BARRIERS):   Body structures, Functions, Activity limitations: Decreased functional mobility , Decreased ADL status, Decreased strength, Decreased endurance, Decreased balance, Decreased coordination, Decreased posture  Assessment: Pt would benefit from skilled physical therapy to address deficits in ambulatory endurance and other mobility deficits. Pt was globally Mod A to Min A for TF and ambulation. (See amb notes for more details). At next session continue with gait training in the II bars and LE ex, as well as work on stand pivot TF to the L. Pt may be good canidate for AFO pending how gait training progresses. Activity Tolerance: Patient limited by fatigue, Patient limited by endurance  PLAN:  Plan  Current Treatment Recommendations: Strengthening, ROM, Balance Training, Functional Mobility Training, Transfer Training, ADL/Self-care Training, Endurance Training, Wheelchair Mobility Training, Gait Training, Stair training, Cognitive Reorientation, Pain Management, Home Exercise Program, Safety Education & Training, Patient/Caregiver Education & Training, Equipment Evaluation, Education, & procurement, Modalities  PATIENT REQUIRES AND IS REASONABLY EXPECTED TO ACTIVELY PARTICIPATE IN AT LEAST 3 HOURS OF INTENSIVE THERAPY PER DAY AT LEAST 5 DAYS PER WEEK, AND BE EXPECTED TO MAKE MEASURABLE IMPROVEMENT THAT WILL BE OF PRACTICAL VALUE TO IMPROVE THE PATIENT'S FUNCTIONAL CAPACITY OR ADAPTATION TO IMPAIRMENTS.    PATIENT GOAL FOR REHAB:  RETURN TO PRIOR LEVEL OF FUNCTION         IRF/DETLA  Roll Left and Right  Assistance Needed: Partial/moderate assistance  CARE Score: 3  Discharge Goal: Independent     Sit to Lying  Assistance Needed: Partial/moderate assistance  CARE Score: 3  Discharge Goal: Independent     Lying to Sitting on Side of Bed  Assistance Needed: Partial/moderate assistance  CARE Score: 3  Discharge Goal: Independent     Sit to Stand  Assistance Needed: Supervision or touching assistance  CARE Score: 4  Discharge Goal: Independent     Chair/Bed-to-Chair Transfer  Assistance Needed: Partial/moderate assistance  CARE Score: 3  Discharge Goal: Independent     Car Transfer  Reason if not Attempted: Not attempted due to medical condition or safety concerns  CARE Score: 88  Discharge Goal: Independent     Walk 10 Feet  Assistance Needed: Partial/moderate assistance  CARE Score: 3  Discharge Goal: Independent     Walk 50 Feet with Two Turns  Reason if not Attempted: Not attempted due to medical condition or safety concerns  CARE Score: 88  Discharge Goal: Independent     Walk 150 Feet  Reason if not Attempted: Not attempted due to medical condition or safety concerns  CARE Score: 88  Discharge Goal: Independent     Walking 10 Feet on Uneven Surfaces  Reason if not Attempted: Not attempted due to medical condition or safety concerns  CARE Score: 88  Discharge Goal: Not Attempted     1 Step (Curb)  Reason if not Attempted: Not attempted due to medical condition or safety concerns  CARE Score: 88  Discharge Goal: Independent     4 Steps  Reason if not Attempted: Not attempted due to medical condition or safety concerns  CARE Score: 88  Discharge Goal: Independent     12 Steps  Reason if not Attempted: Not attempted due to medical condition or safety concerns  CARE Score: 88     Wheel 50 Feet with Two Turns  Reason if not Attempted: Not attempted due to medical condition or safety concerns  CARE Score: 88  Discharge Goal: Independent     Wheel 150 Feet  Reason if not Attempted: Not attempted due to medical condition or safety concerns  CARE Score: 88  Discharge Goal: Independent        LAST TREATMENT TIME  PT Individual Minutes  Time In: 1100  Time Out: 1200  Minutes: 60                                                   RECORD REVIEW: Previous medical records, medications were reviewed at today's visit    IMPRESSION:   1. Acute ischemic stroke-bilateral brendon-brainstem-on ASA/statin/Plavix  2. Hyperlipidemia-statin  3. DVT prophylaxis-Lovenox  4. GI-bowel regimen  5. Chronic right shoulders-monitor  6. History of chronic opiod use due to shoulder issues  7. Smoker  8.  PT/OT/Speech     Continue care      Expected duration and frequency therapy: 180 minutes per day, 5 days per week    [address] [facility]  [phone] CELL  Dr Luz Moreno

## 2020-12-02 NOTE — PROGRESS NOTES
Occupational Therapy     12/02/20 1000   General   Diagnosis CVA with right hemiparesis   Pain Assessment   Patient Currently in Pain No   Pain Assessment 0-10   Pain Level 0   Balance   Sitting Balance Stand by assistance  (Posterior leaning at times during EOB ADLs. )   Standing Balance Moderate assistance   Functional Mobility   Functional - Mobility Device Wheelchair   Activity Other   Assist Level Minimal assistance   Transfers   Stand Pivot Transfers Moderate assistance   Sit to stand Moderate assistance;Minimal assistance   Stand to sit Minimal assistance   Assessment   Performance deficits / Impairments Decreased functional mobility ; Decreased endurance;Decreased coordination;Decreased ADL status; Decreased fine motor control;Decreased high-level IADLs;Decreased strength;Decreased balance;Decreased ROM   Treatment Diagnosis CVA with right hemiparesis   Prognosis Good   Timed Code Treatment Minutes 60 Minutes   Activity Tolerance   Activity Tolerance Patient Tolerated treatment well   Safety Devices   Safety Devices in place Yes   Type of devices Gait belt  (Given to PT.)   Plan   Current Treatment Recommendations Self-Care / ADL; Safety Education & Training; Endurance Training;Positioning; Functional Mobility Training;Balance Training;Equipment Evaluation, Education, & procurement;ROM; Home Management Training;Patient/Caregiver Education & Training;Strengthening      12/02/20 1000   Oral Hygiene   Assistance Needed Setup or clean-up assistance   CARE Score 5   Shower/Bathe Self   Assistance Needed Partial/moderate assistance   Comment Assistance washing/drying RLE, Mod A for balance during standing portion. CARE Score 3   Upper Body Dressing   Assistance Needed Partial/moderate assistance   Comment Min A, pullover shirt. CARE Score 3   Lower Body Dressing   Assistance Needed Partial/moderate assistance   Comment Assistance donning over RLE, Mod A for balance during standing portion.    CARE Score 3   Putting On/Taking Off Footwear   Assistance Needed Partial/moderate assistance   Comment Using AE, required assistance donning R shoe.    CARE Score 3

## 2020-12-02 NOTE — PLAN OF CARE
Problem: Falls - Risk of:  Goal: Will remain free from falls  Description: Will remain free from falls  Outcome: Ongoing  Goal: Absence of physical injury  Description: Absence of physical injury  Outcome: Ongoing     Problem: ACTIVITY INTOLERANCE/IMPAIRED MOBILITY  Goal: Mobility/activity is maintained at optimum level for patient  Outcome: Ongoing     Problem: Bleeding:  Goal: Will show no signs and symptoms of excessive bleeding  Description: Will show no signs and symptoms of excessive bleeding  Outcome: Ongoing

## 2020-12-02 NOTE — PROGRESS NOTES
Occupational Therapy     12/02/20 1400   Restrictions/Precautions   Restrictions/Precautions Fall Risk   General   Additional Pertinent Hx reverse R shoulder replacement approximately 1 yr ago   Diagnosis CVA with right hemiparesis   ADL   LE Dressing Minimal assistance  (shoes only, able to don and tie L, assist to tie R)   Bed mobility   Scooting Stand by assistance  (cues)   Wheelchair Bed Transfers   Wheelchair/Bed - Technique Stand step   Equipment Used Bed; Wheelchair   Level of Asssistance Contact guard assistance   Type of ROM/Therapeutic Exercise   Type of ROM/Therapeutic Exercise Pulley; Resistive Bands   Comment L UE Min Resistance, distal planes   Coordination   Fine Motor Six pack and other FM ex's with F+ quality   Assessment   Performance deficits / Impairments Decreased functional mobility ; Decreased endurance;Decreased coordination;Decreased ADL status; Decreased fine motor control;Decreased high-level IADLs;Decreased strength;Decreased balance;Decreased ROM   Treatment Diagnosis CVA with right hemiparesis   Timed Code Treatment Minutes 30 Minutes   Activity Tolerance   Activity Tolerance Patient Tolerated treatment well   Safety Devices   Safety Devices in place Yes

## 2020-12-02 NOTE — PROGRESS NOTES
12/02/20 1447   Restrictions/Precautions   Restrictions/Precautions Weight Bearing; Fall Risk   Lower Extremity Weight Bearing Restrictions   Right Lower Extremity Weight Bearing Weight Bearing As Tolerated  (R side weakness )   Left Lower Extremity Weight Bearing Weight Bearing As Tolerated   General   Additional Pertinent Hx Hemiplegia/hemiparesis following cerebral infarction affecting left non-dominant side (R side UE and LE weakness); Dysarthria; Dysphagia, oropharyngeal phase; Hyperlipidemais, unspecified; Nicotine dependence, unspecified, uncomplicated; Surgical Hx of breast surgery x4, C-sectionx1, cyst removal from leg, tonsillectomy, R total shoulder arthroplasty w/ distal clavicle excision on 11/1/8/2019   Diagnosis CVA (acute ischemia bilateral brendon), R sided weakness    Transfers   Sit to Stand Minimal Assistance; Moderate Assistance   Stand to sit Contact guard assistance   Squat Pivot Transfers Moderate Assistance (going to the L)   Ambulation   Ambulation? Yes   WB Status WBAT   More Ambulation? Yes   Ambulation 1   Surface level tile   Device Parallel Bars   Other Apparatus Wheelchair follow   Assistance Minimal assistance   Quality of Gait Pt amb with 3 pt, step-to pattern, RLE leading in the II bars. Was able to self-correct trunk and postural alignment during amb, requiring min VC. Pt needs occasional min A with RLE foot placement but has improved in abd RLE and keeping toes facing forward when advancing RLE. Pt took larger steps today (about 1 foot length/step). Still requires min VC to look straight ahead and maintain upright posture but once verbally cued 2x able to maintain. Pt also needed min VC to appropriately position UE/hands on II bars - sometimes forgot to advance them, or advanced them too far forward. Pt demonstrated improved endurance with 3 bouts of walking, and was also able to ind lockout R knee - clinician still gaurded in case of buckling.  Pt partially buckled with both LE 1 time - pt was cued to keep feet closer to hip width apart and engage their glutes/quads to stand tall. (Pt amb with increased mohinder and overall smoother gait )   Distance 2x12'   Comments decreased R ankle DF; VC to decrease LLE foot slap; focus on locking out L knee at next session    Other exercises   Other exercises? Yes   Other exercises 1 Seated bilateral ex: hip flex/ext, hip abd/add, knee ext/flx (SAQ), ankle DF 1x12    Conditions Requiring Skilled Therapeutic Intervention   Body structures, Functions, Activity limitations Decreased functional mobility ; Decreased ADL status; Decreased strength;Decreased endurance;Decreased balance;Decreased coordination;Decreased posture   Assessment Pt was slightly fatigued d/t a.m. session. Pt was still globally Do with amb in the II bars and modA with squat pivot TF (going to the L). See amb notes for more details. Treatment Diagnosis Interference with activity   Safety Devices   Type of devices Bed alarm in place; Patient at risk for falls; Left in bed

## 2020-12-02 NOTE — PROGRESS NOTES
[facility]  INPATIENT SPEECH THERAPY  [facility] 8 REHAB UNIT    TIME   Time In: 0830  Time Out: 0900  Minutes: 30       [x]Daily Note  []Progress Note    Date: 2020  Patient Name: Stefani Castro        MRN: [id number]    Account #: [de-identified]  : 1955  (75 y.o.)  Gender: female   Primary Provider: Leandra Leach MD  Swallowing Status/Diet: Dysphagia minced and moist, nectar thick liquids    PATIENT DIAGNOSIS(ES):    Diagnosis: CVA (acute ischemia bilateral brendon), R sided weakness      Additional Pertinent Hx: Hemiplegia/hemiparesis following cerebral infarction affecting left non-dominant side (R side UE and LE weakness); Dysarthria; Dysphagia, oropharyngeal phase; Hyperlipidemais, unspecified; Nicotine dependence, unspecified, uncomplicated; Surgical Hx of breast surgery x4, C-sectionx1, cyst removal from leg, tonsillectomy, R total shoulder arthroplasty w/ distal clavicle excision on     RESTRICTIONS/PRECAUTIONS:    Restrictions/Precautions  Restrictions/Precautions: Weight Bearing, Fall Risk(Aspiration risk )  Patient is a silent aspirator. She must remain on nectar thickened liquids      Subjective: The patient was upright in bed. She was cooperative with all tasks. Objective:  Pt denies decreased oral and facial sensation. Her goal is to return to living independently. The patient has a barium allergy and could not complete an MBSS at [facility]. A FEES was completed and she does not wish to have a repeat FEES. She would prefer to do an MBSS even with the knowledge of her barium allergy. (per patient report she completed an upper GI 10-15 years ago and experienced tingly scalp and red facial color). She was given an Epipen and took Benadryl for a few days after the procedure. She did report she had barium twenty years ago, and had no reaction. Discussed this with radiology and radiologist. The radiologist has approved a repeat MBSS.  He did suggest considering giving her benadryl prior to the procedure. She did not have an allergic reaction which affected her airway (no throat swelling or difficulty breathing). She only reported the scalp tingling and facial redness. She states her vocal intensity is near baseline. She is concerned about her dysarthria. Oral motor exercises and pharyngeal exercises were completed. She has a history of pharyngeal dysphagia per her caregiver. For years she has demonstrated coughing with liquids. She denies any history of pneumonia. Dysarthria strategies were reviewed and practiced. Her dysarthria is mild this date. Recommended Diet and Intervention  Diet Solids Recommendation: Dysphagia Minced and Moist (Dysphagia II)  Liquid Consistency Recommendation: Mildly Thick (Nectar)  Recommended Form of Meds: Whole with puree     Compensatory Swallowing Strategies  Compensatory Swallowing Strategies: Alternate solids and liquids;Small bites/sips; Remain upright for 30-45 minutes after meals;Upright as possible for all oral intake;Eat/Feed slowly           SPEECH-LANGUAGE PATHOLOGY EVALUATION - FEES at [facility] on 12/2/20. Subjective: The patient was seen on this date for a FEES (Fiberoptic Endoscopic Evaluation of Swallow). Patient was alert and cooperative. The patient's history is significant for CVA, reports difficulty swallowing, coughing on liquids. Unable to complete MBS due to allergy to barium. Objective: Risks/benefits were reviewed with the patient, and consent was obtained. The nasendoscope was introduced into the right nare without the use of topical anaesthetic. Textures given included thin liquid, nectar thick liquid, honey thick liquid, puree consistency and regular consistency. Assessment: Difficulties were noted with thin liquid, characterized by aspiration post swallow. Premature spillage to over tip of epiglottis was observed with all consistencies. Thinner liquids feel to the pyriform sinuses.  No significant residue seen, however thin liquids did have pooling in pyriforms resulting in eventual coating of vocal cords and silent aspiration. Solids resulted in residue in valleculae. SLP Findings: Patient presents with mild to moderate oropharyngeal dysphagia. Recommendations: Diet Textures: nectar thick liquid, mechanical soft consistency food. Medications should be taken whole with thickened liquids or puree. May have water and Ice between meals after oral care, under staff or family supervision and with the recommended strategies for safe swallowing. Recommended Strategies: Upright for PO and small bites and sips  Other Recommended Evaluations: n/a   Dysphagia therapy is recommended. SHORT TERM GOAL #1:  Goal 1: The patient will tolerate minced and moist diet consistency with nectar thick liquids with min/no overt s/s of aspiration. SHORT TERM GOAL #2:  Goal 2: The patient will complete pharyngeal strengthening exercises with min verbal cues at 80% accuracy to improve airway protection. SHORT TERM GOAL #3:  Goal 3: The patient will complete daily oral motor exercises to improve labial/lingual/buccal strength and ROM to improve oral phase of swallow and speech intelligibility. SHORT TERM GOAL #4:  Goal 4: The patient will use the (over articulation/slow rate/writing key word/elongation of thevowel/increased loudness/phrasing) strategy to improve speech intelligibility withwords/phrases/sentences/in conversation with 100% accuracy.     SHORT TERM GOAL #5:  Goal 5: Patient will complete repeat FEES for potential upgrade           ASSESSMENT:  Assessment: [x]Progressing towards goals          []Not Progressing towards goals    Patient Tolerance of Treatment:   [x]Tolerated well []Tolerated fair []Required rest breaks []Fatigued    Education:  Learner:  [x]Patient          []Significant Other          []Other  Education provided regarding:  [x]Goals and POC   []Diet and swallowing precautions    []Home Exercise Program  []Progress

## 2020-12-03 LAB
ALBUMIN SERPL-MCNC: 4 G/DL (ref 3.5–5.2)
ALP BLD-CCNC: 95 U/L (ref 35–104)
ALT SERPL-CCNC: 16 U/L (ref 5–33)
ANION GAP SERPL CALCULATED.3IONS-SCNC: 10 MMOL/L (ref 7–19)
AST SERPL-CCNC: 23 U/L (ref 5–32)
BASOPHILS ABSOLUTE: 0 K/UL (ref 0–0.2)
BASOPHILS RELATIVE PERCENT: 0.5 % (ref 0–1)
BILIRUB SERPL-MCNC: 0.6 MG/DL (ref 0.2–1.2)
BUN BLDV-MCNC: 20 MG/DL (ref 8–23)
CALCIUM SERPL-MCNC: 9.2 MG/DL (ref 8.8–10.2)
CHLORIDE BLD-SCNC: 105 MMOL/L (ref 98–111)
CO2: 27 MMOL/L (ref 22–29)
CREAT SERPL-MCNC: 0.7 MG/DL (ref 0.5–0.9)
EOSINOPHILS ABSOLUTE: 0.2 K/UL (ref 0–0.6)
EOSINOPHILS RELATIVE PERCENT: 2.8 % (ref 0–5)
GFR AFRICAN AMERICAN: >59
GFR NON-AFRICAN AMERICAN: >60
GLUCOSE BLD-MCNC: 94 MG/DL (ref 74–109)
HCT VFR BLD CALC: 42.9 % (ref 37–47)
HEMOGLOBIN: 14 G/DL (ref 12–16)
IMMATURE GRANULOCYTES #: 0 K/UL
LYMPHOCYTES ABSOLUTE: 2.5 K/UL (ref 1.1–4.5)
LYMPHOCYTES RELATIVE PERCENT: 31.8 % (ref 20–40)
MCH RBC QN AUTO: 29.6 PG (ref 27–31)
MCHC RBC AUTO-ENTMCNC: 32.6 G/DL (ref 33–37)
MCV RBC AUTO: 90.7 FL (ref 81–99)
MONOCYTES ABSOLUTE: 0.6 K/UL (ref 0–0.9)
MONOCYTES RELATIVE PERCENT: 7.3 % (ref 0–10)
NEUTROPHILS ABSOLUTE: 4.5 K/UL (ref 1.5–7.5)
NEUTROPHILS RELATIVE PERCENT: 57.2 % (ref 50–65)
PDW BLD-RTO: 13.2 % (ref 11.5–14.5)
PLATELET # BLD: 261 K/UL (ref 130–400)
PMV BLD AUTO: 10.6 FL (ref 9.4–12.3)
POTASSIUM REFLEX MAGNESIUM: 3.9 MMOL/L (ref 3.5–5)
RBC # BLD: 4.73 M/UL (ref 4.2–5.4)
SODIUM BLD-SCNC: 142 MMOL/L (ref 136–145)
TOTAL PROTEIN: 6.2 G/DL (ref 6.6–8.7)
URINE CULTURE, ROUTINE: NORMAL
WBC # BLD: 7.9 K/UL (ref 4.8–10.8)

## 2020-12-03 PROCEDURE — 97110 THERAPEUTIC EXERCISES: CPT

## 2020-12-03 PROCEDURE — 92507 TX SP LANG VOICE COMM INDIV: CPT

## 2020-12-03 PROCEDURE — 1180000000 HC REHAB R&B

## 2020-12-03 PROCEDURE — 97535 SELF CARE MNGMENT TRAINING: CPT

## 2020-12-03 PROCEDURE — 80053 COMPREHEN METABOLIC PANEL: CPT

## 2020-12-03 PROCEDURE — 6370000000 HC RX 637 (ALT 250 FOR IP): Performed by: PSYCHIATRY & NEUROLOGY

## 2020-12-03 PROCEDURE — 85025 COMPLETE CBC W/AUTO DIFF WBC: CPT

## 2020-12-03 PROCEDURE — 6360000002 HC RX W HCPCS: Performed by: PSYCHIATRY & NEUROLOGY

## 2020-12-03 PROCEDURE — 36415 COLL VENOUS BLD VENIPUNCTURE: CPT

## 2020-12-03 PROCEDURE — 99232 SBSQ HOSP IP/OBS MODERATE 35: CPT | Performed by: PSYCHIATRY & NEUROLOGY

## 2020-12-03 PROCEDURE — 97530 THERAPEUTIC ACTIVITIES: CPT

## 2020-12-03 PROCEDURE — 97116 GAIT TRAINING THERAPY: CPT

## 2020-12-03 PROCEDURE — 92526 ORAL FUNCTION THERAPY: CPT

## 2020-12-03 RX ORDER — PANTOPRAZOLE SODIUM 40 MG/1
40 TABLET, DELAYED RELEASE ORAL NIGHTLY
Status: DISCONTINUED | OUTPATIENT
Start: 2020-12-03 | End: 2020-12-17 | Stop reason: HOSPADM

## 2020-12-03 RX ADMIN — ASPIRIN 81 MG: 81 TABLET, CHEWABLE ORAL at 07:38

## 2020-12-03 RX ADMIN — ENOXAPARIN SODIUM 40 MG: 40 INJECTION SUBCUTANEOUS at 20:53

## 2020-12-03 RX ADMIN — PANTOPRAZOLE SODIUM 40 MG: 40 TABLET, DELAYED RELEASE ORAL at 21:35

## 2020-12-03 RX ADMIN — ATORVASTATIN CALCIUM 80 MG: 80 TABLET, FILM COATED ORAL at 20:53

## 2020-12-03 RX ADMIN — CYCLOBENZAPRINE 10 MG: 10 TABLET, FILM COATED ORAL at 20:53

## 2020-12-03 RX ADMIN — CLOPIDOGREL BISULFATE 75 MG: 75 TABLET ORAL at 07:38

## 2020-12-03 ASSESSMENT — PAIN SCALES - GENERAL: PAINLEVEL_OUTOF10: 0

## 2020-12-03 NOTE — PLAN OF CARE
Problem: Falls - Risk of:  Goal: Will remain free from falls  Description: Will remain free from falls  12/3/2020 1226 by Leila Foss RN  Outcome: Ongoing  12/2/2020 2343 by Hang Abdul RN  Outcome: Ongoing  Goal: Absence of physical injury  Description: Absence of physical injury  12/3/2020 1226 by Lelia Foss RN  Outcome: Ongoing  12/2/2020 2343 by Hang Abdul RN  Outcome: Ongoing     Problem: ACTIVITY INTOLERANCE/IMPAIRED MOBILITY  Goal: Mobility/activity is maintained at optimum level for patient  12/3/2020 1226 by Lelia Foss RN  Outcome: Ongoing  12/2/2020 2343 by Hang Abdul RN  Outcome: Ongoing     Problem: Bleeding:  Goal: Will show no signs and symptoms of excessive bleeding  Description: Will show no signs and symptoms of excessive bleeding  12/3/2020 1226 by Lelia Foss RN  Outcome: Ongoing  12/2/2020 2343 by Hang Abdul RN  Outcome: Ongoing

## 2020-12-03 NOTE — PROGRESS NOTES
Occupational Therapy     12/03/20 1400   Restrictions/Precautions   Restrictions/Precautions Fall Risk   General   Additional Pertinent Hx reverse R shoulder replacement approximately 1 yr ago   Diagnosis CVA with right hemiparesis   Subjective   Subjective Tx focused on RUE re-education   Type of ROM/Therapeutic Exercise   Type of ROM/Therapeutic Exercise Free weights;Cane/Wand   Comment comprehensive RUE AROM ex's with minimal resiststance   Exercises   Other Decreased R ER- painful range past neutral   Coordination   Fine Motor R UE with 1# resistance   Assessment   Performance deficits / Impairments Decreased functional mobility ; Decreased endurance;Decreased coordination;Decreased ADL status; Decreased fine motor control;Decreased high-level IADLs;Decreased strength;Decreased balance;Decreased ROM   Treatment Diagnosis CVA with right hemiparesis   Timed Code Treatment Minutes 45 Minutes   Activity Tolerance   Activity Tolerance Patient Tolerated treatment well

## 2020-12-03 NOTE — PROGRESS NOTES
bars, 1min   Other exercises 4 Mirror Therapy, pt seated in w/c, ankle PF and DF, 2x20   Conditions Requiring Skilled Therapeutic Intervention   Body structures, Functions, Activity limitations Decreased functional mobility ; Decreased ADL status; Decreased strength;Decreased endurance;Decreased balance;Decreased coordination;Decreased posture   Assessment Pt demonstrated improved control of RLE today as noted by improved foot placement and ability to control/lockout R knee during amb. Still required assist with sit to stand with RLE knee block by clinician. Attempted stand-step TF with RW, however, discotinued d/t pt having difficulty with sit to stand component. Required ModA with squat pivot TF going to the L. See amb notes for more details. Treatment Diagnosis Interference with activity   Safety Devices   Type of devices Left in chair;Patient at risk for falls; Chair alarm in place

## 2020-12-03 NOTE — PROGRESS NOTES
Patient:   Ivette Post  MR#:    [id number]   Room:    0487/634-96   YOB: 1955  Date of Progress Note: 12/3/2020  Time of Note                           9:44 AM  Consulting Physician:   Yaya Correa M.D. Attending Physician:  Yaya Correa MD     Chief complaint Acute ischemic stroke     S:This 75 y.o. female  pt admitted to [facility] on 11/26/2020 w/R sided weakness. Pt woke up on am of 11/26 around 5am and got up, went to the kitchen to make coffee and fell after diffuse tingling an R sided weakness noted. At baseline, she does not move her R arm well proximally due to fx in 11/2019. She managed to unlock her door before going back to be, in case she would need help. she stayed in bed for 4 hours and noted she was stronger, however, she  got up again around 10 am and fell again due to R sided weakness. She presented to [facility] ED where on presentation she was out of tPA window and NIHSS was 0, but then she developed worsening R sided weakness again. She was related that she can choke on her saliva when she swallows. Pt was alert & oriented x 3 w/ mild dysarthria. Pt also noted to have new onset tremor. MRI done on 11/27 showed acute ischemia in the bilateral brendon. Over the weekend, she had worsening R sided weakness. EEG was done and read as normal. She is on a mechanical soft diet w/nectar thick liquids. She is now medically stable and is participating with therapy. She is ready to begin rehab w/plan of returning home after rehab dc w/support from her family. No new complaints.     REVIEW OF SYSTEMS:  Constitutional: No fevers No chills  Neck:No stiffness  Respiratory: No shortness of breath  Cardiovascular: No chest pain No palpitations  Gastrointestinal: No abdominal pain    Genitourinary: No Dysuria  Neurological: No headache, no confusion    Past Medical History:      Diagnosis Date    Abnormal Pap smear of cervix     1991    History of cryosurgery 1991       Past Surgical History:      Procedure Laterality Date    BREAST CYST EXCISION Bilateral     X4      SECTION  1981    JOINT REPLACEMENT      right shoulder    TONSILLECTOMY         Medications in Hospital:      Current Facility-Administered Medications:     ondansetron (ZOFRAN-ODT) disintegrating tablet 4 mg, 4 mg, Oral, Q8H PRN, Hali King MD, 4 mg at 20 0439    aspirin chewable tablet 81 mg, 81 mg, Oral, Daily, Hali King MD, 81 mg at 20 07    atorvastatin (LIPITOR) tablet 80 mg, 80 mg, Oral, Nightly, Hali King MD, 80 mg at 20    clopidogrel (PLAVIX) tablet 75 mg, 75 mg, Oral, Daily, Hali King MD, 75 mg at 20    cyclobenzaprine (FLEXERIL) tablet 10 mg, 10 mg, Oral, TID PRN, Hali King MD, 10 mg at 20    acetaminophen (TYLENOL) tablet 650 mg, 650 mg, Oral, Q4H PRN, Hali King MD    polyethylene glycol (GLYCOLAX) packet 17 g, 17 g, Oral, Daily PRN, Hali King MD    enoxaparin (LOVENOX) injection 40 mg, 40 mg, Subcutaneous, Q24H, Hali King MD, 40 mg at 20    Allergies:  Patient has no known allergies. Social History:   TOBACCO:   reports that she has been smoking. She has never used smokeless tobacco.  ETOH:   reports current alcohol use. Family History:       Problem Relation Age of Onset    Heart Attack Maternal Grandmother     Colon Cancer Father     Colon Cancer Mother          PHYSICAL EXAM:  /69   Pulse 58   Temp 96.7 °F (35.9 °C) (Temporal)   Resp 16   Ht 5' 6\" (1.676 m)   Wt 164 lb (74.4 kg)   SpO2 94%   Breastfeeding No   BMI 26.47 kg/m²     Constitutional - well developed, well nourished.    Eyes - conjunctiva normal.   Ear, nose, throat - No scars, masses, or lesions over external nose or ears, no atrophy of tongue  Neck-symmetric, no masses noted, no jugular vein distension  Respiration- chest wall appears symmetric, good expansion,   normal effort without use of accessory 2020  NAME:  Martita Brown  :  1955  (64 y.o.,female)  MRN:  [id number]     HEIGHT:  Height: 5' 6\" (167.6 cm)  WEIGHT:  Weight: 164 lb (74.4 kg)     PATIENT DIAGNOSIS(ES):    Diagnosis: CVA (acute ischemia bilateral brendon), R sided weakness      Additional Pertinent Hx: Hemiplegia/hemiparesis following cerebral infarction affecting left non-dominant side (R side UE and LE weakness); Dysarthria; Dysphagia, oropharyngeal phase; Hyperlipidemais, unspecified; Nicotine dependence, unspecified, uncomplicated; Surgical Hx of breast surgery x4, C-sectionx1, cyst removal from leg, tonsillectomy, R total shoulder arthroplasty w/ distal clavicle excision on   RESTRICTIONS/PRECAUTIONS:    Restrictions/Precautions  Restrictions/Precautions: Weight Bearing, Fall Risk(Aspiration risk )  OVERALL  ORIENTATION STATUS:  PAIN:  Pain Level: 0                    NEUROLOGICAL                 Sensation  Overall Sensation Status: WFL  STRENGTH  Strength RLE  Strength RLE: Exception  Comment: Grossly 3/5; DF 2-,+/5  Strength LLE  Strength LLE: Exception  Comment: Grossly 4/5  ROM  POSTURE/BALANCE  Balance  Sitting - Static: Fair, +  Sitting - Dynamic: Fair  Standing - Static: Fair  Standing - Dynamic: Fair, -       ACTIVITY TOLERANCE  Activity Tolerance  Activity Tolerance: Patient limited by fatigue, Patient limited by endurance      BED MOBILITY  Bed Mobility  Bridging: Minimal assistance  Rolling: Moderate assistance  Supine to Sit: Moderate assistance(Assist needed to help manage RLE)  Sit to Supine:  Moderate assistance(Assist needed to help manage RLE)  Scooting: Stand by assistance  TRANSFERS  Sit to Stand: Minimal Assistance      Bed to Chair: Moderate assistance(Squat pivot going to the L )        WHEELCHAIR PROPULSION 1  Propulsion 1  Propulsion: Manual  Level: Level Tile  Method: SILVIANO FORTE  Level of Assistance: Minimal assistance  Description/ Details: Pt able to navigate turns but did not demonstrate mobility , Decreased ADL status, Decreased strength, Decreased endurance, Decreased balance, Decreased coordination, Decreased posture  Assessment: Pt would benefit from skilled physical therapy to address deficits in ambulatory endurance and other mobility deficits. Pt was globally Mod A to Min A for TF and ambulation. (See amb notes for more details). At next session continue with gait training in the II bars and LE ex, as well as work on stand pivot TF to the L. Pt may be good canidate for AFO pending how gait training progresses. Activity Tolerance: Patient limited by fatigue, Patient limited by endurance  PLAN:  Plan  Current Treatment Recommendations: Strengthening, ROM, Balance Training, Functional Mobility Training, Transfer Training, ADL/Self-care Training, Endurance Training, Wheelchair Mobility Training, Gait Training, Stair training, Cognitive Reorientation, Pain Management, Home Exercise Program, Safety Education & Training, Patient/Caregiver Education & Training, Equipment Evaluation, Education, & procurement, Modalities  PATIENT REQUIRES AND IS REASONABLY EXPECTED TO ACTIVELY PARTICIPATE IN AT LEAST 3 HOURS OF INTENSIVE THERAPY PER DAY AT LEAST 5 DAYS PER WEEK, AND BE EXPECTED TO MAKE MEASURABLE IMPROVEMENT THAT WILL BE OF PRACTICAL VALUE TO IMPROVE THE PATIENT'S FUNCTIONAL CAPACITY OR ADAPTATION TO IMPAIRMENTS.    PATIENT GOAL FOR REHAB:  RETURN TO PRIOR LEVEL OF FUNCTION         IRF/DELTA  Roll Left and Right  Assistance Needed: Partial/moderate assistance  CARE Score: 3  Discharge Goal: Independent     Sit to Lying  Assistance Needed: Partial/moderate assistance  CARE Score: 3  Discharge Goal: Independent     Lying to Sitting on Side of Bed  Assistance Needed: Partial/moderate assistance  CARE Score: 3  Discharge Goal: Independent     Sit to Stand  Assistance Needed: Supervision or touching assistance  CARE Score: 4  Discharge Goal: Independent     Chair/Bed-to-Chair Transfer  Assistance Needed: Partial/moderate assistance  CARE Score: 3  Discharge Goal: Independent     Car Transfer  Reason if not Attempted: Not attempted due to medical condition or safety concerns  CARE Score: 88  Discharge Goal: Independent     Walk 10 Feet  Assistance Needed: Partial/moderate assistance  CARE Score: 3  Discharge Goal: Independent     Walk 50 Feet with Two Turns  Reason if not Attempted: Not attempted due to medical condition or safety concerns  CARE Score: 88  Discharge Goal: Independent     Walk 150 Feet  Reason if not Attempted: Not attempted due to medical condition or safety concerns  CARE Score: 88  Discharge Goal: Independent     Walking 10 Feet on Uneven Surfaces  Reason if not Attempted: Not attempted due to medical condition or safety concerns  CARE Score: 88  Discharge Goal: Not Attempted     1 Step (Curb)  Reason if not Attempted: Not attempted due to medical condition or safety concerns  CARE Score: 88  Discharge Goal: Independent     4 Steps  Reason if not Attempted: Not attempted due to medical condition or safety concerns  CARE Score: 88  Discharge Goal: Independent     12 Steps  Reason if not Attempted: Not attempted due to medical condition or safety concerns  CARE Score: 88     Wheel 50 Feet with Two Turns  Reason if not Attempted: Not attempted due to medical condition or safety concerns  CARE Score: 88  Discharge Goal: Independent     Wheel 150 Feet  Reason if not Attempted: Not attempted due to medical condition or safety concerns  CARE Score: 88  Discharge Goal: Independent        LAST TREATMENT TIME  PT Individual Minutes  Time In: 1100  Time Out: 1200  Minutes: 60                                                   RECORD REVIEW: Previous medical records, medications were reviewed at today's visit    IMPRESSION:   1. Acute ischemic stroke-bilateral brendon-brainstem-on ASA/statin/Plavix  2. Hyperlipidemia-statin  3. DVT prophylaxis-Lovenox  4. GI-bowel regimen  5. Chronic right shoulders-monitor  6. History of chronic opiod use due to shoulder issues  7. Smoker  8.  PT/OT/Speech     Continue current care      Expected duration and frequency therapy: 180 minutes per day, 5 days per week    [address] [facility]  [phone] CELL  Dr Leandra Leach

## 2020-12-03 NOTE — PROGRESS NOTES
Occupational Therapy     12/03/20 0915   General   Additional Pertinent Hx reverse R shoulder replacement approximately 1 yr ago   Diagnosis CVA with right hemiparesis   Pain Assessment   Patient Currently in Pain No   Pain Assessment 0-10   Pain Level 0   Balance   Sitting Balance Supervision   Standing Balance Minimal assistance   Functional Mobility   Functional - Mobility Device Wheelchair   Activity To/from bathroom   Assist Level Contact guard assistance   Transfers   Stand Pivot Transfers Minimal assistance   Sit to stand Minimal assistance   Stand to sit Minimal assistance   Assessment   Performance deficits / Impairments Decreased functional mobility ; Decreased endurance;Decreased coordination;Decreased ADL status; Decreased fine motor control;Decreased high-level IADLs;Decreased strength;Decreased balance;Decreased ROM   Treatment Diagnosis CVA with right hemiparesis   Prognosis Good   Timed Code Treatment Minutes 45 Minutes   Activity Tolerance   Activity Tolerance Patient Tolerated treatment well   Safety Devices   Safety Devices in place Yes   Type of devices Gait belt  (Given to PT.)   Plan   Current Treatment Recommendations Self-Care / ADL; Safety Education & Training; Endurance Training;Positioning; Functional Mobility Training;Balance Training;Equipment Evaluation, Education, & procurement;ROM; Home Management Training;Patient/Caregiver Education & Training;Strengthening      12/03/20 0915   Oral Hygiene   Assistance Needed Setup or clean-up assistance   CARE Score 5   Toileting Hygiene   Assistance Needed Partial/moderate assistance   Comment Mod A. CARE Score 3   Shower/Bathe Self   Assistance Needed Partial/moderate assistance   Comment Shower, Min A for balance at GB during standing portion of task, utilized LH sponge. CARE Score 3   Upper Body Dressing   Assistance Needed Supervision or touching assistance   Comment CGA, pullover shirt.    CARE Score 4   Lower Body Dressing   Assistance Needed Partial/moderate assistance   Comment Min A for standing portion, able to start pants over RLE using reacher. CARE Score 3   Putting On/Taking Off Footwear   Assistance Needed Partial/moderate assistance   Comment Min A, using AE.    CARE Score 3

## 2020-12-03 NOTE — PLAN OF CARE
Problem: Falls - Risk of:  Goal: Will remain free from falls  Description: Will remain free from falls  12/2/2020 2343 by Enzo Charles RN  Outcome: Ongoing  12/2/2020 1238 by Ahsan Haas LPN  Outcome: Ongoing  Goal: Absence of physical injury  Description: Absence of physical injury  12/2/2020 2343 by Enzo Charles RN  Outcome: Ongoing  12/2/2020 1238 by Ahsan Haas LPN  Outcome: Ongoing     Problem: ACTIVITY INTOLERANCE/IMPAIRED MOBILITY  Goal: Mobility/activity is maintained at optimum level for patient  12/2/2020 2343 by Enzo Charles RN  Outcome: Ongoing  12/2/2020 1238 by Ahsan Haas LPN  Outcome: Ongoing     Problem: Bleeding:  Goal: Will show no signs and symptoms of excessive bleeding  Description: Will show no signs and symptoms of excessive bleeding  12/2/2020 2343 by Enzo Charles RN  Outcome: Ongoing  12/2/2020 1238 by Ahsan Haas LPN  Outcome: Ongoing

## 2020-12-04 ENCOUNTER — APPOINTMENT (OUTPATIENT)
Dept: GENERAL RADIOLOGY | Age: 65
DRG: 057 | End: 2020-12-04
Attending: PSYCHIATRY & NEUROLOGY
Payer: MEDICARE

## 2020-12-04 PROCEDURE — 1180000000 HC REHAB R&B

## 2020-12-04 PROCEDURE — 92611 MOTION FLUOROSCOPY/SWALLOW: CPT

## 2020-12-04 PROCEDURE — 97530 THERAPEUTIC ACTIVITIES: CPT

## 2020-12-04 PROCEDURE — 97535 SELF CARE MNGMENT TRAINING: CPT

## 2020-12-04 PROCEDURE — 6370000000 HC RX 637 (ALT 250 FOR IP): Performed by: PSYCHIATRY & NEUROLOGY

## 2020-12-04 PROCEDURE — 6360000002 HC RX W HCPCS: Performed by: PSYCHIATRY & NEUROLOGY

## 2020-12-04 PROCEDURE — 74230 X-RAY XM SWLNG FUNCJ C+: CPT

## 2020-12-04 PROCEDURE — 97116 GAIT TRAINING THERAPY: CPT

## 2020-12-04 PROCEDURE — 92526 ORAL FUNCTION THERAPY: CPT

## 2020-12-04 PROCEDURE — 99232 SBSQ HOSP IP/OBS MODERATE 35: CPT | Performed by: PSYCHIATRY & NEUROLOGY

## 2020-12-04 PROCEDURE — 97110 THERAPEUTIC EXERCISES: CPT

## 2020-12-04 RX ORDER — DIPHENHYDRAMINE HCL 25 MG
25 TABLET ORAL ONCE
Status: COMPLETED | OUTPATIENT
Start: 2020-12-04 | End: 2020-12-04

## 2020-12-04 RX ORDER — PREDNISONE 20 MG/1
20 TABLET ORAL ONCE
Status: COMPLETED | OUTPATIENT
Start: 2020-12-04 | End: 2020-12-04

## 2020-12-04 RX ADMIN — CLOPIDOGREL BISULFATE 75 MG: 75 TABLET ORAL at 09:29

## 2020-12-04 RX ADMIN — ENOXAPARIN SODIUM 40 MG: 40 INJECTION SUBCUTANEOUS at 21:09

## 2020-12-04 RX ADMIN — ATORVASTATIN CALCIUM 80 MG: 80 TABLET, FILM COATED ORAL at 21:09

## 2020-12-04 RX ADMIN — DIPHENHYDRAMINE HCL 25 MG: 25 TABLET ORAL at 09:29

## 2020-12-04 RX ADMIN — CYCLOBENZAPRINE 10 MG: 10 TABLET, FILM COATED ORAL at 21:09

## 2020-12-04 RX ADMIN — ASPIRIN 81 MG: 81 TABLET, CHEWABLE ORAL at 09:29

## 2020-12-04 RX ADMIN — PREDNISONE 20 MG: 20 TABLET ORAL at 09:29

## 2020-12-04 RX ADMIN — PANTOPRAZOLE SODIUM 40 MG: 40 TABLET, DELAYED RELEASE ORAL at 21:09

## 2020-12-04 ASSESSMENT — PAIN SCALES - GENERAL
PAINLEVEL_OUTOF10: 0

## 2020-12-04 NOTE — PROGRESS NOTES
Occupational Therapy     12/04/20 0815   General   Additional Pertinent Hx reverse R shoulder replacement approximately 1 yr ago   Diagnosis CVA with right hemiparesis   Pain Assessment   Patient Currently in Pain No   Pain Assessment 0-10   Pain Level 0   Balance   Sitting Balance Supervision   Standing Balance Minimal assistance   Functional Mobility   Functional - Mobility Device Wheelchair   Activity Other   Assist Level Contact guard assistance   Transfers   Stand Step Transfers Minimal assistance  (With RW.)   Sit to stand Minimal assistance   Stand to sit Minimal assistance   Type of ROM/Therapeutic Exercise   Type of ROM/Therapeutic Exercise Pulley;AAROM;AROM   Comment Pulley: Multiple sets. AAROM: shldr level, 1 set x 10 reps. AROM: distal, 1 set x 10 reps. Assessment   Performance deficits / Impairments Decreased functional mobility ; Decreased endurance;Decreased coordination;Decreased ADL status; Decreased fine motor control;Decreased high-level IADLs;Decreased strength;Decreased balance;Decreased ROM   Treatment Diagnosis CVA with right hemiparesis   Prognosis Good   Timed Code Treatment Minutes 45 Minutes   Activity Tolerance   Activity Tolerance Patient Tolerated treatment well   Safety Devices   Safety Devices in place Yes   Type of devices Call light within reach; Bed alarm in place   Plan   Current Treatment Recommendations Self-Care / ADL; Safety Education & Training; Endurance Training;Positioning; Functional Mobility Training;Balance Training;Equipment Evaluation, Education, & procurement;ROM; Home Management Training;Patient/Caregiver Education & Training;Strengthening      12/04/20 0815   Putting On/Taking Off Footwear   Assistance Needed Partial/moderate assistance   Comment Min A.    CARE Score 3

## 2020-12-04 NOTE — PROGRESS NOTES
Patient:   Patrice Lea  MR#:    [id number]   Room:    3174/239-21   YOB: 1955  Date of Progress Note: 12/4/2020  Time of Note                           1:55 PM  Consulting Physician:   Chinmay Perez M.D. Attending Physician:  Chinmay Perez MD     Chief complaint Acute ischemic stroke     S:This 75 y.o. female  pt admitted to [facility] on 11/26/2020 w/R sided weakness. Pt woke up on am of 11/26 around 5am and got up, went to the kitchen to make coffee and fell after diffuse tingling an R sided weakness noted. At baseline, she does not move her R arm well proximally due to fx in 11/2019. She managed to unlock her door before going back to be, in case she would need help. she stayed in bed for 4 hours and noted she was stronger, however, she  got up again around 10 am and fell again due to R sided weakness. She presented to [facility] ED where on presentation she was out of tPA window and NIHSS was 0, but then she developed worsening R sided weakness again. She was related that she can choke on her saliva when she swallows. Pt was alert & oriented x 3 w/ mild dysarthria. Pt also noted to have new onset tremor. MRI done on 11/27 showed acute ischemia in the bilateral brendon. Over the weekend, she had worsening R sided weakness. EEG was done and read as normal. She is on a mechanical soft diet w/nectar thick liquids. She is now medically stable and is participating with therapy. She is ready to begin rehab w/plan of returning home after rehab dc w/support from her family. No new issues.     REVIEW OF SYSTEMS:  Constitutional: No fevers No chills  Neck:No stiffness  Respiratory: No shortness of breath  Cardiovascular: No chest pain No palpitations  Gastrointestinal: No abdominal pain    Genitourinary: No Dysuria  Neurological: No headache, no confusion    Past Medical History:      Diagnosis Date    Abnormal Pap smear of cervix     1991    History of cryosurgery 1991       Past Surgical History:      Procedure Laterality Date    BREAST CYST EXCISION Bilateral     X4      SECTION  1981    JOINT REPLACEMENT      right shoulder    TONSILLECTOMY         Medications in Hospital:      Current Facility-Administered Medications:     pantoprazole (PROTONIX) tablet 40 mg, 40 mg, Oral, Nightly, Morgan Smallwood MD, 40 mg at 20    ondansetron (ZOFRAN-ODT) disintegrating tablet 4 mg, 4 mg, Oral, Q8H PRN, Morgan Smallwood MD, 4 mg at 20    aspirin chewable tablet 81 mg, 81 mg, Oral, Daily, Morgan Smallwood MD, 81 mg at 20    atorvastatin (LIPITOR) tablet 80 mg, 80 mg, Oral, Nightly, Morgan Smallwood MD, 80 mg at 20    clopidogrel (PLAVIX) tablet 75 mg, 75 mg, Oral, Daily, Morgan Smallwood MD, 75 mg at 20    cyclobenzaprine (FLEXERIL) tablet 10 mg, 10 mg, Oral, TID PRN, Morgan Smallwood MD, 10 mg at 20    acetaminophen (TYLENOL) tablet 650 mg, 650 mg, Oral, Q4H PRN, Morgan Smallwood MD    polyethylene glycol (GLYCOLAX) packet 17 g, 17 g, Oral, Daily PRN, Morgan Smallwood MD    enoxaparin (LOVENOX) injection 40 mg, 40 mg, Subcutaneous, Q24H, Morgan Smallwood MD, 40 mg at 20    Allergies:  Patient has no known allergies. Social History:   TOBACCO:   reports that she has been smoking. She has never used smokeless tobacco.  ETOH:   reports current alcohol use. Family History:       Problem Relation Age of Onset    Heart Attack Maternal Grandmother     Colon Cancer Father     Colon Cancer Mother          PHYSICAL EXAM:  /78   Pulse 66   Temp 96.8 °F (36 °C) (Temporal)   Resp 18   Ht 5' 6\" (1.676 m)   Wt 164 lb (74.4 kg)   SpO2 93%   Breastfeeding No   BMI 26.47 kg/m²     Constitutional - well developed, well nourished.    Eyes - conjunctiva normal.   Ear, nose, throat - No scars, masses, or lesions over external nose or ears, no atrophy of tongue  Neck-symmetric, no masses noted, no jugular vein distension  Respiration- chest wall appears symmetric, good expansion,   normal effort without use of accessory muscles  Musculoskeletal - no significant wasting of muscles noted, no bony deformities  Extremities-no clubbing, cyanosis or edema  Skin - warm, dry, and intact. No rash, erythema, or pallor. Psychiatric - mood, affect, and behavior appear normal.      Neurological exam  Awake, alert, fluent oriented appropriate affect slightly slow dysarthria  Attention and concentration appear appropriate  Recent and remote memory appears unremarkable  Speech with dysarthria and some expressive aphasia  No clear issues with language of fund of knowledge     Cranial Nerve Exam     CN III, IV,VI-EOMI, No nystagmus, conjugate eye movements, no ptosis    CN VII-right lower facial droop       Motor Exam  antigravity throughout upper and lower extremities bilaterally  Drift right arm and right leg      Tremors- no tremors in hands or head noted     Gait  Not tested      Nursing/pcp notes, imaging,labs and vitals reviewed.      PT,OT and/or speech notes reviewed    Lab Results   Component Value Date    WBC 7.9 12/03/2020    HGB 14.0 12/03/2020    HCT 42.9 12/03/2020    MCV 90.7 12/03/2020     12/03/2020     Lab Results   Component Value Date     12/03/2020    K 3.9 12/03/2020     12/03/2020    CO2 27 12/03/2020    BUN 20 12/03/2020    CREATININE 0.7 12/03/2020    GLUCOSE 94 12/03/2020    CALCIUM 9.2 12/03/2020    PROT 6.2 (L) 12/03/2020    LABALBU 4.0 12/03/2020    BILITOT 0.6 12/03/2020    ALKPHOS 95 12/03/2020    AST 23 12/03/2020    ALT 16 12/03/2020    LABGLOM >60 12/03/2020    GFRAA >59 12/03/2020     Lab Results   Component Value Date    INR 1.04 12/01/2020    PROTIME 13.5 12/01/2020         [address] [place]    Student Physical Therapist    Physical Therapy    Progress Notes    Cosign Needed    Date of Service:  12/3/2020  1:29 PM                Cosign Needed              Show:Clear all  []Manual[x]Template[]Copied    Added by:  Ene Matt    []Liv for details       12/03/20 1018   Restrictions/Precautions   Restrictions/Precautions Weight Bearing; Fall Risk   Lower Extremity Weight Bearing Restrictions   Right Lower Extremity Weight Bearing Weight Bearing As Tolerated  (R side weakness )   Left Lower Extremity Weight Bearing Weight Bearing As Tolerated   General   Diagnosis CVA (acute ischemia bilateral brendon), R sided weakness    Bed Mobility   Scooting Stand by assistance   Transfers   Sit to Stand Minimal Assistance  (blocking R knee during sit to stand )   Stand to sit Contact guard assistance;Stand by assistance  (SBA to gaurd/block R knee )   Squat Pivot Transfers Moderate Assistance  (Going to the L)   Comment Attempted stand and step TF to L with RW, discontinued d/t pt having difficulty completing sit to stand at end of session   Ambulation   Ambulation? Yes   WB Status WBAT   More Ambulation? Yes   Ambulation 1   Surface level tile   Device Parallel Bars   Other Apparatus Wheelchair follow   Assistance Contact guard assistance;Minimal assistance   Quality of Gait Pt amb with step-to RLE gait pattern; able to advance and place RLE ind, needs min A with blocking R knee during LLE advancement. Overall, improvement from yesterday as noted by decreased assistance needed with blocking R knee. Distance 2x12'   Ambulation 2   Surface - 2 level tile   Device 2 Parallel Bars   Other Apparatus 2 Wheelchair follow   Assistance 2 Contact guard assistance;Minimal assistance   Quality of Gait 2 See quality of gait for amb 1. Pt demonstrated consistancy in needing less assist blocking RLE. Primarily needed for TF from sit to stand. Progress to RW pending similar or improved performance in II bars with amb at next session.     Distance 12'   Comments II bars raised and RW height adjusted to II bar height    Balance   Standing - Static Fair;+   Standing - Dynamic Fair   Other exercises   Other exercises 2 Mini Squats in II bars, 2x5     Other exercises 3 Weight shifting in II bars, 1min   Other exercises 4 Mirror Therapy, pt seated in w/c, ankle PF and DF, 2x20   Conditions Requiring Skilled Therapeutic Intervention   Body structures, Functions, Activity limitations Decreased functional mobility ; Decreased ADL status; Decreased strength;Decreased endurance;Decreased balance;Decreased coordination;Decreased posture   Assessment Pt demonstrated improved control of RLE today as noted by improved foot placement and ability to control/lockout R knee during amb. Still required assist with sit to stand with RLE knee block by clinician. Attempted stand-step TF with RW, however, discotinued d/t pt having difficulty with sit to stand component. Required ModA with squat pivot TF going to the L. See amb notes for more details. Treatment Diagnosis Interference with activity   Safety Devices   Type of devices Left in chair;Patient at risk for falls; Chair alarm in place                      RECORD REVIEW: Previous medical records, medications were reviewed at today's visit    IMPRESSION:   1. Acute ischemic stroke-bilateral brendon-brainstem-on ASA/statin/Plavix  2. Hyperlipidemia-statin  3. DVT prophylaxis-Lovenox  4. GI-bowel regimen  5. Chronic right shoulders-monitor  6. History of chronic opiod use due to shoulder issues  7. Smoker  8.  PT/OT/Speech     Continue present care      Expected duration and frequency therapy: 180 minutes per day, 5 days per week    [address] [place] [address]  [phone] CELL  Dr Morgan Smallwood

## 2020-12-04 NOTE — PLAN OF CARE
Problem: Falls - Risk of:  Goal: Will remain free from falls  Description: Will remain free from falls  12/3/2020 2357 by Sarabjit Torrez RN  Outcome: Ongoing  12/3/2020 1226 by Ty Navarrete RN  Outcome: Ongoing  Goal: Absence of physical injury  Description: Absence of physical injury  12/3/2020 2357 by Sarabjit Torrez RN  Outcome: Ongoing  12/3/2020 1226 by Ty Navarrete RN  Outcome: Ongoing     Problem: ACTIVITY INTOLERANCE/IMPAIRED MOBILITY  Goal: Mobility/activity is maintained at optimum level for patient  12/3/2020 2357 by Sarabjit Torrez RN  Outcome: Ongoing  12/3/2020 1226 by Ty Navarrete RN  Outcome: Ongoing     Problem: Bleeding:  Goal: Will show no signs and symptoms of excessive bleeding  Description: Will show no signs and symptoms of excessive bleeding  12/3/2020 2357 by Sarabjit Torrez RN  Outcome: Ongoing  12/3/2020 1226 by Ty Navarrete RN  Outcome: Ongoing

## 2020-12-04 NOTE — PROGRESS NOTES
Occupational Therapy     12/04/20 1445   General   Additional Pertinent Hx reverse R shoulder replacement approximately 1 yr ago   Diagnosis CVA with right hemiparesis   Subjective   Subjective Tx focused on UE function   Type of ROM/Therapeutic Exercise   Type of ROM/Therapeutic Exercise AAROM; Self PROM   Comment pt instructed in AAROM ex's to be completed while sitting up in chair   Coordination   Fine Motor R UE FM acts with F control   Assessment   Performance deficits / Impairments Decreased functional mobility ; Decreased endurance;Decreased coordination;Decreased ADL status; Decreased fine motor control;Decreased high-level IADLs;Decreased strength;Decreased balance;Decreased ROM   Treatment Diagnosis CVA with right hemiparesis   Timed Code Treatment Minutes 45 Minutes   Activity Tolerance   Activity Tolerance Patient Tolerated treatment well

## 2020-12-04 NOTE — PROCEDURES
INSTRUMENTAL SWALLOW REPORT  MODIFIED BARIUM SWALLOW    NAME: Ana Lilia Gimenez     : 1955  MRN: [id number]     Date of Eval: 2020     Ordering Physician: Dr. Stephy Olivares  Radiologist: Dr. Judy Nuno    Referring Diagnosis(es):   TIA (transient ischemic attack) G45.9 435.9 2. Decreased activities of daily living (ADL) Z78.9 V49.89 3. Oropharyngeal dysphagia R13.12 787.22    History:  Per Neurology: This 75 y.o. female  pt admitted to [facility] on 2020 w/R sided weakness. Pt woke up on am of  around 5am and got up, went to the kitchen to make coffee and fell after diffuse tingling an R sided weakness noted. At baseline, she does not move her R arm well proximally due to fx in 2019.  She managed to unlock her door before going back to be, in case she would need help. she stayed in bed for 4 hours and noted she was stronger, however, she  got up again around 10 am and fell again due to R sided weakness. She presented to [facility] ED where on presentation she was out of tPA window and NIHSS was 0, but then she developed worsening R sided weakness again. She was related that she can choke on her saliva when she swallows. Pt was alert & oriented x 3 w/ mild dysarthria. Pt also noted to have new onset tremor. MRI done on  showed acute ischemia in the bilateral brendon. Over the weekend, she had worsening R sided weakness. EEG was done and read as normal. She is on a mechanical soft diet w/nectar thick liquids. She is now medically stable and is participating with therapy. She is ready to begin rehab w/plan of returning home after rehab dc w/support from her family. No new issues. Per medical records and patient report:   The patient has a barium allergy and could not complete an MBSS at [facility]. A FEES was completed and she does not wish to have a repeat FEES.  She would prefer to do an MBSS even with the knowledge of her barium allergy. (per patient report she completed an upper GI 10-15 years ago and experienced tingly scalp and red facial color). She was given an Epipen and took Benadryl for a few days after the procedure. She did report she had barium twenty years ago, and had no reaction. Discussed this with radiology and radiologist. The radiologist has approved a repeat MBSS. He did suggest considering giving her benadryl prior to the procedure. She did not have an allergic reaction which affected her airway (no throat swelling or difficulty breathing). She only reported the scalp tingling and facial redness. Due to past reaction after consuming barium, the patient was given prednisone and benadryl this morning prior to her modified barium swallow study this date. Past Medical History:    has a past medical history of Abnormal Pap smear of cervix and History of cryosurgery. Past Surgical History:    has a past surgical history that includes  section (); Breast cyst excision (Bilateral); Tonsillectomy; and joint replacement. Current Diet Solid Consistency:   Dysphagia Minced and Moist (Dysphagia II)    Current Diet Liquid Consistency:   Mildly Thick (Nectar)    Type of Study:   Initial MBS    Results of Prior Study:   A FEES was completed at [facility] on 20. Difficulties were noted with thin liquid, characterized by aspiration post swallow. Premature spillage to over tip of epiglottis was observed with all consistencies. Thinner liquids feel to the pyriform sinuses. No significant residue seen, however thin liquids did have pooling in pyriforms resulting in eventual coating of vocal cords and silent aspiration. Solids resulted in residue in valleculae. Recommendations: Diet Textures: nectar thick liquid, mechanical soft consistency food. Medications should be taken whole with thickened liquids or puree. May have water and Ice between meals after oral care, under staff or family supervision and with the recommended swallowing precautions.     Recent CXR/CT of Chest:   Result Impression   1. No acute process in the chest.      This report was finalized on 11/26/2020 10:55 by Dr López Hernández, . Result Narrative   Frontal upright radiograph of the chest 11/26/2020 10:40 AM CST    HISTORY: Chest exam dated 11/14/2019       Patient Complaints/Reason for Referral:  Yennifer Hooper was referred for a MBS to assess the efficiency of his/her swallow function, assess for aspiration, and to make recommendations regarding safe dietary consistencies, effective compensatory strategies, and safe eating environment. Behavior/Cognition/Vision/Hearing:  Behavior/Cognition: Alert; Cooperative;Pleasant mood    Impressions:  No aspiration was observed this study. Silent laryngeal penetration was observed with thin liquids with her head in the straight position. When she used a chin tuck with thin liquids via cup, no further laryngeal penetration was observed. She is recommended to upgrade to a dysphagia soft and bite sized diet. She may have thin liquids via cup while utilizing a chin tuck. Medications should be presented whole in applesauce or pudding. Patient Position:   Lateral and Patient Degrees: 90      Consistencies Administered:   Reg solid; Thin cup    Silent laryngeal penetration before and during the swallow was observed with thin liquids via cup. Minimal vallecular residue. Premature loss over the tongue base the the valleculae then pyriforms. Deep laryngeal penetration was noted (a min to moderate amount). This was ejected from the airway with swallow completion. Thin liquids via cup with a chin tuck were presented. A min to moderate amount of vallecular and pyriform residue was noted. A trace amount of base of tongue residue was noted. All residue cleared with independent dry swallows. Thin liquids via cup with a chin tuck were presented again and trace vallecular and pyriform residue was noted. Dry swallows (independent) did clear the valleculae and pyriforms.  Premature loss to the valleculae was noted, but this was much less with the chin tuck. Solid was presented and undercoating of the epiglottis was observed. Premature loss to the valleculae was noted. Minimal UES residue was noted after solid. Thin liquids via cup with chin tuck were completed. Minimal pyriform and vallecular residue was noted. Pyriform cleared with dry swallow, and vallecular residue remained. A right head turn with thin liquids via cup was completed. A trace amount of silent laryngeal penetration above the vocal folds with ejection was observed prior to her turning her head to the right. Once in the full right head turn premature loss to the valleculae then pyriforms was noted. The valleculae was and pyriforms werefilling with material. After dry swallows this did clear. Mild decreased hyolaryngeal elevation, incomplete epiglottic inversion and decreased anterior hyoid excursion was noted. Limited amounts of barium and limited study this date due to barium sensitivity. Barium presented today included Varibar Thin liquid as well as EZ Paste Barium Sulfate Esophageal Cream on a cracker. No reaction was observed after consuming these products. Postural Changes and/or Swallow Maneuvers Trialed:   Chin tuck; Head turn right      Oral Phase:   Mild to moderate oral phase dysphagia due to premature loss of the bolus over the tongue base. Mild labial spillage. Mild base of tongue residue was observed. Pharyngeal:   Mild to moderate pharyngeal phase dysphagia. Silent laryngeal penetration was observed with thin liquids. A chin tuck did prove effective in preventing further laryngeal penetration. A right head turn was also completed with thin liquids, however, premature loss of the bolus over the tongue base to the pyriforms was noted. Esophageal:  Some mild UES residue noted following solids. Dysphagia Outcome Severity Scale:   Level 5: Mild dysphagia- Distant supervision.  May need one diet consistency secretions. Goal 5: The patient will perform compensatory swallow strategies (chin tuck) to eliminate s/s of aspiration and tolerate the least restrictive diet with (min) verbal cues. Goal 6: The patient will tolerate a dysphagia soft and bite sized diet with thin liquids plus a chin tuck with minimal overt s/s of aspiration.           Pain   Patient Currently in Pain: No  Pain Level: 0                  Jeromy Brush, 12/4/2020, 3:21 PM    Electronically Signed By:  Jeromy Brush M.S., CCC-SLP  12/4/2020,3:24 PM.

## 2020-12-04 NOTE — PROGRESS NOTES
TF's w/o RW from bed to W/C. Pt improved to Min A for TF's when performing Technique correctly w/o RW. Pt also improving w/ AMB, SEE AMB note. Pt tolerated session well w/ minimal fatigue.    Prognosis  --   --  Good   Activity Tolerance   Activity Tolerance  --   --  Patient Tolerated treatment well   Electronically signed by Grazyna Anthony PTA on 12/4/2020 at 2:02 PM

## 2020-12-05 PROCEDURE — 6360000002 HC RX W HCPCS: Performed by: PSYCHIATRY & NEUROLOGY

## 2020-12-05 PROCEDURE — 97116 GAIT TRAINING THERAPY: CPT

## 2020-12-05 PROCEDURE — 99232 SBSQ HOSP IP/OBS MODERATE 35: CPT | Performed by: PSYCHIATRY & NEUROLOGY

## 2020-12-05 PROCEDURE — 92507 TX SP LANG VOICE COMM INDIV: CPT

## 2020-12-05 PROCEDURE — 1180000000 HC REHAB R&B

## 2020-12-05 PROCEDURE — 92526 ORAL FUNCTION THERAPY: CPT

## 2020-12-05 PROCEDURE — 6370000000 HC RX 637 (ALT 250 FOR IP): Performed by: PSYCHIATRY & NEUROLOGY

## 2020-12-05 PROCEDURE — 97535 SELF CARE MNGMENT TRAINING: CPT

## 2020-12-05 PROCEDURE — 97110 THERAPEUTIC EXERCISES: CPT

## 2020-12-05 RX ADMIN — POLYETHYLENE GLYCOL 3350 17 G: 17 POWDER, FOR SOLUTION ORAL at 21:15

## 2020-12-05 RX ADMIN — PANTOPRAZOLE SODIUM 40 MG: 40 TABLET, DELAYED RELEASE ORAL at 21:15

## 2020-12-05 RX ADMIN — ATORVASTATIN CALCIUM 80 MG: 80 TABLET, FILM COATED ORAL at 21:15

## 2020-12-05 RX ADMIN — CLOPIDOGREL BISULFATE 75 MG: 75 TABLET ORAL at 09:33

## 2020-12-05 RX ADMIN — ASPIRIN 81 MG: 81 TABLET, CHEWABLE ORAL at 09:33

## 2020-12-05 RX ADMIN — ENOXAPARIN SODIUM 40 MG: 40 INJECTION SUBCUTANEOUS at 21:15

## 2020-12-05 ASSESSMENT — PAIN SCALES - GENERAL
PAINLEVEL_OUTOF10: 0
PAINLEVEL_OUTOF10: 0

## 2020-12-05 NOTE — PROGRESS NOTES
Occupational Therapy  Facility/Department: [facility] 8 REHAB UNIT  Daily Treatment Note  NAME: Vincent Moncada  : 1955  MRN: [id number]    Date of Service: 2020    Discharge Recommendations:  Home with Home health OT       Assessment   Performance deficits / Impairments: Decreased functional mobility ; Decreased endurance;Decreased coordination;Decreased ADL status; Decreased fine motor control;Decreased high-level IADLs;Decreased strength;Decreased balance;Decreased ROM  OT Education: Transfer Training  Activity Tolerance  Activity Tolerance: Patient Tolerated treatment well  Safety Devices  Safety Devices in place: Yes  Type of devices: Left in bed;Bed alarm in place;Call light within reach         Patient Diagnosis(es): There were no encounter diagnoses. has a past medical history of Abnormal Pap smear of cervix and History of cryosurgery. has a past surgical history that includes  section (); Breast cyst excision (Bilateral); Tonsillectomy; and joint replacement. Restrictions  Restrictions/Precautions  Restrictions/Precautions: Fall Risk  Lower Extremity Weight Bearing Restrictions  Right Lower Extremity Weight Bearing: Weight Bearing As Tolerated(R side weakness )  Left Lower Extremity Weight Bearing: Weight Bearing As Tolerated  Subjective   General  Chart Reviewed: Yes, Orders  Patient assessed for rehabilitation services?: Yes  Additional Pertinent Hx: reverse R shoulder replacement approximately 1 yr ago  Family / Caregiver Present: No  Diagnosis: CVA with right hemiparesis  Subjective  Subjective:  Tx focused on UE function  Pain Assessment  Pain Assessment: 0-10  Pain Level: 0  Vital Signs  Patient Currently in Pain: No   Objective    Balance  Sitting Balance: Supervision  Standing Balance: Minimal assistance  Standing Balance  Time: 2 mins`  Activity: 1 handed task  Functional Mobility  Functional - Mobility Device: Rolling Walker  Activity: Other  Assist Level: Minimal assistance  Functional Mobility Comments: vc for tech  Bed mobility  Supine to Sit: Stand by assistance(with bedrail)  Transfers  Sit to stand: Minimal assistance(vc for positioning)  Stand to sit: Minimal assistance     Type of ROM/Therapeutic Exercise  Type of ROM/Therapeutic Exercise: Cane/Wand(2# distally with dowel, 15 reps)       12/05/20 0900   Putting On/Taking Off Footwear   Assistance Needed Partial/moderate assistance   CARE Score 3        Plan   Plan  Specific instructions for Next Treatment: social functional, 9 hole peg test,  strength  Current Treatment Recommendations: Self-Care / ADL, Safety Education & Training, Endurance Training, Positioning, Functional Mobility Training, Balance Training, Equipment Evaluation, Education, & procurement, ROM, Home Management Training, Patient/Caregiver Education & Training, Strengthening    Goals  Short term goals  Time Frame for Short term goals: 1 week  Short term goal 1: pt will complete UB dressing with setup  Short term goal 2: pt will complete LB dressing with min A  Short term goal 3: pt will complete overall toileting with min A  Short term goal 4: pt will complete overall bathing with min A  Short term goal 5: pt will complete simple home making task with min A using recommended mobility means  Short term goal 6: pt will complete 1 handed standing level task for 3 mins with min A  Short term goal 7: pt will complete R GM/FM acts x 5 occasions to increase coordination for ADLs  Long term goals  Time Frame for Long term goals : 3-4 weeks  Long term goal 1: pt will complete overall dressing with modified independence  Long term goal 2: pt will complete overall toileting with modified independence  Long term goal 3: pt will complete overall bathing with modified independence  Long term goal 4: pt will complete simple ambulatory home making task with modified independence  Long term goal 5: pt will complete HEP with independence  Long term goals 6: verbalize DME for home  Patient Goals   Patient goals : return home       Therapy Time   Individual Concurrent Group Co-treatment   Time In 0900         Time Out 1000         Minutes 60         Timed Code Treatment Minutes: 60 Minutes       Electronically signed by Ab Aguero OT on 12/5/2020 at 1:22 PM

## 2020-12-05 NOTE — PLAN OF CARE
Problem: Falls - Risk of:  Goal: Will remain free from falls  Description: Will remain free from falls  12/4/2020 2246 by Kiesha Mari RN  Outcome: Ongoing  12/4/2020 1759 by Marie Servin RN  Outcome: Ongoing  Goal: Absence of physical injury  Description: Absence of physical injury  12/4/2020 2246 by Kiesha Mari RN  Outcome: Ongoing  12/4/2020 1759 by Marie Servin RN  Outcome: Ongoing     Problem: ACTIVITY INTOLERANCE/IMPAIRED MOBILITY  Goal: Mobility/activity is maintained at optimum level for patient  12/4/2020 2246 by Kiesha Mari RN  Outcome: Ongoing  12/4/2020 1759 by Marie Servin RN  Outcome: Ongoing     Problem: Bleeding:  Goal: Will show no signs and symptoms of excessive bleeding  Description: Will show no signs and symptoms of excessive bleeding  12/4/2020 2246 by Kiesha Mari RN  Outcome: Ongoing  12/4/2020 1759 by Mraie Servin RN  Outcome: Ongoing     Problem: Falls - Risk of:  Goal: Will remain free from falls  Description: Will remain free from falls  12/4/2020 2246 by Kiesha Mari RN  Outcome: Ongoing  12/4/2020 1759 by Marie Servin RN  Outcome: Ongoing  Goal: Absence of physical injury  Description: Absence of physical injury  12/4/2020 2246 by Kiesha Mari RN  Outcome: Ongoing  12/4/2020 1759 by Marie Servin RN  Outcome: Ongoing     Problem: ACTIVITY INTOLERANCE/IMPAIRED MOBILITY  Goal: Mobility/activity is maintained at optimum level for patient  12/4/2020 2246 by Kiesha Mari RN  Outcome: Ongoing  12/4/2020 1759 by Marie Servin RN  Outcome: Ongoing     Problem: Bleeding:  Goal: Will show no signs and symptoms of excessive bleeding  Description: Will show no signs and symptoms of excessive bleeding  12/4/2020 2246 by Kiesha Mari RN  Outcome: Ongoing  12/4/2020 1759 by Marie Servin RN  Outcome: Ongoing

## 2020-12-05 NOTE — PROGRESS NOTES
Name: Nahed Buorgeois  MRN:  [id number]  Date of service:  12/5/2020 12/05/20 1002   Restrictions/Precautions   Restrictions/Precautions Fall Risk   General   Chart Reviewed Yes   Subjective   Subjective Pt brought to PT gym by OT, agreeable to working with PT. Pain Screening   Patient Currently in Pain No   Transfers   Sit to Stand Minimal Assistance   Stand to sit Minimal Assistance   Comment Requires 2 attempts to stand before amb, performs stand-step TF from wc-bed with CGA-min assist   Ambulation   Ambulation? Yes   WB Status WBAT   Ambulation 1   Surface level tile   Device Rolling Walker   Other Apparatus Wheelchair follow   Assistance Minimal assistance;Contact guard assistance   Quality of Gait Pt improved with gait mechanics and weight shifting this date, no instances of R knee buckling while amb. Min verbal cues for 3 pt gait pattern and erect posture. Gait Deviations Decreased step length   Distance 30'   Propulsion 1   Propulsion Manual   Level Level Tile   Method RUE;LUE   Level of Assistance Stand by assistance   Description/ Details Pt able to propel back to room with BUE, fatigued following   Distance 175'   Exercises   Comments Seated BLE ther ex x10 reps each in all planes AROM LLE and AAROM RLE   Other exercises   Other exercises? Yes   Other exercises 1 Mini squats with rwx 5 reps   Other exercises 2 Weight shifting with rwx, 1 min   Other exercises 3 Stand-step TF training from wc-bed   Conditions Requiring Skilled Therapeutic Intervention   Body structures, Functions, Activity limitations Decreased functional mobility ; Decreased ADL status; Decreased strength;Decreased endurance;Decreased balance;Decreased coordination;Decreased posture   Assessment Pt shows great improvement in amb this date, able to increase distance and has no instances of RLE buckling, able to lock out during stance phase.  Pt requires frequent rest breaks with seated and standing ther ex, but is able to propel wc all the way back to her room following tx. She is able to demonstrate good technique with stand-step TF from wc<>bed. Pt would cont to benefit from skilled therapy services to address remaining R-sided impairments. Activity Tolerance   Activity Tolerance Patient Tolerated treatment well   Safety Devices   Type of devices Call light within reach; Chair alarm in place;Gait belt;Left in chair       Electronically signed by Alka Hassan PTA on 12/5/2020 at 11:06 AM

## 2020-12-05 NOTE — PROGRESS NOTES
[facility]  [facility]PATIENT SPEECH THERAPY  [facility] 8 REHAB UNIT  TIME   Time In: 1400  Time Out: 1500  Minutes: 60       [x]Daily Note  []Progress Note    Date: 2020  Patient Name: Nereida Jacques        MRN: [id number]    Account #: [de-identified]  : 1955  (75 y.o.)  Gender: female   Primary Provider: Adam Dougherty MD  Swallowing Status/Diet: Dysphagia soft and bite sized, thin liquids plus a chin tuck       PATIENT DIAGNOSIS(ES):    Diagnosis: CVA (acute ischemia bilateral brendon), R sided weakness      Additional Pertinent Hx: Hemiplegia/hemiparesis following cerebral infarction affecting left non-dominant side (R side UE and LE weakness); Dysarthria; Dysphagia, oropharyngeal phase; Hyperlipidemais, unspecified; Nicotine dependence, unspecified, uncomplicated; Surgical Hx of breast surgery x4, C-sectionx1, cyst removal from leg, tonsillectomy, R total shoulder arthroplasty w/ distal clavicle excision on      RESTRICTIONS/PRECAUTIONS:    Restrictions/Precautions  Restrictions/Precautions: Weight Bearing, Fall Risk(Aspiration risk )  Patient is a silent aspirator. She must remain on nectar thickened liquids         Subjective: The patient was upright in her bed. She stated she was tired this afternoon after her therapies. Objective:  Reviewed and practiced her dysarthria strategies during conversation and sentence repetition. Oral motor exercises were completed to improve lingual strength and range of motion as well as speech intelligibility. Reviewed her swallowing precautions and correct timing of the chin tuck. Did review the Lyric and she was able to complete sets of ten throughout the session. MBSS was completed on 20. Results were: No aspiration was observed this study. Silent laryngeal penetration was observed with thin liquids with her head in the straight position. When she used a chin tuck with thin liquids via cup, no further laryngeal penetration was observed.  She is recommended to upgrade to a dysphagia soft and bite sized diet. She may have thin liquids via cup while utilizing a chin tuck. Medications should be presented whole in applesauce or pudding.      Recommended Diet:  Solid consistency: Regular  Liquid consistency: Thin  Liquid administration via: Cup(No straws)     Medication administration: Meds in puree(Meds whole in applesauce or pudding)     Safe Swallow Protocol:  Supervision: Independent  Compensatory Swallowing Strategies: Alternate solids and liquids;Small bites/sips; Remain upright for 30-45 minutes after meals;Upright as possible for all oral intake;Eat/Feed slowly         SHORT TERM GOAL #1:  Goal 1: The patient will tolerate minced and moist diet consistency with nectar thick liquids with min/no overt s/s of aspiration. SHORT TERM GOAL #2:  Goal 2: The patient will complete pharyngeal strengthening exercises with min verbal cues at 80% accuracy to improve airway protection. SHORT TERM GOAL #3:  Goal 3: The patient will complete daily oral motor exercises to improve labial/lingual/buccal strength and ROM to improve oral phase of swallow and speech intelligibility. SHORT TERM GOAL #4:  Goal 4: The patient will use the (over articulation/slow rate/writing key word/elongation of thevowel/increased loudness/phrasing) strategy to improve speech intelligibility withwords/phrases/sentences/in conversation with 100% accuracy. SHORT TERM GOAL #5:  Goal 5: Patient will complete repeat FEES for potential upgrade    Swallowing Short Term Goals  Short-term Goals  Goal 1: The patient will complete the hoang (tongue hold) technique to improve base of tongue retraction and base of tongue to pharyngeal wall approximation with 90% accuracy and minimal verbal cues.   Goal 2: The patient will complete the Mendelsohn maneuver to improve hyolaryngeal elevation and clear vallecular residue with 90% accuracy and minimal verbal cues  Goal 3: The patient will complete the effortful swallow maneuver to improve hyolaryngeal elevation, tongue base retraction, and vocal fold closure with 90% accuracy and minimal verbal cues. Goal 4: The patient will complete daily oral hygiene to prevent aspiration of bacteria laden secretions. Goal 5: The patient will perform compensatory swallow strategies (chin tuck) to eliminate s/s of aspiration and tolerate the least restrictive diet with (min) verbal cues. Goal 6: The patient will tolerate a dysphagia soft and bite sized diet with thin liquids plus a chin tuck with minimal overt s/s of aspiration. ASSESSMENT:  Assessment: [x]Progressing towards goals          []Not Progressing towards goals    Patient Tolerance of Treatment:   [x]Tolerated well []Tolerated fair []Required rest breaks []Fatigued    Education:  Learner:  [x]Patient          []Significant Other          []Other  Education provided regarding:  [x]Goals and POC   [x]Diet and swallowing precautions    []Home Exercise Program  []Progress and/or discharge information  Method of Education:  [x]Discussion          []Demonstration          []Handout          []Other  Evaluation of Education:   [x]Verbalized understanding   []Demonstrates without assistance  []Demonstrates with assistance  []Needs further instruction     []No evidence of learning                  []No family present      Plan: [x]Continue with current plan of care    []Modify current plan of care as follows:    []Discharge patient    Discharge Location:    Services/Supervision Recommended:      [x]Patient continues to require treatment by a licensed therapist to address functional deficits as outlined in the established plan of care.             Electronically Signed By:  Juan Worthington  12/5/2020,2:33 PM.

## 2020-12-06 PROCEDURE — 6360000002 HC RX W HCPCS: Performed by: PSYCHIATRY & NEUROLOGY

## 2020-12-06 PROCEDURE — 99232 SBSQ HOSP IP/OBS MODERATE 35: CPT | Performed by: PSYCHIATRY & NEUROLOGY

## 2020-12-06 PROCEDURE — 1180000000 HC REHAB R&B

## 2020-12-06 PROCEDURE — 6370000000 HC RX 637 (ALT 250 FOR IP): Performed by: PSYCHIATRY & NEUROLOGY

## 2020-12-06 RX ADMIN — PANTOPRAZOLE SODIUM 40 MG: 40 TABLET, DELAYED RELEASE ORAL at 20:11

## 2020-12-06 RX ADMIN — CLOPIDOGREL BISULFATE 75 MG: 75 TABLET ORAL at 08:21

## 2020-12-06 RX ADMIN — ASPIRIN 81 MG: 81 TABLET, CHEWABLE ORAL at 08:21

## 2020-12-06 RX ADMIN — ENOXAPARIN SODIUM 40 MG: 40 INJECTION SUBCUTANEOUS at 20:11

## 2020-12-06 RX ADMIN — ATORVASTATIN CALCIUM 80 MG: 80 TABLET, FILM COATED ORAL at 20:11

## 2020-12-06 ASSESSMENT — PAIN SCALES - GENERAL: PAINLEVEL_OUTOF10: 0

## 2020-12-06 NOTE — PROGRESS NOTES
Patient c/o of her heels hurting. Blanchable redness noted with small localized purple area to angelic heels. Mepilex dressing applied. Patient refused to have heels elevated. Assisted patient to turn on side but was back to in supine position shortly after. Patient encouraged to turn to avoid putting pressure on heels but patient refused.  Electronically signed by Gerson Barriga RN on 12/6/20 at 12:52 PM CST

## 2020-12-06 NOTE — PATIENT CARE CONFERENCE
[facility] ACUTE INPATIENT REHABILITATION  TEAM CONFERENCE NOTE    Date: 2020  Patient Name: Stefani Castro        MRN: [id number]    : 1955  (75 y.o.)  Gender: female      Diagnosis: CVA (acute ischemia bilateral brendon), R sided weakness       PHYSICAL THERAPY  STRENGTH  Strength RLE  Strength RLE: Exception  Comment: Grossly 3/5; DF 2-,+/5  Strength LLE  Strength LLE: Exception  Comment: Grossly 4/5  ROM        BED MOBILITY  Bed Mobility  Bridging: Minimal assistance  Rolling: Stand by assistance  Supine to Sit: Stand by assistance(Pushing LUE on HR.)  Sit to Supine: Stand by assistance  Scooting: Stand by assistance  TRANSFERS  Transfers  Sit to Stand: Contact guard assistance  Stand to sit: Contact guard assistance  Bed to Chair: Contact guard assistance  Stand Pivot Transfers: Moderate Assistance(pt prefers this technique to squat pivot; overall smoother)  Squat Pivot Transfers: Moderate Assistance(Going to the L)  Car Transfer: Contact guard assistance  Comment: Requires 2 attempts to stand before amb, performs stand-step TF from -bed with CGA-min assist  WHEELCHAIR PROPULSION  Propulsion 1  Propulsion: Manual  Level: Level Tile  Method: RUSILVIANO MARTINEZ  Level of Assistance: Stand by assistance  Description/ Details: Pt able to propel back to room with BUE, fatigued following  Distance: 200'  AMBULATION  Ambulation 1  Surface: level tile  Device: Rolling Walker  Other Apparatus: Wheelchair follow  Assistance: Contact guard assistance  Quality of Gait: Pt improved with gait mechanics and weight shifting this date, no instances of R knee buckling while amb. Min verbal cues for 3 pt gait pattern and erect posture.   Gait Deviations: Decreased step length  Distance: 50'  Comments: decreased R ankle DF; VC to decrease LLE foot slap; focus on locking out L knee at next session   STAIRS     GOALS:  Short term goals  Time Frame for Short term goals: 1 week  Short term goal 1: pt amb 48' with RW, CGA, step-to, 3pt pattern, RLE leading  Short term goal 2: Pt CGA with bed mobility  Short term goal 3: Pt min A with supine <> sit  Short term goal 4: Pt min A with car transfer  Short term goal 5: Pt min A with 4, 4\" steps, step-to gait pattern, LLE leading ascending    Long term goals  Time Frame for Long term goals : 2 weeks  Long term goal 1: pt amb 150' with RW, ind, with step-to gait pattern, RLE leading  Long term goal 2: pt independent with bed mobility  Long term goal 3: pt independent with sit<>stand   Long term goal 4: Pt independent with car TF  Long term goal 5: Pt min A with 4, 4\" steps, step-to gait pattern, LLE leading ascending    ASSESSMENT:  Assessment: Practiced bed mobility on mat table this afternoon. Pt improved to SBA and she was able to get Sam LE's on and off w/ only minor cues for techniques. Pt also improved to CGA w/ Stand pivot TF's. Pt practiced Car TF from W/C and then amb to it w/ RW. Pt performed both ways well w/ only CGA. Pt tolerated session well w/ minimal fatigue. SPEECH THERAPY  Precautions: Fall/aspiration   Swallowing Status/Diet: Soft and bite sized thin liquids plus a chin tuck      Subjective: Patient alert and cooperative with all therapy tasks. Patient seen bedside for treatment.      Objective:   Patient had several sips of thin liquids utilizing chin tuck. Patient only requiring 1x cue to tuck her chin. All other times patient completing independently. Patient tolerating well only 1x throat clear was noted.     Patient completed swallowing exercises during session with min verbal cues. Effortful swallow completed with laryngeal elevation noted on 100% of opportunities. Lyric maneuver completed to address base of tongue strength. Patient was able to complete laryngeal elevation on 100% of opportunities (this is improvement from previous days).      Oral motor exercises completed to improve lingual strength and range of motion.  Patient continues to show improvement. Speech intelligibility ranks at 100% today for unknown context.         Recommended Diet:  Solid consistency: Soft and bite sized  Liquid consistency: Thin  Liquid administration via: Cup(No straws)     Medication administration: Meds in puree(Meds whole in applesauce or pudding)     Safe Swallow Protocol:  Supervision: Independent  Compensatory Swallowing Strategies: Alternate solids and liquids;Small bites/sips; Remain upright for 30-45 minutes after meals;Upright as possible for all oral intake;Eat/Feed slowly        SHORT TERM GOAL #1:  Goal 1: The patient will tolerate minced and moist diet consistency with nectar thick liquids with min/no overt s/s of aspiration. MET     SHORT TERM GOAL #2:  Goal 2: The patient will complete pharyngeal strengthening exercises with min verbal cues at 80% accuracy to improve airway protection.     SHORT TERM GOAL #3:  Goal 3: The patient will complete daily oral motor exercises to improve labial/lingual/buccal strength and ROM to improve oral phase of swallow and speech intelligibility.     SHORT TERM GOAL #4:  Goal 4: The patient will use the (over articulation/slow rate/writing key word/elongation of thevowel/increased loudness/phrasing) strategy to improve speech intelligibility withwords/phrases/sentences/in conversation with 100% accuracy.     SHORT TERM GOAL #5:  Goal 5: Patient will complete repeat FEES for potential upgrade MET       Swallowing Short Term Goals  Short-term Goals  Goal 1: The patient will complete the hoang (tongue hold) technique to improve base of tongue retraction and base of tongue to pharyngeal wall approximation with 90% accuracy and minimal verbal cues.   Goal 2: The patient will complete the Mendelsohn maneuver to improve hyolaryngeal elevation and clear vallecular residue with 90% accuracy and minimal verbal cues  Goal 3: The patient will complete the effortful swallow maneuver to improve hyolaryngeal elevation, tongue base retraction, and vocal fold closure with 90% accuracy and minimal verbal cues. Goal 4: The patient will complete daily oral hygiene to prevent aspiration of bacteria laden secretions. MET  Goal 5: The patient will perform compensatory swallow strategies (chin tuck) to eliminate s/s of aspiration and tolerate the least restrictive diet with (min) verbal cues. Goal 6: The patient will tolerate a dysphagia soft and bite sized diet with thin liquids plus a chin tuck with minimal overt s/s of aspiration.      Long-term Goals  Goal 1: The patient will develop functional and intelligible speech and utilize compensatory strategiesthrough the use of adequate labial and lingual function, increased articulatory precision. Goal 2: The patient will maintain adequate hydration/nutrition with optimum safety and efficiency ofswallowing function on P.O. intake without overt signs and symptoms of aspiration for thehighest appropriate diet level     ASSESSMENT:  Assessment: [x]? Progressing towards goals           []? Not Progressing towards goals    GOALS MET: 3 STG MET 0 LTG    OCCUPATIONAL THERAPY    CURRENT IRF-DELTA SCORES  Eating: CARE Score: 4  Oral Hygiene: CARE Score: 5  Toileting: CARE Score: 3  Shower/Bathe: CARE Score: 3  Upper Body Dressing: CARE Score: 4  Lower Body Dressing: CARE Score: 3  Footwear: CARE Score: 3  Toilet Transfers: CARE Score: 3  Picking Up Object:  CARE Score: 88      UE Functioning:  Decreased R ROM/strength and FM/coordination.   (hx of R reverse sh replacement ~ 1 yr ago)  L WNLs    Pain Assessment:  Pain Level: 0       STGs:  Short term goals  Time Frame for Short term goals: 1 week  Short term goal 1: pt will complete UB dressing with setup  Short term goal 2: pt will complete LB dressing with min A  Short term goal 3: pt will complete overall toileting with min A  Short term goal 4: pt will complete overall bathing with min A  Short term goal 5: pt will complete simple home making task with min A using recommended mobility means  Short term goal 6: pt will complete 1 handed standing level task for 3 mins with min A  Short term goal 7: pt will complete R GM/FM acts x 5 occasions to increase coordination for ADLs    LTGs:  Long term goals  Time Frame for Long term goals : 3-4 weeks  Long term goal 1: pt will complete overall dressing with modified independence  Long term goal 2: pt will complete overall toileting with modified independence  Long term goal 3: pt will complete overall bathing with modified independence  Long term goal 4: pt will complete simple ambulatory home making task with modified independence  Long term goal 5: pt will complete HEP with independence  Long term goals 6: verbalize DME for home    Assessment:  Decreased functional mobility ; Decreased endurance; Decreased coordination; Decreased ADL status; Decreased fine motor control; Decreased high-level IADLs; Decreased strength; Decreased balance; Decreased ROM              NUTRITION  Current Wt: Weight: 164 lb 1.6 oz (74.4 kg) / Body mass index is 26.49 kg/m². Admission Wt: Admission Body Weight: 164 lb (74.4 kg)  Oral Diet Orders:   DYSPHAGIA MINCED AND MOIST; Soft and Bite-sized  Oral Nutrition Supplement (ONS) Orders:  None  Pt is nutritionally stable with PO intake >75%. Wt is stable. Diet upgraded this week. No nutritional concerns. Please see nutrition note for details. NURSING    Wounds/Incisions/Ulcers: No skin issues identified     Ravi Scale Score: 17    Pain: No pain concerns to address    Consultations/Labs/X-rays:     Family Education: Family available and participating in education     Fall Risk:  Nelson Score: 76    Fall in the last week?no      Other Nursing Issues: Up x1-2 monique steady. Lovenox. R karena. Speech slurred. Pills WAS. BM 6. Cont B/B. Dysphagia diet.          SOCIAL WORK/CASE MANAGEMENT  Assessment: Cooperative, Has family support    Discharge Plan   Estimated Length of Stay: 12/17/20  Destination: home health    Pass: No    Services at Discharge: Home Health  Physical Therapy, Occupational Therapy, Speech Therapy and Nursing per evaluations    Equipment at Discharge: To be determined     Progress made in the prior week:  1. IMPROVED ENDURANCE WITH EXERCISES  2. Tolerating soft and bite sized with thin liquids (chin tuck)  3. CGA UB dressing  4.  5.      Goals for following week:  1. AMB 50 FT WITH RW CGA  2. CGA LB dressing  3.   4.   5.     Factors facilitating achievement of predicted outcomes: Motivated, Cooperative and Pleasant    Barriers to the achievement of predicted outcomes: Decreased endurance, Decreased sensation, Decreased proprioception, Upper extremity weakness and Lower extremity weakness    Team Members Present at Conference:  : King Patel [place]   Occupational Therapist: Amanda Webster OTR/L  Physical Therapist: Jaylan Torres PT,DPT  Speech Therapist: Daniela Owen MS,CCC-SLP  Nurse: Kina Ward, RN,BSN   Nurse Manager:  Kina Ward, RN, BSN  Dietitian:  Jesus Garza RD  Rehab Director:  Mell Oseguera approve the established interdisciplinary plan of care as documented within the medical record of [facility].

## 2020-12-06 NOTE — PROGRESS NOTES
Patient:   Gunjan Antoine  MR#:    [id number]   Room:    [facility]-   YOB: 1955  Date of Progress Note: 12/5/2020  Time of Note                           8:02 PM  Consulting Physician:   Kait Srinivasan M.D. Attending Physician:  Kait Srinivasan MD     Chief complaint Acute ischemic stroke     S:This 75 y.o. female  pt admitted to [address] [place] [address] on 11/26/2020 w/R sided weakness. Pt woke up on am of 11/26 around 5am and got up, went to the kitchen to make coffee and fell after diffuse tingling an R sided weakness noted. At baseline, she does not move her R arm well proximally due to fx in 11/2019. She managed to unlock her door before going back to be, in case she would need help. she stayed in bed for 4 hours and noted she was stronger, however, she  got up again around 10 am and fell again due to R sided weakness. She presented to [address] [place] [address] ED where on presentation she was out of tPA window and NIHSS was 0, but then she developed worsening R sided weakness again. She was related that she can choke on her saliva when she swallows. Pt was alert & oriented x 3 w/ mild dysarthria. Pt also noted to have new onset tremor. MRI done on 11/27 showed acute ischemia in the bilateral brendon. Over the weekend, she had worsening R sided weakness. EEG was done and read as normal. She is on a mechanical soft diet w/nectar thick liquids. She is now medically stable and is participating with therapy. She is ready to begin rehab w/plan of returning home after rehab dc w/support from her family. No acute issues.     REVIEW OF SYSTEMS:  Constitutional: No fevers No chills  Neck:No stiffness  Respiratory: No shortness of breath  Cardiovascular: No chest pain No palpitations  Gastrointestinal: No abdominal pain    Genitourinary: No Dysuria  Neurological: No headache, no confusion    Past Medical History:      Diagnosis Date    Abnormal Pap smear of cervix     1991    History of cryosurgery 1991       Past Surgical History:      Procedure Laterality Date    BREAST CYST EXCISION Bilateral     X4      SECTION  1981    JOINT REPLACEMENT      right shoulder    TONSILLECTOMY         Medications in Hospital:      Current Facility-Administered Medications:     pantoprazole (PROTONIX) tablet 40 mg, 40 mg, Oral, Nightly, Luz Moreno MD, 40 mg at 20    ondansetron (ZOFRAN-ODT) disintegrating tablet 4 mg, 4 mg, Oral, Q8H PRN, Luz Moreno MD, 4 mg at 20    aspirin chewable tablet 81 mg, 81 mg, Oral, Daily, Luz Moreno MD, 81 mg at 20    atorvastatin (LIPITOR) tablet 80 mg, 80 mg, Oral, Nightly, Luz Moreno MD, 80 mg at 20    clopidogrel (PLAVIX) tablet 75 mg, 75 mg, Oral, Daily, Luz Morneo MD, 75 mg at 20    cyclobenzaprine (FLEXERIL) tablet 10 mg, 10 mg, Oral, TID PRN, Luz Moreno MD, 10 mg at 20    acetaminophen (TYLENOL) tablet 650 mg, 650 mg, Oral, Q4H PRN, Luz Moreno MD    polyethylene glycol (GLYCOLAX) packet 17 g, 17 g, Oral, Daily PRN, Luz Moreno MD    enoxaparin (LOVENOX) injection 40 mg, 40 mg, Subcutaneous, Q24H, Luz Moreno MD, 40 mg at 20    Allergies:  Patient has no known allergies. Social History:   TOBACCO:   reports that she has been smoking. She has never used smokeless tobacco.  ETOH:   reports current alcohol use. Family History:       Problem Relation Age of Onset    Heart Attack Maternal Grandmother     Colon Cancer Father     Colon Cancer Mother          PHYSICAL EXAM:  /70   Pulse 60   Temp 96.6 °F (35.9 °C) (Temporal)   Resp 16   Ht 5' 6\" (1.676 m)   Wt 164 lb 1.6 oz (74.4 kg)   SpO2 94%   Breastfeeding No   BMI 26.49 kg/m²     Constitutional - well developed, well nourished.    Eyes - conjunctiva normal.   Ear, nose, throat - No scars, masses, or lesions over external nose or ears, no atrophy of tongue  Neck-symmetric, no masses noted, no jugular vein all  []Manual[x]Template[]Copied    Added by:  Milton Castro    []Liv for details       12/03/20 1018   Restrictions/Precautions   Restrictions/Precautions Weight Bearing; Fall Risk   Lower Extremity Weight Bearing Restrictions   Right Lower Extremity Weight Bearing Weight Bearing As Tolerated  (R side weakness )   Left Lower Extremity Weight Bearing Weight Bearing As Tolerated   General   Diagnosis CVA (acute ischemia bilateral brendon), R sided weakness    Bed Mobility   Scooting Stand by assistance   Transfers   Sit to Stand Minimal Assistance  (blocking R knee during sit to stand )   Stand to sit Contact guard assistance;Stand by assistance  (SBA to gaurd/block R knee )   Squat Pivot Transfers Moderate Assistance  (Going to the L)   Comment Attempted stand and step TF to L with RW, discontinued d/t pt having difficulty completing sit to stand at end of session   Ambulation   Ambulation? Yes   WB Status WBAT   More Ambulation? Yes   Ambulation 1   Surface level tile   Device Parallel Bars   Other Apparatus Wheelchair follow   Assistance Contact guard assistance;Minimal assistance   Quality of Gait Pt amb with step-to RLE gait pattern; able to advance and place RLE ind, needs min A with blocking R knee during LLE advancement. Overall, improvement from yesterday as noted by decreased assistance needed with blocking R knee. Distance 2x12'   Ambulation 2   Surface - 2 level tile   Device 2 Parallel Bars   Other Apparatus 2 Wheelchair follow   Assistance 2 Contact guard assistance;Minimal assistance   Quality of Gait 2 See quality of gait for amb 1. Pt demonstrated consistancy in needing less assist blocking RLE. Primarily needed for TF from sit to stand. Progress to RW pending similar or improved performance in II bars with amb at next session.     Distance 12'   Comments II bars raised and RW height adjusted to II bar height    Balance   Standing - Static Fair;+   Standing - Dynamic Fair   Other exercises   Other exercises 2 Mini Squats in II bars, 2x5     Other exercises 3 Weight shifting in II bars, 1min   Other exercises 4 Mirror Therapy, pt seated in w/c, ankle PF and DF, 2x20   Conditions Requiring Skilled Therapeutic Intervention   Body structures, Functions, Activity limitations Decreased functional mobility ; Decreased ADL status; Decreased strength;Decreased endurance;Decreased balance;Decreased coordination;Decreased posture   Assessment Pt demonstrated improved control of RLE today as noted by improved foot placement and ability to control/lockout R knee during amb. Still required assist with sit to stand with RLE knee block by clinician. Attempted stand-step TF with RW, however, discotinued d/t pt having difficulty with sit to stand component. Required ModA with squat pivot TF going to the L. See amb notes for more details. Treatment Diagnosis Interference with activity   Safety Devices   Type of devices Left in chair;Patient at risk for falls; Chair alarm in place                      RECORD REVIEW: Previous medical records, medications were reviewed at today's visit    IMPRESSION:   1. Acute ischemic stroke-bilateral brendon-brainstem-on ASA/statin/Plavix  2. Hyperlipidemia-statin  3. DVT prophylaxis-Lovenox  4. GI-bowel regimen  5. Chronic right shoulders-monitor  6. History of chronic opiod use due to shoulder issues  7. Smoker  8.  PT/OT/Speech     Continue current care      Expected duration and frequency therapy: 180 minutes per day, 5 days per week    Josselin Oas  [phone] CELL  Dr Lynne Angel

## 2020-12-06 NOTE — PROGRESS NOTES
Patient:   Jarrod Enamorado  MR#:    [id number]   Room:    [address] [place], [place] [address]   YOB: 1955  Date of Progress Note: 12/6/2020  Time of Note                           1:46 PM  Consulting Physician:   Tim Beach M.D. Attending Physician:  Tim Beach MD     Chief complaint Acute ischemic stroke     S:This 75 y.o. female  pt admitted to [facility] on 11/26/2020 w/R sided weakness. Pt woke up on am of 11/26 around 5am and got up, went to the kitchen to make coffee and fell after diffuse tingling an R sided weakness noted. At baseline, she does not move her R arm well proximally due to fx in 11/2019. She managed to unlock her door before going back to be, in case she would need help. she stayed in bed for 4 hours and noted she was stronger, however, she  got up again around 10 am and fell again due to R sided weakness. She presented to [facility] ED where on presentation she was out of tPA window and NIHSS was 0, but then she developed worsening R sided weakness again. She was related that she can choke on her saliva when she swallows. Pt was alert & oriented x 3 w/ mild dysarthria. Pt also noted to have new onset tremor. MRI done on 11/27 showed acute ischemia in the bilateral brendon. Over the weekend, she had worsening R sided weakness. EEG was done and read as normal. She is on a mechanical soft diet w/nectar thick liquids. She is now medically stable and is participating with therapy. She is ready to begin rehab w/plan of returning home after rehab dc w/support from her family. No new issues.     REVIEW OF SYSTEMS:  Constitutional: No fevers No chills  Neck:No stiffness  Respiratory: No shortness of breath  Cardiovascular: No chest pain No palpitations  Gastrointestinal: No abdominal pain    Genitourinary: No Dysuria  Neurological: No headache, no confusion    Past Medical History:      Diagnosis Date    Abnormal Pap smear of cervix     1991    History of cryosurgery 1991       Past Surgical History:      Procedure Laterality Date    BREAST CYST EXCISION Bilateral     X4      SECTION  1981    JOINT REPLACEMENT      right shoulder    TONSILLECTOMY         Medications in Hospital:      Current Facility-Administered Medications:     pantoprazole (PROTONIX) tablet 40 mg, 40 mg, Oral, Nightly, Tejal Michelle MD, 40 mg at 20    ondansetron (ZOFRAN-ODT) disintegrating tablet 4 mg, 4 mg, Oral, Q8H PRN, Tejal Michelle MD, 4 mg at 20 0439    aspirin chewable tablet 81 mg, 81 mg, Oral, Daily, Tejal Michelle MD, 81 mg at 20 0821    atorvastatin (LIPITOR) tablet 80 mg, 80 mg, Oral, Nightly, Tejal Michelle MD, 80 mg at 20    clopidogrel (PLAVIX) tablet 75 mg, 75 mg, Oral, Daily, Tejal Michelle MD, 75 mg at 20 5133    cyclobenzaprine (FLEXERIL) tablet 10 mg, 10 mg, Oral, TID PRN, Tejal Michelle MD, 10 mg at 20    acetaminophen (TYLENOL) tablet 650 mg, 650 mg, Oral, Q4H PRN, Tejal Michelle MD    polyethylene glycol (GLYCOLAX) packet 17 g, 17 g, Oral, Daily PRN, Tejal Michelle MD, 17 g at 20    enoxaparin (LOVENOX) injection 40 mg, 40 mg, Subcutaneous, Q24H, Tejal Michelle MD, 40 mg at 20    Allergies:  Patient has no known allergies. Social History:   TOBACCO:   reports that she has been smoking. She has never used smokeless tobacco.  ETOH:   reports current alcohol use. Family History:       Problem Relation Age of Onset    Heart Attack Maternal Grandmother     Colon Cancer Father     Colon Cancer Mother          PHYSICAL EXAM:  /60   Pulse 65   Temp 97.2 °F (36.2 °C) (Temporal)   Resp 16   Ht 5' 6\" (1.676 m)   Wt 164 lb 1.6 oz (74.4 kg)   SpO2 95%   Breastfeeding No   BMI 26.49 kg/m²     Constitutional - well developed, well nourished.    Eyes - conjunctiva normal.   Ear, nose, throat - No scars, masses, or lesions over external nose or ears, no atrophy of tongue  Neck-symmetric, no masses noted, no jugular vein distension  Respiration- chest wall appears symmetric, good expansion,   normal effort without use of accessory muscles  Musculoskeletal - no significant wasting of muscles noted, no bony deformities  Extremities-no clubbing, cyanosis or edema  Skin - warm, dry, and intact. No rash, erythema, or pallor. Psychiatric - mood, affect, and behavior appear normal.      Neurological exam  Awake, alert, fluent oriented appropriate affect slightly slow dysarthria  Attention and concentration appear appropriate  Recent and remote memory appears unremarkable  Speech with dysarthria and some expressive aphasia  No clear issues with language of fund of knowledge     Cranial Nerve Exam     CN III, IV,VI-EOMI, No nystagmus, conjugate eye movements, no ptosis    CN VII-right lower facial droop       Motor Exam  antigravity throughout upper and lower extremities bilaterally  Drift right arm and right leg      Tremors- no tremors in hands or head noted     Gait  Not tested      Nursing/pcp notes, imaging,labs and vitals reviewed.      PT,OT and/or speech notes reviewed    Lab Results   Component Value Date    WBC 7.9 12/03/2020    HGB 14.0 12/03/2020    HCT 42.9 12/03/2020    MCV 90.7 12/03/2020     12/03/2020     Lab Results   Component Value Date     12/03/2020    K 3.9 12/03/2020     12/03/2020    CO2 27 12/03/2020    BUN 20 12/03/2020    CREATININE 0.7 12/03/2020    GLUCOSE 94 12/03/2020    CALCIUM 9.2 12/03/2020    PROT 6.2 (L) 12/03/2020    LABALBU 4.0 12/03/2020    BILITOT 0.6 12/03/2020    ALKPHOS 95 12/03/2020    AST 23 12/03/2020    ALT 16 12/03/2020    LABGLOM >60 12/03/2020    GFRAA >59 12/03/2020     Lab Results   Component Value Date    INR 1.04 12/01/2020    PROTIME 13.5 12/01/2020         [facility]    Student Physical Therapist    Physical Therapy    Progress Notes    Cosign Needed    Date of Service:  12/3/2020  1:29 PM                Cosign Needed              Show:Clear all  []Manual[x]Template[]Copied    Added by:  Milka Kraus    []Liv for details       12/03/20 1018   Restrictions/Precautions   Restrictions/Precautions Weight Bearing; Fall Risk   Lower Extremity Weight Bearing Restrictions   Right Lower Extremity Weight Bearing Weight Bearing As Tolerated  (R side weakness )   Left Lower Extremity Weight Bearing Weight Bearing As Tolerated   General   Diagnosis CVA (acute ischemia bilateral brendon), R sided weakness    Bed Mobility   Scooting Stand by assistance   Transfers   Sit to Stand Minimal Assistance  (blocking R knee during sit to stand )   Stand to sit Contact guard assistance;Stand by assistance  (SBA to gaurd/block R knee )   Squat Pivot Transfers Moderate Assistance  (Going to the L)   Comment Attempted stand and step TF to L with RW, discontinued d/t pt having difficulty completing sit to stand at end of session   Ambulation   Ambulation? Yes   WB Status WBAT   More Ambulation? Yes   Ambulation 1   Surface level tile   Device Parallel Bars   Other Apparatus Wheelchair follow   Assistance Contact guard assistance;Minimal assistance   Quality of Gait Pt amb with step-to RLE gait pattern; able to advance and place RLE ind, needs min A with blocking R knee during LLE advancement. Overall, improvement from yesterday as noted by decreased assistance needed with blocking R knee. Distance 2x12'   Ambulation 2   Surface - 2 level tile   Device 2 Parallel Bars   Other Apparatus 2 Wheelchair follow   Assistance 2 Contact guard assistance;Minimal assistance   Quality of Gait 2 See quality of gait for amb 1. Pt demonstrated consistancy in needing less assist blocking RLE. Primarily needed for TF from sit to stand. Progress to RW pending similar or improved performance in II bars with amb at next session.     Distance 12'   Comments II bars raised and RW height adjusted to II bar height    Balance   Standing - Static Fair;+   Standing - Dynamic Fair   Other exercises   Other exercises 2 Mini Squats in II bars, 2x5     Other exercises 3 Weight shifting in II bars, 1min   Other exercises 4 Mirror Therapy, pt seated in w/c, ankle PF and DF, 2x20   Conditions Requiring Skilled Therapeutic Intervention   Body structures, Functions, Activity limitations Decreased functional mobility ; Decreased ADL status; Decreased strength;Decreased endurance;Decreased balance;Decreased coordination;Decreased posture   Assessment Pt demonstrated improved control of RLE today as noted by improved foot placement and ability to control/lockout R knee during amb. Still required assist with sit to stand with RLE knee block by clinician. Attempted stand-step TF with RW, however, discotinued d/t pt having difficulty with sit to stand component. Required ModA with squat pivot TF going to the L. See amb notes for more details. Treatment Diagnosis Interference with activity   Safety Devices   Type of devices Left in chair;Patient at risk for falls; Chair alarm in place                      RECORD REVIEW: Previous medical records, medications were reviewed at today's visit    IMPRESSION:   1. Acute ischemic stroke-bilateral brendon-brainstem-on ASA/statin/Plavix  2. Hyperlipidemia-statin  3. DVT prophylaxis-Lovenox  4. GI-bowel regimen  5. Chronic right shoulders-monitor  6. History of chronic opiod use due to shoulder issues  7. Smoker  8.  PT/OT/Speech     Continue present care      Expected duration and frequency therapy: 180 minutes per day, 5 days per week    [address] [facility]  [phone] CELL  Dr Sarah Beth Vazquez

## 2020-12-07 LAB
ALBUMIN SERPL-MCNC: 3.8 G/DL (ref 3.5–5.2)
ALP BLD-CCNC: 99 U/L (ref 35–104)
ALT SERPL-CCNC: 36 U/L (ref 5–33)
ANION GAP SERPL CALCULATED.3IONS-SCNC: 9 MMOL/L (ref 7–19)
AST SERPL-CCNC: 40 U/L (ref 5–32)
BASOPHILS ABSOLUTE: 0 K/UL (ref 0–0.2)
BASOPHILS RELATIVE PERCENT: 0.6 % (ref 0–1)
BILIRUB SERPL-MCNC: 0.4 MG/DL (ref 0.2–1.2)
BUN BLDV-MCNC: 15 MG/DL (ref 8–23)
CALCIUM SERPL-MCNC: 9.1 MG/DL (ref 8.8–10.2)
CHLORIDE BLD-SCNC: 108 MMOL/L (ref 98–111)
CO2: 28 MMOL/L (ref 22–29)
CREAT SERPL-MCNC: 0.8 MG/DL (ref 0.5–0.9)
EOSINOPHILS ABSOLUTE: 0.2 K/UL (ref 0–0.6)
EOSINOPHILS RELATIVE PERCENT: 3.2 % (ref 0–5)
GFR AFRICAN AMERICAN: >59
GFR NON-AFRICAN AMERICAN: >60
GLUCOSE BLD-MCNC: 103 MG/DL (ref 74–109)
HCT VFR BLD CALC: 38.8 % (ref 37–47)
HEMOGLOBIN: 13.1 G/DL (ref 12–16)
IMMATURE GRANULOCYTES #: 0 K/UL
LYMPHOCYTES ABSOLUTE: 2.6 K/UL (ref 1.1–4.5)
LYMPHOCYTES RELATIVE PERCENT: 37.2 % (ref 20–40)
MCH RBC QN AUTO: 30 PG (ref 27–31)
MCHC RBC AUTO-ENTMCNC: 33.8 G/DL (ref 33–37)
MCV RBC AUTO: 89 FL (ref 81–99)
MONOCYTES ABSOLUTE: 0.5 K/UL (ref 0–0.9)
MONOCYTES RELATIVE PERCENT: 7.3 % (ref 0–10)
NEUTROPHILS ABSOLUTE: 3.6 K/UL (ref 1.5–7.5)
NEUTROPHILS RELATIVE PERCENT: 51.6 % (ref 50–65)
PDW BLD-RTO: 13.2 % (ref 11.5–14.5)
PLATELET # BLD: 254 K/UL (ref 130–400)
PMV BLD AUTO: 10.8 FL (ref 9.4–12.3)
POTASSIUM REFLEX MAGNESIUM: 4.2 MMOL/L (ref 3.5–5)
RBC # BLD: 4.36 M/UL (ref 4.2–5.4)
SODIUM BLD-SCNC: 145 MMOL/L (ref 136–145)
TOTAL PROTEIN: 5.6 G/DL (ref 6.6–8.7)
WBC # BLD: 6.9 K/UL (ref 4.8–10.8)

## 2020-12-07 PROCEDURE — 99232 SBSQ HOSP IP/OBS MODERATE 35: CPT | Performed by: PSYCHIATRY & NEUROLOGY

## 2020-12-07 PROCEDURE — 36415 COLL VENOUS BLD VENIPUNCTURE: CPT

## 2020-12-07 PROCEDURE — 85025 COMPLETE CBC W/AUTO DIFF WBC: CPT

## 2020-12-07 PROCEDURE — 6360000002 HC RX W HCPCS: Performed by: PSYCHIATRY & NEUROLOGY

## 2020-12-07 PROCEDURE — 92526 ORAL FUNCTION THERAPY: CPT

## 2020-12-07 PROCEDURE — 97530 THERAPEUTIC ACTIVITIES: CPT

## 2020-12-07 PROCEDURE — 97110 THERAPEUTIC EXERCISES: CPT

## 2020-12-07 PROCEDURE — 6370000000 HC RX 637 (ALT 250 FOR IP): Performed by: PSYCHIATRY & NEUROLOGY

## 2020-12-07 PROCEDURE — 80053 COMPREHEN METABOLIC PANEL: CPT

## 2020-12-07 PROCEDURE — 92507 TX SP LANG VOICE COMM INDIV: CPT

## 2020-12-07 PROCEDURE — 1180000000 HC REHAB R&B

## 2020-12-07 PROCEDURE — 97116 GAIT TRAINING THERAPY: CPT

## 2020-12-07 RX ADMIN — ENOXAPARIN SODIUM 40 MG: 40 INJECTION SUBCUTANEOUS at 20:38

## 2020-12-07 RX ADMIN — CLOPIDOGREL BISULFATE 75 MG: 75 TABLET ORAL at 08:42

## 2020-12-07 RX ADMIN — ATORVASTATIN CALCIUM 80 MG: 80 TABLET, FILM COATED ORAL at 20:38

## 2020-12-07 RX ADMIN — PANTOPRAZOLE SODIUM 40 MG: 40 TABLET, DELAYED RELEASE ORAL at 20:38

## 2020-12-07 RX ADMIN — ASPIRIN 81 MG: 81 TABLET, CHEWABLE ORAL at 08:41

## 2020-12-07 ASSESSMENT — PAIN SCALES - GENERAL
PAINLEVEL_OUTOF10: 0

## 2020-12-07 NOTE — PROGRESS NOTES
[facility]  INPATIENT SPEECH THERAPY  [facility] 8 REHAB UNIT  TIME   Time In: 0930  Time Out: 1000  Minutes: 30       [x]Daily Note  []Progress Note    Date: 2020  Patient Name: Gurjit Boone        MRN: [id number]    Account #: [de-identified]  : 1955  (66 y.o.)  Gender: female   Primary Provider: Apple Hua MD  Precautions: Fall/aspiration   Swallowing Status/Diet: Soft and bite sized thin liquids plus a chin tuck     Subjective: Patient alert and cooperative with all therapy tasks. Patient seen bedside for treatment. Objective:   Patient had several sips of thin liquids utilizing chin tuck. Patient only requiring 1x cue to tuck her chin. All other times patient completing independently. Patient tolerating well only 1x throat clear was noted. Patient completed swallowing exercises during session with min verbal cues. Effortful swallow completed with laryngeal elevation noted on 100% of opportunities. Lyric maneuver completed to address base of tongue strength. Patient was able to complete laryngeal elevation on 100% of opportunities (this is improvement from previous days). Oral motor exercises completed to improve lingual strength and range of motion. Patient continues to show improvement. Speech intelligibility ranks at 100% today for unknown context. Recommended Diet:  Solid consistency: Soft and bite sized  Liquid consistency: Thin  Liquid administration via: Cup(No straws)     Medication administration: Meds in puree(Meds whole in applesauce or pudding)     Safe Swallow Protocol:  Supervision: Independent  Compensatory Swallowing Strategies: Alternate solids and liquids;Small bites/sips; Remain upright for 30-45 minutes after meals;Upright as possible for all oral intake;Eat/Feed slowly       SHORT TERM GOAL #1:  Goal 1: The patient will tolerate minced and moist diet consistency with nectar thick liquids with min/no overt s/s of aspiration.     SHORT TERM GOAL #2:  Goal 2: The patient will complete pharyngeal strengthening exercises with min verbal cues at 80% accuracy to improve airway protection. SHORT TERM GOAL #3:  Goal 3: The patient will complete daily oral motor exercises to improve labial/lingual/buccal strength and ROM to improve oral phase of swallow and speech intelligibility. SHORT TERM GOAL #4:  Goal 4: The patient will use the (over articulation/slow rate/writing key word/elongation of thevowel/increased loudness/phrasing) strategy to improve speech intelligibility withwords/phrases/sentences/in conversation with 100% accuracy. SHORT TERM GOAL #5:  Goal 5: Patient will complete repeat FEES for potential upgrade    Swallowing Short Term Goals  Short-term Goals  Goal 1: The patient will complete the hoang (tongue hold) technique to improve base of tongue retraction and base of tongue to pharyngeal wall approximation with 90% accuracy and minimal verbal cues. Goal 2: The patient will complete the Mendelsohn maneuver to improve hyolaryngeal elevation and clear vallecular residue with 90% accuracy and minimal verbal cues  Goal 3: The patient will complete the effortful swallow maneuver to improve hyolaryngeal elevation, tongue base retraction, and vocal fold closure with 90% accuracy and minimal verbal cues. Goal 4: The patient will complete daily oral hygiene to prevent aspiration of bacteria laden secretions. Goal 5: The patient will perform compensatory swallow strategies (chin tuck) to eliminate s/s of aspiration and tolerate the least restrictive diet with (min) verbal cues. Goal 6: The patient will tolerate a dysphagia soft and bite sized diet with thin liquids plus a chin tuck with minimal overt s/s of aspiration.          ASSESSMENT:  Assessment: [x]Progressing towards goals          []Not Progressing towards goals    Patient Tolerance of Treatment:   [x]Tolerated well []Tolerated fair []Required rest breaks []Fatigued    Education:  Learner:  [x]Patient []Significant Other          []Other  Education provided regarding:  [x]Goals and POC   []Diet and swallowing precautions    []Home Exercise Program  []Progress and/or discharge information  Method of Education:  [x]Discussion          [x]Demonstration          []Handout          []Other  Evaluation of Education:   [x]Verbalized understanding   []Demonstrates without assistance  []Demonstrates with assistance  []Needs further instruction     []No evidence of learning                  []No family present      Plan: [x]Continue with current plan of care    []Modify current plan of care as follows:    []Discharge patient    Discharge Location:    Services/Supervision Recommended:      [x]Patient continues to require treatment by a licensed therapist to address functional deficits as outlined in the established plan of care.

## 2020-12-07 NOTE — PROGRESS NOTES
Nutrition Assessment     Type and Reason for Visit: Reassess     Nutrition Assessment:  Pt improving from a nutritional standpoint with diet upgrade and PO intake >75% most meals. Wt remains stable as well. Consider pt to be low risk for nutritional decline at this time. Will monitor. Malnutrition Assessment:  Malnutrition Status: No malnutrition    Current Nutrition Therapies:    DIET DYSPHAGIA MINCED AND MOIST;  Dysphagia Soft and Bite-Sized    Anthropometric Measures:  · Height: 5' 6\" (167.6 cm)  · Current Body Wt: 164 lb 1.6 oz (74.4 kg)   · BMI: 26.5    Nutrition Diagnosis:   · Swallowing difficulty related to cognitive or neurological impairment, acute injury/trauma as evidenced by swallow study results      Nutrition Interventions:   Food and/or Nutrient Delivery:  Continue Current Diet  Nutrition Education/Counseling:  No recommendation at this time   Coordination of Nutrition Care:  Continue to monitor while inpatient    Goals:  PO intake >50%, wt stable       Nutrition Monitoring and Evaluation:   Behavioral-Environmental Outcomes:  None Identified   Food/Nutrient Intake Outcomes:  Food and Nutrient Intake  Physical Signs/Symptoms Outcomes:  Biochemical Data, Chewing or Swallowing, Nutrition Focused Physical Findings, Skin, Weight     Discharge Planning:    No discharge needs at this time     Electronically signed by Luigi Pelaez RD, LD on 12/7/20 at 9:23 AM CST    Contact: [phone]

## 2020-12-07 NOTE — PROGRESS NOTES
Occupational Therapy     12/07/20 1100   General   Additional Pertinent Hx reverse R shoulder replacement approximately 1 yr ago   Diagnosis CVA with right hemiparesis   Pain Assessment   Patient Currently in Pain No   Pain Assessment 0-10   Pain Level 0   Balance   Sitting Balance Supervision   Standing Balance Contact guard assistance   Standing Balance   Time 3 minutes, 4 minutes   Activity 1 handed static standing task   Functional Mobility   Functional - Mobility Device Wheelchair   Activity Other;Retrieve items;Transport items   Assist Level Supervision   Functional Mobility Comments Completed light food prep task in kitchen. Transfers   Sit to stand Contact guard assistance   Stand to sit Contact guard assistance   Type of ROM/Therapeutic Exercise   Type of ROM/Therapeutic Exercise Pulley   Comment Pulley: flex/ext, ADduct/ABduct. Assessment   Performance deficits / Impairments Decreased functional mobility ; Decreased endurance;Decreased coordination;Decreased ADL status; Decreased fine motor control;Decreased high-level IADLs;Decreased strength;Decreased balance;Decreased ROM   Treatment Diagnosis CVA with right hemiparesis   Prognosis Good   Timed Code Treatment Minutes 60 Minutes   Activity Tolerance   Activity Tolerance Patient Tolerated treatment well   Safety Devices   Safety Devices in place Yes   Type of devices Call light within reach; Chair alarm in place   Plan   Current Treatment Recommendations Self-Care / ADL; Safety Education & Training; Endurance Training;Positioning; Functional Mobility Training;Balance Training;Equipment Evaluation, Education, & procurement;ROM; Home Management Training;Patient/Caregiver Education & Training;Strengthening

## 2020-12-07 NOTE — PROGRESS NOTES
Occupational Therapy     12/07/20 1515   General   Additional Pertinent Hx reverse R shoulder replacement approximately 1 yr ago   Diagnosis CVA with right hemiparesis   Pain Assessment   Patient Currently in Pain No   Pain Assessment 0-10   Pain Level 0   Balance   Sitting Balance Supervision   Standing Balance Contact guard assistance   Functional Mobility   Functional - Mobility Device Rolling Walker   Activity Other   Assist Level Minimal assistance   Bed mobility   Sit to Supine Stand by assistance   Scooting Stand by assistance   Transfers   Sit to stand Contact guard assistance   Stand to sit Contact guard assistance   Exercises   Grasp/Release Medium resistance digi-flex, R hand, 3 sets x 15 reps. Coordination   Fine Motor R hand FM acts with fair quality of movement. Assessment   Performance deficits / Impairments Decreased functional mobility ; Decreased endurance;Decreased coordination;Decreased ADL status; Decreased fine motor control;Decreased high-level IADLs;Decreased strength;Decreased balance;Decreased ROM   Treatment Diagnosis CVA with right hemiparesis   Prognosis Good   Timed Code Treatment Minutes 30 Minutes   Activity Tolerance   Activity Tolerance Patient Tolerated treatment well   Safety Devices   Safety Devices in place Yes   Type of devices Call light within reach; Bed alarm in place   Plan   Current Treatment Recommendations Self-Care / ADL; Safety Education & Training; Endurance Training;Positioning; Functional Mobility Training;Balance Training;Equipment Evaluation, Education, & procurement;ROM; Home Management Training;Patient/Caregiver Education & Training;Strengthening

## 2020-12-07 NOTE — PROGRESS NOTES
Bethanie Brown  [id number]     12/07/20 1329 12/07/20 1331 12/07/20 1341   Subjective   Subjective Pt agreed to therapy this afternoon. --   --    Bed Mobility   Rolling  --  Stand by assistance  --    Supine to Sit  --  Stand by assistance  (Pushing LUE on HR.)  --    Sit to Supine  --  Stand by assistance  --    Transfers   Sit to Stand  --  Contact guard assistance  --    Stand to sit  --  Contact guard assistance  --    Bed to Chair  --  Contact guard assistance  --    Car Transfer  --  Contact guard assistance  --    Ambulation   Ambulation?  --   --  Yes   Ambulation 1   Surface  --   --  level tile   Device  --   --  Rolling Walker   Other Apparatus  --   --  Wheelchair follow   Assistance  --   --  Contact guard assistance   Quality of Gait  --   --  Pt improved with gait mechanics and weight shifting this date, no instances of R knee buckling while amb. Min verbal cues for 3 pt gait pattern and erect posture.    Distance  --   --  48'   Propulsion 1   Propulsion  --   --  Manual   Level  --   --  Level Tile   Method  --   --  RUE;LUE   Level of Assistance  --   --  Stand by assistance   Description/ Details  --   --  Pt able to propel back to room with BUE, fatigued following   Distance  --   --  200'   Exercises   Comments  --   --   --    Conditions Requiring Skilled Therapeutic Intervention   Body structures, Functions, Activity limitations  --   --   --    Assessment  --   --   --    Prognosis  --   --   --    Activity Tolerance   Activity Tolerance  --   --   --       12/07/20 1342 12/07/20 1343   Subjective   Subjective  --   --    Bed Mobility   Rolling  --   --    Supine to Sit  --   --    Sit to Supine  --   --    Transfers   Sit to Stand  --   --    Stand to sit  --   --    Bed to Chair  --   --    Car Transfer  --   --    Ambulation   Ambulation?  --   --    Ambulation 1   Surface  --   --    Device  --   --    Other Apparatus  --   --    Assistance  --   --    Quality of Gait  --   --

## 2020-12-08 PROCEDURE — 99233 SBSQ HOSP IP/OBS HIGH 50: CPT | Performed by: PSYCHIATRY & NEUROLOGY

## 2020-12-08 PROCEDURE — 97530 THERAPEUTIC ACTIVITIES: CPT

## 2020-12-08 PROCEDURE — 6360000002 HC RX W HCPCS: Performed by: PSYCHIATRY & NEUROLOGY

## 2020-12-08 PROCEDURE — 1180000000 HC REHAB R&B

## 2020-12-08 PROCEDURE — 97110 THERAPEUTIC EXERCISES: CPT

## 2020-12-08 PROCEDURE — 6370000000 HC RX 637 (ALT 250 FOR IP): Performed by: PSYCHIATRY & NEUROLOGY

## 2020-12-08 PROCEDURE — 92526 ORAL FUNCTION THERAPY: CPT

## 2020-12-08 PROCEDURE — 97116 GAIT TRAINING THERAPY: CPT

## 2020-12-08 PROCEDURE — 92507 TX SP LANG VOICE COMM INDIV: CPT

## 2020-12-08 RX ADMIN — PANTOPRAZOLE SODIUM 40 MG: 40 TABLET, DELAYED RELEASE ORAL at 20:32

## 2020-12-08 RX ADMIN — ENOXAPARIN SODIUM 40 MG: 40 INJECTION SUBCUTANEOUS at 20:32

## 2020-12-08 RX ADMIN — CLOPIDOGREL BISULFATE 75 MG: 75 TABLET ORAL at 08:23

## 2020-12-08 RX ADMIN — ASPIRIN 81 MG: 81 TABLET, CHEWABLE ORAL at 08:23

## 2020-12-08 RX ADMIN — ATORVASTATIN CALCIUM 80 MG: 80 TABLET, FILM COATED ORAL at 20:32

## 2020-12-08 ASSESSMENT — PAIN SCALES - GENERAL: PAINLEVEL_OUTOF10: 0

## 2020-12-08 NOTE — PROGRESS NOTES
Bethanie Brown  [id number]     12/08/20 9055 12/08/20 0940   Subjective   Subjective Pt agreed to therapy this morning.  --    Bed Mobility   Rolling Stand by assistance  --    Supine to Sit Stand by assistance  --    Transfers   Sit to Stand Contact guard assistance  --    Stand to sit Contact guard assistance  --    Bed to Chair Contact guard assistance  --    Ambulation   Ambulation? Yes  --    WB Status WBAT  --    Ambulation 1   Surface level tile  --    Device Rolling Walker  --    Assistance Contact guard assistance  --    Quality of Gait Practiced amb chair to chair this morning incorporating turning and sitting. Pt performed turns well w/ only  minor cues and CGA. Pt did not have any episodes of R knee buckling this morning.  --    Distance 20'x4  --    Exercises   Comments  --  Assisted Pt w/ dressing, hygiene, and ADL's. Conditions Requiring Skilled Therapeutic Intervention   Body structures, Functions, Activity limitations  --  Decreased functional mobility ; Decreased ADL status; Decreased strength;Decreased safe awareness;Decreased endurance   Assessment  --  Practiced turning and sitting this morning amb chair to chair. Pt performed turns well only needing minor cues and CGA. Pt did not have any episodes of R knee buckling. Pt tolerated session well w/ minimal fatigue.    Prognosis  --  Good   Activity Tolerance   Activity Tolerance  --  Patient Tolerated treatment well   Electronically signed by Gladys Zarate PTA on 12/8/2020 at 9:42 AM

## 2020-12-08 NOTE — PROGRESS NOTES
Yanci Bobo [facility]  [id number]     12/08/20 8747 12/08/20 0940   Subjective   Subjective Pt agreed to therapy this morning.  --    Bed Mobility   Rolling Stand by assistance  --    Supine to Sit Stand by assistance  --    Transfers   Sit to Stand Contact guard assistance  --    Stand to sit Contact guard assistance  --    Bed to Chair Contact guard assistance  --    Ambulation   Ambulation? Yes  --    WB Status WBAT  --    Ambulation 1   Surface level tile  --    Device Rolling Walker  --    Assistance Contact guard assistance  --    Quality of Gait Practiced amb chair to chair this morning incorporating turning and sitting. Pt performed turns well w/ only  minor cues and CGA. Pt did not have any episodes of R knee buckling this morning.  --    Distance 20'x4  --    Exercises   Comments  --  Assisted Pt w/ dressing, hygiene, and ADL's. Conditions Requiring Skilled Therapeutic Intervention   Body structures, Functions, Activity limitations  --  Decreased functional mobility ; Decreased ADL status; Decreased strength;Decreased safe awareness;Decreased endurance   Assessment  --  Practiced turning and sitting this morning amb chair to chair. Pt performed turns well only needing minor cues and CGA. Pt did not have any episodes of R knee buckling. Pt tolerated session well w/ minimal fatigue.    Prognosis  --  Good   Activity Tolerance   Activity Tolerance  --  Patient Tolerated treatment well   Electronically signed by Napoleon Blanc PTA on 12/8/2020 at 9:53 AM

## 2020-12-08 NOTE — PROGRESS NOTES
Occupational Therapy     12/08/20 1100   Restrictions/Precautions   Restrictions/Precautions Fall Risk   General   Additional Pertinent Hx reverse R shoulder replacement approximately 1 yr ago   Diagnosis CVA with right hemiparesis   Attendance   Activity Games;Party/social;Trivia   Participation Active participation   Bed mobility   Supine to Sit Stand by assistance   Sit to Supine Stand by assistance   Comment with bedrail   Coordination   Fine Motor R hand FM act  (cues to utilize R hand)   Assessment   Performance deficits / Impairments Decreased functional mobility ; Decreased endurance;Decreased coordination;Decreased ADL status; Decreased fine motor control;Decreased high-level IADLs;Decreased strength;Decreased balance;Decreased ROM   Treatment Diagnosis CVA with right hemiparesis   Timed Code Treatment Minutes 60 Minutes   Activity Tolerance   Activity Tolerance Patient Tolerated treatment well   Safety Devices   Safety Devices in place Yes

## 2020-12-08 NOTE — PROGRESS NOTES
Occupational Therapy     12/08/20 1430   Restrictions/Precautions   Restrictions/Precautions Fall Risk   General   Additional Pertinent Hx reverse R shoulder replacement approximately 1 yr ago   Diagnosis CVA with right hemiparesis   Functional Mobility   Functional - Mobility Device Rolling Walker   Assist Level Contact guard assistance   Functional Mobility Comments short distance x 4 occ in room and OT gym   Bed mobility   Supine to Sit Minimal assistance  ( without bedrail, coming up on R side)   Comment pt sleeps on the R side of bed at home but will need to switch to L  for wc clearance as bed is too close to the wall   Wheelchair Bed Transfers   Wheelchair/Bed - Technique Ambulating   Equipment Used   (RW)   Level of Asssistance Contact guard assistance   Type of ROM/Therapeutic Exercise   Type of ROM/Therapeutic Exercise Free weights;Hoang   Comment comprehensive RUE re-education   Exercises   Shoulder Flexion no resistance, 10 reps, full range in PG   Shoulder Extension no resistance, 10 reps   Horizontal ABduction no resistance, 10 reps   Horizontal ADduction no resistance, 10 reps   Elbow Flexion with 1# wt 20 reps AG   Elbow Extension with 1# wt 20 reps AG   Supination with 1# wt  20 reps GE   Pronation with 1# wt  20 reps GE   Wrist Flexion with 1# wt  20 reps AG   Wrist Extension with 1# wt 20 reps AG   Assessment   Performance deficits / Impairments Decreased functional mobility ; Decreased endurance;Decreased coordination;Decreased ADL status; Decreased fine motor control;Decreased high-level IADLs;Decreased strength;Decreased balance;Decreased ROM   Treatment Diagnosis CVA with right hemiparesis   Timed Code Treatment Minutes 45 Minutes   Activity Tolerance   Activity Tolerance Patient Tolerated treatment well   Safety Devices   Safety Devices in place Yes

## 2020-12-08 NOTE — PROGRESS NOTES
[facility]  INPATIENT SPEECH THERAPY  [facility] [address] [facility] UNIT  TIME   [phone]  [x]Daily Note  []Progress Note    Date: 2020  Patient Name: Margarito Rowley        MRN: [id number]    Account #: [de-identified]  : 1955  (66 y.o.)  Gender: female   Primary Provider: Ignacio Eckert MD  Precautions: Fall/aspiration   Swallowing Status/Diet: Soft and bite sized with thin liquids (chin tuck)      Subjective: Patient alert and cooperative with all therapy tasks. Patient upright in bed eating breakfast when SLP arrived. Objective:   Patients swallow function monitored at breakfast meal. Patient tolerating diet well. Did note cough x1 when patient's timing of the chin tuck was off. All other sips patients timing was good and No other overt s/s of aspiration observed during meal. Patient completing chin tuck independently during all meal no verbal cues were required. Did note some frequent throat clears at rest.     Patients speech intelligibility ranks at 100% for unknown context. Did note improvement today and less distortions/imprecise articulations within conversational discourse. Patient she feels like her speech/voice are improving, however does not feel as if it is at baseline. Recommended Diet:  Solid consistency: Soft and bite sized  Liquid consistency: Thin  Liquid administration via: Cup(No straws)     Medication administration: Meds in puree(Meds whole in applesauce or pudding)     Safe Swallow Protocol:  Supervision: Independent  Compensatory Swallowing Strategies: Alternate solids and liquids;Small bites/sips; Remain upright for 30-45 minutes after meals;Upright as possible for all oral intake;Eat/Feed slowly      SHORT TERM GOAL #1:  Goal 1: The patient will tolerate minced and moist diet consistency with nectar thick liquids with min/no overt s/s of aspiration.      SHORT TERM GOAL #2:  Goal 2: The patient will complete pharyngeal strengthening exercises with min verbal cues at 80% accuracy to improve airway protection. SHORT TERM GOAL #3:  Goal 3: The patient will complete daily oral motor exercises to improve labial/lingual/buccal strength and ROM to improve oral phase of swallow and speech intelligibility. SHORT TERM GOAL #4:  Goal 4: The patient will use the (over articulation/slow rate/writing key word/elongation of thevowel/increased loudness/phrasing) strategy to improve speech intelligibility withwords/phrases/sentences/in conversation with 100% accuracy. SHORT TERM GOAL #5:  Goal 5: Patient will complete repeat FEES for potential upgrade    Swallowing Short Term Goals  Short-term Goals  Goal 1: The patient will complete the hoang (tongue hold) technique to improve base of tongue retraction and base of tongue to pharyngeal wall approximation with 90% accuracy and minimal verbal cues. Goal 2: The patient will complete the Mendelsohn maneuver to improve hyolaryngeal elevation and clear vallecular residue with 90% accuracy and minimal verbal cues  Goal 3: The patient will complete the effortful swallow maneuver to improve hyolaryngeal elevation, tongue base retraction, and vocal fold closure with 90% accuracy and minimal verbal cues. Goal 4: The patient will complete daily oral hygiene to prevent aspiration of bacteria laden secretions. Goal 5: The patient will perform compensatory swallow strategies (chin tuck) to eliminate s/s of aspiration and tolerate the least restrictive diet with (min) verbal cues. Goal 6: The patient will tolerate a dysphagia soft and bite sized diet with thin liquids plus a chin tuck with minimal overt s/s of aspiration.          ASSESSMENT:  Assessment: [x]Progressing towards goals          []Not Progressing towards goals    Patient Tolerance of Treatment:   [x]Tolerated well []Tolerated fair []Required rest breaks []Fatigued    Education:  Learner:  [x]Patient          []Significant Other          []Other  Education provided regarding:  [x]Goals and POC   [x]Diet and swallowing precautions    []Home Exercise Program  []Progress and/or discharge information  Method of Education:  [x]Discussion          []Demonstration          []Handout          []Other  Evaluation of Education:   [x]Verbalized understanding   []Demonstrates without assistance  []Demonstrates with assistance  []Needs further instruction     []No evidence of learning                  []No family present      Plan: [x]Continue with current plan of care    []Modify current plan of care as follows:    []Discharge patient    Discharge Location:    Services/Supervision Recommended:      [x]Patient continues to require treatment by a licensed therapist to address functional deficits as outlined in the established plan of care.

## 2020-12-08 NOTE — PROGRESS NOTES
Patient:   Nereida Jacques  MR#:    [id number]   Room:    9458/535-19   YOB: 1955  Date of Progress Note: 12/7/2020  Time of Note                           8:02 PM  Consulting Physician:   Adam Dougherty M.D. Attending Physician:  Adam Dougherty MD     Chief complaint Acute ischemic stroke     S:This 75 y.o. female  pt admitted to [facility] on 11/26/2020 w/R sided weakness. Pt woke up on am of 11/26 around 5am and got up, went to the kitchen to make coffee and fell after diffuse tingling an R sided weakness noted. At baseline, she does not move her R arm well proximally due to fx in 11/2019. She managed to unlock her door before going back to be, in case she would need help. she stayed in bed for 4 hours and noted she was stronger, however, she  got up again around 10 am and fell again due to R sided weakness. She presented to [facility] ED where on presentation she was out of tPA window and NIHSS was 0, but then she developed worsening R sided weakness again. She was related that she can choke on her saliva when she swallows. Pt was alert & oriented x 3 w/ mild dysarthria. Pt also noted to have new onset tremor. MRI done on 11/27 showed acute ischemia in the bilateral brendon. Over the weekend, she had worsening R sided weakness. EEG was done and read as normal. She is on a mechanical soft diet w/nectar thick liquids. She is now medically stable and is participating with therapy. She is ready to begin rehab w/plan of returning home after rehab dc w/support from her family. No acute issues.     REVIEW OF SYSTEMS:  Constitutional: No fevers No chills  Neck:No stiffness  Respiratory: No shortness of breath  Cardiovascular: No chest pain No palpitations  Gastrointestinal: No abdominal pain    Genitourinary: No Dysuria  Neurological: No headache, no confusion    Past Medical History:      Diagnosis Date    Abnormal Pap smear of cervix     1991    History of cryosurgery 1991       Past Surgical History:      Procedure Laterality Date    BREAST CYST EXCISION Bilateral     X4      SECTION  1981    JOINT REPLACEMENT      right shoulder    TONSILLECTOMY         Medications in Hospital:      Current Facility-Administered Medications:     pantoprazole (PROTONIX) tablet 40 mg, 40 mg, Oral, Nightly, Aparna Hassan MD, 40 mg at 20    ondansetron (ZOFRAN-ODT) disintegrating tablet 4 mg, 4 mg, Oral, Q8H PRN, Aparna Hassan MD, 4 mg at 20 0439    aspirin chewable tablet 81 mg, 81 mg, Oral, Daily, Aparna Hassan MD, 81 mg at 20 0841    atorvastatin (LIPITOR) tablet 80 mg, 80 mg, Oral, Nightly, Aparna Hassan MD, 80 mg at 20    clopidogrel (PLAVIX) tablet 75 mg, 75 mg, Oral, Daily, Aparna Hassan MD, 75 mg at 2042    cyclobenzaprine (FLEXERIL) tablet 10 mg, 10 mg, Oral, TID PRN, Aparna Hassan MD, 10 mg at 20    acetaminophen (TYLENOL) tablet 650 mg, 650 mg, Oral, Q4H PRN, Aparna Hassan MD    polyethylene glycol (GLYCOLAX) packet 17 g, 17 g, Oral, Daily PRN, Aparna Hassan MD, 17 g at 20    enoxaparin (LOVENOX) injection 40 mg, 40 mg, Subcutaneous, Q24H, Aparna Hassan MD, 40 mg at 20    Allergies:  Patient has no known allergies. Social History:   TOBACCO:   reports that she has been smoking. She has never used smokeless tobacco.  ETOH:   reports current alcohol use. Family History:       Problem Relation Age of Onset    Heart Attack Maternal Grandmother     Colon Cancer Father     Colon Cancer Mother          PHYSICAL EXAM:  /69   Pulse 62   Temp 97.4 °F (36.3 °C) (Temporal)   Resp 16   Ht 5' 6\" (1.676 m)   Wt 164 lb 1.6 oz (74.4 kg)   SpO2 95%   Breastfeeding No   BMI 26.49 kg/m²     Constitutional - well developed, well nourished.    Eyes - conjunctiva normal.   Ear, nose, throat - No scars, masses, or lesions over external nose or ears, no atrophy of tongue  Neck-symmetric, no masses noted, no jugular vein distension  Respiration- chest wall appears symmetric, good expansion,   normal effort without use of accessory muscles  Musculoskeletal - no significant wasting of muscles noted, no bony deformities  Extremities-no clubbing, cyanosis or edema  Skin - warm, dry, and intact. No rash, erythema, or pallor. Psychiatric - mood, affect, and behavior appear normal.      Neurological exam  Awake, alert, fluent oriented appropriate affect slightly slow dysarthria  Attention and concentration appear appropriate  Recent and remote memory appears unremarkable  Speech with dysarthria and some expressive aphasia  No clear issues with language of fund of knowledge     Cranial Nerve Exam     CN III, IV,VI-EOMI, No nystagmus, conjugate eye movements, no ptosis    CN VII-right lower facial droop       Motor Exam  antigravity throughout upper and lower extremities bilaterally  Drift right arm and right leg      Tremors- no tremors in hands or head noted     Gait  Not tested      Nursing/pcp notes, imaging,labs and vitals reviewed.      PT,OT and/or speech notes reviewed    Lab Results   Component Value Date    WBC 6.9 12/07/2020    HGB 13.1 12/07/2020    HCT 38.8 12/07/2020    MCV 89.0 12/07/2020     12/07/2020     Lab Results   Component Value Date     12/07/2020    K 4.2 12/07/2020     12/07/2020    CO2 28 12/07/2020    BUN 15 12/07/2020    CREATININE 0.8 12/07/2020    GLUCOSE 103 12/07/2020    CALCIUM 9.1 12/07/2020    PROT 5.6 (L) 12/07/2020    LABALBU 3.8 12/07/2020    BILITOT 0.4 12/07/2020    ALKPHOS 99 12/07/2020    AST 40 (H) 12/07/2020    ALT 36 (H) 12/07/2020    LABGLOM >60 12/07/2020    GFRAA >59 12/07/2020     Lab Results   Component Value Date    INR 1.04 12/01/2020    PROTIME 13.5 12/01/2020       Fatoumata Bal PTA    Therapy Assistant    Physical Therapy    Progress Notes    Cosign Needed    Date of Service:  12/7/2020  1:55 PM                Cosign Needed Show:Clear all  []Manual[x]Template[]Copied    Added by:  [x]Emir Spears PTA    []Liv for details  [facility]  [id number]       12/07/20 1329 12/07/20 1331 12/07/20 1341   Subjective   Subjective Pt agreed to therapy this afternoon. --   --    Bed Mobility   Rolling  --  Stand by assistance  --    Supine to Sit  --  Stand by assistance  (Pushing LUE on HR.)  --    Sit to Supine  --  Stand by assistance  --    Transfers   Sit to Stand  --  Contact guard assistance  --    Stand to sit  --  Contact guard assistance  --    Bed to Chair  --  Contact guard assistance  --    Car Transfer  --  Contact guard assistance  --    Ambulation   Ambulation?  --   --  Yes   Ambulation 1   Surface  --   --  level tile   Device  --   --  Rolling Walker   Other Apparatus  --   --  Wheelchair follow   Assistance  --   --  Contact guard assistance   Quality of Gait  --   --  Pt improved with gait mechanics and weight shifting this date, no instances of R knee buckling while amb. Min verbal cues for 3 pt gait pattern and erect posture.    Distance  --   --  48'   Propulsion 1   Propulsion  --   --  Manual   Level  --   --  Level Tile   Method  --   --  RUE;LUE   Level of Assistance  --   --  Stand by assistance   Description/ Details  --   --  Pt able to propel back to room with BUE, fatigued following   Distance  --   --  200'   Exercises   Comments  --   --   --    Conditions Requiring Skilled Therapeutic Intervention   Body structures, Functions, Activity limitations  --   --   --    Assessment  --   --   --    Prognosis  --   --   --    Activity Tolerance   Activity Tolerance  --   --   --         12/07/20 1342 12/07/20 1343   Subjective   Subjective  --   --    Bed Mobility   Rolling  --   --    Supine to Sit  --   --    Sit to Supine  --   --    Transfers   Sit to Stand  --   --    Stand to sit  --   --    Bed to Chair  --   --    Car Transfer  --   --    Ambulation   Ambulation?  --   --    Ambulation 1

## 2020-12-08 NOTE — PROGRESS NOTES
Patient:   Jarrod Enamorado  MR#:    [id number]   Room:    [facility]/776-59   YOB: 1955  Date of Progress Note: 12/8/2020  Time of Note                           8:17 AM  Consulting Physician:   Tim Beach M.D. Attending Physician:  Tim Beach MD     Chief complaint Acute ischemic stroke     S:This 75 y.o. female  pt admitted to [facility] on 11/26/2020 w/R sided weakness. Pt woke up on am of 11/26 around 5am and got up, went to the kitchen to make coffee and fell after diffuse tingling an R sided weakness noted. At baseline, she does not move her R arm well proximally due to fx in 11/2019. She managed to unlock her door before going back to be, in case she would need help. she stayed in bed for 4 hours and noted she was stronger, however, she  got up again around 10 am and fell again due to R sided weakness. She presented to [facility] ED where on presentation she was out of tPA window and NIHSS was 0, but then she developed worsening R sided weakness again. She was related that she can choke on her saliva when she swallows. Pt was alert & oriented x 3 w/ mild dysarthria. Pt also noted to have new onset tremor. MRI done on 11/27 showed acute ischemia in the bilateral brendon. Over the weekend, she had worsening R sided weakness. EEG was done and read as normal. She is on a mechanical soft diet w/nectar thick liquids. She is now medically stable and is participating with therapy. She is ready to begin rehab w/plan of returning home after rehab dc w/support from her family. No new issues.     REVIEW OF SYSTEMS:  Constitutional: No fevers No chills  Neck:No stiffness  Respiratory: No shortness of breath  Cardiovascular: No chest pain No palpitations  Gastrointestinal: No abdominal pain    Genitourinary: No Dysuria  Neurological: No headache, no confusion    Past Medical History:      Diagnosis Date    Abnormal Pap smear of cervix     1991    History of cryosurgery 1991       Past Surgical Show:Clear all  []Manual[x]Template[]Copied    Added by:  [x]Emir Spears PTA    []Liv for details  [facility]  [id number]       12/07/20 1329 12/07/20 1331 12/07/20 1341   Subjective   Subjective Pt agreed to therapy this afternoon. --   --    Bed Mobility   Rolling  --  Stand by assistance  --    Supine to Sit  --  Stand by assistance  (Pushing LUE on HR.)  --    Sit to Supine  --  Stand by assistance  --    Transfers   Sit to Stand  --  Contact guard assistance  --    Stand to sit  --  Contact guard assistance  --    Bed to Chair  --  Contact guard assistance  --    Car Transfer  --  Contact guard assistance  --    Ambulation   Ambulation?  --   --  Yes   Ambulation 1   Surface  --   --  level tile   Device  --   --  Rolling Walker   Other Apparatus  --   --  Wheelchair follow   Assistance  --   --  Contact guard assistance   Quality of Gait  --   --  Pt improved with gait mechanics and weight shifting this date, no instances of R knee buckling while amb. Min verbal cues for 3 pt gait pattern and erect posture.    Distance  --   --  48'   Propulsion 1   Propulsion  --   --  Manual   Level  --   --  Level Tile   Method  --   --  RUE;LUE   Level of Assistance  --   --  Stand by assistance   Description/ Details  --   --  Pt able to propel back to room with BUE, fatigued following   Distance  --   --  200'   Exercises   Comments  --   --   --    Conditions Requiring Skilled Therapeutic Intervention   Body structures, Functions, Activity limitations  --   --   --    Assessment  --   --   --    Prognosis  --   --   --    Activity Tolerance   Activity Tolerance  --   --   --         12/07/20 1342 12/07/20 1343   Subjective   Subjective  --   --    Bed Mobility   Rolling  --   --    Supine to Sit  --   --    Sit to Supine  --   --    Transfers   Sit to Stand  --   --    Stand to sit  --   --    Bed to Chair  --   --    Car Transfer  --   --    Ambulation   Ambulation?  --   --    Ambulation 1 Surface  --   --    Device  --   --    Other Apparatus  --   --    Assistance  --   --    Quality of Gait  --   --    Distance  --   --    Propulsion 1   Propulsion  --   --    Level  --   --    Method  --   --    Level of Assistance  --   --    Description/ Details  --   --    Distance  --   --    Exercises   Comments Practiced bed mobility on mat table and Car TF from W/C and then using RW.  --    Conditions Requiring Skilled Therapeutic Intervention   Body structures, Functions, Activity limitations  --  Decreased functional mobility ; Decreased ADL status; Decreased strength;Decreased safe awareness;Decreased endurance   Assessment  --  Practiced bed mobility on mat table this afternoon. Pt improved to SBA and she was able to get Sam LE's on and off w/ only minor cues for techniques. Pt also improved to CGA w/ Stand pivot TF's. Pt practiced Car TF from W/C and then amb to it w/ RW. Pt performed both ways well w/ only CGA. Pt tolerated session well w/ minimal fatigue. Prognosis  --  Good   Activity Tolerance   Activity Tolerance  --  Patient Tolerated treatment well   Electronically signed by Ivette Cesar PTA on 12/7/2020 at 1:56 PM                     RECORD REVIEW: Previous medical records, medications were reviewed at today's visit    IMPRESSION:   1. Acute ischemic stroke-bilateral brendon-brainstem-on ASA/statin/Plavix  2. Hyperlipidemia-statin  3. DVT prophylaxis-Lovenox  4. GI-bowel regimen  5. Chronic right shoulders-monitor  6. History of chronic opiod use due to shoulder issues  7. Smoker  8.  PT/OT/Speech     Continue current care    Team conference with PT/OT/speech/nursing/Care coordinator to review in depth patients care and discharge planning     ft SBA  Ambulation 50 ft RW CGA  0 steps    PEACEOS December 17 th    Expected duration and frequency therapy: 180 minutes per day, 5 days per week    Jemma Larson  [phone] CELL  Dr Dafne Stapleton

## 2020-12-09 PROCEDURE — 1180000000 HC REHAB R&B

## 2020-12-09 PROCEDURE — 6370000000 HC RX 637 (ALT 250 FOR IP): Performed by: PSYCHIATRY & NEUROLOGY

## 2020-12-09 PROCEDURE — 97116 GAIT TRAINING THERAPY: CPT

## 2020-12-09 PROCEDURE — 92526 ORAL FUNCTION THERAPY: CPT

## 2020-12-09 PROCEDURE — 6360000002 HC RX W HCPCS: Performed by: PSYCHIATRY & NEUROLOGY

## 2020-12-09 PROCEDURE — 99232 SBSQ HOSP IP/OBS MODERATE 35: CPT | Performed by: PSYCHIATRY & NEUROLOGY

## 2020-12-09 PROCEDURE — 92507 TX SP LANG VOICE COMM INDIV: CPT

## 2020-12-09 PROCEDURE — 97530 THERAPEUTIC ACTIVITIES: CPT

## 2020-12-09 PROCEDURE — 97110 THERAPEUTIC EXERCISES: CPT

## 2020-12-09 PROCEDURE — 97535 SELF CARE MNGMENT TRAINING: CPT

## 2020-12-09 RX ADMIN — ATORVASTATIN CALCIUM 80 MG: 80 TABLET, FILM COATED ORAL at 21:32

## 2020-12-09 RX ADMIN — ENOXAPARIN SODIUM 40 MG: 40 INJECTION SUBCUTANEOUS at 21:32

## 2020-12-09 RX ADMIN — CLOPIDOGREL BISULFATE 75 MG: 75 TABLET ORAL at 08:02

## 2020-12-09 RX ADMIN — PANTOPRAZOLE SODIUM 40 MG: 40 TABLET, DELAYED RELEASE ORAL at 21:32

## 2020-12-09 RX ADMIN — ASPIRIN 81 MG: 81 TABLET, CHEWABLE ORAL at 08:02

## 2020-12-09 ASSESSMENT — PAIN SCALES - GENERAL
PAINLEVEL_OUTOF10: 0
PAINLEVEL_OUTOF10: 0

## 2020-12-09 NOTE — PLAN OF CARE
Problem: Falls - Risk of:  Goal: Will remain free from falls  Description: Will remain free from falls  12/9/2020 0037 by Nicki Slater LPN  Outcome: Ongoing  12/8/2020 1636 by Holley Page RN  Outcome: Ongoing  Goal: Absence of physical injury  Description: Absence of physical injury  12/9/2020 0037 by Nicki Slater LPN  Outcome: Ongoing  12/8/2020 1636 by Holley Page RN  Outcome: Ongoing     Problem: ACTIVITY INTOLERANCE/IMPAIRED MOBILITY  Goal: Mobility/activity is maintained at optimum level for patient  12/9/2020 0037 by Nicki Slater LPN  Outcome: Ongoing  12/8/2020 1636 by Holley Page RN  Outcome: Ongoing     Problem: Bleeding:  Goal: Will show no signs and symptoms of excessive bleeding  Description: Will show no signs and symptoms of excessive bleeding  12/9/2020 0037 by Nicki Slater LPN  Outcome: Ongoing  12/8/2020 1636 by Holley Page RN  Outcome: Ongoing     Problem: Skin Integrity:  Goal: Will show no infection signs and symptoms  Description: Will show no infection signs and symptoms  12/9/2020 0037 by Nicki Slater LPN  Outcome: Ongoing  12/8/2020 1636 by Holley Page RN  Outcome: Ongoing  Goal: Absence of new skin breakdown  Description: Absence of new skin breakdown  12/9/2020 0037 by Nicki Slater LPN  Outcome: Ongoing  12/8/2020 1636 by Holley Page RN  Outcome: Ongoing

## 2020-12-09 NOTE — PROGRESS NOTES
12/09/20 1438 12/09/20 1439 12/09/20 1445   Restrictions/Precautions   Restrictions/Precautions Fall Risk  --   --    Lower Extremity Weight Bearing Restrictions   Right Lower Extremity Weight Bearing Weight Bearing As Tolerated  --   --    Left Lower Extremity Weight Bearing Weight Bearing As Tolerated  --   --    Subjective   Subjective Pt agreed to therapy this afternoon. --   --    Bed Mobility   Rolling  --  Stand by assistance  --    Supine to Sit  --  Stand by assistance  --    Sit to Supine  --  Stand by assistance  --    Transfers   Sit to Stand  --  Contact guard assistance  --    Stand to sit  --  Contact guard assistance  --    Bed to Chair  --  Contact guard assistance  --    Ambulation   Ambulation?  --   --  Yes   WB Status  --   --  WBAT   Ambulation 1   Surface  --   --  level tile   Device  --   --  Rolling Walker   Assistance  --   --  Contact guard assistance   Quality of Gait  --   --  Practiced amb chair to chair incorporating turning and sitting. Pt performed turns well w/ only  minor cues and CGA. Pt did not have any episodes of R knee buckling. VC to lockout LLE when stepping.  Cued to not cross feet when turning   Gait Deviations  --   --  Decreased step length;Decreased step height   Distance  --   --  30' x4   Comments  --   --  decreased R ankle DF; VC to decrease LLE foot slap;   Stairs/Curb   Stairs?  --   --  Yes   Stairs   # Steps   --   --  8   Stairs Height  --   --  4\"   Rails  --   --  Bilateral   Device  --   --  No Device   Assistance  --   --  Contact guard assistance   Comment  --   --  VC to lead with L ascending and R descending; Cues to flex R knee and step up with it compared to vaulting R leg to next step; performed a step to pattern   Conditions Requiring Skilled Therapeutic Intervention   Body structures, Functions, Activity limitations  --   --   --    Assessment  --   --   --    Activity Tolerance   Activity Tolerance  --   --   --    Safety Devices   Type of devices  --   --   --       12/09/20 1454 12/09/20 1505 12/09/20 1507   Restrictions/Precautions   Restrictions/Precautions  --   --   --    Lower Extremity Weight Bearing Restrictions   Right Lower Extremity Weight Bearing  --   --   --    Left Lower Extremity Weight Bearing  --   --   --    Subjective   Subjective  --   --   --    Bed Mobility   Rolling  --   --   --    Supine to Sit  --   --   --    Sit to Supine  --   --   --    Transfers   Sit to Stand  --   --   --    Stand to sit  --   --   --    Bed to Chair  --   --   --    Ambulation   Ambulation?  --   --   --    WB Status  --   --   --    Ambulation 1   Surface  --   --   --    Device  --   --   --    Assistance  --   --   --    Quality of Gait  --   --   --    Gait Deviations  --   --   --    Distance  --   --   --    Comments  --   --   --    Stairs/Curb   Stairs?  --   --   --    Stairs   # Steps   --   --   --    Stairs Height  --   --   --    Rails  --   --   --    Device  --   --   --    Assistance  --   --   --    Comment  --   --   --    Conditions Requiring Skilled Therapeutic Intervention   Body structures, Functions, Activity limitations  --  Decreased functional mobility ; Decreased ADL status; Decreased strength;Decreased safe awareness;Decreased endurance  --    Assessment  --  Pt did well this session. Pt requested to practice stairs. Pt performed stairs with a step to pattern leading with the L foot ascending and R foot descending. pt used both hand rails. Pt requried VC to step with R leg flexing knee as to not vault to next step. Pt still requires cueing for amb to focus on reducing foot slap and foot drag of L and R foot respectively. Pt left in room in bed. --    Activity Tolerance   Activity Tolerance Patient Tolerated treatment well  --   --    Safety Devices   Type of devices  --   --  Bed alarm in place;Call light within reach; Left in bed   Electronically signed by Rose Torres PTA on 12/9/2020 at 3:22 PM

## 2020-12-09 NOTE — PROGRESS NOTES
Occupational Therapy     12/09/20 1100   General   Additional Pertinent Hx reverse R shoulder replacement approximately 1 yr ago   Diagnosis CVA with right hemiparesis   Pain Assessment   Patient Currently in Pain No   Pain Assessment 0-10   Pain Level 0   Balance   Sitting Balance Supervision   Standing Balance Contact guard assistance   Functional Mobility   Functional - Mobility Device Rolling Walker   Activity To/from bathroom; Other   Assist Level Contact guard assistance   Bed mobility   Sit to Supine Supervision  (Mat table, utilized leg .)   Scooting Supervision   Transfers   Sit to stand Contact guard assistance   Stand to sit Contact guard assistance   Toilet Transfers   Toilet - Technique Ambulating   Equipment Used Grab bars   Toilet Transfer Contact guard assistance   Shower Transfers   Shower - Transfer From [employer] - Transfer Type To and From   [place] - Transfer To Transfer tub bench   Shower - Technique Ambulating   Shower Transfers Contact Guard   Shower Transfers Comments Home setup. Type of ROM/Therapeutic Exercise   Type of ROM/Therapeutic Exercise AROM; Self PROM   Comment [place] RUE supine on mat table, seated distal, 2 sets x 10 reps. Assessment   Performance deficits / Impairments Decreased functional mobility ; Decreased endurance;Decreased coordination;Decreased ADL status; Decreased fine motor control;Decreased high-level IADLs;Decreased strength;Decreased balance;Decreased ROM   Treatment Diagnosis CVA with right hemiparesis   Prognosis Good   Timed Code Treatment Minutes 60 Minutes   Activity Tolerance   Activity Tolerance Patient Tolerated treatment well   Safety Devices   Safety Devices in place Yes   Type of devices Chair alarm in place;Call light within reach   Plan   Current Treatment Recommendations Self-Care / ADL; Safety Education & Training; Endurance Training;Positioning; Functional Mobility Training;Balance Training;Equipment Evaluation, Education, & procurement;ROM; Home Management Training;Patient/Caregiver Education & Training;Strengthening      12/09/20 329 [facility] Needed Supervision or touching assistance   Comment CGA. CARE Score 4      12/09/20 1100   Toilet Transfer   Assistance Needed Supervision or touching assistance   Comment CGA.    CARE Score 4

## 2020-12-09 NOTE — PROGRESS NOTES
[facility]  INPATIENT SPEECH THERAPY  [facility] [address] [facility] UNIT  TIME     1330  1400       [x]Daily Note  []Progress Note    Date: 2020  Patient Name: Steve Monreal        MRN: [id number]    Account #: [de-identified]  : 1955  (66 y.o.)  Gender: female   Primary Provider: Verónica Urena MD    Restrictions/Precautions: Fall Risk  Lower Extremity Weight Bearing Restrictions  Right Lower Extremity Weight Bearing: Weight Bearing As Tolerated  Left Lower Extremity Weight Bearing: Weight Bearing As Tolerated    Recommended Diet:  Solid consistency: Soft and bite sized  Liquid consistency: Thin  Liquid administration via: Cup(No straws)     Medication administration: Meds in puree(Meds whole in applesauce or pudding)     Safe Swallow Protocol:  Supervision: Independent  Compensatory Swallowing Strategies: Alternate solids and liquids;Small bites/sips; Remain upright for 30-45 minutes after meals;Upright as possible for all oral intake;Eat/Feed slowly    Subjective: Pt alert and cooperative for skilled ST. Safe swallow precautions were printed behind and infront of pt's bed. Pt verbalized happiness with success in rehab program. Pt discussed concerns with vocal quality and breath support. SLP introduced diaphragmatic breathing and LE/voicing exercises this date. Objective:    Pt exhibited no s/s of aspiration with thin cup sips of water utilizing chin tuck. Pt was independent with chin tuck strategy. No diet trials administered. Speech and language tasks targeted. Pt's intelligibility ranked % in discourse. Pt's rate was slow, but precise in short utterances. Pt was encouraged to increase loudness and improve vocal pitch for best speech intelligibility. Diaphragmatic breathing exercises introduced and pt completed 5 reps with a model. Voicing exercises introduced, and pt completed four sets of 3-5 reps. SLP left printed paper with exercises for pt to complete independently.     SHORT TERM GOAL #1:  Goal 1: The patient will tolerate minced and moist diet consistency with nectar thick liquids with min/no overt s/s of aspiration. SHORT TERM GOAL #2:  Goal 2: The patient will complete pharyngeal strengthening exercises with min verbal cues at 80% accuracy to improve airway protection. SHORT TERM GOAL #3:  Goal 3: The patient will complete daily oral motor exercises to improve labial/lingual/buccal strength and ROM to improve oral phase of swallow and speech intelligibility. SHORT TERM GOAL #4:  Goal 4: The patient will use the (over articulation/slow rate/writing key word/elongation of thevowel/increased loudness/phrasing) strategy to improve speech intelligibility withwords/phrases/sentences/in conversation with 100% accuracy. SHORT TERM GOAL #5:  Goal 5: Patient will complete repeat FEES for potential upgrade    Swallowing Short Term Goals  Short-term Goals  Goal 1: The patient will complete the hoang (tongue hold) technique to improve base of tongue retraction and base of tongue to pharyngeal wall approximation with 90% accuracy and minimal verbal cues. Goal 2: The patient will complete the Mendelsohn maneuver to improve hyolaryngeal elevation and clear vallecular residue with 90% accuracy and minimal verbal cues  Goal 3: The patient will complete the effortful swallow maneuver to improve hyolaryngeal elevation, tongue base retraction, and vocal fold closure with 90% accuracy and minimal verbal cues. Goal 4: The patient will complete daily oral hygiene to prevent aspiration of bacteria laden secretions. Goal 5: The patient will perform compensatory swallow strategies (chin tuck) to eliminate s/s of aspiration and tolerate the least restrictive diet with (min) verbal cues. Goal 6: The patient will tolerate a dysphagia soft and bite sized diet with thin liquids plus a chin tuck with minimal overt s/s of aspiration.          ASSESSMENT:  Assessment: [x]Progressing towards goals          []Not Progressing towards goals    Patient Tolerance of Treatment:   [x]Tolerated well []Tolerated fair []Required rest breaks []Fatigued    Education:  Learner:  [x]Patient          []Significant Other          []Other  Education provided regarding:  [x]Goals and POC   [x]Diet and swallowing precautions    []Home Exercise Program  [x]Progress and/or discharge information  Method of Education:  [x]Discussion          [x]Demonstration          [x]Handout          []Other  Evaluation of Education:   [x]Verbalized understanding   [x]Demonstrates without assistance  []Demonstrates with assistance  []Needs further instruction     []No evidence of learning                  []No family present      Plan: [x]Continue with current plan of care    []Modify current plan of care as follows:    []Discharge patient    Discharge Location:    Services/Supervision Recommended:      [x]Patient continues to require treatment by a licensed therapist to address functional deficits as outlined in the established plan of care.     Electronically signed by Addison Goodwin on 12/9/20 at 2:46 PM CST

## 2020-12-09 NOTE — PROGRESS NOTES
12/09/20 0913 12/09/20 0914 12/09/20 0925   Restrictions/Precautions   Restrictions/Precautions Fall Risk  --   --    Lower Extremity Weight Bearing Restrictions   Right Lower Extremity Weight Bearing Weight Bearing As Tolerated  --   --    Left Lower Extremity Weight Bearing Weight Bearing As Tolerated  --   --    Subjective   Subjective  --  Pt agreed to therapy this morning. --    Transfers   Sit to Stand  --  Contact guard assistance  --    Stand to sit  --  Contact guard assistance  --    Ambulation   Ambulation?  --   --  Yes   WB Status  --   --  WBAT   Ambulation 1   Surface  --   --  level tile   Device  --   --  Rolling Walker   Assistance  --   --  Contact guard assistance   Quality of Gait  --   --  Practiced amb chair to chair this morning incorporating turning and sitting. Pt performed turns well w/ only  minor cues and CGA. Pt did not have any episodes of R knee buckling this morning. VC to lockout LLE when stepping. Gait Deviations  --   --  Decreased step length;Decreased step height   Distance  --   --  30' x4   Comments  --   --  decreased R ankle DF; VC to decrease LLE foot slap;    Exercises   Comments  --   --   --    Conditions Requiring Skilled Therapeutic Intervention   Body structures, Functions, Activity limitations  --   --   --    Assessment  --   --   --    Activity Tolerance   Activity Tolerance  --   --   --    Safety Devices   Type of devices  --   --   --       12/09/20 0941 12/09/20 0955 12/09/20 0957   Restrictions/Precautions   Restrictions/Precautions  --   --   --    Lower Extremity Weight Bearing Restrictions   Right Lower Extremity Weight Bearing  --   --   --    Left Lower Extremity Weight Bearing  --   --   --    Subjective   Subjective  --   --   --    Transfers   Sit to Stand  --   --   --    Stand to sit  --   --   --    Ambulation   Ambulation?  --   --   --    WB Status  --   --   --    Ambulation 1   Surface  --   --   --    Device  --   --   --    Assistance --   --   --    Quality of Gait  --   --   --    Gait Deviations  --   --   --    Distance  --   --   --    Comments  --   --   --    Exercises   Comments Dot taps in ll bars BLE, mini squats 2x10  --   --    Conditions Requiring Skilled Therapeutic Intervention   Body structures, Functions, Activity limitations  --  Decreased functional mobility ; Decreased ADL status; Decreased strength;Decreased safe awareness;Decreased endurance  --    Assessment  --  Practiced turning and sitting this morning amb chair to chair. Pt performed turns well only needing minor cues and CGA. Pt did not have any episodes of R knee buckling. Pt performed dot taps in llbars and mini squats to improve LE control. Pt tolerated session well w/ minimal fatigue. --    Activity Tolerance   Activity Tolerance  --  Patient Tolerated treatment well  --    Safety Devices   Type of devices  --   --  Call light within reach; Chair alarm in place; Left in chair   Electronically signed by Kavya Valdez PTA on 12/9/2020 at 10:37 AM

## 2020-12-09 NOTE — PROGRESS NOTES
Independent  LE Dressing: Contact guard assistance;Minimal assistance  Toileting: Supervision        Balance  Sitting Balance: Supervision  Bed mobility  Supine to Sit: Stand by assistance  Transfers  Stand Step Transfers: Contact guard assistance  Sit to stand: Contact guard assistance  Stand to sit: Contact guard assistance                                                                 Plan   Plan  Specific instructions for Next Treatment: social functional, 9 hole peg test,  strength  Current Treatment Recommendations: Self-Care / ADL, Safety Education & Training, Endurance Training, Positioning, Functional Mobility Training, Balance Training, Equipment Evaluation, Education, & procurement, ROM, Home Management Training, Patient/Caregiver Education & Training, Strengthening  G-Code     OutComes Score                                                  AM-PAC Score             Goals  Short term goals  Time Frame for Short term goals: 1 week  Short term goal 1: pt will complete UB dressing with setup  Short term goal 2: pt will complete LB dressing with min A  Short term goal 3: pt will complete overall toileting with min A  Short term goal 4: pt will complete overall bathing with min A  Short term goal 5: pt will complete simple home making task with min A using recommended mobility means  Short term goal 6: pt will complete 1 handed standing level task for 3 mins with min A  Short term goal 7: pt will complete R GM/FM acts x 5 occasions to increase coordination for ADLs  Long term goals  Time Frame for Long term goals : 3-4 weeks  Long term goal 1: pt will complete overall dressing with modified independence  Long term goal 2: pt will complete overall toileting with modified independence  Long term goal 3: pt will complete overall bathing with modified independence  Long term goal 4: pt will complete simple ambulatory home making task with modified independence  Long term goal 5: pt will complete HEP with independence  Long term goals 6: verbalize DME for home  Patient Goals   Patient goals : return home       Therapy Time   Individual Concurrent Group Co-treatment   Time In 0815         Time Out 0900         Minutes 45         Timed Code Treatment Minutes: [id number] [facility] Amol Adam

## 2020-12-09 NOTE — PROGRESS NOTES
Patient:   Gunjan Antoine  MR#:    [id number]   Room:    [facility]521-   YOB: 1955  Date of Progress Note: 12/9/2020  Time of Note                           2:44 PM  Consulting Physician:   Kait Srinivasan M.D. Attending Physician:  Kait Srinivasan MD     Chief complaint Acute ischemic stroke     S:This 75 y.o. female  pt admitted to [facility] on 11/26/2020 w/R sided weakness. Pt woke up on am of 11/26 around 5am and got up, went to the kitchen to make coffee and fell after diffuse tingling an R sided weakness noted. At baseline, she does not move her R arm well proximally due to fx in 11/2019. She managed to unlock her door before going back to be, in case she would need help. she stayed in bed for 4 hours and noted she was stronger, however, she  got up again around 10 am and fell again due to R sided weakness. She presented to [facility] ED where on presentation she was out of tPA window and NIHSS was 0, but then she developed worsening R sided weakness again. She was related that she can choke on her saliva when she swallows. Pt was alert & oriented x 3 w/ mild dysarthria. Pt also noted to have new onset tremor. MRI done on 11/27 showed acute ischemia in the bilateral brendon. Over the weekend, she had worsening R sided weakness. EEG was done and read as normal. She is on a mechanical soft diet w/nectar thick liquids. She is now medically stable and is participating with therapy. She is ready to begin rehab w/plan of returning home after rehab dc w/support from her family. No acute issues.     REVIEW OF SYSTEMS:  Constitutional: No fevers No chills  Neck:No stiffness  Respiratory: No shortness of breath  Cardiovascular: No chest pain No palpitations  Gastrointestinal: No abdominal pain    Genitourinary: No Dysuria  Neurological: No headache, no confusion    Past Medical History:      Diagnosis Date    Abnormal Pap smear of cervix     1991    History of cryosurgery 1991       Past Surgical History:      Procedure Laterality Date    BREAST CYST EXCISION Bilateral     X4      SECTION  1981    JOINT REPLACEMENT      right shoulder    TONSILLECTOMY         Medications in Hospital:      Current Facility-Administered Medications:     pantoprazole (PROTONIX) tablet 40 mg, 40 mg, Oral, Nightly, Morgan Smallwood MD, 40 mg at 20    ondansetron (ZOFRAN-ODT) disintegrating tablet 4 mg, 4 mg, Oral, Q8H PRN, Morgan Smallwood MD, 4 mg at 20    aspirin chewable tablet 81 mg, 81 mg, Oral, Daily, Morgan Smallwood MD, 81 mg at 20    atorvastatin (LIPITOR) tablet 80 mg, 80 mg, Oral, Nightly, Morgan Smallwood MD, 80 mg at 20    clopidogrel (PLAVIX) tablet 75 mg, 75 mg, Oral, Daily, Morgan Smallwood MD, 75 mg at 20    cyclobenzaprine (FLEXERIL) tablet 10 mg, 10 mg, Oral, TID PRN, Morgan Smallwood MD, 10 mg at 20    acetaminophen (TYLENOL) tablet 650 mg, 650 mg, Oral, Q4H PRN, Morgan Smallwood MD    polyethylene glycol (GLYCOLAX) packet 17 g, 17 g, Oral, Daily PRN, Morgan Smallwood MD, 17 g at 20    enoxaparin (LOVENOX) injection 40 mg, 40 mg, Subcutaneous, Q24H, Morgan Smallwood MD, 40 mg at 20    Allergies:  Patient has no known allergies. Social History:   TOBACCO:   reports that she has been smoking. She has never used smokeless tobacco.  ETOH:   reports current alcohol use. Family History:       Problem Relation Age of Onset    Heart Attack Maternal Grandmother     Colon Cancer Father     Colon Cancer Mother          PHYSICAL EXAM:  /69   Pulse 53   Temp 97.2 °F (36.2 °C) (Temporal)   Resp 16   Ht 5' 6\" (1.676 m)   Wt 164 lb 1.6 oz (74.4 kg)   SpO2 93%   Breastfeeding No   BMI 26.49 kg/m²     Constitutional - well developed, well nourished.    Eyes - conjunctiva normal.   Ear, nose, throat - No scars, masses, or lesions over external nose or ears, no atrophy of tongue  Neck-symmetric, no masses Needed              Show:Clear all  []Manual[x]Template[]Copied    Added by:  [x]Farideh Beach PTA    []Liv for details       12/09/20 0913 12/09/20 0914 12/09/20 0925   Restrictions/Precautions   Restrictions/Precautions Fall Risk  --   --    Lower Extremity Weight Bearing Restrictions   Right Lower Extremity Weight Bearing Weight Bearing As Tolerated  --   --    Left Lower Extremity Weight Bearing Weight Bearing As Tolerated  --   --    Subjective   Subjective  --  Pt agreed to therapy this morning. --    Transfers   Sit to Stand  --  Contact guard assistance  --    Stand to sit  --  Contact guard assistance  --    Ambulation   Ambulation?  --   --  Yes   WB Status  --   --  WBAT   Ambulation 1   Surface  --   --  level tile   Device  --   --  Rolling Walker   Assistance  --   --  Contact guard assistance   Quality of Gait  --   --  Practiced amb chair to chair this morning incorporating turning and sitting. Pt performed turns well w/ only  minor cues and CGA. Pt did not have any episodes of R knee buckling this morning. VC to lockout LLE when stepping. Gait Deviations  --   --  Decreased step length;Decreased step height   Distance  --   --  30' x4   Comments  --   --  decreased R ankle DF; VC to decrease LLE foot slap;    Exercises   Comments  --   --   --    Conditions Requiring Skilled Therapeutic Intervention   Body structures, Functions, Activity limitations  --   --   --    Assessment  --   --   --    Activity Tolerance   Activity Tolerance  --   --   --    Safety Devices   Type of devices  --   --   --         12/09/20 0941 12/09/20 0955 12/09/20 0957   Restrictions/Precautions   Restrictions/Precautions  --   --   --    Lower Extremity Weight Bearing Restrictions   Right Lower Extremity Weight Bearing  --   --   --    Left Lower Extremity Weight Bearing  --   --   --    Subjective   Subjective  --   --   --    Transfers   Sit to Stand  --   --   --    Stand to sit  --   --   --    Ambulation Ambulation?  --   --   --    WB Status  --   --   --    Ambulation 1   Surface  --   --   --    Device  --   --   --    Assistance  --   --   --    Quality of Gait  --   --   --    Gait Deviations  --   --   --    Distance  --   --   --    Comments  --   --   --    Exercises   Comments Dot taps in ll bars BLE, mini squats 2x10  --   --    Conditions Requiring Skilled Therapeutic Intervention   Body structures, Functions, Activity limitations  --  Decreased functional mobility ; Decreased ADL status; Decreased strength;Decreased safe awareness;Decreased endurance  --    Assessment  --  Practiced turning and sitting this morning amb chair to chair. Pt performed turns well only needing minor cues and CGA. Pt did not have any episodes of R knee buckling. Pt performed dot taps in llbars and mini squats to improve LE control. Pt tolerated session well w/ minimal fatigue. --    Activity Tolerance   Activity Tolerance  --  Patient Tolerated treatment well  --    Safety Devices   Type of devices  --   --  Call light within reach; Chair alarm in place; Left in chair   Electronically signed by Jeromy Cabrera PTA on 12/9/2020 at 10:37 AM                    RECORD REVIEW: Previous medical records, medications were reviewed at today's visit    IMPRESSION:   1. Acute ischemic stroke-bilateral [id number] ASA/statin/Plavix  2. Hyperlipidemia-statin  3. DVT prophylaxis-Lovenox  4. GI-bowel regimen  5. Chronic right shoulders-monitor  6. History of chronic opiod use due to shoulder issues  7. Smoker  8.  PT/OT/Speech     Continue present care    ELOS December 17 th    Expected duration and frequency therapy: 180 minutes per day, 5 days per week    [address] [facility]  [phone] CELL  Dr Gladis Priest

## 2020-12-10 LAB
ALBUMIN SERPL-MCNC: 4.1 G/DL (ref 3.5–5.2)
ALP BLD-CCNC: 114 U/L (ref 35–104)
ALT SERPL-CCNC: 34 U/L (ref 5–33)
ANION GAP SERPL CALCULATED.3IONS-SCNC: 9 MMOL/L (ref 7–19)
AST SERPL-CCNC: 26 U/L (ref 5–32)
BASOPHILS ABSOLUTE: 0.1 K/UL (ref 0–0.2)
BASOPHILS RELATIVE PERCENT: 1 % (ref 0–1)
BILIRUB SERPL-MCNC: 0.4 MG/DL (ref 0.2–1.2)
BUN BLDV-MCNC: 17 MG/DL (ref 8–23)
CALCIUM SERPL-MCNC: 9.3 MG/DL (ref 8.8–10.2)
CHLORIDE BLD-SCNC: 105 MMOL/L (ref 98–111)
CO2: 28 MMOL/L (ref 22–29)
CREAT SERPL-MCNC: 0.7 MG/DL (ref 0.5–0.9)
EOSINOPHILS ABSOLUTE: 0.2 K/UL (ref 0–0.6)
EOSINOPHILS RELATIVE PERCENT: 3.3 % (ref 0–5)
GFR AFRICAN AMERICAN: >59
GFR NON-AFRICAN AMERICAN: >60
GLUCOSE BLD-MCNC: 97 MG/DL (ref 74–109)
HCT VFR BLD CALC: 40.8 % (ref 37–47)
HEMOGLOBIN: 13.3 G/DL (ref 12–16)
IMMATURE GRANULOCYTES #: 0 K/UL
LYMPHOCYTES ABSOLUTE: 2.4 K/UL (ref 1.1–4.5)
LYMPHOCYTES RELATIVE PERCENT: 32.7 % (ref 20–40)
MCH RBC QN AUTO: 29.4 PG (ref 27–31)
MCHC RBC AUTO-ENTMCNC: 32.6 G/DL (ref 33–37)
MCV RBC AUTO: 90.1 FL (ref 81–99)
MONOCYTES ABSOLUTE: 0.5 K/UL (ref 0–0.9)
MONOCYTES RELATIVE PERCENT: 6.4 % (ref 0–10)
NEUTROPHILS ABSOLUTE: 4.1 K/UL (ref 1.5–7.5)
NEUTROPHILS RELATIVE PERCENT: 56.3 % (ref 50–65)
PDW BLD-RTO: 13.2 % (ref 11.5–14.5)
PLATELET # BLD: 273 K/UL (ref 130–400)
PMV BLD AUTO: 10.9 FL (ref 9.4–12.3)
POTASSIUM REFLEX MAGNESIUM: 4.4 MMOL/L (ref 3.5–5)
RBC # BLD: 4.53 M/UL (ref 4.2–5.4)
SODIUM BLD-SCNC: 142 MMOL/L (ref 136–145)
TOTAL PROTEIN: 6 G/DL (ref 6.6–8.7)
WBC # BLD: 7.3 K/UL (ref 4.8–10.8)

## 2020-12-10 PROCEDURE — 97535 SELF CARE MNGMENT TRAINING: CPT

## 2020-12-10 PROCEDURE — 6360000002 HC RX W HCPCS: Performed by: PSYCHIATRY & NEUROLOGY

## 2020-12-10 PROCEDURE — 97110 THERAPEUTIC EXERCISES: CPT

## 2020-12-10 PROCEDURE — 99232 SBSQ HOSP IP/OBS MODERATE 35: CPT | Performed by: PSYCHIATRY & NEUROLOGY

## 2020-12-10 PROCEDURE — 80053 COMPREHEN METABOLIC PANEL: CPT

## 2020-12-10 PROCEDURE — 1180000000 HC REHAB R&B

## 2020-12-10 PROCEDURE — 36415 COLL VENOUS BLD VENIPUNCTURE: CPT

## 2020-12-10 PROCEDURE — 97530 THERAPEUTIC ACTIVITIES: CPT

## 2020-12-10 PROCEDURE — 6370000000 HC RX 637 (ALT 250 FOR IP): Performed by: PSYCHIATRY & NEUROLOGY

## 2020-12-10 PROCEDURE — 97116 GAIT TRAINING THERAPY: CPT

## 2020-12-10 PROCEDURE — 92526 ORAL FUNCTION THERAPY: CPT

## 2020-12-10 PROCEDURE — 92507 TX SP LANG VOICE COMM INDIV: CPT

## 2020-12-10 PROCEDURE — 85025 COMPLETE CBC W/AUTO DIFF WBC: CPT

## 2020-12-10 RX ADMIN — CLOPIDOGREL BISULFATE 75 MG: 75 TABLET ORAL at 07:27

## 2020-12-10 RX ADMIN — ENOXAPARIN SODIUM 40 MG: 40 INJECTION SUBCUTANEOUS at 20:45

## 2020-12-10 RX ADMIN — PANTOPRAZOLE SODIUM 40 MG: 40 TABLET, DELAYED RELEASE ORAL at 20:45

## 2020-12-10 RX ADMIN — ASPIRIN 81 MG: 81 TABLET, CHEWABLE ORAL at 07:27

## 2020-12-10 RX ADMIN — ATORVASTATIN CALCIUM 80 MG: 80 TABLET, FILM COATED ORAL at 20:45

## 2020-12-10 ASSESSMENT — PAIN SCALES - GENERAL
PAINLEVEL_OUTOF10: 0
PAINLEVEL_OUTOF10: 0

## 2020-12-10 NOTE — PROGRESS NOTES
Occupational Therapy     12/10/20 0900   Restrictions/Precautions   Restrictions/Precautions Fall Risk   General   Diagnosis CVA with right hemiparesis   ADL   Additional Comments see CARE scores   Balance   Standing Balance Stand by assistance  (Lb clothing management)   Functional Mobility   Functional - Mobility Device Rolling Walker   Activity Other  (short distance in room and OT gym)   Assist Level Contact guard assistance   Exercises   Scapular Protraction 2# TBar AG    Scapular Retraction 2# TBar     Shoulder Flexion 2# TBar PG    Shoulder Extension with 1# wt AG    Elbow Flexion 1# PG   Elbow Extension 1# AG   Supination with 1# wt AG    Pronation with 1# wt AG    Wrist Flexion with 1# wt AG    Wrist Extension with 1# wt AG    Short term goals   Short term goal 1 MET   Short term goal 2 MET   Short term goal 3 MET   Short term goal 4 MET   Assessment   Performance deficits / Impairments Decreased functional mobility ; Decreased endurance;Decreased coordination;Decreased ADL status; Decreased fine motor control;Decreased high-level IADLs;Decreased strength;Decreased balance;Decreased ROM   Timed Code Treatment Minutes 60 Minutes   Activity Tolerance   Activity Tolerance Patient Tolerated treatment well   Safety Devices   Safety Devices in place Yes      12/10/20 0900   Oral Hygiene   Assistance Needed Setup or clean-up assistance   CARE Score 5   [address] or touching assistance   Comment supervision   CARE Score 4   Shower/Bathe Self   Assistance Needed Supervision or touching assistance   CARE Score 4   [address] or clean-up assistance   CARE Score 5   Lower Body Dressing   Assistance Needed Supervision or touching assistance   Comment supervision   CARE Score 4   Putting [address] or clean-up assistance   Comment set up- no AE required- elastic shoelaces   CARE Score 5

## 2020-12-10 NOTE — PROGRESS NOTES
Physical Therapy  Name: Yennifer Hooper  MRN:  [id number]  Date of service:  12/10/2020       12/10/20 1300   Restrictions/Precautions   Restrictions/Precautions Fall Risk   Lower Extremity Weight Bearing Restrictions   Right Lower Extremity Weight Bearing Weight Bearing As Tolerated   Left Lower Extremity Weight Bearing Weight Bearing As Tolerated   General   Additional Pertinent Hx Hemiplegia/hemiparesis following cerebral infarction affecting left non-dominant side (R side UE and LE weakness); Dysarthria; Dysphagia, oropharyngeal phase; Hyperlipidemais, unspecified; Nicotine dependence, unspecified, uncomplicated; Surgical Hx of breast surgery x4, C-sectionx1, cyst removal from leg, tonsillectomy, R total shoulder arthroplasty w/ distal clavicle excision on 11/1/8/2019   Subjective   Subjective No co's, feeling good   Pain Screening   Patient Currently in Pain No   Ambulation   Ambulation? Yes   WB Status WBAT   Ambulation 1   Surface level tile   Device Rolling Walker   Assistance Contact guard assistance   Distance 30 x    Comments decreased R ankle DF; VC to decrease LLE foot slap; Exercises   Hamstring Sets 15 red band on R   Hip Flexion 15 with red band R   Hip Abduction 15 ab/ad with manual resist   Knee Long Arc Quad 10x 2 R    Ankle Pumps 3x 5 red band on R for DF and inv x 10/ever x 3/5   Other exercises   Other exercises?  Yes   Other exercises 5 high steps in RW x 10   Other exercises 6 sit to stand from wc x 2/5   Other exercises 7 sidestepping in // bars 1 x each direction   Other exercises 8 tandem stance x 30 sec each foot fwd (mod@ with R LE in post position)   Other exercises 9 static stance w narrow terrance in // bars x 1 min with mild bal perturbations [email or web address]   Short term goals   Time Frame for Short term goals 1 week   Short term goal 1 pt amb 48' with RW, CGA, step-to, 3pt pattern, RLE leading   Short term goal 2 Pt CGA with bed mobility   Short term goal 3 Pt min A with supine <> sit   Short term goal 4 Pt min A with car transfer   Short term goal 5 Pt min A with 4, 4\" steps, step-to gait pattern, LLE leading ascending   Long term goals   Time Frame for Long term goals  2 weeks   Long term goal 1 pt amb 150' with RW, ind, with step-to gait pattern, RLE leading   Long term goal 2 pt independent with bed mobility   Long term goal 3 pt independent with sit<>stand    Long term goal 4 Pt independent with car TF   Long term goal 5 Pt min A with 4, 4\" steps, step-to gait pattern, LLE leading ascending   Conditions Requiring Skilled Therapeutic Intervention   Body structures, Functions, Activity limitations Decreased functional mobility ; Decreased ADL status; Decreased strength;Decreased safe awareness;Decreased endurance   Assessment pt with good effort; fatigues quickly and needs rest to recharge and cont with good quality       Electronically signed by Brendan Daily PTA on 12/10/2020 at 2:09 PM

## 2020-12-10 NOTE — PROGRESS NOTES
Occupational Therapy     12/10/20 1430   General   Diagnosis CVA with right hemiparesis   Pain Assessment   Patient Currently in Pain No   Pain Assessment 0-10   Pain Level 0   Balance   Sitting Balance Supervision   Standing Balance Stand by assistance   Functional Mobility   Functional - Mobility Device Rolling Walker   Activity To/from bathroom   Assist Level Contact guard assistance   Transfers   Sit to stand Stand by assistance   Stand to sit Stand by assistance   Toilet Transfers   Toilet - Technique Ambulating   Equipment Used Grab bars   Toilet Transfer Stand by assistance   Coordination   Fine Motor RUE FM task with coins, and in-hand translation of objects. Completed BUE FM theraputty tasks. Assessment   Performance deficits / Impairments Decreased functional mobility ; Decreased endurance;Decreased coordination;Decreased ADL status; Decreased fine motor control;Decreased high-level IADLs;Decreased strength;Decreased balance;Decreased ROM   Treatment Diagnosis CVA with right hemiparesis   Prognosis Good   Timed Code Treatment Minutes 45 Minutes   Activity Tolerance   Activity Tolerance Patient Tolerated treatment well   Safety Devices   Safety Devices in place Yes   Type of devices Call light within reach; Bed alarm in place   Plan   Current Treatment Recommendations Self-Care / ADL; Safety Education & Training; Endurance Training;Positioning; Functional Mobility Training;Balance Training;Equipment Evaluation, Education, & procurement;ROM; Home Management Training;Patient/Caregiver Education & Training;Strengthening      12/10/20 [address] Needed Supervision or touching assistance   CARE Score 4

## 2020-12-10 NOTE — PLAN OF CARE
Problem: Falls - Risk of:  Goal: Will remain free from falls  Description: Will remain free from falls  12/10/2020 1441 by Farzad Masters RN  Outcome: Ongoing  12/10/2020 0128 by Shruthi Bauman RN  Outcome: Ongoing  Goal: Absence of physical injury  Description: Absence of physical injury  12/10/2020 1441 by Farzad Mastesr RN  Outcome: Ongoing  12/10/2020 0128 by Shruthi Bauman RN  Outcome: Ongoing     Problem: ACTIVITY INTOLERANCE/IMPAIRED MOBILITY  Goal: Mobility/activity is maintained at optimum level for patient  12/10/2020 1441 by Farzad Masters RN  Outcome: Ongoing  12/10/2020 0128 by Shruthi Bauman RN  Outcome: Ongoing     Problem: Bleeding:  Goal: Will show no signs and symptoms of excessive bleeding  Description: Will show no signs and symptoms of excessive bleeding  12/10/2020 1441 by Farzad Masters RN  Outcome: Ongoing  12/10/2020 0128 by Shruthi Bauman RN  Outcome: Ongoing     Problem: Skin Integrity:  Goal: Will show no infection signs and symptoms  Description: Will show no infection signs and symptoms  12/10/2020 1441 by Farzad Masters RN  Outcome: Ongoing  12/10/2020 0128 by Shruthi Bauman RN  Outcome: Ongoing  Goal: Absence of new skin breakdown  Description: Absence of new skin breakdown  12/10/2020 1441 by Farzad Masters RN  Outcome: Ongoing  12/10/2020 0128 by Shruthi Bauman RN  Outcome: Ongoing

## 2020-12-10 NOTE — PROGRESS NOTES
Patient:   Gunjan Antoine  MR#:    [id number]   Room:    29/952-36   YOB: 1955  Date of Progress Note: 12/10/2020  Time of Note                           11:36 AM  Consulting Physician:   Kait Srinivasan M.D. Attending Physician:  Kait Srinivasan MD     Chief complaint Acute ischemic stroke     S:This 75 y.o. female  pt admitted to [facility] on 11/26/2020 w/R sided weakness. Pt woke up on am of 11/26 around 5am and got up, went to the kitchen to make coffee and fell after diffuse tingling an R sided weakness noted. At baseline, she does not move her R arm well proximally due to fx in 11/2019. She managed to unlock her door before going back to be, in case she would need help. she stayed in bed for 4 hours and noted she was stronger, however, she  got up again around 10 am and fell again due to R sided weakness. She presented to [facility] ED where on presentation she was out of tPA window and NIHSS was 0, but then she developed worsening R sided weakness again. She was related that she can choke on her saliva when she swallows. Pt was alert & oriented x 3 w/ mild dysarthria. Pt also noted to have new onset tremor. MRI done on 11/27 showed acute ischemia in the bilateral brendon. Over the weekend, she had worsening R sided weakness. EEG was done and read as normal. She is on a mechanical soft diet w/nectar thick liquids. She is now medically stable and is participating with therapy. She is ready to begin rehab w/plan of returning home after rehab dc w/support from her family. No new issues.     REVIEW OF SYSTEMS:  Constitutional: No fevers No chills  Neck:No stiffness  Respiratory: No shortness of breath  Cardiovascular: No chest pain No palpitations  Gastrointestinal: No abdominal pain    Genitourinary: No Dysuria  Neurological: No headache, no confusion    Past Medical History:      Diagnosis Date    Abnormal Pap smear of cervix     1991    History of cryosurgery 1991       Past Surgical History:      Procedure Laterality Date    BREAST CYST EXCISION Bilateral     X4      SECTION  1981    JOINT REPLACEMENT      right shoulder    TONSILLECTOMY         Medications in Hospital:      Current Facility-Administered Medications:     pantoprazole (PROTONIX) tablet 40 mg, 40 mg, Oral, Nightly, Tim Beach MD, 40 mg at 20    ondansetron (ZOFRAN-ODT) disintegrating tablet 4 mg, 4 mg, Oral, Q8H PRN, Tim Beach MD, 4 mg at 20    aspirin chewable tablet 81 mg, 81 mg, Oral, Daily, Tim Beach MD, 81 mg at 12/10/20 0727    atorvastatin (LIPITOR) tablet 80 mg, 80 mg, Oral, Nightly, Tim Beach MD, 80 mg at 20    clopidogrel (PLAVIX) tablet 75 mg, 75 mg, Oral, Daily, Tim Beach MD, 75 mg at 12/10/20 0727    cyclobenzaprine (FLEXERIL) tablet 10 mg, 10 mg, Oral, TID PRN, Tim Beach MD, 10 mg at 20    acetaminophen (TYLENOL) tablet 650 mg, 650 mg, Oral, Q4H PRN, Tim Beach MD    polyethylene glycol (GLYCOLAX) packet 17 g, 17 g, Oral, Daily PRN, Tim Beach MD, 17 g at 20    enoxaparin (LOVENOX) injection 40 mg, 40 mg, Subcutaneous, Q24H, Tim Beach MD, 40 mg at 20    Allergies:  Patient has no known allergies. Social History:   TOBACCO:   reports that she has been smoking. She has never used smokeless tobacco.  ETOH:   reports current alcohol use. Family History:       Problem Relation Age of Onset    Heart Attack Maternal Grandmother     Colon Cancer Father     Colon Cancer Mother          PHYSICAL EXAM:  /70   Pulse 59   Temp 96.6 °F (35.9 °C) (Temporal)   Resp 16   Ht 5' 6\" (1.676 m)   Wt 164 lb 1.6 oz (74.4 kg)   SpO2 94%   Breastfeeding No   BMI 26.49 kg/m²     Constitutional - well developed, well nourished.    Eyes - conjunctiva normal.   Ear, nose, throat - No scars, masses, or lesions over external nose or ears, no atrophy of tongue  Neck-symmetric, no masses noted, no jugular vein distension  Respiration- chest wall appears symmetric, good expansion,   normal effort without use of accessory muscles  Musculoskeletal - no significant wasting of muscles noted, no bony deformities  Extremities-no clubbing, cyanosis or edema  Skin - warm, dry, and intact. No rash, erythema, or pallor. Psychiatric - mood, affect, and behavior appear normal.      Neurological exam  Awake, alert, fluent oriented appropriate affect slightly slow dysarthria  Attention and concentration appear appropriate  Recent and remote memory appears unremarkable  Speech with dysarthria and some expressive aphasia  No clear issues with language of fund of knowledge     Cranial Nerve Exam     CN III, IV,VI-EOMI, No nystagmus, conjugate eye movements, no ptosis    CN VII-right lower facial droop       Motor Exam  antigravity throughout upper and lower extremities bilaterally  Drift right arm and right leg      Tremors- no tremors in hands or head noted     Gait  Not tested      Nursing/pcp notes, imaging,labs and vitals reviewed.      PT,OT and/or speech notes reviewed    Lab Results   Component Value Date    WBC 7.3 12/10/2020    HGB 13.3 12/10/2020    HCT 40.8 12/10/2020    MCV 90.1 12/10/2020     12/10/2020     Lab Results   Component Value Date     12/10/2020    K 4.4 12/10/2020     12/10/2020    CO2 28 12/10/2020    BUN 17 12/10/2020    CREATININE 0.7 12/10/2020    GLUCOSE 97 12/10/2020    CALCIUM 9.3 12/10/2020    PROT 6.0 (L) 12/10/2020    LABALBU 4.1 12/10/2020    BILITOT 0.4 12/10/2020    ALKPHOS 114 (H) 12/10/2020    AST 26 12/10/2020    ALT 34 (H) 12/10/2020    LABGLOM >60 12/10/2020    GFRAA >59 12/10/2020     Lab Results   Component Value Date    INR 1.04 12/01/2020    PROTIME 13.5 12/01/2020          Katrina Cavanaugh PTA    Physical Therapist Assistant    Specialty:  Physical Therapy    Progress Notes    Cosign Needed    Date of Service:  12/9/2020 10:37 AM                Cosign Needed              Show:Clear all  []Manual[x]Template[]Copied    Added by:  [x]Farideh Bess PTA    []Liv for details       12/09/20 0913 12/09/20 0914 12/09/20 0925   Restrictions/Precautions   Restrictions/Precautions Fall Risk  --   --    Lower Extremity Weight Bearing Restrictions   Right Lower Extremity Weight Bearing Weight Bearing As Tolerated  --   --    Left Lower Extremity Weight Bearing Weight Bearing As Tolerated  --   --    Subjective   Subjective  --  Pt agreed to therapy this morning. --    Transfers   Sit to Stand  --  Contact guard assistance  --    Stand to sit  --  Contact guard assistance  --    Ambulation   Ambulation?  --   --  Yes   WB Status  --   --  WBAT   Ambulation 1   Surface  --   --  level tile   Device  --   --  Rolling Walker   Assistance  --   --  Contact guard assistance   Quality of Gait  --   --  Practiced amb chair to chair this morning incorporating turning and sitting. Pt performed turns well w/ only  minor cues and CGA. Pt did not have any episodes of R knee buckling this morning. VC to lockout LLE when stepping. Gait Deviations  --   --  Decreased step length;Decreased step height   Distance  --   --  30' x4   Comments  --   --  decreased R ankle DF; VC to decrease LLE foot slap;    Exercises   Comments  --   --   --    Conditions Requiring Skilled Therapeutic Intervention   Body structures, Functions, Activity limitations  --   --   --    Assessment  --   --   --    Activity Tolerance   Activity Tolerance  --   --   --    Safety Devices   Type of devices  --   --   --         12/09/20 0941 12/09/20 0955 12/09/20 0957   Restrictions/Precautions   Restrictions/Precautions  --   --   --    Lower Extremity Weight Bearing Restrictions   Right Lower Extremity Weight Bearing  --   --   --    Left Lower Extremity Weight Bearing  --   --   --    Subjective   Subjective  --   --   --    Transfers   Sit to Stand  --   --   --    Stand to sit  --   --   --    Ambulation Ambulation?  --   --   --    WB Status  --   --   --    Ambulation 1   Surface  --   --   --    Device  --   --   --    Assistance  --   --   --    Quality of Gait  --   --   --    Gait Deviations  --   --   --    Distance  --   --   --    Comments  --   --   --    Exercises   Comments Dot taps in ll bars BLE, mini squats 2x10  --   --    Conditions Requiring Skilled Therapeutic Intervention   Body structures, Functions, Activity limitations  --  Decreased functional mobility ; Decreased ADL status; Decreased strength;Decreased safe awareness;Decreased endurance  --    Assessment  --  Practiced turning and sitting this morning amb chair to chair. Pt performed turns well only needing minor cues and CGA. Pt did not have any episodes of R knee buckling. Pt performed dot taps in llbars and mini squats to improve LE control. Pt tolerated session well w/ minimal fatigue. --    Activity Tolerance   Activity Tolerance  --  Patient Tolerated treatment well  --    Safety Devices   Type of devices  --   --  Call light within reach; Chair alarm in place; Left in chair   Electronically signed by Ko Yan PTA on 12/9/2020 at 10:37 AM                    RECORD REVIEW: Previous medical records, medications were reviewed at today's visit    IMPRESSION:   1. Acute ischemic stroke-bilateral [id number] ASA/statin/Plavix  2. Hyperlipidemia-statin  3. DVT prophylaxis-Lovenox  4. GI-bowel regimen  5. Chronic right shoulders-monitor  6. History of chronic opiod use due to shoulder issues  7. Smoker  8.  PT/OT/Speech     Continue current care    ELOS December 17 th    Expected duration and frequency therapy: 180 minutes per day, 5 days per week    [address] [facility]  [phone] CELL  Dr Madie Chin

## 2020-12-10 NOTE — PLAN OF CARE
Problem: Falls - Risk of:  Goal: Will remain free from falls  Description: Will remain free from falls  12/10/2020 0128 by Ryder Herrmann RN  Outcome: Ongoing  12/9/2020 1432 by Atul Melgar RN  Outcome: Ongoing  Goal: Absence of physical injury  Description: Absence of physical injury  12/10/2020 0128 by Ryder Herrmann RN  Outcome: Ongoing  12/9/2020 1432 by Atul Melgar RN  Outcome: Ongoing     Problem: Skin Integrity:  Goal: Will show no infection signs and symptoms  Description: Will show no infection signs and symptoms  12/10/2020 0128 by Ryder Herrmann RN  Outcome: Ongoing  12/9/2020 1432 by Atul Melgar RN  Outcome: Ongoing  Goal: Absence of new skin breakdown  Description: Absence of new skin breakdown  12/10/2020 0128 by Ryder Herrmann RN  Outcome: Ongoing  12/9/2020 1432 by Atul Melgar RN  Outcome: Ongoing

## 2020-12-11 PROCEDURE — 97535 SELF CARE MNGMENT TRAINING: CPT

## 2020-12-11 PROCEDURE — 97110 THERAPEUTIC EXERCISES: CPT

## 2020-12-11 PROCEDURE — 92526 ORAL FUNCTION THERAPY: CPT

## 2020-12-11 PROCEDURE — 97116 GAIT TRAINING THERAPY: CPT

## 2020-12-11 PROCEDURE — 1180000000 HC REHAB R&B

## 2020-12-11 PROCEDURE — 6370000000 HC RX 637 (ALT 250 FOR IP): Performed by: PSYCHIATRY & NEUROLOGY

## 2020-12-11 PROCEDURE — 6360000002 HC RX W HCPCS: Performed by: PSYCHIATRY & NEUROLOGY

## 2020-12-11 PROCEDURE — 92507 TX SP LANG VOICE COMM INDIV: CPT

## 2020-12-11 PROCEDURE — 97530 THERAPEUTIC ACTIVITIES: CPT

## 2020-12-11 RX ADMIN — CLOPIDOGREL BISULFATE 75 MG: 75 TABLET ORAL at 07:35

## 2020-12-11 RX ADMIN — ATORVASTATIN CALCIUM 80 MG: 80 TABLET, FILM COATED ORAL at 20:56

## 2020-12-11 RX ADMIN — PANTOPRAZOLE SODIUM 40 MG: 40 TABLET, DELAYED RELEASE ORAL at 20:56

## 2020-12-11 RX ADMIN — ASPIRIN 81 MG: 81 TABLET, CHEWABLE ORAL at 07:35

## 2020-12-11 RX ADMIN — ENOXAPARIN SODIUM 40 MG: 40 INJECTION SUBCUTANEOUS at 20:56

## 2020-12-11 ASSESSMENT — 9 HOLE PEG TEST: TESTTIME_SECONDS: 61

## 2020-12-11 ASSESSMENT — PAIN SCALES - GENERAL
PAINLEVEL_OUTOF10: 0

## 2020-12-11 NOTE — PROGRESS NOTES
[facility]  INPATIENT SPEECH THERAPY  [facility] 8 REHAB UNIT      TIME   Time In: 1400  Time Out: 1430  Minutes: 30       [x]Daily Note  []Progress Note    Date: 2020  Patient Name: Barbie Danielson        MRN: [id number]    Account #: [de-identified]  : 1955  (75 y.o.)  Gender: female   Primary Provider: Chris Gallardo MD  Swallowing Status/Diet: Dysphagia soft and bite sized, thin liquids + a chin tuck    PATIENT DIAGNOSIS(ES):    Diagnosis: CVA (acute ischemia bilateral brendon), R sided weakness      Additional Pertinent Hx: Hemiplegia/hemiparesis following cerebral infarction affecting left non-dominant side (R side UE and LE weakness); Dysarthria; Dysphagia, oropharyngeal phase; Hyperlipidemais, unspecified; Nicotine dependence, unspecified, uncomplicated; Surgical Hx of breast surgery x4, C-sectionx1, cyst removal from leg, tonsillectomy, R total shoulder arthroplasty w/ distal clavicle excision on      RESTRICTIONS/PRECAUTIONS:    Restrictions/Precautions  Restrictions/Precautions: Weight Bearing, Fall Risk(Aspiration risk )  Pt must use chin tuck with thin liquids        Subjective: The patient was upright in her wheelchair. She was cooperative all tasks      Objective: The patient reports when she wakes up her vocal intensity is good. As the day goes on, her voice becomes more strained. Do recommend consider ENT consult if she does not see improvement in her voicing. Dysarthria also gets worse with fatigue. She denies any decreased oral or facial sensation. Mild dysarthria was observed during today's session    Did review chin tuck and correct timing. She did exhibit correct chin tuck timing with all swallows. Reviewed and completed the Lyric throughout the session. Oral motor exercises were completed to improve lingual and labial strength and range of motion. Recommend repeat MBSS prior to discharge. Her discharge date is set for .        Recommended Diet:  Solid consistency: Regular  Liquid consistency: Thin  Liquid administration via: Cup(No straws)     Medication administration: Meds in puree(Meds whole in applesauce or pudding)     Safe Swallow Protocol:  Supervision: Independent  Compensatory Swallowing Strategies: Alternate solids and liquids;Small bites/sips; Remain upright for 30-45 minutes after meals;Upright as possible for all oral intake;Eat/Feed slowly            SHORT TERM GOAL #1:  Goal 1: The patient will tolerate minced and moist diet consistency with nectar thick liquids with min/no overt s/s of aspiration. SHORT TERM GOAL #2:  Goal 2: The patient will complete pharyngeal strengthening exercises with min verbal cues at 80% accuracy to improve airway protection. SHORT TERM GOAL #3:  Goal 3: The patient will complete daily oral motor exercises to improve labial/lingual/buccal strength and ROM to improve oral phase of swallow and speech intelligibility. SHORT TERM GOAL #4:  Goal 4: The patient will use the (over articulation/slow rate/writing key word/elongation of thevowel/increased loudness/phrasing) strategy to improve speech intelligibility withwords/phrases/sentences/in conversation with 100% accuracy. SHORT TERM GOAL #5:  Goal 5: Patient will complete repeat FEES for potential upgrade    Swallowing Short Term Goals  Short-term Goals  Goal 1: The patient will complete the hoang (tongue hold) technique to improve base of tongue retraction and base of tongue to pharyngeal wall approximation with 90% accuracy and minimal verbal cues. Goal 2: The patient will complete the Mendelsohn maneuver to improve hyolaryngeal elevation and clear vallecular residue with 90% accuracy and minimal verbal cues  Goal 3: The patient will complete the effortful swallow maneuver to improve hyolaryngeal elevation, tongue base retraction, and vocal fold closure with 90% accuracy and minimal verbal cues.   Goal 4: The patient will complete daily oral hygiene to prevent aspiration of bacteria laden secretions. Goal 5: The patient will perform compensatory swallow strategies (chin tuck) to eliminate s/s of aspiration and tolerate the least restrictive diet with (min) verbal cues. Goal 6: The patient will tolerate a dysphagia soft and bite sized diet with thin liquids plus a chin tuck with minimal overt s/s of aspiration. ASSESSMENT:  Assessment: [x]Progressing towards goals          []Not Progressing towards goals    Patient Tolerance of Treatment:   [x]Tolerated well []Tolerated fair []Required rest breaks []Fatigued    Education:  Learner:  []Patient          []Significant Other          []Other  Education provided regarding:  [x]Goals and POC   []Diet and swallowing precautions    []Home Exercise Program  []Progress and/or discharge information  Method of Education:  [x]Discussion          []Demonstration          []Handout          []Other  Evaluation of Education:   [x]Verbalized understanding   []Demonstrates without assistance  []Demonstrates with assistance  []Needs further instruction     []No evidence of learning                  []No family present      Plan: [x]Continue with current plan of care    []Modify current plan of care as follows:    []Discharge patient    Discharge Location:    Services/Supervision Recommended:      [x]Patient continues to require treatment by a licensed therapist to address functional deficits as outlined in the established plan of care.       Electronically Signed By:  Chung Real M.S., CCC-SLP  12/11/2020,2:04 PM.

## 2020-12-11 NOTE — PROGRESS NOTES
Occupational Therapy     12/11/20 1100   Restrictions/Precautions   Restrictions/Precautions Fall Risk   General   Diagnosis CVA with right hemiparesis   Subjective   Subjective Pt c/o being \"sore\" from extensive UE workout yesterday, tx focused on FM vs GM this date. Attendance   Activity Arts/Crafts;Discussion/reminisce  (to incorporate FM ex's)   Participation Active participation   Functional Mobility   Functional - Mobility Device Rolling Walker   Activity Other   Assist Level Stand by assistance   Functional Mobility Comments improved in distance and quality of movement    Bed mobility   Supine to Sit Supervision   Comment Ind with bed functions   Transfers   Sit to stand Supervision   Stand to sit Supervision   Assessment   Performance deficits / Impairments Decreased functional mobility ; Decreased endurance;Decreased coordination;Decreased ADL status; Decreased fine motor control;Decreased high-level IADLs;Decreased strength;Decreased balance;Decreased ROM   Treatment Diagnosis CVA with right hemiparesis   Timed Code Treatment Minutes 60 Minutes   Activity Tolerance   Activity Tolerance Patient Tolerated treatment well   Safety Devices   Safety Devices in place Yes

## 2020-12-11 NOTE — PROGRESS NOTES
Occupational Therapy  Name: Isiah Villatoro  MRN:  [id number]  Date of service:  12/11/2020 12/11/20 1310 12/11/20 1313 12/11/20 1314   General   Additional Pertinent Hx  --   --   --    Subjective   Subjective  --   --   --    Transfers   Sit to Stand  --   --  Contact guard assistance   Stand to sit  --   --  Contact guard assistance   Bed to Chair  --   --  Contact guard assistance   Ambulation 1   Surface level tile  --   --    Device Rolling Walker  --   --    Other Apparatus Wheelchair follow  --   --    Assistance Contact guard assistance  --   --    Distance 80'  --   --    Exercises   Hamstring Sets  --   --   --    Hip Flexion  --   --   --    Hip Abduction  --   --   --    Knee Long Arc Quad  --   --   --    Ankle Pumps  --   --   --    Other exercises   Other exercises?  --   --   --    Other exercises 5  --   --   --    Other exercises 6  --   --   --    Other exercises 7  --   --   --    Other exercises 8  --   --   --    Other exercises 9  --   --   --    Short term goals   Time Frame for Short term goals  --  1 week  --    Short term goal 1  --  pt amb 48' with RW, CGA, step-to, 3pt pattern, RLE leading  --    Short term goal 2  --  Pt CGA with bed mobility  --    Short term goal 3  --  Pt min A with supine <> sit  --    Short term goal 4  --  Pt min A with car transfer  --    Short term goal 5  --  Pt min A with 4, 4\" steps, step-to gait pattern, LLE leading ascending  --    Long term goals   Time Frame for Long term goals   --  2 weeks  --    Long term goal 1  --  pt amb 150' with RW, ind, with step-to gait pattern, RLE leading  --    Long term goal 2  --  pt independent with bed mobility  --    Long term goal 3  --  pt independent with sit<>stand   --    Long term goal 4  --  Pt independent with car TF  --    Long term goal 5  --  Pt min A with 4, 4\" steps, step-to gait pattern, LLE leading ascending  --    Conditions Requiring Skilled Therapeutic Intervention   Body structures, Functions, Activity limitations  --   --   --    Assessment  --   --   --    Treatment Diagnosis  --   --   --    Plan   Current Treatment Recommendations  --  Strengthening;ROM;Balance Training;Functional Mobility Training;Transfer Training;ADL/Self-care Training; Endurance Training; Wheelchair Mobility Training;Gait Training;Stair training;Cognitive Reorientation;Pain Management;Home Exercise Program;Safety Education & Training;Patient/Caregiver Education & Training;Equipment Evaluation, Education, & procurement; Modalities  --       12/11/20 1344 12/11/20 1346 12/11/20 1357   General   Additional Pertinent Hx  --   --  Hemiplegia/hemiparesis following cerebral infarction affecting left non-dominant side (R side UE and LE weakness); Dysarthria; Dysphagia, oropharyngeal phase; Hyperlipidemais, unspecified;  Nicotine dependence, unspecified, uncomplicated; Surgical Hx of breast surgery x4, C-sectionx1, cyst removal from leg, tonsillectomy, R total shoulder arthroplasty w/ distal clavicle excision on 11/1/8/2019   Subjective   Subjective  --   --  i'm a little tired but i feel ok   Transfers   Sit to Stand  --   --   --    Stand to sit  --   --   --    Bed to Chair  --   --   --    Ambulation 1   Surface  --   --   --    Device  --   --   --    Other Apparatus  --   --   --    Assistance  --   --   --    Distance  --   --   --    Exercises   Hamstring Sets  --  15 red band on R  --    Hip Flexion  --  15 with red band R  --    Hip Abduction  --  15 ab/ad with manual resist  --    Knee Long Arc Quad  --  10x 2 R   --    Ankle Pumps  --  3x 5 red band on R for DF and inv x 10/ever x 3/5  --    Other exercises   Other exercises?  --  Yes  --    Other exercises 5  --  high steps in RW x 10  --    Other exercises 6  --  sit to stand from wc x 2/5  --    Other exercises 7  --  sidestepping in // bars 1 x each direction  --    Other exercises 8  --  tandem stance x 30 sec each foot fwd (mod@ with R LE in post position)  --    Other exercises 9  --  static stance w narrow terrance in // bars x 1 min with mild bal perturbations [email or web address]  --    Short term goals   Time Frame for Short term goals  --   --   --    Short term goal 1  --   --   --    Short term goal 2  --   --   --    Short term goal 3  --   --   --    Short term goal 4  --   --   --    Short term goal 5  --   --   --    Long term goals   Time Frame for Long term goals   --   --   --    Long term goal 1  --   --   --    Long term goal 2  --   --   --    Long term goal 3  --   --   --    Long term goal 4  --   --   --    Long term goal 5  --   --   --    Conditions Requiring Skilled Therapeutic Intervention   Body structures, Functions, Activity limitations Decreased functional mobility ; Decreased ADL status; Decreased strength;Decreased safe awareness;Decreased endurance  --   --    Assessment Patient tolerated therapy session well, good increase in gait distance compaired to previous sessions, demonstrated decreased stride length sidestepping R vs L in // bars. Continues to require frequent rest breaks due to fatigue but put forth a good effort this afternoon.   --   --    Treatment Diagnosis Interference with activity  --   --    Plan   Current Treatment Recommendations  --   --   --          Electronically signed by Reji Grider PTA on 12/11/2020 at 1:58 PM

## 2020-12-11 NOTE — PROGRESS NOTES
Occupational Therapy     12/11/20 0815   General   Diagnosis CVA with right hemiparesis   Pain Assessment   Patient Currently in Pain No   Pain Assessment 0-10   Pain Level 0   Balance   Sitting Balance Independent   Standing Balance Supervision   Functional Mobility   Functional - Mobility Device Rolling Walker   Activity To/from bathroom; Retrieve items;Transport items   Assist Level Stand by assistance   Transfers   Sit to stand Supervision   Stand to sit Supervision   Toilet Transfers   Toilet - Technique Ambulating   Equipment Used Grab bars   Toilet Transfer Supervision   Short term goals   Short term goal 5 MET   Assessment   Performance deficits / Impairments Decreased functional mobility ; Decreased endurance;Decreased coordination;Decreased ADL status; Decreased fine motor control;Decreased high-level IADLs;Decreased strength;Decreased balance;Decreased ROM   Treatment Diagnosis CVA with right hemiparesis   Prognosis Good   Timed Code Treatment Minutes 45 Minutes   Activity Tolerance   Activity Tolerance Patient Tolerated treatment well   Safety Devices   Safety Devices in place Yes   Type of devices Call light within reach; Bed alarm in place   Plan   Current Treatment Recommendations Self-Care / ADL; Safety Education & Training; Endurance Training;Positioning; Functional Mobility Training;Balance Training;Equipment Evaluation, Education, & procurement;ROM; Home Management Training;Patient/Caregiver Education & Training;Strengthening      12/11/20 0815   Toileting Hygiene   Assistance Needed Supervision or touching assistance   CARE Score 4   Shower/Bathe Self   Assistance Needed Supervision or touching assistance   Comment Shower, supervision. CARE Score 4   Upper Body Dressing   Assistance Needed Independent   Comment Pullover shirt. CARE Score 6   Lower Body Dressing   Assistance Needed Supervision or touching assistance   Comment Supervision, with AE PRN.    CARE Score 4   Putting On/Taking Off Footwear Assistance Needed Setup or clean-up assistance   CARE Score 5

## 2020-12-12 PROCEDURE — 1180000000 HC REHAB R&B

## 2020-12-12 PROCEDURE — 6370000000 HC RX 637 (ALT 250 FOR IP): Performed by: PSYCHIATRY & NEUROLOGY

## 2020-12-12 PROCEDURE — 99232 SBSQ HOSP IP/OBS MODERATE 35: CPT | Performed by: PSYCHIATRY & NEUROLOGY

## 2020-12-12 RX ADMIN — ATORVASTATIN CALCIUM 80 MG: 80 TABLET, FILM COATED ORAL at 20:27

## 2020-12-12 RX ADMIN — RIVAROXABAN 10 MG: 10 TABLET, FILM COATED ORAL at 17:20

## 2020-12-12 RX ADMIN — ASPIRIN 81 MG: 81 TABLET, CHEWABLE ORAL at 08:51

## 2020-12-12 RX ADMIN — PANTOPRAZOLE SODIUM 40 MG: 40 TABLET, DELAYED RELEASE ORAL at 20:27

## 2020-12-12 RX ADMIN — CLOPIDOGREL BISULFATE 75 MG: 75 TABLET ORAL at 08:51

## 2020-12-12 ASSESSMENT — PAIN SCALES - GENERAL
PAINLEVEL_OUTOF10: 0
PAINLEVEL_OUTOF10: 0

## 2020-12-12 NOTE — PLAN OF CARE
Problem: Falls - Risk of:  Goal: Will remain free from falls  Description: Will remain free from falls  12/11/2020 2342 by Kapil Phelps RN  Outcome: Ongoing  12/11/2020 1157 by Jose Mishra RN  Outcome: Ongoing  Goal: Absence of physical injury  Description: Absence of physical injury  12/11/2020 2342 by Kapil Phelps RN  Outcome: Ongoing  12/11/2020 1157 by Jose Mishra RN  Outcome: Ongoing     Problem: Bleeding:  Goal: Will show no signs and symptoms of excessive bleeding  Description: Will show no signs and symptoms of excessive bleeding  12/11/2020 2342 by Kapil Phelps RN  Outcome: Ongoing  12/11/2020 1157 by Jose Mishra RN  Outcome: Ongoing     Problem: Skin Integrity:  Goal: Will show no infection signs and symptoms  Description: Will show no infection signs and symptoms  12/11/2020 2342 by Kapil Phelps RN  Outcome: Ongoing  12/11/2020 1157 by Jose Mishra RN  Outcome: Ongoing  Goal: Absence of new skin breakdown  Description: Absence of new skin breakdown  12/11/2020 2342 by Kapil Phelps RN  Outcome: Ongoing  12/11/2020 1157 by Jose Mishra RN  Outcome: Ongoing

## 2020-12-12 NOTE — PROGRESS NOTES
Patient:   Isiah Villatoro  MR#:    [id number]   Room:    [address] [place] [address]   YOB: 1955  Date of Progress Note: 12/12/2020  Time of Note                           11:46 AM  Consulting Physician:   Lynne Angel M.D. Attending Physician:  Lynne Angel MD     Chief complaint Acute ischemic stroke     S:This 75 y.o. female  pt admitted to [facility] on 11/26/2020 w/R sided weakness. Pt woke up on am of 11/26 around 5am and got up, went to the kitchen to make coffee and fell after diffuse tingling an R sided weakness noted. At baseline, she does not move her R arm well proximally due to fx in 11/2019. She managed to unlock her door before going back to be, in case she would need help. she stayed in bed for 4 hours and noted she was stronger, however, she  got up again around 10 am and fell again due to R sided weakness. She presented to [facility] ED where on presentation she was out of tPA window and NIHSS was 0, but then she developed worsening R sided weakness again. She was related that she can choke on her saliva when she swallows. Pt was alert & oriented x 3 w/ mild dysarthria. Pt also noted to have new onset tremor. MRI done on 11/27 showed acute ischemia in the bilateral brendon. Over the weekend, she had worsening R sided weakness. EEG was done and read as normal. She is on a mechanical soft diet w/nectar thick liquids. She is now medically stable and is participating with therapy. She is ready to begin rehab w/plan of returning home after rehab dc w/support from her family. No acute issues.     REVIEW OF SYSTEMS:  Constitutional: No fevers No chills  Neck:No stiffness  Respiratory: No shortness of breath  Cardiovascular: No chest pain No palpitations  Gastrointestinal: No abdominal pain    Genitourinary: No Dysuria  Neurological: No headache, no confusion    Past Medical History:      Diagnosis Date    Abnormal Pap smear of cervix     1991    History of cryosurgery 1991 Past Surgical History:      Procedure Laterality Date    BREAST CYST EXCISION Bilateral     X4      SECTION  1981    JOINT REPLACEMENT      right shoulder    TONSILLECTOMY         Medications in Hospital:      Current Facility-Administered Medications:     rivaroxaban (XARELTO) tablet 10 mg, 10 mg, Oral, Daily, Kait Srinivasan MD    pantoprazole (PROTONIX) tablet 40 mg, 40 mg, Oral, Nightly, Kait Srinivasan MD, 40 mg at 20    ondansetron (ZOFRAN-ODT) disintegrating tablet 4 mg, 4 mg, Oral, Q8H PRN, Kait Srinivasan MD, 4 mg at 20    aspirin chewable tablet 81 mg, 81 mg, Oral, Daily, Kait Srinivasan MD, 81 mg at 20    atorvastatin (LIPITOR) tablet 80 mg, 80 mg, Oral, Nightly, Kait Srinivasan MD, 80 mg at 20    clopidogrel (PLAVIX) tablet 75 mg, 75 mg, Oral, Daily, Kait Srinivasan MD, 75 mg at 20    cyclobenzaprine (FLEXERIL) tablet 10 mg, 10 mg, Oral, TID PRN, Kait Srinivasan MD, 10 mg at 20    acetaminophen (TYLENOL) tablet 650 mg, 650 mg, Oral, Q4H PRN, Kait Srinivasan MD    polyethylene glycol (GLYCOLAX) packet 17 g, 17 g, Oral, Daily PRN, Kait Srinivasan MD, 17 g at 20    Allergies:  Patient has no known allergies. Social History:   TOBACCO:   reports that she has been smoking. She has never used smokeless tobacco.  ETOH:   reports current alcohol use. Family History:       Problem Relation Age of Onset    Heart Attack Maternal Grandmother     Colon Cancer Father     Colon Cancer Mother          PHYSICAL EXAM:  /69   Pulse 70   Temp 96.2 °F (35.7 °C) (Temporal)   Resp 16   Ht 5' 6\" (1.676 m)   Wt 163 lb (73.9 kg)   SpO2 93%   Breastfeeding No   BMI 26.31 kg/m²     Constitutional  well developed, well nourished.    Eyes  conjunctiva normal.   Ear, nose, throat - No scars, masses, or lesions over external nose or ears, no atrophy of tongue  Neck-symmetric, no masses noted, no jugular vein distension Respiration- chest wall appears symmetric, good expansion,   normal effort without use of accessory muscles  Musculoskeletal  no significant wasting of muscles noted, no bony deformities  Extremities-no clubbing, cyanosis or edema  Skin  warm, dry, and intact. No rash, erythema, or pallor. Psychiatric  mood, affect, and behavior appear normal.      Neurological exam  Awake, alert, fluent oriented appropriate affect slightly slow dysarthria  Attention and concentration appear appropriate  Recent and remote memory appears unremarkable  Speech with dysarthria and some expressive aphasia  No clear issues with language of fund of knowledge     Cranial Nerve Exam     CN III, IV,VI-EOMI, No nystagmus, conjugate eye movements, no ptosis    CN VII-right lower facial droop       Motor Exam  antigravity throughout upper and lower extremities bilaterally  Drift right arm and right leg      Tremors- no tremors in hands or head noted     Gait  Not tested      Nursing/pcp notes, imaging,labs and vitals reviewed.      PT,OT and/or speech notes reviewed    Lab Results   Component Value Date    WBC 7.3 12/10/2020    HGB 13.3 12/10/2020    HCT 40.8 12/10/2020    MCV 90.1 12/10/2020     12/10/2020     Lab Results   Component Value Date     12/10/2020    K 4.4 12/10/2020     12/10/2020    CO2 28 12/10/2020    BUN 17 12/10/2020    CREATININE 0.7 12/10/2020    GLUCOSE 97 12/10/2020    CALCIUM 9.3 12/10/2020    PROT 6.0 (L) 12/10/2020    LABALBU 4.1 12/10/2020    BILITOT 0.4 12/10/2020    ALKPHOS 114 (H) 12/10/2020    AST 26 12/10/2020    ALT 34 (H) 12/10/2020    LABGLOM >60 12/10/2020    GFRAA >59 12/10/2020     Lab Results   Component Value Date    INR 1.04 12/01/2020    PROTIME 13.5 12/01/2020          Power Livers, PTA    Physical Therapist Assistant    Specialty:  Physical Therapy    Progress Notes    Cosign Needed    Date of Service:  12/9/2020 10:37 AM                Cosign Needed              Show:Clear all WB Status  --   --   --    Ambulation 1   Surface  --   --   --    Device  --   --   --    Assistance  --   --   --    Quality of Gait  --   --   --    Gait Deviations  --   --   --    Distance  --   --   --    Comments  --   --   --    Exercises   Comments Dot taps in ll bars BLE, mini squats 2x10  --   --    Conditions Requiring Skilled Therapeutic Intervention   Body structures, Functions, Activity limitations  --  Decreased functional mobility ; Decreased ADL status; Decreased strength;Decreased safe awareness;Decreased endurance  --    Assessment  --  Practiced turning and sitting this morning amb chair to chair. Pt performed turns well only needing minor cues and CGA. Pt did not have any episodes of R knee buckling. Pt performed dot taps in llbars and mini squats to improve LE control. Pt tolerated session well w/ minimal fatigue. --    Activity Tolerance   Activity Tolerance  --  Patient Tolerated treatment well  --    Safety Devices   Type of devices  --   --  Call light within reach; Chair alarm in place; Left in chair   Electronically signed by Manan Summers PTA on 12/9/2020 at 10:37 AM                    RECORD REVIEW: Previous medical records, medications were reviewed at today's visit    IMPRESSION:   1. Acute ischemic stroke-bilateral [id number] ASA/statin/Plavix  2. Hyperlipidemia-statin  3. DVT prophylaxis-changed to Xarelto as per patient's wishes  4. GI-bowel regimen  5. Chronic right shoulders-monitor  6. History of chronic opiod use due to shoulder issues  7. Smoker  8.  PT/OT/Speech     Continue current care    ELOS December 17 th    Expected duration and frequency therapy: 180 minutes per day, 5 days per week    [address] [place] [address]  [phone] CELL  Dr Boyle Poag

## 2020-12-13 PROCEDURE — 99232 SBSQ HOSP IP/OBS MODERATE 35: CPT | Performed by: PSYCHIATRY & NEUROLOGY

## 2020-12-13 PROCEDURE — 1180000000 HC REHAB R&B

## 2020-12-13 PROCEDURE — 6370000000 HC RX 637 (ALT 250 FOR IP): Performed by: PSYCHIATRY & NEUROLOGY

## 2020-12-13 RX ADMIN — CLOPIDOGREL BISULFATE 75 MG: 75 TABLET ORAL at 08:43

## 2020-12-13 RX ADMIN — ATORVASTATIN CALCIUM 80 MG: 80 TABLET, FILM COATED ORAL at 20:30

## 2020-12-13 RX ADMIN — RIVAROXABAN 10 MG: 10 TABLET, FILM COATED ORAL at 17:23

## 2020-12-13 RX ADMIN — ASPIRIN 81 MG: 81 TABLET, CHEWABLE ORAL at 08:42

## 2020-12-13 RX ADMIN — PANTOPRAZOLE SODIUM 40 MG: 40 TABLET, DELAYED RELEASE ORAL at 20:30

## 2020-12-13 ASSESSMENT — PAIN SCALES - GENERAL: PAINLEVEL_OUTOF10: 0

## 2020-12-13 NOTE — PLAN OF CARE
Problem: Falls - Risk of:  Goal: Will remain free from falls  Description: Will remain free from falls  12/13/2020 1255 by Jhon To RN  Outcome: Ongoing  12/12/2020 2333 by Clyde Mendoza RN  Outcome: Ongoing  Goal: Absence of physical injury  Description: Absence of physical injury  12/13/2020 1255 by Jhon To RN  Outcome: Ongoing  12/12/2020 2333 by Clyde Mendoza RN  Outcome: Ongoing

## 2020-12-13 NOTE — PLAN OF CARE
Problem: Falls - Risk of:  Goal: Will remain free from falls  Description: Will remain free from falls  Outcome: Ongoing  Goal: Absence of physical injury  Description: Absence of physical injury  Outcome: Ongoing     Problem: Bleeding:  Goal: Will show no signs and symptoms of excessive bleeding  Description: Will show no signs and symptoms of excessive bleeding  Outcome: Ongoing     Problem: Skin Integrity:  Goal: Will show no infection signs and symptoms  Description: Will show no infection signs and symptoms  Outcome: Ongoing  Goal: Absence of new skin breakdown  Description: Absence of new skin breakdown  Outcome: Ongoing

## 2020-12-13 NOTE — PROGRESS NOTES
Patient:   Ivon Mack  MR#:    [id number]   Room:    5979/942-84   YOB: 1955  Date of Progress Note: 12/13/2020  Time of Note                           10:36 AM  Consulting Physician:   Brandie Day M.D. Attending Physician:  Brandie Day MD     Chief complaint Acute ischemic stroke     S:This 75 y.o. female  pt admitted to [facility] on 11/26/2020 w/R sided weakness. Pt woke up on am of 11/26 around 5am and got up, went to the kitchen to make coffee and fell after diffuse tingling an R sided weakness noted. At baseline, she does not move her R arm well proximally due to fx in 11/2019. She managed to unlock her door before going back to be, in case she would need help. she stayed in bed for 4 hours and noted she was stronger, however, she  got up again around 10 am and fell again due to R sided weakness. She presented to [facility] ED where on presentation she was out of tPA window and NIHSS was 0, but then she developed worsening R sided weakness again. She was related that she can choke on her saliva when she swallows. Pt was alert & oriented x 3 w/ mild dysarthria. Pt also noted to have new onset tremor. MRI done on 11/27 showed acute ischemia in the bilateral brendon. Over the weekend, she had worsening R sided weakness. EEG was done and read as normal. She is on a mechanical soft diet w/nectar thick liquids. She is now medically stable and is participating with therapy. She is ready to begin rehab w/plan of returning home after rehab dc w/support from her family. No new issues.     REVIEW OF SYSTEMS:  Constitutional: No fevers No chills  Neck:No stiffness  Respiratory: No shortness of breath  Cardiovascular: No chest pain No palpitations  Gastrointestinal: No abdominal pain    Genitourinary: No Dysuria  Neurological: No headache, no confusion    Past Medical History:      Diagnosis Date    Abnormal Pap smear of cervix     1991    History of cryosurgery 1991 Past Surgical History:      Procedure Laterality Date    BREAST CYST EXCISION Bilateral     X4      SECTION  1981    JOINT REPLACEMENT      right shoulder    TONSILLECTOMY         Medications in Hospital:      Current Facility-Administered Medications:     rivaroxaban (XARELTO) tablet 10 mg, 10 mg, Oral, Daily, Samreen Fontaine MD, 10 mg at 20 172    pantoprazole (PROTONIX) tablet 40 mg, 40 mg, Oral, Nightly, Samreen Fontaine MD, 40 mg at 20    ondansetron (ZOFRAN-ODT) disintegrating tablet 4 mg, 4 mg, Oral, Q8H PRN, Samreen Fontaine MD, 4 mg at 209    aspirin chewable tablet 81 mg, 81 mg, Oral, Daily, Samreen Fontaine MD, 81 mg at 20 0842    atorvastatin (LIPITOR) tablet 80 mg, 80 mg, Oral, Nightly, Samreen Fontaine MD, 80 mg at 20    clopidogrel (PLAVIX) tablet 75 mg, 75 mg, Oral, Daily, Samreen Fontaine MD, 75 mg at 20    cyclobenzaprine (FLEXERIL) tablet 10 mg, 10 mg, Oral, TID PRN, Samreen Fontaine MD, 10 mg at 20    acetaminophen (TYLENOL) tablet 650 mg, 650 mg, Oral, Q4H PRN, Samreen Fontaine MD    polyethylene glycol (GLYCOLAX) packet 17 g, 17 g, Oral, Daily PRN, Samreen Fontaine MD, 17 g at 20    Allergies:  Patient has no known allergies. Social History:   TOBACCO:   reports that she has been smoking. She has never used smokeless tobacco.  ETOH:   reports current alcohol use. Family History:       Problem Relation Age of Onset    Heart Attack Maternal Grandmother     Colon Cancer Father     Colon Cancer Mother          PHYSICAL EXAM:  /83   Pulse 65   Temp 96.3 °F (35.7 °C) (Temporal)   Resp 16   Ht 5' 6\" (1.676 m)   Wt 163 lb (73.9 kg)   SpO2 91%   Breastfeeding No   BMI 26.31 kg/m²     Constitutional  well developed, well nourished.    Eyes  conjunctiva normal.   Ear, nose, throat - No scars, masses, or lesions over external nose or ears, no atrophy of tongue Neck-symmetric, no masses noted, no jugular vein distension  Respiration- chest wall appears symmetric, good expansion,   normal effort without use of accessory muscles  Musculoskeletal  no significant wasting of muscles noted, no bony deformities  Extremities-no clubbing, cyanosis or edema  Skin  warm, dry, and intact. No rash, erythema, or pallor. Psychiatric  mood, affect, and behavior appear normal.      Neurological exam  Awake, alert, fluent oriented appropriate affect slightly slow dysarthria  Attention and concentration appear appropriate  Recent and remote memory appears unremarkable  Speech with dysarthria and some expressive aphasia  No clear issues with language of fund of knowledge     Cranial Nerve Exam     CN III, IV,VI-EOMI, No nystagmus, conjugate eye movements, no ptosis    CN VII-right lower facial droop       Motor Exam  antigravity throughout upper and lower extremities bilaterally  Drift right arm and right leg      Tremors- no tremors in hands or head noted     Gait  Not tested      Nursing/pcp notes, imaging,labs and vitals reviewed.      PT,OT and/or speech notes reviewed    Lab Results   Component Value Date    WBC 7.3 12/10/2020    HGB 13.3 12/10/2020    HCT 40.8 12/10/2020    MCV 90.1 12/10/2020     12/10/2020     Lab Results   Component Value Date     12/10/2020    K 4.4 12/10/2020     12/10/2020    CO2 28 12/10/2020    BUN 17 12/10/2020    CREATININE 0.7 12/10/2020    GLUCOSE 97 12/10/2020    CALCIUM 9.3 12/10/2020    PROT 6.0 (L) 12/10/2020    LABALBU 4.1 12/10/2020    BILITOT 0.4 12/10/2020    ALKPHOS 114 (H) 12/10/2020    AST 26 12/10/2020    ALT 34 (H) 12/10/2020    LABGLOM >60 12/10/2020    GFRAA >59 12/10/2020     Lab Results   Component Value Date    INR 1.04 12/01/2020    PROTIME 13.5 12/01/2020          Gaby Webb PTA    Physical Therapist Assistant    Specialty:  Physical Therapy    Progress Notes    Cosign Needed Sit to Stand  --   --   --    Stand to sit  --   --   --    Ambulation   Ambulation?  --   --   --    WB Status  --   --   --    Ambulation 1   Surface  --   --   --    Device  --   --   --    Assistance  --   --   --    Quality of Gait  --   --   --    Gait Deviations  --   --   --    Distance  --   --   --    Comments  --   --   --    Exercises   Comments Dot taps in ll bars BLE, mini squats 2x10  --   --    Conditions Requiring Skilled Therapeutic Intervention   Body structures, Functions, Activity limitations  --  Decreased functional mobility ; Decreased ADL status; Decreased strength;Decreased safe awareness;Decreased endurance  --    Assessment  --  Practiced turning and sitting this morning amb chair to chair. Pt performed turns well only needing minor cues and CGA. Pt did not have any episodes of R knee buckling. Pt performed dot taps in llbars and mini squats to improve LE control. Pt tolerated session well w/ minimal fatigue. --    Activity Tolerance   Activity Tolerance  --  Patient Tolerated treatment well  --    Safety Devices   Type of devices  --   --  Call light within reach; Chair alarm in place; Left in chair   Electronically signed by Dean Luna PTA on 12/9/2020 at 10:37 AM                    RECORD REVIEW: Previous medical records, medications were reviewed at today's visit    IMPRESSION:   1. Acute ischemic stroke-bilateral [id number] ASA/statin/Plavix  2. Hyperlipidemia-statin  3. DVT prophylaxis-changed to Xarelto as per patient's wishes  4. GI-bowel regimen  5. Chronic right shoulders-monitor  6. History of chronic opiod use due to shoulder issues  7. Smoker  8.  PT/OT/Speech     Continue present care as noted    JOSLYN December 17 th    Expected duration and frequency therapy: 180 minutes per day, 5 days per week    [address] [facility]  [phone] CELL  Dr Hali King

## 2020-12-14 LAB
ALBUMIN SERPL-MCNC: 3.8 G/DL (ref 3.5–5.2)
ALP BLD-CCNC: 108 U/L (ref 35–104)
ALT SERPL-CCNC: 27 U/L (ref 5–33)
ANION GAP SERPL CALCULATED.3IONS-SCNC: 10 MMOL/L (ref 7–19)
AST SERPL-CCNC: 23 U/L (ref 5–32)
BASOPHILS ABSOLUTE: 0.1 K/UL (ref 0–0.2)
BASOPHILS RELATIVE PERCENT: 1.1 % (ref 0–1)
BILIRUB SERPL-MCNC: <0.2 MG/DL (ref 0.2–1.2)
BUN BLDV-MCNC: 16 MG/DL (ref 8–23)
CALCIUM SERPL-MCNC: 8.8 MG/DL (ref 8.8–10.2)
CHLORIDE BLD-SCNC: 106 MMOL/L (ref 98–111)
CO2: 27 MMOL/L (ref 22–29)
CREAT SERPL-MCNC: 0.8 MG/DL (ref 0.5–0.9)
EOSINOPHILS ABSOLUTE: 0.2 K/UL (ref 0–0.6)
EOSINOPHILS RELATIVE PERCENT: 3.1 % (ref 0–5)
GFR AFRICAN AMERICAN: >59
GFR NON-AFRICAN AMERICAN: >60
GLUCOSE BLD-MCNC: 101 MG/DL (ref 74–109)
HCT VFR BLD CALC: 39.7 % (ref 37–47)
HEMOGLOBIN: 13.1 G/DL (ref 12–16)
IMMATURE GRANULOCYTES #: 0 K/UL
LYMPHOCYTES ABSOLUTE: 1.8 K/UL (ref 1.1–4.5)
LYMPHOCYTES RELATIVE PERCENT: 25.1 % (ref 20–40)
MCH RBC QN AUTO: 29.9 PG (ref 27–31)
MCHC RBC AUTO-ENTMCNC: 33 G/DL (ref 33–37)
MCV RBC AUTO: 90.6 FL (ref 81–99)
MONOCYTES ABSOLUTE: 0.5 K/UL (ref 0–0.9)
MONOCYTES RELATIVE PERCENT: 6.8 % (ref 0–10)
NEUTROPHILS ABSOLUTE: 4.5 K/UL (ref 1.5–7.5)
NEUTROPHILS RELATIVE PERCENT: 63.5 % (ref 50–65)
PDW BLD-RTO: 13.4 % (ref 11.5–14.5)
PLATELET # BLD: 269 K/UL (ref 130–400)
PMV BLD AUTO: 10.4 FL (ref 9.4–12.3)
POTASSIUM REFLEX MAGNESIUM: 4 MMOL/L (ref 3.5–5)
RBC # BLD: 4.38 M/UL (ref 4.2–5.4)
SODIUM BLD-SCNC: 143 MMOL/L (ref 136–145)
TOTAL PROTEIN: 5.5 G/DL (ref 6.6–8.7)
WBC # BLD: 7.1 K/UL (ref 4.8–10.8)

## 2020-12-14 PROCEDURE — 6370000000 HC RX 637 (ALT 250 FOR IP): Performed by: PSYCHIATRY & NEUROLOGY

## 2020-12-14 PROCEDURE — 1180000000 HC REHAB R&B

## 2020-12-14 PROCEDURE — 85025 COMPLETE CBC W/AUTO DIFF WBC: CPT

## 2020-12-14 PROCEDURE — 80053 COMPREHEN METABOLIC PANEL: CPT

## 2020-12-14 PROCEDURE — 36415 COLL VENOUS BLD VENIPUNCTURE: CPT

## 2020-12-14 PROCEDURE — 97530 THERAPEUTIC ACTIVITIES: CPT

## 2020-12-14 PROCEDURE — 92507 TX SP LANG VOICE COMM INDIV: CPT

## 2020-12-14 PROCEDURE — 97116 GAIT TRAINING THERAPY: CPT

## 2020-12-14 PROCEDURE — 92526 ORAL FUNCTION THERAPY: CPT

## 2020-12-14 PROCEDURE — 97110 THERAPEUTIC EXERCISES: CPT

## 2020-12-14 PROCEDURE — 99232 SBSQ HOSP IP/OBS MODERATE 35: CPT | Performed by: PSYCHIATRY & NEUROLOGY

## 2020-12-14 RX ADMIN — ATORVASTATIN CALCIUM 80 MG: 80 TABLET, FILM COATED ORAL at 20:31

## 2020-12-14 RX ADMIN — RIVAROXABAN 10 MG: 10 TABLET, FILM COATED ORAL at 17:33

## 2020-12-14 RX ADMIN — ASPIRIN 81 MG: 81 TABLET, CHEWABLE ORAL at 07:59

## 2020-12-14 RX ADMIN — PANTOPRAZOLE SODIUM 40 MG: 40 TABLET, DELAYED RELEASE ORAL at 20:31

## 2020-12-14 RX ADMIN — CLOPIDOGREL BISULFATE 75 MG: 75 TABLET ORAL at 07:59

## 2020-12-14 ASSESSMENT — PAIN SCALES - GENERAL
PAINLEVEL_OUTOF10: 0
PAINLEVEL_OUTOF10: 0

## 2020-12-14 ASSESSMENT — 9 HOLE PEG TEST: TESTTIME_SECONDS: 32

## 2020-12-14 NOTE — PROGRESS NOTES
Assessment Misael session well. Able to increase amb distance today. --    Activity Tolerance   Activity Tolerance  --   --    Safety Devices   Type of devices  --  Call light within reach; Chair alarm in place   Electronically signed by Lizzette Moseley PTA on 12/14/2020 at 9:59 AM

## 2020-12-14 NOTE — PATIENT CARE CONFERENCE
[facility] ACUTE INPATIENT REHABILITATION  TEAM CONFERENCE NOTE    Date: 2020  Patient Name: Yennifer Hooper        MRN: [id number]    : 1955  (75 y.o.)  Gender: female      Diagnosis: CVA (acute ischemia bilateral brendon), R sided weakness       PHYSICAL THERAPY  STRENGTH  Strength RLE  Strength RLE: Exception  Comment: Grossly 3/5; DF 2-,+/5  Strength LLE  Strength LLE: Exception  Comment: Grossly 4/5  ROM        BED MOBILITY  Bed Mobility  Bridging: Minimal assistance  Rolling: Modified independent  Supine to Sit: Modified independent  Sit to Supine: Modified independent  Scooting: Modified independent  TRANSFERS  Transfers  Sit to Stand: Independent  Stand to sit: Independent  Bed to Chair: Modified independent  Stand Pivot Transfers:   Squat Pivot Transfers:   Car Transfer: Contact guard assistance  Comment:   WHEELCHAIR PROPULSION  Propulsion 1  Propulsion: Manual  Level: Level Tile  Method: SILVIANO FORTE  Level of Assistance: Independent  Description/ Details: Incorporated turns. Distance: 200'  AMBULATION  Ambulation 1  Surface: level tile  Device: Rolling Walker  Other Apparatus: Wheelchair follow  Assistance: Stand by assistance  Quality of Gait: Steps too large on RLE at times. Weakness noted in RLE, shaheen with fatigue. Gait Deviations: Decreased step length, Decreased step height  Distance: 100' x2  Comments: Incorporated turns.   STAIRS  Stairs  # Steps : 8  Stairs Height: 4\"  Rails: Bilateral  Device: No Device  Assistance: Contact guard assistance  Comment: VC to lead with L ascending and R descending; Cues to flex R knee and step up with it compared to vaulting R leg to next step; performed a step to pattern  GOALS:  Short term goals  Time Frame for Short term goals: 1 week  Short term goal 1: pt amb 48' with RW, CGA, step-to, 3pt pattern, RLE leading  Short term goal 2: Pt CGA with bed mobility  Short term goal 3: Pt min A with supine <> sit Short term goal 4: Pt min A with car transfer  Short term goal 5: Pt min A with 4, 4\" steps, step-to gait pattern, LLE leading ascending    Long term goals  Time Frame for Long term goals : 2 weeks  Long term goal 1: pt amb 150' with RW, ind, with step-to gait pattern, RLE leading  Long term goal 2: pt independent with bed mobility  Long term goal 3: pt independent with sit<>stand   Long term goal 4: Pt independent with car TF  Long term goal 5: Pt min A with 4, 4\" steps, step-to gait pattern, LLE leading ascending    ASSESSMENT:  Assessment: Now able to be up ad seema in room using RW    SPEECH THERAPY  Swallowing Status/Diet: Dysphagia soft and bite sized, thin liquids + a chin tuck     She still reports varying vocal intensity and vocal strain. Do recommend consider ENT consult if she does not see improvement in her voicing. Breath support tasks were completed. 12.61 seconds was her MPT this date. She was able to count on one breath up to 17. She stated she did try and sing along to a favorite song and reported significant voice changes. She was unable to hit the correct notes. She stated prior to her CVA, she was able to sing well. She exhibits mild dysarthria this date.      Oral motor exercises were completed to improve lingual and labial strength and range of motion. The Lyric was also completed throughout the session. She did state she exhibits and immediate cough when she does not use her chin tuck. Trials of thin liquids via cup and straw with and without a chin tuck were completed. No overt s/s of aspiration were observed this date. She was encouraged to continue using the chin tuck until her repeat MBSS is completed. Recommend repeat MBSS prior to discharge, possibly on 12/16/20. Her discharge date is set for December 17. Will request Prednisone and Benadryl prior to her study.      Recommended Diet:  Solid consistency: Regular  Liquid consistency:  Thin  Liquid administration via: Cup(No straws)    Medication administration: Meds in puree(Meds whole in applesauce or pudding)     Safe Swallow Protocol:  Supervision: Independent  Compensatory Swallowing Strategies: Alternate solids and liquids;Small bites/sips; Remain upright for 30-45 minutes after meals;Upright as possible for all oral intake;Eat/Feed slowly     SHORT TERM GOAL #1:  Goal 1: The patient will tolerate minced and moist diet consistency with nectar thick liquids with min/no overt s/s of aspiration. Met     SHORT TERM GOAL #2:  Goal 2: The patient will complete pharyngeal strengthening exercises with min verbal cues at 80% accuracy to improve airway protection. Not Met     SHORT TERM GOAL #3:  Goal 3: The patient will complete daily oral motor exercises to improve labial/lingual/buccal strength and ROM to improve oral phase of swallow and speech intelligibility. Not met     SHORT TERM GOAL #4:  Goal 4: The patient will use the (over articulation/slow rate/writing key word/elongation of thevowel/increased loudness/phrasing) strategy to improve speech intelligibility withwords/phrases/sentences/in conversation with 100% accuracy. Not MEt     SHORT TERM GOAL #5:  Goal 5: Patient will complete repeat MBSS for potential upgrade. Not Met     Swallowing Short Term Goals  Short-term Goals  Goal 1: The patient will complete the hoang (tongue hold) technique to improve base of tongue retraction and base of tongue to pharyngeal wall approximation with 90% accuracy and minimal verbal cues. Not Met  Goal 2: The patient will complete the Mendelsohn maneuver to improve hyolaryngeal elevation and clear vallecular residue with 90% accuracy and minimal verbal cues. Not Met  Goal 3: The patient will complete the effortful swallow maneuver to improve hyolaryngeal elevation, tongue base retraction, and vocal fold closure with 90% accuracy and minimal verbal cues.  Not Met Long term goal 4: pt will complete simple ambulatory home making task with modified independence  Long term goal 5: pt will complete HEP with independence  Long term goals 6: verbalize DME for home    Assessment:  Decreased functional mobility ; Decreased coordination; Decreased fine motor control; Decreased high-level IADLs; Decreased ROM; Decreased strength              NUTRITION  Current Wt: Weight: 163 lb (73.9 kg) / Body mass index is 26.31 kg/m². Admission Wt: Admission Body Weight: 164 lb (74.4 kg)  Oral Diet Orders:   GENERAL; Dysphagia Soft and Bite-Sized  Oral Nutrition Supplement (ONS) Orders:  None  Pt remains nutritionally stable with PO intake >75% of meals. Wt has remained stable since admit. Pt at low risk for nutritional compromise at this time. Please see nutrition note for details. NURSING    Wounds/Incisions/Ulcers: No skin issues identified     Ravi Scale Score: 19    Pain: No pain concerns to address    Consultations/Labs/X-rays:     Family Education: Family available and participating in education     Fall Risk:  Nelson Score: 76    Fall in the last week?no      Other Nursing Issues: Pills WAS. Up x1 assist walker. BM 13. Cont B/B. Xarelto. Dysphagia diet. SOCIAL WORK/CASE MANAGEMENT  Assessment: Good family support, daughters very supportive and express concern about previous habits/lifestyle on possible recurrence of stroke    Discharge Plan   Estimated Length of Stay: 12/17/20  Destination: home health    Pass: No    Services at Discharge: [address] [facility], Occupational Therapy and Nursing, speech therapy to evaluate    Equipment at Discharge: To be determined during family therapy session    Progress made in the prior week:  1. IMPROVED MECHANICS WITH TURNS  2. Mod I for ADLs  3.  4.  5.      Goals for following week:  1. INDEP TF SURFACE TO SURFACE  2.  Arranged DME  3.   4.   5. Factors facilitating achievement of predicted outcomes: Motivated, Cooperative and Pleasant    Barriers to the achievement of predicted outcomes: Decreased endurance, Decreased sensation, Decreased proprioception, Upper extremity weakness and Lower extremity weakness    Team Members Present at Conference:  : Tyra Ty   Occupational Therapist: Jorge Luis Marquez OTR/L  Physical Therapist: RASHEEDA Joseph  Speech Therapist: Mario Angel MS,CCC-SLP  Nurse: Ras Olivas RN,BSN   Nurse Manager:  Ras Olivas RN, BSN  Dietitian:  Mp Watkins RD  Rehab Director:  Verena Gallagher approve the established interdisciplinary plan of care as documented within the medical record of [facility]

## 2020-12-14 NOTE — PROGRESS NOTES
12/14/20 1446 12/14/20 1447 12/14/20 1451   Bed Mobility   Rolling Modified independent  --   --    Supine to Sit Modified independent  --   --    Sit to Supine Modified independent  --   --    Scooting Modified independent  --   --    Transfers   Sit to Stand  --  Independent  --    Stand to sit  --  Independent  --    Bed to Chair  --  Modified independent  --    Ambulation   Ambulation?  --   --  Yes   WB Status  --   --  WBAT   Ambulation 1   Surface  --   --  level tile   Device  --   --  [employer]  --   --  Stand by assistance   Distance  --   --  100' x2   Comments  --   --  Incorporated turns. Ambulation 2   Surface - 2  --   --  level tile   Device 2  --   --  Rolling Walker   Assistance 2  --   --  Modified Independent   Quality of Gait 2  --   --  Practiced and pt safe to be up ad seema in room using RW.    Distance  --   --  48'   Wheelchair Activities   Wheelchair Parts Management  --   --   --    Left Leg Rest Level of Assistance  --   --   --    Right Leg Rest Level of Assistance  --   --   --    Left Brakes Level of Assistance  --   --   --    Right Brakes Level of Assistance  --   --   --    Conditions Requiring Skilled Therapeutic Intervention   Assessment  --   --   --    Activity Tolerance   Activity Tolerance  --   --   --       12/14/20 1507 12/14/20 1515   Bed Mobility   Rolling  --   --    Supine to Sit  --   --    Sit to Supine  --   --    Scooting  --   --    Transfers   Sit to Stand  --   --    Stand to sit  --   --    Bed to Chair  --   --    Ambulation   Ambulation?  --   --    WB Status  --   --    Ambulation 1   Surface  --   --    Device  --   --    Assistance  --   --    Distance  --   --    Comments  --   --    Ambulation 2   Surface - 2  --   --    Device 2  --   --    Assistance 2  --   --    Quality of Gait 2  --   --    Distance  --   --    Wheelchair Activities   Wheelchair Parts Management  --  Yes   Left Leg Rest Level of Assistance  --  Independent Right Leg Rest Level of Assistance  --  Independent   Left Brakes Level of Assistance  --  Independent   Right Brakes Level of Assistance  --  Independent   Conditions Requiring Skilled Therapeutic Intervention   Assessment Now able to be up ad seema in room using RW  --    Activity Tolerance   Activity Tolerance Patient Tolerated treatment well  --    Electronically signed by Katrina Cavanaugh PTA on 12/14/2020 at 3:19 PM

## 2020-12-14 NOTE — PROGRESS NOTES
Patient:   Vincent Moncada  MR#:    [id number]   Room:    8119/405-48   YOB: 1955  Date of Progress Note: 12/14/2020  Time of Note                           9:01 AM  Consulting Physician:   Ahsan Pelaez M.D. Attending Physician:  Ahsan Pelaez MD     Chief complaint Acute ischemic stroke     S:This 75 y.o. female  pt admitted to [facility] on 11/26/2020 w/R sided weakness. Pt woke up on am of 11/26 around 5am and got up, went to the kitchen to make coffee and fell after diffuse tingling an R sided weakness noted. At baseline, she does not move her R arm well proximally due to fx in 11/2019. She managed to unlock her door before going back to be, in case she would need help. she stayed in bed for 4 hours and noted she was stronger, however, she  got up again around 10 am and fell again due to R sided weakness. She presented to [facility] ED where on presentation she was out of tPA window and NIHSS was 0, but then she developed worsening R sided weakness again. She was related that she can choke on her saliva when she swallows. Pt was alert & oriented x 3 w/ mild dysarthria. Pt also noted to have new onset tremor. MRI done on 11/27 showed acute ischemia in the bilateral brendon. Over the weekend, she had worsening R sided weakness. EEG was done and read as normal. She is on a mechanical soft diet w/nectar thick liquids. She is now medically stable and is participating with therapy. She is ready to begin rehab w/plan of returning home after rehab dc w/support from her family. No acute issues.     REVIEW OF SYSTEMS:  Constitutional: No fevers No chills  Neck:No stiffness  Respiratory: No shortness of breath  Cardiovascular: No chest pain No palpitations  Gastrointestinal: No abdominal pain    Genitourinary: No Dysuria  Neurological: No headache, no confusion    Past Medical History:      Diagnosis Date    Abnormal Pap smear of cervix     1991    History of cryosurgery 1991 Past Surgical History:      Procedure Laterality Date    BREAST CYST EXCISION Bilateral     X4      SECTION  1981    JOINT REPLACEMENT      right shoulder    TONSILLECTOMY         Medications in Hospital:      Current Facility-Administered Medications:     rivaroxaban (XARELTO) tablet 10 mg, 10 mg, Oral, Daily, Sarah Beth Vazquez MD, 10 mg at 20 1723    pantoprazole (PROTONIX) tablet 40 mg, 40 mg, Oral, Nightly, Sarah Beth Vazquez MD, 40 mg at 20    ondansetron (ZOFRAN-ODT) disintegrating tablet 4 mg, 4 mg, Oral, Q8H PRN, Sarah Beth Vazquez MD, 4 mg at 20 0439    aspirin chewable tablet 81 mg, 81 mg, Oral, Daily, Sarah Beth Vazquez MD, 81 mg at 20 0759    atorvastatin (LIPITOR) tablet 80 mg, 80 mg, Oral, Nightly, Sarah Beth Vazquez MD, 80 mg at 20    clopidogrel (PLAVIX) tablet 75 mg, 75 mg, Oral, Daily, Sarah Beth Vazquez MD, 75 mg at 20 075    cyclobenzaprine (FLEXERIL) tablet 10 mg, 10 mg, Oral, TID PRN, Sarah Beth Vazquez MD, 10 mg at 20    acetaminophen (TYLENOL) tablet 650 mg, 650 mg, Oral, Q4H PRN, Sarah Beth Vazquez MD    polyethylene glycol (GLYCOLAX) packet 17 g, 17 g, Oral, Daily PRN, Sarah Beth Vazquez MD, 17 g at 20    Allergies:  Patient has no known allergies. Social History:   TOBACCO:   reports that she has been smoking. She has never used smokeless tobacco.  ETOH:   reports current alcohol use. Family History:       Problem Relation Age of Onset    Heart Attack Maternal Grandmother     Colon Cancer Father     Colon Cancer Mother          PHYSICAL EXAM:  /62   Pulse 62   Temp 97.2 °F (36.2 °C) (Temporal)   Resp 16   Ht 5' 6\" (1.676 m)   Wt 163 lb (73.9 kg)   SpO2 92%   Breastfeeding No   BMI 26.31 kg/m²     Constitutional  well developed, well nourished.    Eyes  conjunctiva normal.   Ear, nose, throat - No scars, masses, or lesions over external nose or ears, no atrophy of tongue Neck-symmetric, no masses noted, no jugular vein distension  Respiration- chest wall appears symmetric, good expansion,   normal effort without use of accessory muscles  Musculoskeletal  no significant wasting of muscles noted, no bony deformities  Extremities-no clubbing, cyanosis or edema  Skin  warm, dry, and intact. No rash, erythema, or pallor. Psychiatric  mood, affect, and behavior appear normal.      Neurological exam  Awake, alert, fluent oriented appropriate affect slightly slow dysarthria  Attention and concentration appear appropriate  Recent and remote memory appears unremarkable  Speech with dysarthria and some expressive aphasia  No clear issues with language of fund of knowledge     Cranial Nerve Exam     CN III, IV,VI-EOMI, No nystagmus, conjugate eye movements, no ptosis    CN VII-right lower facial droop       Motor Exam  antigravity throughout upper and lower extremities bilaterally  Drift right arm and right leg      Tremors- no tremors in hands or head noted     Gait  Not tested      Nursing/pcp notes, imaging,labs and vitals reviewed.      PT,OT and/or speech notes reviewed    Lab Results   Component Value Date    WBC 7.1 12/14/2020    HGB 13.1 12/14/2020    HCT 39.7 12/14/2020    MCV 90.6 12/14/2020     12/14/2020     Lab Results   Component Value Date     12/14/2020    K 4.0 12/14/2020     12/14/2020    CO2 27 12/14/2020    BUN 16 12/14/2020    CREATININE 0.8 12/14/2020    GLUCOSE 101 12/14/2020    CALCIUM 8.8 12/14/2020    PROT 5.5 (L) 12/14/2020    LABALBU 3.8 12/14/2020    BILITOT <0.2 12/14/2020    ALKPHOS 108 (H) 12/14/2020    AST 23 12/14/2020    ALT 27 12/14/2020    LABGLOM >60 12/14/2020    GFRAA >59 12/14/2020     Lab Results   Component Value Date    INR 1.04 12/01/2020    PROTIME 13.5 12/01/2020          Felicia Cavanaugh PTA   Physical Therapist Assistant   Physical Therapy   Progress Notes   Signed   Date of Service:  12/11/2020  1:58 PM Signed             Show:Clear all  []Manual[x]Template[]Copied    Added by:  [x]Shaq Norton PTA    []Liv for details  Occupational Therapy  Name: Jarrod Enamorado  MRN:  [id number]  Date of service:  12/11/2020 12/11/20 1310 12/11/20 1313 12/11/20 1314   General   Additional Pertinent Hx  --   --   --    Subjective   Subjective  --   --   --    Transfers   Sit to Stand  --   --  Contact guard assistance   Stand to sit  --   --  Contact guard assistance   Bed to Chair  --   --  Contact guard assistance   Ambulation 1   Surface level tile  --   --    Device Rolling Walker  --   --    Other Apparatus Wheelchair follow  --   --    Assistance Contact guard assistance  --   --    Distance 80'  --   --    Exercises   Hamstring Sets  --   --   --    Hip Flexion  --   --   --    Hip Abduction  --   --   --    Knee Long Arc Quad  --   --   --    Ankle Pumps  --   --   --    Other exercises   Other exercises?  --   --   --    Other exercises 5  --   --   --    Other exercises 6  --   --   --    Other exercises 7  --   --   --    Other exercises 8  --   --   --    Other exercises 9  --   --   --    Short term goals   Time Frame for Short term goals  --  1 week  --    Short term goal 1  --  pt amb 48' with RW, CGA, step-to, 3pt pattern, RLE leading  --    Short term goal 2  --  Pt CGA with bed mobility  --    Short term goal 3  --  Pt min A with supine <> sit  --    Short term goal 4  --  Pt min A with car transfer  --    Short term goal 5  --  Pt min A with 4, 4\" steps, step-to gait pattern, LLE leading ascending  --    Long term goals   Time Frame for Long term goals   --  2 weeks  --    Long term goal 1  --  pt amb 150' with RW, ind, with step-to gait pattern, RLE leading  --    Long term goal 2  --  pt independent with bed mobility  --    Long term goal 3  --  pt independent with sit<>stand   --    Long term goal 4  --  Pt independent with car TF  -- Long term goal 5  --  Pt min A with 4, 4\" steps, step-to gait pattern, LLE leading ascending  --    Conditions Requiring Skilled Therapeutic Intervention   Body structures, Functions, Activity limitations  --   --   --    Assessment  --   --   --    Treatment Diagnosis  --   --   --    Plan   Current Treatment Recommendations  --  Strengthening;ROM;Balance Training;Functional Mobility Training;Transfer Training;ADL/Self-care Training; Endurance Training; Wheelchair Mobility Training;Gait Training;Stair training;Cognitive Reorientation;Pain Management;Home Exercise Program;Safety Education & Training;Patient/Caregiver Education & Training;Equipment Evaluation, Education, & procurement; Modalities  --         12/11/20 1344 12/11/20 1346 12/11/20 1357   General   Additional Pertinent Hx  --   --  Hemiplegia/hemiparesis following cerebral infarction affecting left non-dominant side (R side UE and LE weakness); Dysarthria; Dysphagia, oropharyngeal phase; Hyperlipidemais, unspecified;  Nicotine dependence, unspecified, uncomplicated; Surgical Hx of breast surgery x4, C-sectionx1, cyst removal from leg, tonsillectomy, R total shoulder arthroplasty w/ distal clavicle excision on 11/1/8/2019   Subjective   Subjective  --   --  i'm a little tired but i feel ok   Transfers   Sit to Stand  --   --   --    Stand to sit  --   --   --    Bed to Chair  --   --   --    Ambulation 1   Surface  --   --   --    Device  --   --   --    Other Apparatus  --   --   --    Assistance  --   --   --    Distance  --   --   --    Exercises   Hamstring Sets  --  15 red band on R  --    Hip Flexion  --  15 with red band R  --    Hip Abduction  --  15 ab/ad with manual resist  --    Knee Long Arc Quad  --  10x 2 R   --    Ankle Pumps  --  3x 5 red band on R for DF and inv x 10/ever x 3/5  --    Other exercises   Other exercises?  --  Yes  --    Other exercises 5  --  high steps in RW x 10  --    Other exercises 6  --  sit to stand from wc x 2/5  -- Other exercises 7  --  sidestepping in // bars 1 x each direction  --    Other exercises 8  --  tandem stance x 30 sec each foot fwd (mod@ with R LE in post position)  --    Other exercises 9  --  static stance w narrow terrance in // bars x 1 min with mild bal perturbations Valentine@[email or web address] Therapy  --    Short term goals   Time Frame for Short term goals  --   --   --    Short term goal 1  --   --   --    Short term goal 2  --   --   --    Short term goal 3  --   --   --    Short term goal 4  --   --   --    Short term goal 5  --   --   --    Long term goals   Time Frame for Long term goals   --   --   --    Long term goal 1  --   --   --    Long term goal 2  --   --   --    Long term goal 3  --   --   --    Long term goal 4  --   --   --    Long term goal 5  --   --   --    Conditions Requiring Skilled Therapeutic Intervention   Body structures, Functions, Activity limitations Decreased functional mobility ; Decreased ADL status; Decreased strength;Decreased safe awareness;Decreased endurance  --   --    Assessment Patient tolerated therapy session well, good increase in gait distance compaired to previous sessions, demonstrated decreased stride length sidestepping R vs L in // bars. Continues to require frequent rest breaks due to fatigue but put forth a good effort this afternoon. --   --    Treatment Diagnosis Interference with activity  --   --    Plan   Current Treatment Recommendations  --   --   --             Electronically signed by Michelle Edwards PTA on 12/11/2020 at 1:58 PM               Cosigned by: Zena Fuller PT at 12/11/2020  3:09 PM   Revision History                            RECORD REVIEW: Previous medical records, medications were reviewed at today's visit    IMPRESSION:   1. Acute ischemic stroke-bilateral [id number] ASA/statin/Plavix  2. Hyperlipidemia-statin  3. DVT prophylaxis-changed to Xarelto as per patient's wishes  4. GI-bowel regimen  5.  Chronic right shoulders-monitor 6. History of chronic opiod use due to shoulder issues  7. Smoker  8.  PT/OT/Speech     Continue current care as noted    ELOS December 17 th    Expected duration and frequency therapy: 180 minutes per day, 5 days per week    [address] [facility]  [phone] CELL  Dr Khoa Irene

## 2020-12-14 NOTE — PROGRESS NOTES
Occupational Therapy     12/14/20 1310   Restrictions/Precautions   Restrictions/Precautions Up [place]   Lower Extremity Weight Bearing Restrictions   Right Lower Extremity Weight Bearing Weight Bearing As Tolerated   Left Lower Extremity Weight Bearing Weight Bearing As Tolerated   General   Additional Pertinent Hx reverse R shoulder replacement approximately 1 yr ago   Diagnosis CVA with right hemiparesis   Pain Assessment   Patient Currently in Pain No   Pain Assessment 0-10   Pain Level 0   Balance   Sitting Balance Independent   Standing Balance Modified independent    Standing Balance   Time 5 minutes   Activity 2 handed static standing task. Functional Mobility   Functional - Mobility Device Rolling Walker   Activity Retrieve items;Transport items; Other   Assist Level Modified independent    Transfers   Sit to stand Modified independent   Stand to sit Modified independent   Type of ROM/Therapeutic Exercise   Type of ROM/Therapeutic Exercise Christina   Comment RUE 7.5#, LUE 12.5#, 3 sets x 10 reps. Coordination   Fine Motor BUE FM coordination task, fair(+) quality of movement with R hand. Short term goals   Short term goal 6 MET   Short term goal 7 MET   Long term goals   Long term goal 1 MET   Long term goal 2 MET   Long term goal 3 MET   Assessment   Performance deficits / Impairments Decreased functional mobility ; Decreased coordination;Decreased fine motor control;Decreased high-level IADLs;Decreased ROM; Decreased strength   Treatment Diagnosis CVA with right hemiparesis   Prognosis Good   Timed Code Treatment Minutes 35 Minutes   Activity Tolerance   Activity Tolerance Patient Tolerated treatment well   Safety Devices   Safety Devices in place Yes   Type of devices Call light within reach; Bed alarm in place   Plan Current Treatment Recommendations Functional Mobility Training;Equipment Evaluation, Education, & procurement;ROM; Home Management Training;Patient/Caregiver Education & Training;Strengthening

## 2020-12-14 NOTE — PROGRESS NOTES
[facility]  INPATIENT SPEECH THERAPY  [facility] 8 REHAB UNIT      TIME   Time In: 1400  Time Out: 1430  Minutes: 30       [x]Daily Note  []Progress Note    Date: 2020  Patient Name: Cristi Hernandez        MRN: [id number]    Account #: [de-identified]  : 1955  (75 y.o.)  Gender: female   Primary Provider: Tejal Michelle MD  Swallowing Status/Diet: Dysphagia soft and bite sized, thin liquids + a chin tuck    PATIENT DIAGNOSIS(ES):    Diagnosis: CVA (acute ischemia bilateral brendon), R sided weakness      Additional Pertinent Hx: Hemiplegia/hemiparesis following cerebral infarction affecting left non-dominant side (R side UE and LE weakness); Dysarthria; Dysphagia, oropharyngeal phase; Hyperlipidemais, unspecified; Nicotine dependence, unspecified, uncomplicated; Surgical Hx of breast surgery x4, C-sectionx1, cyst removal from leg, tonsillectomy, R total shoulder arthroplasty w/ distal clavicle excision on      RESTRICTIONS/PRECAUTIONS:    Restrictions/Precautions  Restrictions/Precautions: Weight Bearing, Fall Risk(Aspiration risk )  Pt must use chin tuck with thin liquids             Subjective: The patient was upright in the bed. She stated she was drowsy this afternoon. Objective:  She still reports varying vocal intensity and vocal strain. Do recommend consider ENT consult if she does not see improvement in her voicing. Breath support tasks were completed. 12.61 seconds was her MPT this date. She was able to count on one breath up to 17. She stated she did try and sing along to a favorite song and reported significant voice changes. She was unable to hit the correct notes. She stated prior to her CVA, she was able to sing well. She exhibits mild dysarthria this date. Oral motor exercises were completed to improve lingual and labial strength and range of motion. The Lyric was also completed throughout the session. She did state she exhibits and immediate cough when she does not use her chin tuck. Trials of thin liquids via cup and straw with and without a chin tuck were completed. No overt s/s of aspiration were observed this date. She was encouraged to continue using the chin tuck until her repeat MBSS is completed.     Recommend repeat MBSS prior to discharge, possibly on 12/16/20. Her discharge date is set for December 17. Will request Prednisone and Benadryl prior to her study. Recommended Diet:  Solid consistency: Regular  Liquid consistency: Thin + CHIN TUCK  Liquid administration via: Cup(No straws)     Medication administration: Meds in puree(Meds whole in applesauce or pudding)     Safe Swallow Protocol:  Supervision: Independent  Compensatory Swallowing Strategies: Alternate solids and liquids;Small bites/sips; Remain upright for 30-45 minutes after meals;Upright as possible for all oral intake;Eat/Feed slowly    SHORT TERM GOAL #1:  Goal 1: The patient will tolerate minced and moist diet consistency with nectar thick liquids with min/no overt s/s of aspiration. SHORT TERM GOAL #2:  Goal 2: The patient will complete pharyngeal strengthening exercises with min verbal cues at 80% accuracy to improve airway protection. SHORT TERM GOAL #3:  Goal 3: The patient will complete daily oral motor exercises to improve labial/lingual/buccal strength and ROM to improve oral phase of swallow and speech intelligibility. SHORT TERM GOAL #4:  Goal 4: The patient will use the (over articulation/slow rate/writing key word/elongation of thevowel/increased loudness/phrasing) strategy to improve speech intelligibility withwords/phrases/sentences/in conversation with 100% accuracy.     SHORT TERM GOAL #5:  Goal 5: Patient will complete repeat MBSS for potential upgrade    Swallowing Short Term Goals  Short-term Goals Goal 1: The patient will complete the hoang (tongue hold) technique to improve base of tongue retraction and base of tongue to pharyngeal wall approximation with 90% accuracy and minimal verbal cues. Goal 2: The patient will complete the Mendelsohn maneuver to improve hyolaryngeal elevation and clear vallecular residue with 90% accuracy and minimal verbal cues  Goal 3: The patient will complete the effortful swallow maneuver to improve hyolaryngeal elevation, tongue base retraction, and vocal fold closure with 90% accuracy and minimal verbal cues. Goal 4: The patient will complete daily oral hygiene to prevent aspiration of bacteria laden secretions. Goal 5: The patient will perform compensatory swallow strategies (chin tuck) to eliminate s/s of aspiration and tolerate the least restrictive diet with (min) verbal cues. Goal 6: The patient will tolerate a dysphagia soft and bite sized diet with thin liquids plus a chin tuck with minimal overt s/s of aspiration.          ASSESSMENT:  Assessment: [x]Progressing towards goals          []Not Progressing towards goals    Patient Tolerance of Treatment:   [x]Tolerated well []Tolerated fair []Required rest breaks []Fatigued    Education:  Learner:  [x]Patient          []Significant Other          []Other  Education provided regarding:  [x]Goals and POC   []Diet and swallowing precautions    []Home Exercise Program  []Progress and/or discharge information  Method of Education:  [x]Discussion          []Demonstration          []Handout          []Other  Evaluation of Education:   [x]Verbalized understanding   []Demonstrates without assistance  []Demonstrates with assistance  []Needs further instruction     []No evidence of learning                  []No family present      Plan: [x]Continue with current plan of care    []Modify current plan of care as follows:    []Discharge patient    Discharge Location:    Services/Supervision Recommended: [x]Patient continues to require treatment by a licensed therapist to address functional deficits as outlined in the established plan of care.         Electronically Signed By:  Patrice Apgar  2020,3:23 PM.                            Electronically Signed By:  Eric Soriano M.S., CCC-SLP  2020,3:24 PM.

## 2020-12-14 NOTE — CARE COORDINATION
[facility]  Test for Patient [place] in the Areas of Transfers/Ambulation    Ambulation/Transfers   Independent ambulation in room with assistive device:  YES  -Device Type:  Rolling walker  · Independent transfers from wheelchair to surface in room:  YES    Bathroom Transfers  · Independent transfers in patient bathroom:  [place] and Therapies feel as though the patient qualifies for an acute rehabilitation test for independence in the areas of transfers and ambulation prior to discharging from Inpatient Rehabilitation Unit at [facility].  This test for independence in the areas of transfers and ambulation must be agreed upon by the patient's physician, nurse, and therapists.         Nurse Approval:  Electronically signed by Tello Garrison RN on 12/15/20 at 9:52 AM CST      Physical Therapist Approval:  Electronically signed by Matt Rodriges PTA on 12/14/2020 at 2:53 PM    Occupational Therapist Approval: Electronically signed by GRANT Zarco on 12/14/2020 at 3:21 PM

## 2020-12-15 PROCEDURE — 97530 THERAPEUTIC ACTIVITIES: CPT

## 2020-12-15 PROCEDURE — 1180000000 HC REHAB R&B

## 2020-12-15 PROCEDURE — 97116 GAIT TRAINING THERAPY: CPT

## 2020-12-15 PROCEDURE — 92526 ORAL FUNCTION THERAPY: CPT

## 2020-12-15 PROCEDURE — 97110 THERAPEUTIC EXERCISES: CPT

## 2020-12-15 PROCEDURE — 6370000000 HC RX 637 (ALT 250 FOR IP): Performed by: PSYCHIATRY & NEUROLOGY

## 2020-12-15 PROCEDURE — 92507 TX SP LANG VOICE COMM INDIV: CPT

## 2020-12-15 PROCEDURE — 97535 SELF CARE MNGMENT TRAINING: CPT

## 2020-12-15 PROCEDURE — 99233 SBSQ HOSP IP/OBS HIGH 50: CPT | Performed by: PSYCHIATRY & NEUROLOGY

## 2020-12-15 RX ADMIN — RIVAROXABAN 10 MG: 10 TABLET, FILM COATED ORAL at 17:05

## 2020-12-15 RX ADMIN — CLOPIDOGREL BISULFATE 75 MG: 75 TABLET ORAL at 07:40

## 2020-12-15 RX ADMIN — ATORVASTATIN CALCIUM 80 MG: 80 TABLET, FILM COATED ORAL at 20:57

## 2020-12-15 RX ADMIN — PANTOPRAZOLE SODIUM 40 MG: 40 TABLET, DELAYED RELEASE ORAL at 20:58

## 2020-12-15 RX ADMIN — ASPIRIN 81 MG: 81 TABLET, CHEWABLE ORAL at 07:40

## 2020-12-15 ASSESSMENT — PAIN SCALES - GENERAL
PAINLEVEL_OUTOF10: 0

## 2020-12-15 NOTE — PROGRESS NOTES
[facility]  INPATIENT SPEECH THERAPY  [facility] 8 REHAB UNIT  TIME   Time In: 1400  Time Out: 1430  Minutes: 30       [x]Daily Note  []Progress Note    Date: 12/15/2020  Patient Name: Nicolasa Love        MRN: [id number]    Account #: [de-identified]  : 1955  (66 y.o.)  Gender: female   Primary Provider: Sindhu Olsen MD  Precautions: Fall/aspiration  Swallowing Status/Diet: Regular diet with thin liquids and chin tuck       Subjective: Patient alert and cooperative with all therapy tasks. Patient seen upright in wheel chair for session. Objective:   Patient tolerating consecutive sips of thin liquids via cup today. Patient completing chin tuck independently. No overt s/s of aspiration were observed. Reviewed swallowing exercises, breath support exercises, and oral motor exercises to continue to complete when she discharges. Patient was able to recall all exercises with no difficulty. Speech intelligibility was 100% for unknown context today. Slight distortions and imprecise articulations were noted at times within conversational discourse, however did not impact speech intelligibility. Patient encouraged to utilize spirometer during the day to help with breath support. Swallowing exercises completed. Patient completed hoang to improve base of tongue strength. Laryngeal elevation completed with 100% of opportunities. Effortful swallow also completed with laryngeal elevation completed on 100% of opportunities. Patient will complete MBSS tomorrow. Recommended Diet:  Solid consistency: Regular  Liquid consistency:  Thin + CHIN TUCK  Liquid administration via: Cup(No straws)     Medication administration: Meds in puree(Meds whole in applesauce or pudding)     Safe Swallow Protocol:  Supervision: Independent Compensatory Swallowing Strategies: Alternate solids and liquids;Small bites/sips; Remain upright for 30-45 minutes after meals;Upright as possible for all oral intake;Eat/Feed slowly    SHORT TERM GOAL #1:  Goal 1: The patient will tolerate minced and moist diet consistency with nectar thick liquids with min/no overt s/s of aspiration. SHORT TERM GOAL #2:  Goal 2: The patient will complete pharyngeal strengthening exercises with min verbal cues at 80% accuracy to improve airway protection. SHORT TERM GOAL #3:  Goal 3: The patient will complete daily oral motor exercises to improve labial/lingual/buccal strength and ROM to improve oral phase of swallow and speech intelligibility. SHORT TERM GOAL #4:  Goal 4: The patient will use the (over articulation/slow rate/writing key word/elongation of thevowel/increased loudness/phrasing) strategy to improve speech intelligibility withwords/phrases/sentences/in conversation with 100% accuracy. SHORT TERM GOAL #5:  Goal 5: Patient will complete repeat FEES for potential upgrade    Swallowing Short Term Goals  Short-term Goals  Goal 1: The patient will complete the hoang (tongue hold) technique to improve base of tongue retraction and base of tongue to pharyngeal wall approximation with 90% accuracy and minimal verbal cues. Goal 2: The patient will complete the Mendelsohn maneuver to improve hyolaryngeal elevation and clear vallecular residue with 90% accuracy and minimal verbal cues  Goal 3: The patient will complete the effortful swallow maneuver to improve hyolaryngeal elevation, tongue base retraction, and vocal fold closure with 90% accuracy and minimal verbal cues. Goal 4: The patient will complete daily oral hygiene to prevent aspiration of bacteria laden secretions. Goal 5: The patient will perform compensatory swallow strategies (chin tuck) to eliminate s/s of aspiration and tolerate the least restrictive diet with (min) verbal cues. Goal 6: The patient will tolerate a dysphagia soft and bite sized diet with thin liquids plus a chin tuck with minimal overt s/s of aspiration. ASSESSMENT:  Assessment: [x]Progressing towards goals          []Not Progressing towards goals    Patient Tolerance of Treatment:   [x]Tolerated well []Tolerated fair []Required rest breaks []Fatigued    Education:  Learner:  [x]Patient          []Significant Other          []Other  Education provided regarding:  [x]Goals and POC   [x]Diet and swallowing precautions    []Home Exercise Program  []Progress and/or discharge information  Method of Education:  [x]Discussion          [x]Demonstration          []Handout          []Other  Evaluation of Education:   [x]Verbalized understanding   []Demonstrates without assistance  []Demonstrates with assistance  []Needs further instruction     []No evidence of learning                  []No family present      Plan: [x]Continue with current plan of care    []Modify current plan of care as follows:    []Discharge patient    Discharge Location:    Services/Supervision Recommended:      [x]Patient continues to require treatment by a licensed therapist to address functional deficits as outlined in the established plan of care.

## 2020-12-15 NOTE — PROGRESS NOTES
Occupational Therapy     12/15/20 1300   General   Diagnosis CVA with right hemiparesis   Pain Assessment   Patient Currently in Pain No   Pain Assessment 0-10   Pain Level 0   ADL   Toileting Modified independent    Balance   Sitting Balance Independent   Standing Balance Modified independent    Functional Mobility   Functional - Mobility Device Rolling Walker   Activity To/from bathroom; Other   Assist Level Modified independent    Transfers   Sit to stand Modified independent   Stand to sit Modified independent   Toilet Transfers   Toilet - Technique Ambulating   Equipment Used Grab bars   Toilet Transfer Modified independent   Coordination   Fine Motor Completed theraputty HEP with RUE. Assessment   Performance deficits / Impairments Decreased functional mobility ; Decreased coordination;Decreased fine motor control;Decreased high-level IADLs;Decreased ROM; Decreased strength   Treatment Diagnosis CVA with right hemiparesis   Prognosis Good   Timed Code Treatment Minutes 45 Minutes   Activity Tolerance   Activity Tolerance Patient Tolerated treatment well   Safety Devices   Safety Devices in place N/A   Type of devices   (Up ad seema.)   Plan   Current Treatment Recommendations Functional Mobility Training;ROM; Home Management Training;Strengthening

## 2020-12-15 NOTE — PROGRESS NOTES
[facility]  INPATIENT SPEECH THERAPY  [facility] 8 [facility] UNIT  TIME   [id number]  [x]Daily Note  []Progress Note    Date: 12/15/2020  Patient Name: Nahed Bourgeois        MRN: [id number]    Account #: [de-identified]  : 1955  (66 y.o.)  Gender: female   Primary Provider: Sabiha Smith MD  Precautions: Fall/aspiration  Swallowing Status/Diet: Regular diet with thin liquids (chin tuck)      Subjective:     Oral motor exercises completed to address lingual/labial strength. Patient continues to report no signs of numbness or decreased sensation. Overall labial/lingual strength were improved. Patient continues to report hoarse/strained vocal quality and states her voice is not at its baseline. Patient completed laryngeal strengthening exercises. Patient completed hoang 3 reps 5 times with laryngeal elevation completed on 100% of opportunities. Patient completing effortful swallow 3 reps 5 times with laryngeal elevation completed on 100% of opportunities. Improvement noted for laryngeal elevation. Sips of thin liquids administered via cup. Patient utilizing chin tuck independently. No overt s/s of aspiration were observed when chin tuck was utilized and timing was good. Recommended Diet:  Solid consistency: Regular  Liquid consistency: Thin  Liquid administration via: Cup(No straws)     Medication administration: Meds in puree(Meds whole in applesauce or pudding)     Safe Swallow Protocol:  Supervision: Independent  Compensatory Swallowing Strategies: Alternate solids and liquids;Small bites/sips; Remain upright for 30-45 minutes after meals;Upright as possible for all oral intake;Eat/Feed slowly      SHORT TERM GOAL #1:  Goal 1: The patient will tolerate minced and moist diet consistency with nectar thick liquids with min/no overt s/s of aspiration.     SHORT TERM GOAL #2: Goal 2: The patient will complete pharyngeal strengthening exercises with min verbal cues at 80% accuracy to improve airway protection. SHORT TERM GOAL #3:  Goal 3: The patient will complete daily oral motor exercises to improve labial/lingual/buccal strength and ROM to improve oral phase of swallow and speech intelligibility. SHORT TERM GOAL #4:  Goal 4: The patient will use the (over articulation/slow rate/writing key word/elongation of thevowel/increased loudness/phrasing) strategy to improve speech intelligibility withwords/phrases/sentences/in conversation with 100% accuracy. SHORT TERM GOAL #5:  Goal 5: Patient will complete repeat FEES for potential upgrade    Swallowing Short Term Goals  Short-term Goals  Goal 1: The patient will complete the hoang (tongue hold) technique to improve base of tongue retraction and base of tongue to pharyngeal wall approximation with 90% accuracy and minimal verbal cues. Goal 2: The patient will complete the Mendelsohn maneuver to improve hyolaryngeal elevation and clear vallecular residue with 90% accuracy and minimal verbal cues  Goal 3: The patient will complete the effortful swallow maneuver to improve hyolaryngeal elevation, tongue base retraction, and vocal fold closure with 90% accuracy and minimal verbal cues. Goal 4: The patient will complete daily oral hygiene to prevent aspiration of bacteria laden secretions. Goal 5: The patient will perform compensatory swallow strategies (chin tuck) to eliminate s/s of aspiration and tolerate the least restrictive diet with (min) verbal cues. Goal 6: The patient will tolerate a dysphagia soft and bite sized diet with thin liquids plus a chin tuck with minimal overt s/s of aspiration.          ASSESSMENT:  Assessment: [x]Progressing towards goals          []Not Progressing towards goals    Patient Tolerance of Treatment:   [x]Tolerated well []Tolerated fair []Required rest breaks []Fatigued    Education: Learner:  []Patient          []Significant Other          []Other  Education provided regarding:  [x]Goals and POC   [x]Diet and swallowing precautions    []Home Exercise Program  []Progress and/or discharge information  Method of Education:  [x]Discussion          []Demonstration          []Handout          []Other  Evaluation of Education:   [x]Verbalized understanding   []Demonstrates without assistance  []Demonstrates with assistance  []Needs further instruction     []No evidence of learning                  []No family present      Plan: [x]Continue with current plan of care    []Modify current plan of care as follows:    []Discharge patient    Discharge Location:    Services/Supervision Recommended:      [x]Patient continues to require treatment by a licensed therapist to address functional deficits as outlined in the established plan of care.

## 2020-12-15 NOTE — PROGRESS NOTES
12/15/20 1002 12/15/20 1003 12/15/20 1041   Subjective   Subjective  --   --   --    Transfers   Sit to Stand Independent  --   --    Stand to sit Independent  --   --    Ambulation   Ambulation?  --  Yes  --    WB Status  --  WBAT  --    Ambulation 1   Surface  --  level tile  --    Device  --  Rolling Walker  --    Assistance  --  Stand by assistance  --    Quality of Gait  --  Steps too large on RLE at times. Weakness noted in RLE, shaheen with fatigue. --    Distance  --  100', 150' x2  --    Comments  --  Incorporated turns. --    Stairs/Curb   Stairs?  --  Yes  --    Stairs   # Steps   --  12  --    Stairs Height  --  4\"  --    Rails  --  Bilateral  --    Assistance  --  Stand by assistance  --    Comment  --  Left first going up; Right first going downl.  --    Wheelchair Activities   Wheelchair Parts Management  --  Yes  --    Left Leg Rest Level of Assistance  --  Independent  --    Right Leg Rest Level of Assistance  --  Independent  --    Left Brakes Level of Assistance  --  Independent  --    Right Brakes Level of Assistance  --  Independent  --    Exercises   Comments  --   --  Sitting BLE ex x15 reps. 1 lb weight on RLE.  1-1/2 lb weight on LLE. Conditions Requiring Skilled Therapeutic Intervention   Assessment  --   --   --    Activity Tolerance   Activity Tolerance  --   --   --       12/15/20 1047 12/15/20 1100   Subjective   Subjective  --  States no issues with being up ad seema in room.    Transfers   Sit to Stand  --   --    Stand to sit  --   --    Ambulation   Ambulation?  --   --    WB Status  --   --    Ambulation 1   Surface  --   --    Device  --   --    Assistance  --   --    Quality of Gait  --   --    Distance  --   --    Comments  --   --    Stairs/Curb   Stairs?  --   --    Stairs   # Steps   --   --    Stairs Height  --   --    Rails  --   --    Assistance  --   --    Comment  --   --    Wheelchair Activities   Wheelchair Parts Management  --   -- Left Leg Rest Level of Assistance  --   --    Right Leg Rest Level of Assistance  --   --    Left Brakes Level of Assistance  --   --    Right Brakes Level of Assistance  --   --    Exercises   Comments  --   --    Conditions Requiring Skilled Therapeutic Intervention   Assessment Pt's daughter participated in FTD today.   --    Activity Tolerance   Activity Tolerance Patient Tolerated treatment well  --    Electronically signed by Nabeel Muller PTA on 12/15/2020 at 11:29 AM

## 2020-12-15 NOTE — PROGRESS NOTES
Patient:   Ria Harrison  MR#:    [id number]   Room:    [facility]179-40   YOB: 1955  Date of Progress Note: 12/15/2020  Time of Note                           8:03 AM  Consulting Physician:   Sarah Beth Vazquez M.D. Attending Physician:  Sarah Beth Vazquez MD     Chief complaint Acute ischemic stroke     S:This 75 y.o. female  pt admitted to [facility] on 11/26/2020 w/R sided weakness. Pt woke up on am of 11/26 around 5am and got up, went to the kitchen to make coffee and fell after diffuse tingling an R sided weakness noted. At baseline, she does not move her R arm well proximally due to fx in 11/2019. She managed to unlock her door before going back to be, in case she would need help. she stayed in bed for 4 hours and noted she was stronger, however, she  got up again around 10 am and fell again due to R sided weakness. She presented to [facility] ED where on presentation she was out of tPA window and NIHSS was 0, but then she developed worsening R sided weakness again. She was related that she can choke on her saliva when she swallows. Pt was alert & oriented x 3 w/ mild dysarthria. Pt also noted to have new onset tremor. MRI done on 11/27 showed acute ischemia in the bilateral brendon. Over the weekend, she had worsening R sided weakness. EEG was done and read as normal. She is on a mechanical soft diet w/nectar thick liquids. She is now medically stable and is participating with therapy. She is ready to begin rehab w/plan of returning home after rehab dc w/support from her family. No new issues overnight.     REVIEW OF SYSTEMS:  Constitutional: No fevers No chills  Neck:No stiffness  Respiratory: No shortness of breath  Cardiovascular: No chest pain No palpitations  Gastrointestinal: No abdominal pain    Genitourinary: No Dysuria  Neurological: No headache, no confusion    Past Medical History:      Diagnosis Date    Abnormal Pap smear of cervix     1991    History of cryosurgery 1991 Past Surgical History:      Procedure Laterality Date    BREAST CYST EXCISION Bilateral     X4      SECTION  1981    JOINT REPLACEMENT      right shoulder    TONSILLECTOMY         Medications in Hospital:      Current Facility-Administered Medications:     rivaroxaban (XARELTO) tablet 10 mg, 10 mg, Oral, Daily, Segun Carr MD, 10 mg at 20    pantoprazole (PROTONIX) tablet 40 mg, 40 mg, Oral, Nightly, Segun Carr MD, 40 mg at 20    ondansetron (ZOFRAN-ODT) disintegrating tablet 4 mg, 4 mg, Oral, Q8H PRN, Segun Carr MD, 4 mg at 20 0439    aspirin chewable tablet 81 mg, 81 mg, Oral, Daily, Segun Carr MD, 81 mg at 12/15/20 0740    atorvastatin (LIPITOR) tablet 80 mg, 80 mg, Oral, Nightly, Segun Carr MD, 80 mg at 20    clopidogrel (PLAVIX) tablet 75 mg, 75 mg, Oral, Daily, Segun Carr MD, 75 mg at 12/15/20 0740    cyclobenzaprine (FLEXERIL) tablet 10 mg, 10 mg, Oral, TID PRN, Segun Carr MD, 10 mg at 20    acetaminophen (TYLENOL) tablet 650 mg, 650 mg, Oral, Q4H PRN, Segun Carr MD    polyethylene glycol (GLYCOLAX) packet 17 g, 17 g, Oral, Daily PRN, Segun Carr MD, 17 g at 20    Allergies:  Patient has no known allergies. Social History:   TOBACCO:   reports that she has been smoking. She has never used smokeless tobacco.  ETOH:   reports current alcohol use. Family History:       Problem Relation Age of Onset    Heart Attack Maternal Grandmother     Colon Cancer Father     Colon Cancer Mother          PHYSICAL EXAM:  /67   Pulse 55   Temp 96.6 °F (35.9 °C) (Temporal)   Resp 20   Ht 5' 6\" (1.676 m)   Wt 163 lb (73.9 kg)   SpO2 95%   Breastfeeding No   BMI 26.31 kg/m²     Constitutional  well developed, well nourished.    Eyes  conjunctiva normal.   Ear, nose, throat - No scars, masses, or lesions over external nose or ears, no atrophy of tongue Neck-symmetric, no masses noted, no jugular vein distension  Respiration- chest wall appears symmetric, good expansion,   normal effort without use of accessory muscles  Musculoskeletal  no significant wasting of muscles noted, no bony deformities  Extremities-no clubbing, cyanosis or edema  Skin  warm, dry, and intact. No rash, erythema, or pallor. Psychiatric  mood, affect, and behavior appear normal.      Neurological exam  Awake, alert, fluent oriented appropriate affect slightly slow dysarthria  Attention and concentration appear appropriate  Recent and remote memory appears unremarkable  Speech with dysarthria and some expressive aphasia  No clear issues with language of fund of knowledge     Cranial Nerve Exam     CN III, IV,VI-EOMI, No nystagmus, conjugate eye movements, no ptosis    CN VII-right lower facial droop       Motor Exam  antigravity throughout upper and lower extremities bilaterally  Drift right arm and right leg      Tremors- no tremors in hands or head noted     Gait  Not tested      Nursing/pcp notes, imaging,labs and vitals reviewed.      PT,OT and/or speech notes reviewed    Lab Results   Component Value Date    WBC 7.1 12/14/2020    HGB 13.1 12/14/2020    HCT 39.7 12/14/2020    MCV 90.6 12/14/2020     12/14/2020     Lab Results   Component Value Date     12/14/2020    K 4.0 12/14/2020     12/14/2020    CO2 27 12/14/2020    BUN 16 12/14/2020    CREATININE 0.8 12/14/2020    GLUCOSE 101 12/14/2020    CALCIUM 8.8 12/14/2020    PROT 5.5 (L) 12/14/2020    LABALBU 3.8 12/14/2020    BILITOT <0.2 12/14/2020    ALKPHOS 108 (H) 12/14/2020    AST 23 12/14/2020    ALT 27 12/14/2020    LABGLOM >60 12/14/2020    GFRAA >59 12/14/2020     Lab Results   Component Value Date    INR 1.04 12/01/2020    PROTIME 13.5 12/01/2020          Mario Loving PTA   Physical Therapist Assistant   Physical Therapy   Progress Notes   Signed   Date of Service:  12/11/2020  1:58 PM Signed             Show:Clear all  []Manual[x]Template[]Copied    Added by:  [x]Roger Les Severe, PTA    []Liv for details  Occupational Therapy  Name: Nahed Bourgeois  MRN:  [id number]  Date of service:  12/11/2020 12/11/20 1310 12/11/20 1313 12/11/20 1314   General   Additional Pertinent Hx  --   --   --    Subjective   Subjective  --   --   --    Transfers   Sit to Stand  --   --  Contact guard assistance   Stand to sit  --   --  Contact guard assistance   Bed to Chair  --   --  Contact guard assistance   Ambulation 1   Surface level tile  --   --    Device Rolling Walker  --   --    Other Apparatus Wheelchair follow  --   --    Assistance Contact guard assistance  --   --    Distance 80'  --   --    Exercises   Hamstring Sets  --   --   --    Hip Flexion  --   --   --    Hip Abduction  --   --   --    Knee Long Arc Quad  --   --   --    Ankle Pumps  --   --   --    Other exercises   Other exercises?  --   --   --    Other exercises 5  --   --   --    Other exercises 6  --   --   --    Other exercises 7  --   --   --    Other exercises 8  --   --   --    Other exercises 9  --   --   --    Short term goals   Time Frame for Short term goals  --  1 week  --    Short term goal 1  --  pt amb 48' with RW, CGA, step-to, 3pt pattern, RLE leading  --    Short term goal 2  --  Pt CGA with bed mobility  --    Short term goal 3  --  Pt min A with supine <> sit  --    Short term goal 4  --  Pt min A with car transfer  --    Short term goal 5  --  Pt min A with 4, 4\" steps, step-to gait pattern, LLE leading ascending  --    Long term goals   Time Frame for Long term goals   --  2 weeks  --    Long term goal 1  --  pt amb 150' with RW, ind, with step-to gait pattern, RLE leading  --    Long term goal 2  --  pt independent with bed mobility  --    Long term goal 3  --  pt independent with sit<>stand   --    Long term goal 4  --  Pt independent with car TF  -- Long term goal 5  --  Pt min A with 4, 4\" steps, step-to gait pattern, LLE leading ascending  --    Conditions Requiring Skilled Therapeutic Intervention   Body structures, Functions, Activity limitations  --   --   --    Assessment  --   --   --    Treatment Diagnosis  --   --   --    Plan   Current Treatment Recommendations  --  Strengthening;ROM;Balance Training;Functional Mobility Training;Transfer Training;ADL/Self-care Training; Endurance Training; Wheelchair Mobility Training;Gait Training;Stair training;Cognitive Reorientation;Pain Management;Home Exercise Program;Safety Education & Training;Patient/Caregiver Education & Training;Equipment Evaluation, Education, & procurement; Modalities  --         12/11/20 1344 12/11/20 1346 12/11/20 1357   General   Additional Pertinent Hx  --   --  Hemiplegia/hemiparesis following cerebral infarction affecting left non-dominant side (R side UE and LE weakness); Dysarthria; Dysphagia, oropharyngeal phase; Hyperlipidemais, unspecified;  Nicotine dependence, unspecified, uncomplicated; Surgical Hx of breast surgery x4, C-sectionx1, cyst removal from leg, tonsillectomy, R total shoulder arthroplasty w/ distal clavicle excision on 11/1/8/2019   Subjective   Subjective  --   --  i'm a little tired but i feel ok   Transfers   Sit to Stand  --   --   --    Stand to sit  --   --   --    Bed to Chair  --   --   --    Ambulation 1   Surface  --   --   --    Device  --   --   --    Other Apparatus  --   --   --    Assistance  --   --   --    Distance  --   --   --    Exercises   Hamstring Sets  --  15 red band on R  --    Hip Flexion  --  15 with red band R  --    Hip Abduction  --  15 ab/ad with manual resist  --    Knee Long Arc Quad  --  10x 2 R   --    Ankle Pumps  --  3x 5 red band on R for DF and inv x 10/ever x 3/5  --    Other exercises   Other exercises?  --  Yes  --    Other exercises 5  --  high steps in RW x 10  --    Other exercises 6  --  sit to stand from wc x 2/5  -- Other exercises 7  --  sidestepping in // bars 1 x each direction  --    Other exercises 8  --  tandem stance x 30 sec each foot fwd (mod@ with R LE in post position)  --    Other exercises 9  --  static stance w narrow terrance in // bars x 1 min with mild bal perturbations [email or web address]  --    Short term goals   Time Frame for Short term goals  --   --   --    Short term goal 1  --   --   --    Short term goal 2  --   --   --    Short term goal 3  --   --   --    Short term goal 4  --   --   --    Short term goal 5  --   --   --    Long term goals   Time Frame for Long term goals   --   --   --    Long term goal 1  --   --   --    Long term goal 2  --   --   --    Long term goal 3  --   --   --    Long term goal 4  --   --   --    Long term goal 5  --   --   --    Conditions Requiring Skilled Therapeutic Intervention   Body structures, Functions, Activity limitations Decreased functional mobility ; Decreased ADL status; Decreased strength;Decreased safe awareness;Decreased endurance  --   --    Assessment Patient tolerated therapy session well, good increase in gait distance compaired to previous sessions, demonstrated decreased stride length sidestepping R vs L in // bars. Continues to require frequent rest breaks due to fatigue but put forth a good effort this afternoon. --   --    Treatment Diagnosis Interference with activity  --   --    Plan   Current Treatment Recommendations  --   --   --             Electronically signed by Adeel Jacob PTA on 12/11/2020 at 1:58 PM               Cosigned by: Brandon Rider PT at 12/11/2020  3:09 PM   Revision History                            RECORD REVIEW: Previous medical records, medications were reviewed at today's visit    IMPRESSION:   1. Acute ischemic stroke-bilateral [id number] ASA/statin/Plavix  2. Hyperlipidemia-statin  3. DVT prophylaxis-changed to Xarelto as per patient's wishes  4. GI-bowel regimen  5.  Chronic right shoulders-monitor 6. History of chronic opiod use due to shoulder issues  7. Smoker  8.  PT/OT/Speech     Continue current care as noted    Team conference with PT/OT/speech/nursing/Care coordinator to review in depth patients care and discharge planning    indep bathing dressing, bathing     Ambulation RW  ft x 2  May do regular diet    Regular diet    needs speech on DC     ELOS Thursday     Expected duration and frequency therapy: 180 minutes per day, 5 days per week    [address] [facility]  [phone] CELL  Dr Madie Chin

## 2020-12-15 NOTE — PROGRESS NOTES
Occupational Therapy     12/15/20 0900   General   Family / Caregiver Present Yes  (Pt. daughter present for FTD.)   Diagnosis CVA with right hemiparesis   Pain Assessment   Patient Currently in Pain No   Pain Assessment 0-10   Pain Level 0   Balance   Sitting Balance Independent   Standing Balance Modified independent    Functional Mobility   Functional - Mobility Device Rolling Walker   Activity Retrieve items;Transport items; To/from bathroom   Assist Level Modified independent    Functional Mobility Comments Completed light homemaking tasks in ADL apartment. Bed mobility   Supine to Sit Independent   Sit to Supine Independent   Comment High bed. Transfers   Sit to stand Modified independent   Stand to sit Modified independent   Toilet Transfers   Toilet - Technique Ambulating   Equipment Used Standard bedside commode   Toilet Transfer Modified independent   Shower Transfers   Shower - Transfer From [employer] - Transfer Type To and From   [employer] - Transfer To Shower seat without back   Shower - Technique Ambulating   Shower Transfers Modified independence   Shower Transfers Comments Daughter's home setup. Coordination   Fine Motor Given FM HEP, discussed acts to complete at home with daughter. Long term goals   Long term goal 4 MET   Long term goal 5 MET   Long term goals 6 MET   Assessment   Performance deficits / Impairments Decreased functional mobility ; Decreased coordination;Decreased fine motor control;Decreased high-level IADLs;Decreased ROM; Decreased strength   Assessment Pt. daughter present for FTD, educated on functional transfers, HEP, and DME needed for safe d/c to daughter's home. Pt. and daughter demonstrated good understanding of education.    Treatment Diagnosis CVA with right hemiparesis   Prognosis Good   Timed Code Treatment Minutes 60 Minutes   Activity Tolerance   Activity Tolerance Patient Tolerated treatment well   Safety Devices   Safety Devices in place Yes Type of devices Gait belt  (Given to PT.)   Plan   Current Treatment Recommendations Functional Mobility Training;ROM; Home Management Training;Strengthening

## 2020-12-16 ENCOUNTER — APPOINTMENT (OUTPATIENT)
Dept: GENERAL RADIOLOGY | Age: 65
DRG: 057 | End: 2020-12-16
Attending: PSYCHIATRY & NEUROLOGY
Payer: MEDICARE

## 2020-12-16 PROCEDURE — 97116 GAIT TRAINING THERAPY: CPT

## 2020-12-16 PROCEDURE — 97530 THERAPEUTIC ACTIVITIES: CPT

## 2020-12-16 PROCEDURE — 92611 MOTION FLUOROSCOPY/SWALLOW: CPT

## 2020-12-16 PROCEDURE — 97110 THERAPEUTIC EXERCISES: CPT

## 2020-12-16 PROCEDURE — 92507 TX SP LANG VOICE COMM INDIV: CPT

## 2020-12-16 PROCEDURE — 92526 ORAL FUNCTION THERAPY: CPT

## 2020-12-16 PROCEDURE — 74230 X-RAY XM SWLNG FUNCJ C+: CPT

## 2020-12-16 PROCEDURE — 6370000000 HC RX 637 (ALT 250 FOR IP)

## 2020-12-16 PROCEDURE — 99232 SBSQ HOSP IP/OBS MODERATE 35: CPT | Performed by: PSYCHIATRY & NEUROLOGY

## 2020-12-16 PROCEDURE — 6370000000 HC RX 637 (ALT 250 FOR IP): Performed by: PSYCHIATRY & NEUROLOGY

## 2020-12-16 PROCEDURE — 1180000000 HC REHAB R&B

## 2020-12-16 RX ORDER — DIPHENHYDRAMINE HCL 25 MG
TABLET ORAL
Status: COMPLETED
Start: 2020-12-16 | End: 2020-12-16

## 2020-12-16 RX ORDER — CLOPIDOGREL BISULFATE 75 MG/1
75 TABLET ORAL DAILY
Qty: 30 TABLET | Refills: 0 | Status: SHIPPED | OUTPATIENT
Start: 2020-12-16 | End: 2021-01-15 | Stop reason: SDUPTHER

## 2020-12-16 RX ORDER — PANTOPRAZOLE SODIUM 40 MG/1
40 TABLET, DELAYED RELEASE ORAL NIGHTLY
Qty: 30 TABLET | Refills: 0 | Status: SHIPPED | OUTPATIENT
Start: 2020-12-16 | End: 2020-12-23

## 2020-12-16 RX ORDER — ASPIRIN 81 MG/1
81 TABLET, CHEWABLE ORAL DAILY
Qty: 30 TABLET | Refills: 0 | Status: SHIPPED | OUTPATIENT
Start: 2020-12-16 | End: 2021-01-15 | Stop reason: SDUPTHER

## 2020-12-16 RX ORDER — DIPHENHYDRAMINE HYDROCHLORIDE 50 MG/ML
25 INJECTION INTRAMUSCULAR; INTRAVENOUS ONCE
Status: DISCONTINUED | OUTPATIENT
Start: 2020-12-16 | End: 2020-12-16

## 2020-12-16 RX ORDER — ATORVASTATIN CALCIUM 80 MG/1
80 TABLET, FILM COATED ORAL NIGHTLY
Qty: 30 TABLET | Refills: 0 | Status: SHIPPED | OUTPATIENT
Start: 2020-12-16 | End: 2021-01-15 | Stop reason: SDUPTHER

## 2020-12-16 RX ORDER — PREDNISONE 20 MG/1
20 TABLET ORAL DAILY
Qty: 30 TABLET | Refills: 0 | Status: SHIPPED | OUTPATIENT
Start: 2020-12-16 | End: 2020-12-23

## 2020-12-16 RX ORDER — PREDNISONE 20 MG/1
20 TABLET ORAL DAILY
Status: COMPLETED | OUTPATIENT
Start: 2020-12-16 | End: 2020-12-16

## 2020-12-16 RX ORDER — DIPHENHYDRAMINE HCL 25 MG
25 TABLET ORAL ONCE
Status: COMPLETED | OUTPATIENT
Start: 2020-12-16 | End: 2020-12-16

## 2020-12-16 RX ADMIN — CLOPIDOGREL BISULFATE 75 MG: 75 TABLET ORAL at 08:54

## 2020-12-16 RX ADMIN — PANTOPRAZOLE SODIUM 40 MG: 40 TABLET, DELAYED RELEASE ORAL at 20:33

## 2020-12-16 RX ADMIN — ATORVASTATIN CALCIUM 80 MG: 80 TABLET, FILM COATED ORAL at 20:33

## 2020-12-16 RX ADMIN — RIVAROXABAN 10 MG: 10 TABLET, FILM COATED ORAL at 17:49

## 2020-12-16 RX ADMIN — DIPHENHYDRAMINE HCL 25 MG: 25 TABLET ORAL at 08:53

## 2020-12-16 RX ADMIN — Medication 25 MG: at 08:53

## 2020-12-16 RX ADMIN — PREDNISONE 20 MG: 20 TABLET ORAL at 08:56

## 2020-12-16 RX ADMIN — ASPIRIN 81 MG: 81 TABLET, CHEWABLE ORAL at 08:54

## 2020-12-16 ASSESSMENT — PAIN SCALES - GENERAL: PAINLEVEL_OUTOF10: 0

## 2020-12-16 NOTE — PROGRESS NOTES
[facility]  INPATIENT SPEECH THERAPY  [facility] 8 REHAB UNIT    TIME   Time In: 1300  Time Out: 1400  Minutes: 60       [x]Daily Note  []Progress Note    Date: 2020  Patient Name: Yennifer Hooper        MRN: [id number]    Account #: [de-identified]  : 1955  (75 y.o.)  Gender: female   Primary Provider: Shaun Mckinney MD  Swallowing Status/Diet: Regular diet, thin liquids + a chin tuck     PATIENT DIAGNOSIS(ES):    Diagnosis: CVA (acute ischemia bilateral brendon), R sided weakness      Additional Pertinent Hx: Hemiplegia/hemiparesis following cerebral infarction affecting left non-dominant side (R side UE and LE weakness); Dysarthria; Dysphagia, oropharyngeal phase; Hyperlipidemais, unspecified; Nicotine dependence, unspecified, uncomplicated; Surgical Hx of breast surgery x4, C-sectionx1, cyst removal from leg, tonsillectomy, R total shoulder arthroplasty w/ distal clavicle excision on      RESTRICTIONS/PRECAUTIONS:    Restrictions/Precautions  Restrictions/Precautions: Weight Bearing, Fall Risk(Aspiration risk )  Pt must use chin tuck with thin liquids       Subjective: The patient was upright in bed. She denied any pain this date. Objective:  Her MBSS was reviewed in detail. Reviewed all liquid and food presentations and discussed need for dry swallows after every bite and sip to clear pharyngeal residue. She was able to watch sips of thin liquids without a chin tuck. Laryngeal penetration and aspiration were observed when she did not use a chin tuck. Trace laryngeal penetration was noted with a few sips of thin with a chin tuck, but this was much safer than without the chin tuck. Handouts for vocal adduction exercises were provided. Pharyngeal exercises (effortful and hoang exercises) handouts were reviewed. All exercises were completed to insure correct execution. Strained vocal quality and decreased vocal intensity still observed. She is recommended for ENT evaluation as an outpatient. Dysarthria strategies were reviewed and practiced during structured tasks and conversation. All questions were answered this date relating to discharge and home health services. She is recommended to receive home health speech therapy services following discharge. Recommended Diet:  Solid consistency: Regular  Liquid consistency: Thin + CHIN TUCK  Liquid administration via: Cup(No straws)     Medication administration: Meds in puree(Meds whole in applesauce or pudding)     Safe Swallow Protocol:  Supervision: Independent  Compensatory Swallowing Strategies: Alternate solids and liquids;Small bites/sips; Remain upright for 30-45 minutes after meals;Upright as possible for all oral intake;Eat/Feed slowly       SHORT TERM GOAL #1:  Goal 1: The patient will tolerate minced and moist diet consistency with nectar thick liquids with min/no overt s/s of aspiration. SHORT TERM GOAL #2:  Goal 2: The patient will complete pharyngeal strengthening exercises with min verbal cues at 80% accuracy to improve airway protection. SHORT TERM GOAL #3:  Goal 3: The patient will complete daily oral motor exercises to improve labial/lingual/buccal strength and ROM to improve oral phase of swallow and speech intelligibility. SHORT TERM GOAL #4:  Goal 4: The patient will use the (over articulation/slow rate/writing key word/elongation of thevowel/increased loudness/phrasing) strategy to improve speech intelligibility withwords/phrases/sentences/in conversation with 100% accuracy.     SHORT TERM GOAL #5:  Goal 5: Patient will complete repeat MBSS for potential upgrade    Swallowing Short Term Goals  Short-term Goals Goal 1: The patient will complete the hoang (tongue hold) technique to improve base of tongue retraction and base of tongue to pharyngeal wall approximation with 90% accuracy and minimal verbal cues. Goal 2: The patient will complete the Mendelsohn maneuver to improve hyolaryngeal elevation and clear vallecular residue with 90% accuracy and minimal verbal cues  Goal 3: The patient will complete the effortful swallow maneuver to improve hyolaryngeal elevation, tongue base retraction, and vocal fold closure with 90% accuracy and minimal verbal cues. Goal 4: The patient will complete daily oral hygiene to prevent aspiration of bacteria laden secretions. Goal 5: The patient will perform compensatory swallow strategies (chin tuck) to eliminate s/s of aspiration and tolerate the least restrictive diet with (min) verbal cues. Goal 6: The patient will tolerate a dysphagia soft and bite sized diet with thin liquids plus a chin tuck with minimal overt s/s of aspiration.          ASSESSMENT:  Assessment: [x]Progressing towards goals          []Not Progressing towards goals    Patient Tolerance of Treatment:   [x]Tolerated well []Tolerated fair []Required rest breaks []Fatigued    Education:  Learner:  [x]Patient          []Significant Other          []Other  Education provided regarding:  [x]Goals and POC   [x]Diet and swallowing precautions    [x]Home Exercise Program  [x]Progress and/or discharge information  Method of Education:  [x]Discussion          []Demonstration          []Handout          []Other  Evaluation of Education:   [x]Verbalized understanding   [x]Demonstrates without assistance  []Demonstrates with assistance  []Needs further instruction     []No evidence of learning                  []No family present      Plan: [x]Continue with current plan of care    []Modify current plan of care as follows:    []Discharge patient    Discharge Location:    Services/Supervision Recommended: [x]Patient continues to require treatment by a licensed therapist to address functional deficits as outlined in the established plan of care.         Electronically Signed By:  Radha Simmons  12/16/2020,1:30 PM.                    Electronically Signed By:  Chung Real M.S. [id number] [place] [address]  12/16/2020,1:21 PM.

## 2020-12-16 NOTE — PROGRESS NOTES
Patient:   Jacqueline Benz  MR#:    [id number]   Room:    [address] [place] [address]   YOB: 1955  Date of Progress Note: 12/16/2020  Time of Note                           8:19 AM  Consulting Physician:   Luz Moreno M.D. Attending Physician:  Luz Moreno MD     Chief complaint Acute ischemic stroke     S:This 75 y.o. female  pt admitted to [facility] on 11/26/2020 w/R sided weakness. Pt woke up on am of 11/26 around 5am and got up, went to the kitchen to make coffee and fell after diffuse tingling an R sided weakness noted. At baseline, she does not move her R arm well proximally due to fx in 11/2019. She managed to unlock her door before going back to be, in case she would need help. she stayed in bed for 4 hours and noted she was stronger, however, she  got up again around 10 am and fell again due to R sided weakness. She presented to [facility] ED where on presentation she was out of tPA window and NIHSS was 0, but then she developed worsening R sided weakness again. She was related that she can choke on her saliva when she swallows. Pt was alert & oriented x 3 w/ mild dysarthria. Pt also noted to have new onset tremor. MRI done on 11/27 showed acute ischemia in the bilateral brendon. Over the weekend, she had worsening R sided weakness. EEG was done and read as normal. She is on a mechanical soft diet w/nectar thick liquids. She is now medically stable and is participating with therapy. She is ready to begin rehab w/plan of returning home after rehab dc w/support from her family. No acute issues overnight.     REVIEW OF SYSTEMS:  Constitutional: No fevers No chills  Neck:No stiffness  Respiratory: No shortness of breath  Cardiovascular: No chest pain No palpitations  Gastrointestinal: No abdominal pain    Genitourinary: No Dysuria  Neurological: No headache, no confusion    Past Medical History:      Diagnosis Date    Abnormal Pap smear of cervix     1991    History of cryosurgery 1991 Past Surgical History:      Procedure Laterality Date    BREAST CYST EXCISION Bilateral     X4      SECTION  1981    JOINT REPLACEMENT      right shoulder    TONSILLECTOMY         Medications in Hospital:      Current Facility-Administered Medications:     rivaroxaban (XARELTO) tablet 10 mg, 10 mg, Oral, Daily, Riana Hawkins MD, 10 mg at 12/15/20 1705    pantoprazole (PROTONIX) tablet 40 mg, 40 mg, Oral, Nightly, Riana Hawkins MD, 40 mg at 12/15/20 2058    ondansetron (ZOFRAN-ODT) disintegrating tablet 4 mg, 4 mg, Oral, Q8H PRN, Riana Hawkins MD, 4 mg at 20    aspirin chewable tablet 81 mg, 81 mg, Oral, Daily, Riana Hawkins MD, 81 mg at 12/15/20 0740    atorvastatin (LIPITOR) tablet 80 mg, 80 mg, Oral, Nightly, Riana Hawkins MD, 80 mg at 12/15/20 2057    clopidogrel (PLAVIX) tablet 75 mg, 75 mg, Oral, Daily, Riana Hawkins MD, 75 mg at 12/15/20 0740    cyclobenzaprine (FLEXERIL) tablet 10 mg, 10 mg, Oral, TID PRN, Riana Hawkins MD, 10 mg at 20    acetaminophen (TYLENOL) tablet 650 mg, 650 mg, Oral, Q4H PRN, Riana Hawkins MD    polyethylene glycol (GLYCOLAX) packet 17 g, 17 g, Oral, Daily PRN, Riana Hawkins MD, 17 g at 20    Allergies:  Patient has no known allergies. Social History:   TOBACCO:   reports that she has been smoking. She has never used smokeless tobacco.  ETOH:   reports current alcohol use. Family History:       Problem Relation Age of Onset    Heart Attack Maternal Grandmother     Colon Cancer Father     Colon Cancer Mother          PHYSICAL EXAM:  /70   Pulse 62   Temp 96 °F (35.6 °C) (Temporal)   Resp 20   Ht 5' 6\" (1.676 m)   Wt 163 lb (73.9 kg)   SpO2 96%   Breastfeeding No   BMI 26.31 kg/m²     Constitutional  well developed, well nourished.    Eyes  conjunctiva normal.   Ear, nose, throat - No scars, masses, or lesions over external nose or ears, no atrophy of tongue Neck-symmetric, no masses noted, no jugular vein distension  Respiration- chest wall appears symmetric, good expansion,   normal effort without use of accessory muscles  Musculoskeletal  no significant wasting of muscles noted, no bony deformities  Extremities-no clubbing, cyanosis or edema  Skin  warm, dry, and intact. No rash, erythema, or pallor. Psychiatric  mood, affect, and behavior appear normal.      Neurological exam  Awake, alert, fluent oriented appropriate affect slightly slow dysarthria  Attention and concentration appear appropriate  Recent and remote memory appears unremarkable  Speech with dysarthria and some expressive aphasia  No clear issues with language of fund of knowledge     Cranial Nerve Exam     CN III, IV,VI-EOMI, No nystagmus, conjugate eye movements, no ptosis    CN VII-right lower facial droop       Motor Exam  antigravity throughout upper and lower extremities bilaterally  Drift right arm and right leg      Tremors- no tremors in hands or head noted     Gait  Not tested      Nursing/pcp notes, imaging,labs and vitals reviewed.      PT,OT and/or speech notes reviewed    Lab Results   Component Value Date    WBC 7.1 12/14/2020    HGB 13.1 12/14/2020    HCT 39.7 12/14/2020    MCV 90.6 12/14/2020     12/14/2020     Lab Results   Component Value Date     12/14/2020    K 4.0 12/14/2020     12/14/2020    CO2 27 12/14/2020    BUN 16 12/14/2020    CREATININE 0.8 12/14/2020    GLUCOSE 101 12/14/2020    CALCIUM 8.8 12/14/2020    PROT 5.5 (L) 12/14/2020    LABALBU 3.8 12/14/2020    BILITOT <0.2 12/14/2020    ALKPHOS 108 (H) 12/14/2020    AST 23 12/14/2020    ALT 27 12/14/2020    LABGLOM >60 12/14/2020    GFRAA >59 12/14/2020     Lab Results   Component Value Date    INR 1.04 12/01/2020    PROTIME 13.5 12/01/2020       GRANT Penaloza   Occupational Therapist Assistant   Occupational   Progress Notes   Cosign Needed   Date of Service:  12/15/2020  3:55 PM Cosign Needed             Show:Clear all  []Manual[x]Template[]Copied    Added by:  [x]GRANT Mckee    []Liv for details  Occupational Therapy       12/15/20 1300   General   Diagnosis CVA with right hemiparesis   Pain Assessment   Patient Currently in Pain No   Pain Assessment 0-10   Pain Level 0   ADL   Toileting Modified independent    Balance   Sitting Balance Independent   Standing Balance Modified independent    Functional Mobility   Functional - Mobility Device Rolling Walker   Activity To/from bathroom; Other   Assist Level Modified independent    Transfers   Sit to stand Modified independent   Stand to sit Modified independent   Toilet Transfers   Toilet - Technique Ambulating   Equipment Used Grab bars   Toilet Transfer Modified independent   Coordination   Fine Motor Completed theraputty HEP with RUE. Assessment   Performance deficits / Impairments Decreased functional mobility ; Decreased coordination;Decreased fine motor control;Decreased high-level IADLs;Decreased ROM; Decreased strength   Treatment Diagnosis CVA with right hemiparesis   Prognosis Good   Timed Code Treatment Minutes 45 Minutes   Activity Tolerance   Activity Tolerance Patient Tolerated treatment well   Safety Devices   Safety Devices in place N/A   Type of devices    (Up ad seema.)   Plan   Current Treatment Recommendations Functional Mobility Training;ROM; Home Management Training;Strengthening                        RECORD REVIEW: Previous medical records, medications were reviewed at today's visit    IMPRESSION:   1. Acute ischemic stroke-bilateral [id number] ASA/statin/Plavix  2. Hyperlipidemia-statin  3. DVT prophylaxis-changed to Xarelto as per patient's wishes  4. GI-bowel regimen  5. Chronic right shoulders-monitor  6. History of chronic opiod use due to shoulder issues  7. Smoker  8.  PT/OT/Speech     Continue current care as noted    DC tomorrow Expected duration and frequency therapy: 180 minutes per day, 5 days per week    [address] [facility]  [phone] CELL  Dr Brandie Day

## 2020-12-16 NOTE — PROGRESS NOTES
Occupational Therapy     12/16/20 1100   General   Diagnosis CVA with right hemiparesis   Pain Assessment   Patient Currently in Pain No   Pain Assessment 0-10   Pain Level 0   Balance   Sitting Balance Independent   Standing Balance Modified independent    Standing Balance   Time 4 minutes, 6 minutes   Activity 2 handed static standing task. Functional Mobility   Functional - Mobility Device Rolling Walker   Activity Other   Assist Level Modified independent    Transfers   Sit to stand Modified independent   Stand to sit Modified independent   Type of ROM/Therapeutic Exercise   Type of ROM/Therapeutic Exercise Free weights;AROM   Comment Free weights: 2# LUE all planes, 1# RUE distal, 1 set x 15 reps. AROM: RUE shldr, 1 set x 10 reps. Independent with HEP. Assessment   Performance deficits / Impairments Decreased functional mobility ; Decreased coordination;Decreased fine motor control;Decreased high-level IADLs;Decreased ROM; Decreased strength   Treatment Diagnosis CVA with right hemiparesis   Prognosis Good   Timed Code Treatment Minutes 60 Minutes   Activity Tolerance   Activity Tolerance Patient Tolerated treatment well   Safety Devices   Safety Devices in place N/A   Type of devices Call light within reach   Plan   Current Treatment Recommendations Functional Mobility Training;ROM; Home Management Training;Strengthening

## 2020-12-16 NOTE — DISCHARGE SUMMARY
Neurology Discharge Summary     Patient Identification:  Nereida Jacques is a 75 y.o. female. :  1955  Admit Date:  2020  Discharge date : 2020   Attending Provider: Adam Dougherty MD     Account Number: [de-identified]                                   Admission Diagnoses:   Acute ischemic stroke [facility]) [I63.9]    Discharge Diagnoses:  Principal Problem:    Acute ischemic stroke [facility])  Active Problems:    Weakness    Dysphagia due to recent cerebral infarction    Dysarthria    Chronic right shoulder pain    Smoker    Gait abnormality  Resolved Problems:    * No resolved hospital problems.  *      Discharge Medications:    Current Discharge Medication List           Details   predniSONE (DELTASONE) 20 MG tablet Take 1 tablet by mouth daily  Qty: 30 tablet, Refills: 0      pantoprazole (PROTONIX) 40 MG tablet Take 1 tablet by mouth nightly  Qty: 30 tablet, Refills: 0              Details   aspirin 81 MG chewable tablet Take 1 tablet by mouth daily  Qty: 30 tablet, Refills: 0      atorvastatin (LIPITOR) 80 MG tablet Take 1 tablet by mouth nightly  Qty: 30 tablet, Refills: 0      clopidogrel (PLAVIX) 75 MG tablet Take 1 tablet by mouth daily  Qty: 30 tablet, Refills: 0           Current Discharge Medication List      STOP taking these medications       cyclobenzaprine (FLEXERIL) 10 MG tablet Comments:   Reason for Stopping:                 Consults:   none Hospital Course: This 75 y.o. female  pt admitted to [facility] on 11/26/2020 w/R sided weakness. Pt woke up on am of 11/26 around 5am and got up, went to the kitchen to make coffee and fell after diffuse tingling an R sided weakness noted. At baseline, she does not move her R arm well proximally due to fx in 11/2019.  She managed to unlock her door before going back to be, in case she would need help. she stayed in bed for 4 hours and noted she was stronger, however, she  got up again around 10 am and fell again due to R sided weakness. She presented to [facility] ED where on presentation she was out of tPA window and NIHSS was 0, but then she developed worsening R sided weakness again. She was related that she can choke on her saliva when she swallows. Pt was alert & oriented x 3 w/ mild dysarthria. Pt also noted to have new onset tremor. MRI done on 11/27 showed acute ischemia in the bilateral brendon. Over the weekend, she had worsening R sided weakness. EEG was done and read as normal. She was on a mechanical soft diet w/nectar thick liquids on admission to rehab. She was medically stable and was participating with therapy. She was ready to begin rehab w/plan of returning home after rehab dc w/support from her family. The patient was admitted to inpatient rehab with improvement. Plan DC home with home health.            Discharge Instructions     Patient Instructions:   Home  Therapy orders: PT, OT, SLP and nursing    Discharge lab work: none  Code status: Full Code   Activity: activity as tolerated  Diet: DIET GENERAL;    Wound Care: as directed  Equipment: as per therapy      Evelyn Saldana MD    At least 35 minutes were spent in discharging the patient

## 2020-12-16 NOTE — PROGRESS NOTES
Occupational Therapy     12/14/20 1100   Restrictions/Precautions   Restrictions/Precautions Up Ad [place]   General   Additional Pertinent Hx reverse R shoulder replacement approximately 1 yr ago   Diagnosis CVA with right hemiparesis   Balance   Standing Balance Modified independent    Standing Balance   Time 5.5 mins   Activity R hand FM task   Functional Mobility   Functional - Mobility Device Rolling Walker   Assist Level Modified independent    Transfers   Sit to stand Modified independent   Stand to sit Modified independent   Type of ROM/Therapeutic Exercise   Type of ROM/Therapeutic Exercise Free weights   Comment 1-2#    Coordination   Fine Motor R FM acts   Fine Motor Skills   Right 9 Hole Peg Test Time (secs) 32   Assessment   Performance deficits / Impairments Decreased functional mobility ; Decreased coordination;Decreased fine motor control;Decreased high-level IADLs;Decreased ROM; Decreased strength   Treatment Diagnosis CVA with right hemiparesis   Timed Code Treatment Minutes 60 Minutes   Activity Tolerance   Activity Tolerance Patient Tolerated treatment well   Safety Devices   Safety Devices in place N/A

## 2020-12-16 NOTE — PROCEDURES
INSTRUMENTAL SWALLOW REPORT  MODIFIED BARIUM SWALLOW    NAME: Paty Prasad     : 1955  MRN: [id number]     Date of Eval: 2020     Ordering Physician: Dr. Marleen Juarez  Radiologist: Dr. Marleen Doss    Referring Diagnosis(es):   Acute ischemic stroke I63.9, Weakness R53.1, Dysphagia due to recent cerebral infarction I69.391, Dysarthria R47.1      History:  Per Neurology: BAVV 49 y.o. female  pt admitted to [facility] on 2020 w/R sided weakness. Pt woke up on am of  around 5am and got up, went to the kitchen to make coffee and fell after diffuse tingling an R sided weakness noted. At baseline, she does not move her R arm well proximally due to fx in 2019.  She managed to unlock her door before going back to be, in case she would need help. she stayed in bed for 4 hours and noted she was stronger, however, she  got up again around 10 am and fell again due to R sided weakness. She presented to [facility] ED where on presentation she was out of tPA window and NIHSS was 0, but then she developed worsening R sided weakness again. She was related that she can choke on her saliva when she swallows. Pt was alert & oriented x 3 w/ mild dysarthria. Pt also noted to have new onset tremor. MRI done on  showed acute ischemia in the bilateral brendon. Over the weekend, she had worsening R sided weakness. EEG was done and read as normal. She is on a mechanical soft diet w/nectar thick liquids. She is now medically stable and is participating with therapy. She is ready to begin rehab w/plan of returning home after rehab dc w/support from her family. No new issues. Past Medical History:    has a past medical history of Abnormal Pap smear of cervix and History of cryosurgery. Past Surgical History:    has a past surgical history that includes  section (); Breast cyst excision (Bilateral); Tonsillectomy; and joint replacement.     Current Diet Solid Consistency:   Regular Current Diet Liquid Consistency:   Thin(With a chin tuck)    Per medical records and patient report:   The patient has a barium allergy and could not complete an MBSS at [facility]. A FEES was completed and she does not wish to have a repeat FEES. She would prefer to do an MBSS even with the knowledge of her barium allergy. (per patient report she completed an upper GI 10-15 years ago and experienced tingly scalp and red facial color). She was given an Epipen and took Benadryl for a few days after the procedure. She did report she had barium twenty years ago, and had no reaction. Discussed this with radiology and radiologist. The radiologist has approved a repeat MBSS. He did suggest considering giving her benadryl prior to the procedure. She did not have an allergic reaction which affected her airway (no throat swelling or difficulty breathing). She only reported the scalp tingling and facial redness.      Due to past reaction after consuming barium, the patient was given 20 mg prednisone and benadryl this morning prior to her modified barium swallow study this date. Results of Prior Study: [facility]20[facility]   [facility] Results were:No aspiration was observed this study. Silent laryngeal penetration was observed with thin liquids with her head in the straight position. When she used a chin tuck with thin liquids via cup, no further laryngeal penetration was observed. She is recommended to upgrade to a dysphagia soft and bite sized diet. She may have thin liquids via cup while utilizing a chin tuck. Medications should be presented whole in applesauce or pudding. Patient Complaints/Reason for Referral:  Yaneth Chou was referred for a MBS to assess the efficiency of his/her swallow function, assess for aspiration, and to make recommendations regarding safe dietary consistencies, effective compensatory strategies, and safe eating environment.     Behavior/Cognition/Vision/Hearing: Behavior/Cognition: Alert; Cooperative;Pleasant mood    Impressions:  Silent aspiration of thin liquids via cup was observed this date. Thin liquids via cup plus a chin tuck did prove most effective. She will need to swallow several times after all bites and sips to clear pharyngeal residue. She is recommended to receive a regular diet with thin liquids. Medications should be whole in puree or applesauce. She is recommended to receive home health speech therapy to continue dysphagia therapy. She is recommended to repeat the MBSS in one to two months to determine if the chin tuck can be discontinued. Patient Position:   Lateral and Patient Degrees: 90      Consistencies Administered:   Reg solid; Dysphagia Soft and Bite-Sized (Dysphagia III); Dysphagia Pureed (Dysphagia I); Thin cup Premature loss of the bolus over the tongue base to the valleculae then the pyriforms. A trace amount of silent laryngeal penetration was observed during the swallow. This was ejected from the airway with swallow completion. Thin liquids via cup were again presented and min to moderate vallecular and pyriform residue was noted. Cued dry swallows did clear the material. Thin liquids via cup with a chin tuck resulted in premature loss to the valleculae then the pyriforms. A trace amount of silent laryngeal penetration during the swallow was noted. The material did eject from the airway with swallow completion. A moderate amount of pyriform residue and min to moderate vallecular residue was observed. Cued dry swallows while still in the chin tuck were effective in clearing the residue. Thin liquids via cup with a chin tuck were again presented and premature loss to the valleculae was noted. Min vallecular and moderate pyriform residue was observed. Cued dry swallows cleared the residue. Thin liquids via cup were completed without a chin tuck and silent aspiration before the swallow was observed due to premature loss of the bolus over the tongue base to the valleculae then pyriforms then the laryngeal vestibule. The material did not eject. Pudding required cued dry swallows to clear vallecular and pyriform residue. Puree resulted in minim vallecular residue. Mechanical soft resulted in premature loss of the bolus and undercoating of the epiglottis. Residue at the pyriforms and valleculae remained despite cued dry swallows. Solid resulted in premature loss to the valleculae. Min to mod pyriform residue noted. Thin liquids via cup with a chin tuck resulted in a trace amount of laryngeal penetration above the vocal folds with ejection from the airway.       Postural Changes and/or Swallow Maneuvers Trialed:   Chin tuck      Oral Phase: Mild to moderate oral phase dysphagia due to premature loss of the bolus over the tongue base. Mild labial spillage. Mild base of tongue residue was observed. Pharyngeal:   Mild to moderate pharyngeal phase dysphagia. Silent aspiration of thin liquids via cup without a chin tuck. Consistent silent laryngeal penetration of subsequent presentations of thin liquids via cup. Thin liquids with a chin tuck did prove most effective in preventing further consistent laryngeal penetration. Cued dry swallows were necessary after all sips and bites to help clear pharyngeal residue. Decreased hyolaryngeal elevation, decreased anterior hyoid excursion, and intermittent incomplete epiglottic inversion. Dysphagia Outcome Severity Scale:   Level 4: Mild moderate dysphagia- Intermittent supervision/cueing. One - two diet consistencies restricted    Recommended Diet:  Solid consistency: Regular  Liquid consistency: Thin(with a chin tuck)  Liquid administration via: Cup(No Straws)    Medication administration: Meds in puree(Meds whole in puree or applesauce)    Safe Swallow Protocol:  Supervision: Independent  Compensatory Swallowing Strategies: Alternate solids and liquids;Small bites/sips; Remain upright for 30-45 minutes after meals;Upright as possible for all oral intake;Eat/Feed slowly      Recommendations/Treatment  Requires SLP Intervention: Yes  Recommendations: F/U MBS(Repeat MBSS in one to two months)     D/C Recommendations:   Home ST    Postural Changes and/or Swallow Maneuvers:   Chin tuck    Recommended Exercises:    Therapeutic Interventions: Oral care;Diet tolerance monitoring;Oral motor exercises; Patient/Family education;Pharyngeal exercises      Education:   Images and recommendations were reviewed with the patient following this exam.   Patient Education: Reviewed the MBSS, swallowing precautions, instructions for the chin tuck, and diet recommendations.   Patient Education Response: Verbalizes understanding Goals:    Long Term:   Goal 1: The patient will maintain adequate hydration/nutrition with optimum safety and efficiency of swallowing function with P.O. intake without overt signs and symptoms of aspiration for the highest appropriate diet level        Short Term:  Goal 1: The patient will complete the hoang (tongue hold) technique to improve base of tongue retraction and base of tongue to pharyngeal wall approximation with 90% accuracy and minimal verbal cues. Goal 2: The patient will complete the Mendelsohn maneuver to improve hyolaryngeal elevation and clear vallecular residue with 90% accuracy and minimal verbal cues  Goal 3: The patient will complete the effortful swallow maneuver to improve hyolaryngeal elevation, tongue base retraction, and vocal fold closure with 90% accuracy and minimal verbal cues. Goal 4: The patient will complete daily oral hygiene to prevent aspiration of bacteria laden secretions. Goal 5: The patient will perform compensatory swallow strategies (chin tuck) to eliminate s/s of aspiration and tolerate the least restrictive diet with (min) verbal cues. Goal 6: The patient will tolerate regular diet with thin liquids plus a chin tuck with minimal overt s/s of aspiration.           Pain   Patient Currently in Pain: No  Pain Level: 0            12/16/2020, 10:33 AM    Electronically Signed By:  Roro Gill M.S., CCC-SLP  12/16/2020,10:40 AM.

## 2020-12-16 NOTE — PROGRESS NOTES
12/16/20 1013 12/16/20 1014 12/16/20 1015   Subjective   Subjective  --  States no issues with being up ad seema in room. --    Pain Screening   Patient Currently in Pain No  --   --    Transfers   Sit to Stand  --   --   --    Stand to sit  --   --   --    Car Transfer  --   --   --    Ambulation   Ambulation?  --   --   --    WB Status  --   --   --    Ambulation 1   Surface  --   --   --    Device  --   --   --    Assistance  --   --   --    Quality of Gait  --   --   --    Distance  --   --   --    Comments  --   --   --    Wheelchair Activities   Propulsion  --   --  Yes   Propulsion 1   Propulsion  --   --  Manual   Level  --   --  Level Tile   Method  --   --  RUE;SILVIANO   Level of Assistance  --   --  Independent   Description/ Details  --   --  Incorporated turns. Distance  --   --  200'   Exercises   Comments  --   --   --    Conditions Requiring Skilled Therapeutic Intervention   Assessment  --   --   --    Activity Tolerance   Activity Tolerance  --   --   --       12/16/20 1033 12/16/20 1034 12/16/20 1044   Subjective   Subjective  --   --   --    Pain Screening   Patient Currently in Pain  --   --   --    Transfers   Sit to Stand Independent  --   --    Stand to sit Independent  --   --    Car Transfer Stand by assistance  --   --    Ambulation   Ambulation?  --  Yes  --    WB Status  --  WBAT  --    Ambulation 1   Surface  --  level tile  --    Device  --  Rolling Walker  --    Assistance  --  Stand by assistance  --    Quality of Gait  --  Weakness noted in RLE, shaheen with fatigue. --    Distance  --  200' x2  --    Comments  --  Incorporated turns. --    Wheelchair Activities   Propulsion  --   --   --    Propulsion 1   Propulsion  --   --   --    Level  --   --   --    Method  --   --   --    Level of Assistance  --   --   --    Description/ Details  --   --   --    Distance  --   --   --    Exercises   Comments  --   --  Sitting BLE ex x15 reps. 1 lb weight on RLE.  1-1/2 lb weight on LLE. Conditions Requiring Skilled Therapeutic Intervention   Assessment  --   --  Misael session well. Increased amb distance today.    Activity Tolerance   Activity Tolerance  --   --  Patient Tolerated treatment well   Electronically signed by Betty Ellison PTA on 12/16/2020 at 10:46 AM

## 2020-12-17 VITALS
WEIGHT: 163 LBS | HEART RATE: 60 BPM | TEMPERATURE: 97.2 F | BODY MASS INDEX: 26.2 KG/M2 | SYSTOLIC BLOOD PRESSURE: 120 MMHG | HEIGHT: 66 IN | DIASTOLIC BLOOD PRESSURE: 72 MMHG | RESPIRATION RATE: 16 BRPM | OXYGEN SATURATION: 97 %

## 2020-12-17 LAB
ALBUMIN SERPL-MCNC: 3.7 G/DL (ref 3.5–5.2)
ALP BLD-CCNC: 99 U/L (ref 35–104)
ALT SERPL-CCNC: 18 U/L (ref 5–33)
ANION GAP SERPL CALCULATED.3IONS-SCNC: 8 MMOL/L (ref 7–19)
AST SERPL-CCNC: 14 U/L (ref 5–32)
BASOPHILS ABSOLUTE: 0 K/UL (ref 0–0.2)
BASOPHILS RELATIVE PERCENT: 0.5 % (ref 0–1)
BILIRUB SERPL-MCNC: 0.3 MG/DL (ref 0.2–1.2)
BUN BLDV-MCNC: 17 MG/DL (ref 8–23)
CALCIUM SERPL-MCNC: 9 MG/DL (ref 8.8–10.2)
CHLORIDE BLD-SCNC: 109 MMOL/L (ref 98–111)
CO2: 28 MMOL/L (ref 22–29)
CREAT SERPL-MCNC: 0.7 MG/DL (ref 0.5–0.9)
EOSINOPHILS ABSOLUTE: 0.1 K/UL (ref 0–0.6)
EOSINOPHILS RELATIVE PERCENT: 1.7 % (ref 0–5)
GFR AFRICAN AMERICAN: >59
GFR NON-AFRICAN AMERICAN: >60
GLUCOSE BLD-MCNC: 90 MG/DL (ref 74–109)
HCT VFR BLD CALC: 38.8 % (ref 37–47)
HEMOGLOBIN: 12.6 G/DL (ref 12–16)
IMMATURE GRANULOCYTES #: 0 K/UL
LYMPHOCYTES ABSOLUTE: 2.1 K/UL (ref 1.1–4.5)
LYMPHOCYTES RELATIVE PERCENT: 26.4 % (ref 20–40)
MAGNESIUM: 2.2 MG/DL (ref 1.6–2.4)
MCH RBC QN AUTO: 29.9 PG (ref 27–31)
MCHC RBC AUTO-ENTMCNC: 32.5 G/DL (ref 33–37)
MCV RBC AUTO: 91.9 FL (ref 81–99)
MONOCYTES ABSOLUTE: 0.5 K/UL (ref 0–0.9)
MONOCYTES RELATIVE PERCENT: 5.5 % (ref 0–10)
NEUTROPHILS ABSOLUTE: 5.3 K/UL (ref 1.5–7.5)
NEUTROPHILS RELATIVE PERCENT: 65.7 % (ref 50–65)
PDW BLD-RTO: 13.3 % (ref 11.5–14.5)
PLATELET # BLD: 284 K/UL (ref 130–400)
PMV BLD AUTO: 10.9 FL (ref 9.4–12.3)
POTASSIUM REFLEX MAGNESIUM: 3.4 MMOL/L (ref 3.5–5)
RBC # BLD: 4.22 M/UL (ref 4.2–5.4)
SODIUM BLD-SCNC: 145 MMOL/L (ref 136–145)
TOTAL PROTEIN: 5.9 G/DL (ref 6.6–8.7)
WBC # BLD: 8.1 K/UL (ref 4.8–10.8)

## 2020-12-17 PROCEDURE — 83735 ASSAY OF MAGNESIUM: CPT

## 2020-12-17 PROCEDURE — 80053 COMPREHEN METABOLIC PANEL: CPT

## 2020-12-17 PROCEDURE — 85025 COMPLETE CBC W/AUTO DIFF WBC: CPT

## 2020-12-17 PROCEDURE — 6370000000 HC RX 637 (ALT 250 FOR IP): Performed by: PSYCHIATRY & NEUROLOGY

## 2020-12-17 PROCEDURE — 36415 COLL VENOUS BLD VENIPUNCTURE: CPT

## 2020-12-17 PROCEDURE — 99239 HOSP IP/OBS DSCHRG MGMT >30: CPT | Performed by: PSYCHIATRY & NEUROLOGY

## 2020-12-17 RX ADMIN — ASPIRIN 81 MG: 81 TABLET, CHEWABLE ORAL at 09:26

## 2020-12-17 RX ADMIN — CLOPIDOGREL BISULFATE 75 MG: 75 TABLET ORAL at 09:26

## 2020-12-17 ASSESSMENT — PAIN SCALES - GENERAL: PAINLEVEL_OUTOF10: 0

## 2020-12-17 NOTE — CARE COORDINATION
Ms. Florecita Alexandre is being discharged to her daughters' [address] [address]) home for assistance and care. Referral made to [facility] for continuing therapy. New primary care scheduled- [address] in [facility].

## 2020-12-17 NOTE — DISCHARGE SUMMARY
Occupational Therapy Discharge Summary         Date: 2020  Patient Name: Paty Prasad        MRN: [id number]    : 1955  (75 y.o.)  Gender: female      Diagnosis: CVA (acute ischemia bilateral brendon), R sided weakness   Restrictions/Precautions  Restrictions/Precautions: Up Ad Aziza      Discharge Date: 2020    UE Functioning:  RUE: limited ROM proximally due to hx of reverse shoulder replacement in 2019, WFLs distally     Home Management:  Functional Mobility  Functional - Mobility Device: Rolling Walker  Activity: Other  Assist Level: Modified independent   Functional Mobility Comments: Completed light homemaking tasks in ADL apartment. Adaptive Equipment/DME Status:  Rolling walker, Bedside commode     Pain Assessment:  Pain Level: 0       Remaining Problems:  Decreased functional mobility ; Decreased coordination; Decreased fine motor control; Decreased high-level IADLs; Decreased ROM;  Decreased strength    STGs:  Short term goals  Time Frame for Short term goals: 1 week  Short term goal 1: pt will complete UB dressing with setup  Short term goal 2: pt will complete LB dressing with min A  Short term goal 3: pt will complete overall toileting with min A  Short term goal 4: pt will complete overall bathing with min A  Short term goal 5: pt will complete simple home making task with min A using recommended mobility means  Short term goal 6: pt will complete 1 handed standing level task for 3 mins with min A  Short term goal 7: pt will complete R GM/FM acts x 5 occasions to increase coordination for ADLs  MET  Short Term Goals    LTGs:  Long term goals  Time Frame for Long term goals : 3-4 weeks  Long term goal 1: pt will complete overall dressing with modified independence  Long term goal 2: pt will complete overall toileting with modified independence  Long term goal 3: pt will complete overall bathing with modified independence Long term goal 4: pt will complete simple ambulatory home making task with modified independence  Long term goal 5: pt will complete HEP with independence  Long term goals 6: verbalize DME for home  MET 6/6 Long Term Goals    Discharge Setting and Recommendations:  Home Health OT     Discharge Care Scores  Eating: CARE Score: 6  Oral Hygiene: CARE Score: 6  Toileting: CARE Score: 6  Shower/Bathe: CARE Score: 6  Upper Body Dressing: CARE Score: 6  Lower Body Dressing: CARE Score: 6  Footwear: CARE Score: 6  Toilet Transfers: CARE Score: 6  Picking Up Object:  CARE Score: 6    Electronically signed by Julian Bravo OT on 12/17/20 at 1:53 PM CST

## 2020-12-17 NOTE — PROGRESS NOTES
Patient:   Nereida Jacques  MR#:    [id number]   Room:    5305/990-77   YOB: 1955  Date of Progress Note: 12/17/2020  Time of Note                           7:03 AM  Consulting Physician:   Adam Dougherty M.D. Attending Physician:  Adam Dougherty MD     Chief complaint Acute ischemic stroke     S:This 75 y.o. female  pt admitted to [facility] on 11/26/2020 w/R sided weakness. Pt woke up on am of 11/26 around 5am and got up, went to the kitchen to make coffee and fell after diffuse tingling an R sided weakness noted. At baseline, she does not move her R arm well proximally due to fx in 11/2019. She managed to unlock her door before going back to be, in case she would need help. she stayed in bed for 4 hours and noted she was stronger, however, she  got up again around 10 am and fell again due to R sided weakness. She presented to [facility] ED where on presentation she was out of tPA window and NIHSS was 0, but then she developed worsening R sided weakness again. She was related that she can choke on her saliva when she swallows. Pt was alert & oriented x 3 w/ mild dysarthria. Pt also noted to have new onset tremor. MRI done on 11/27 showed acute ischemia in the bilateral brendon. Over the weekend, she had worsening R sided weakness. EEG was done and read as normal. She is on a mechanical soft diet w/nectar thick liquids. She is now medically stable and is participating with therapy. She is ready to begin rehab w/plan of returning home after rehab dc w/support from her family. No new issues overnight.     REVIEW OF SYSTEMS:  Constitutional: No fevers No chills  Neck:No stiffness  Respiratory: No shortness of breath  Cardiovascular: No chest pain No palpitations  Gastrointestinal: No abdominal pain    Genitourinary: No Dysuria  Neurological: No headache, no confusion    Past Medical History:      Diagnosis Date    Abnormal Pap smear of cervix     1991    History of cryosurgery 1991 Past Surgical History:      Procedure Laterality Date    BREAST CYST EXCISION Bilateral     X4      SECTION  1981    JOINT REPLACEMENT      right shoulder    TONSILLECTOMY         Medications in Hospital:      Current Facility-Administered Medications:     rivaroxaban (XARELTO) tablet 10 mg, 10 mg, Oral, Daily, Chris Gallardo MD, 10 mg at 20 174    pantoprazole (PROTONIX) tablet 40 mg, 40 mg, Oral, Nightly, Chris Gallardo MD, 40 mg at 20    ondansetron (ZOFRAN-ODT) disintegrating tablet 4 mg, 4 mg, Oral, Q8H PRN, Chris Gallardo MD, 4 mg at 209    aspirin chewable tablet 81 mg, 81 mg, Oral, Daily, Chris Gallardo MD, 81 mg at 20    atorvastatin (LIPITOR) tablet 80 mg, 80 mg, Oral, Nightly, Chris Gallardo MD, 80 mg at 20    clopidogrel (PLAVIX) tablet 75 mg, 75 mg, Oral, Daily, Chris Gallardo MD, 75 mg at 20    cyclobenzaprine (FLEXERIL) tablet 10 mg, 10 mg, Oral, TID PRN, Chris Gallardo MD, 10 mg at 20    acetaminophen (TYLENOL) tablet 650 mg, 650 mg, Oral, Q4H PRN, Chris Gallardo MD    polyethylene glycol (GLYCOLAX) packet 17 g, 17 g, Oral, Daily PRN, Chris Gallardo MD, 17 g at 20    Allergies:  Patient has no known allergies. Social History:   TOBACCO:   reports that she has been smoking. She has never used smokeless tobacco.  ETOH:   reports current alcohol use. Family History:       Problem Relation Age of Onset    Heart Attack Maternal Grandmother     Colon Cancer Father     Colon Cancer Mother          PHYSICAL EXAM:  /72   Pulse 60   Temp 97.2 °F (36.2 °C) (Temporal)   Resp 16   Ht 5' 6\" (1.676 m)   Wt 163 lb (73.9 kg)   SpO2 97%   Breastfeeding No   BMI 26.31 kg/m²     Constitutional  well developed, well nourished.    Eyes  conjunctiva normal.   Ear, nose, throat - No scars, masses, or lesions over external nose or ears, no atrophy of tongue Neck-symmetric, no masses noted, no jugular vein distension  Respiration- chest wall appears symmetric, good expansion,   normal effort without use of accessory muscles  Musculoskeletal  no significant wasting of muscles noted, no bony deformities  Extremities-no clubbing, cyanosis or edema  Skin  warm, dry, and intact. No rash, erythema, or pallor. Psychiatric  mood, affect, and behavior appear normal.      Neurological exam  Awake, alert, fluent oriented appropriate affect slightly slow dysarthria  Attention and concentration appear appropriate  Recent and remote memory appears unremarkable  Speech with dysarthria and some expressive aphasia  No clear issues with language of fund of knowledge     Cranial Nerve Exam     CN III, IV,VI-EOMI, No nystagmus, conjugate eye movements, no ptosis    CN VII-right lower facial droop       Motor Exam  antigravity throughout upper and lower extremities bilaterally  Drift right arm and right leg      Tremors- no tremors in hands or head noted     Gait  Not tested      Nursing/pcp notes, imaging,labs and vitals reviewed.      PT,OT and/or speech notes reviewed    Lab Results   Component Value Date    WBC 8.1 12/17/2020    HGB 12.6 12/17/2020    HCT 38.8 12/17/2020    MCV 91.9 12/17/2020     12/17/2020     Lab Results   Component Value Date     12/17/2020    K 3.4 (L) 12/17/2020     12/17/2020    CO2 28 12/17/2020    BUN 17 12/17/2020    CREATININE 0.7 12/17/2020    GLUCOSE 90 12/17/2020    CALCIUM 9.0 12/17/2020    PROT 5.9 (L) 12/17/2020    LABALBU 3.7 12/17/2020    BILITOT 0.3 12/17/2020    ALKPHOS 99 12/17/2020    AST 14 12/17/2020    ALT 18 12/17/2020    LABGLOM >60 12/17/2020    GFRAA >59 12/17/2020     Lab Results   Component Value Date    INR 1.04 12/01/2020    PROTIME 13.5 12/01/2020       GRANT Hanson   Occupational Therapist Assistant   Occupational   Progress Notes   Cosign Needed   Date of Service:  12/15/2020  3:55 PM Cosign Needed             Show:Clear all  []Manual[x]Template[]Copied    Added by:  [x]GRANT Mckee    []Liv for details  Occupational Therapy       12/15/20 1300   General   Diagnosis CVA with right hemiparesis   Pain Assessment   Patient Currently in Pain No   Pain Assessment 0-10   Pain Level 0   ADL   Toileting Modified independent    Balance   Sitting Balance Independent   Standing Balance Modified independent    Functional Mobility   Functional - Mobility Device Rolling Walker   Activity To/from bathroom; Other   Assist Level Modified independent    Transfers   Sit to stand Modified independent   Stand to sit Modified independent   Toilet Transfers   Toilet - Technique Ambulating   Equipment Used Grab bars   Toilet Transfer Modified independent   Coordination   Fine Motor Completed theraputty HEP with RUE. Assessment   Performance deficits / Impairments Decreased functional mobility ; Decreased coordination;Decreased fine motor control;Decreased high-level IADLs;Decreased ROM; Decreased strength   Treatment Diagnosis CVA with right hemiparesis   Prognosis Good   Timed Code Treatment Minutes 45 Minutes   Activity Tolerance   Activity Tolerance Patient Tolerated treatment well   Safety Devices   Safety Devices in place N/A   Type of devices    (Up ad seema.)   Plan   Current Treatment Recommendations Functional Mobility Training;ROM; Home Management Training;Strengthening                        RECORD REVIEW: Previous medical records, medications were reviewed at today's visit    IMPRESSION:   1. Acute ischemic stroke-bilateral [id number] ASA/statin/Plavix  2. Hyperlipidemia-statin  3. DVT prophylaxis-changed to Xarelto as per patient's wishes  4. GI-bowel regimen  5. Chronic right shoulders-monitor  6. History of chronic opiod use due to shoulder issues  7. Smoker  8.  PT/OT/Speech     Continue current care as noted      DC home Expected duration and frequency therapy: 180 minutes per day, 5 days per week    [address] [facility]  [phone] CELL  Dr Riana Hawkins

## 2020-12-17 NOTE — PROGRESS NOTES
Patient discharged per doctors orders. Discharge instructions, follow up appointments and prescriptions reviewed with patient who verbalized understanding. Patient stable and agreeable to discharge. Patient along with belongings off of floor by wheelchair per transportation. Patient discharged home via private vehicle.  Electronically signed by Nadir Mckeon RN on 12/17/20 at 11:30 AM CST

## 2020-12-22 ENCOUNTER — TELEPHONE (OUTPATIENT)
Dept: NEUROLOGY | Age: 65
End: 2020-12-22

## 2020-12-22 NOTE — TELEPHONE ENCOUNTER
[facility] home care nurse called stating the patient is not wanting to continue taking the Prednisone if it is not needed. She also stated that the patient requests a living will so they will be ordering a  if that is ok with you. I advised that was fine.        Please Advise

## 2020-12-23 ENCOUNTER — OFFICE VISIT (OUTPATIENT)
Dept: PRIMARY CARE CLINIC | Age: 65
End: 2020-12-23
Payer: MEDICARE

## 2020-12-23 VITALS
HEART RATE: 66 BPM | TEMPERATURE: 97.8 F | DIASTOLIC BLOOD PRESSURE: 70 MMHG | OXYGEN SATURATION: 97 % | SYSTOLIC BLOOD PRESSURE: 122 MMHG | BODY MASS INDEX: 27.76 KG/M2 | RESPIRATION RATE: 16 BRPM | WEIGHT: 172 LBS

## 2020-12-23 PROCEDURE — G8484 FLU IMMUNIZE NO ADMIN: HCPCS | Performed by: FAMILY MEDICINE

## 2020-12-23 PROCEDURE — 1090F PRES/ABSN URINE INCON ASSESS: CPT | Performed by: FAMILY MEDICINE

## 2020-12-23 PROCEDURE — 1036F TOBACCO NON-USER: CPT | Performed by: FAMILY MEDICINE

## 2020-12-23 PROCEDURE — 99203 OFFICE O/P NEW LOW 30 MIN: CPT | Performed by: FAMILY MEDICINE

## 2020-12-23 PROCEDURE — 4040F PNEUMOC VAC/ADMIN/RCVD: CPT | Performed by: FAMILY MEDICINE

## 2020-12-23 PROCEDURE — G8417 CALC BMI ABV UP PARAM F/U: HCPCS | Performed by: FAMILY MEDICINE

## 2020-12-23 PROCEDURE — G8400 PT W/DXA NO RESULTS DOC: HCPCS | Performed by: FAMILY MEDICINE

## 2020-12-23 PROCEDURE — 1111F DSCHRG MED/CURRENT MED MERGE: CPT | Performed by: FAMILY MEDICINE

## 2020-12-23 PROCEDURE — G8427 DOCREV CUR MEDS BY ELIG CLIN: HCPCS | Performed by: FAMILY MEDICINE

## 2020-12-23 PROCEDURE — 1123F ACP DISCUSS/DSCN MKR DOCD: CPT | Performed by: FAMILY MEDICINE

## 2020-12-23 PROCEDURE — 3017F COLORECTAL CA SCREEN DOC REV: CPT | Performed by: FAMILY MEDICINE

## 2020-12-23 RX ORDER — CYCLOBENZAPRINE HCL 10 MG
10 TABLET ORAL 3 TIMES DAILY PRN
COMMUNITY
Start: 2020-11-30 | End: 2020-12-23

## 2020-12-23 ASSESSMENT — ENCOUNTER SYMPTOMS
NAUSEA: 0
COUGH: 0
COLOR CHANGE: 0
EYE DISCHARGE: 0
VOMITING: 0
ABDOMINAL PAIN: 0
BACK PAIN: 0
DIARRHEA: 0
WHEEZING: 0

## 2020-12-23 ASSESSMENT — PATIENT HEALTH QUESTIONNAIRE - PHQ9
2. FEELING DOWN, DEPRESSED OR HOPELESS: 0
1. LITTLE INTEREST OR PLEASURE IN DOING THINGS: 0
SUM OF ALL RESPONSES TO PHQ QUESTIONS 1-9: 0
SUM OF ALL RESPONSES TO PHQ9 QUESTIONS 1 & 2: 0
SUM OF ALL RESPONSES TO PHQ QUESTIONS 1-9: 0
SUM OF ALL RESPONSES TO PHQ QUESTIONS 1-9: 0

## 2020-12-23 NOTE — PROGRESS NOTES
SUBJECTIVE:    Patient ID: Yaneth Chou is a 75 y.o. female. HPI:     Patient presents today to establish care. She has a history of having had a stroke. She was diagnosed initially in November when she had a stroke and affected her right side. She was at [facility] for 3 or 4 days. They have been transferred to [facility] to rehab. She was there for a couple of weeks. She was discharged home and is doing well. She has home health coming for PT. Her daughter is with her today and states that she is doing well. They have taken her cigarettes away from her and she is no longer smoking. She is currently taking Lipitor, Plavix, and aspirin. She denies any side effects to the medication. She states that overall she is doing well. She states that her appetite is good. She denies any blood in her stool or gum bleeding. She states that she feels well. Past Medical History:   Diagnosis Date    Abnormal Pap smear of cervix         History of cryosurgery     Stroke [facility])      Current Outpatient Medications on File Prior to Visit   Medication Sig Dispense Refill    aspirin 81 MG chewable tablet Take 1 tablet by mouth daily 30 tablet 0    atorvastatin (LIPITOR) 80 MG tablet Take 1 tablet by mouth nightly 30 tablet 0    clopidogrel (PLAVIX) 75 MG tablet Take 1 tablet by mouth daily 30 tablet 0     No current facility-administered medications on file prior to visit.       No Known Allergies  Past Surgical History:   Procedure Laterality Date    BREAST CYST EXCISION Bilateral     X4      SECTION      JOINT REPLACEMENT      right shoulder    TONSILLECTOMY       Family History   Problem Relation Age of Onset    Heart Attack Maternal Grandmother     Colon Cancer Father     Colon Cancer Mother      Social History     Socioeconomic History    Marital status:      Spouse name: Not on file    Number of children: Not on file    Years of education: Not on file  Highest education level: Not on file   Occupational History    Not on file   Social Needs    Financial resource strain: Not on file    Food insecurity     Worry: Not on file     Inability: Not on file    Transportation needs     Medical: Not on file     Non-medical: Not on file   Tobacco Use    Smoking status: Former Smoker     Types: Cigarettes     Quit date: 2020     Years since quittin.0    Smokeless tobacco: Never Used   Substance and Sexual Activity    Alcohol use: Yes    Drug use: No    Sexual activity: Never   Lifestyle    Physical activity     Days per week: Not on file     Minutes per session: Not on file    Stress: Not on file   Relationships    Social connections     Talks on phone: Not on file     Gets together: Not on file     Attends Voodoo service: Not on file     Active member of club or organization: Not on file     Attends meetings of clubs or organizations: Not on file     Relationship status: Not on file    Intimate partner violence     Fear of current or ex partner: Not on file     Emotionally abused: Not on file     Physically abused: Not on file     Forced sexual activity: Not on file   Other Topics Concern    Not on file   Social History Narrative    Not on file       Review of Systems   Constitutional: Negative for activity change, appetite change and fever. HENT: Negative for congestion and nosebleeds. Eyes: Negative for discharge. Respiratory: Negative for cough and wheezing. Cardiovascular: Negative for chest pain and leg swelling. Gastrointestinal: Negative for abdominal pain, diarrhea, nausea and vomiting. Genitourinary: Negative for difficulty urinating, frequency and urgency. Musculoskeletal: Negative for back pain and gait problem. Skin: Negative for color change and rash. Neurological: Negative for dizziness and headaches. Hematological: Does not bruise/bleed easily. Psychiatric/Behavioral: Negative for sleep disturbance and suicidal ideas. OBJECTIVE:    Physical Exam  Constitutional:       Appearance: Normal appearance. She is well-developed. HENT:      Head: Normocephalic and atraumatic. Neck:      Musculoskeletal: Normal range of motion and neck supple. Thyroid: No thyroid mass or thyromegaly. Vascular: No carotid bruit. Cardiovascular:      Rate and Rhythm: Normal rate and regular rhythm. Heart sounds: No murmur. No friction rub. No gallop. Pulmonary:      Effort: Pulmonary effort is normal. No respiratory distress. Breath sounds: Normal breath sounds. Abdominal:      General: Abdomen is flat. Bowel sounds are normal.      Palpations: Abdomen is soft. Tenderness: There is no abdominal tenderness. Lymphadenopathy:      Cervical: No cervical adenopathy. Skin:     General: Skin is warm and dry. Findings: No rash. Neurological:      General: No focal deficit present. Mental Status: She is alert and oriented to person, place, and time. Mental status is at baseline. Cranial Nerves: No cranial nerve deficit. Psychiatric:         Mood and Affect: Mood normal.         Behavior: Behavior normal.         Thought Content: Thought content normal.         Judgment: Judgment normal.        /70 (Site: Left Upper Arm, Position: Sitting, Cuff Size: Medium Adult)   Pulse 66   Temp 97.8 °F (36.6 °C) (Temporal)   Resp 16   Wt 172 lb (78 kg)   SpO2 97%   BMI 27.76 kg/m²      ASSESSMENT:    Waleska Young was seen today for new patient and follow-up from hospital.    Diagnoses and all orders for this visit:    Acute ischemic stroke ([facility] [address]    Screen for colon cancer  -     Dandre Ramirez MD, Gastroenterology, [place]    Hyperlipidemia, unspecified hyperlipidemia type        PLAN:    I put a referral in for GI for colonoscopy. Continue current medications. Follow-up with us in 3 months for checkup and labs unless needed sooner. Continue home health and PT OT. EMR Dragon/transcription disclaimer:  Much of this encounter note is electronictranscription/translation of spoken language to printed texts. The electronic translation of spoken language may be erroneous, or at times, nonsensical words or phrases may be inadvertently transcribed.   Although I havereviewed the note for such errors, some may still exist.

## 2020-12-24 NOTE — DISCHARGE SUMMARY
Reg solid; Dysphagia Soft and Bite-Sized (Dysphagia III); Dysphagia Pureed (Dysphagia I); Thin cup     Premature loss of the bolus over the tongue base to the valleculae then the pyriforms. A trace amount of silent laryngeal penetration was observed during the swallow. This was ejected from the airway with swallow completion. Thin liquids via cup were again presented and min to moderate vallecular and pyriform residue was noted. Cued dry swallows did clear the material. Thin liquids via cup with a chin tuck resulted in premature loss to the valleculae then the pyriforms. A trace amount of silent laryngeal penetration during the swallow was noted. The material did eject from the airway with swallow completion. A moderate amount of pyriform residue and min to moderate vallecular residue was observed. Cued dry swallows while still in the chin tuck were effective in clearing the residue. Thin liquids via cup with a chin tuck were again presented and premature loss to the valleculae was noted. Min vallecular and moderate pyriform residue was observed. Cued dry swallows cleared the residue. Thin liquids via cup were completed without a chin tuck and silent aspiration before the swallow was observed due to premature loss of the bolus over the tongue base to the valleculae then pyriforms then the laryngeal vestibule. The material did not eject. Pudding required cued dry swallows to clear vallecular and pyriform residue. Puree resulted in minim vallecular residue. Mechanical soft resulted in premature loss of the bolus and undercoating of the epiglottis. Residue at the pyriforms and valleculae remained despite cued dry swallows. Solid resulted in premature loss to the valleculae. Min to mod pyriform residue noted. Thin liquids via cup with a chin tuck resulted in a trace amount of laryngeal penetration above the vocal folds with ejection from the airway.   Recommended Diet:  Solid consistency: Regular Liquid consistency: Thin + CHIN TUCK  Liquid administration via: Cup(No straws)     Medication administration: Meds in puree(Meds whole in applesauce or pudding)     Safe Swallow Protocol:  Supervision: Independent  Compensatory Swallowing Strategies: Alternate solids and liquids;Small bites/sips; Remain upright for 30-45 minutes after meals;Upright as possible for all oral intake;Eat/Feed slowly    SHORT TERM GOAL #1:  Goal 1: The patient will tolerate minced and moist diet consistency with nectar thick liquids with min/no overt s/s of aspiration. MET      SHORT TERM GOAL #2:  Goal 2: The patient will complete pharyngeal strengthening exercises with min verbal cues at 80% accuracy to improve airway protection. SHORT TERM GOAL #3:  Goal 3: The patient will complete daily oral motor exercises to improve labial/lingual/buccal strength and ROM to improve oral phase of swallow and speech intelligibility. SHORT TERM GOAL #4:  Goal 4: The patient will use the (over articulation/slow rate/writing key word/elongation of thevowel/increased loudness/phrasing) strategy to improve speech intelligibility withwords/phrases/sentences/in conversation with 100% accuracy. SHORT TERM GOAL #5:  Goal 5: Patient will complete repeat FEES for potential upgrade discontinued. Patient was able to complete MBSS    Swallowing Short Term Goals  Short-term Goals  Goal 1: The patient will complete the hoang (tongue hold) technique to improve base of tongue retraction and base of tongue to pharyngeal wall approximation with 90% accuracy and minimal verbal cues. Goal 2: The patient will complete the Mendelsohn maneuver to improve hyolaryngeal elevation and clear vallecular residue with 90% accuracy and minimal verbal cues  Goal 3: The patient will complete the effortful swallow maneuver to improve hyolaryngeal elevation, tongue base retraction, and vocal fold closure with 90% accuracy and minimal verbal cues.

## 2021-01-05 ENCOUNTER — VIRTUAL VISIT (OUTPATIENT)
Dept: GASTROENTEROLOGY | Age: 66
End: 2021-01-05
Payer: MEDICARE

## 2021-01-05 DIAGNOSIS — Z80.0 FAMILY HISTORY OF COLON CANCER IN FATHER: ICD-10-CM

## 2021-01-05 DIAGNOSIS — Z86.010 PERSONAL HISTORY OF COLONIC POLYPS: Primary | ICD-10-CM

## 2021-01-05 DIAGNOSIS — Z80.0 FAMILY HISTORY OF COLON CANCER IN MOTHER: ICD-10-CM

## 2021-01-05 DIAGNOSIS — R13.10 ABNORMAL SWALLOWING: ICD-10-CM

## 2021-01-05 PROBLEM — Z86.0100 PERSONAL HISTORY OF COLONIC POLYPS: Status: ACTIVE | Noted: 2021-01-05

## 2021-01-05 PROCEDURE — 99203 OFFICE O/P NEW LOW 30 MIN: CPT | Performed by: NURSE PRACTITIONER

## 2021-01-05 PROCEDURE — 4040F PNEUMOC VAC/ADMIN/RCVD: CPT | Performed by: NURSE PRACTITIONER

## 2021-01-05 PROCEDURE — G8427 DOCREV CUR MEDS BY ELIG CLIN: HCPCS | Performed by: NURSE PRACTITIONER

## 2021-01-05 PROCEDURE — G8400 PT W/DXA NO RESULTS DOC: HCPCS | Performed by: NURSE PRACTITIONER

## 2021-01-05 PROCEDURE — 1111F DSCHRG MED/CURRENT MED MERGE: CPT | Performed by: NURSE PRACTITIONER

## 2021-01-05 PROCEDURE — 1090F PRES/ABSN URINE INCON ASSESS: CPT | Performed by: NURSE PRACTITIONER

## 2021-01-05 PROCEDURE — 1123F ACP DISCUSS/DSCN MKR DOCD: CPT | Performed by: NURSE PRACTITIONER

## 2021-01-05 PROCEDURE — 3017F COLORECTAL CA SCREEN DOC REV: CPT | Performed by: NURSE PRACTITIONER

## 2021-01-05 ASSESSMENT — ENCOUNTER SYMPTOMS
CONSTIPATION: 0
VOMITING: 0
RECTAL PAIN: 0
ABDOMINAL DISTENTION: 0
ANAL BLEEDING: 0
COUGH: 0
SHORTNESS OF BREATH: 0
SORE THROAT: 0
VOICE CHANGE: 0
NAUSEA: 0
BLOOD IN STOOL: 0
TROUBLE SWALLOWING: 0
BACK PAIN: 0
ABDOMINAL PAIN: 0
DIARRHEA: 0

## 2021-01-05 NOTE — PATIENT INSTRUCTIONS

## 2021-01-05 NOTE — PROGRESS NOTES
2021     Pt location: pt home    TELEHEALTH EVALUATION -- Audio/Visual (During PTEZI-07 public health emergency)    HPI:    Jacey Munson (:  1955) has requested an audio/video evaluation for the following concern(s):    Pt is referred here for a surveillance colonoscopy. Has a personal hx of colon polyps. Also has + family hx of colon cancer in her mother as well as father. She has no lower GI complaints. C/o having the sensation that after she eats the food sits in upper esophagus, and it finally moves down on its own within about 10 minutes after eating. This is worse after laying down. This all started after she had a stroke in 2020. She did not have this problem at all prior to the CVA. On plavix for hx of CVA. GI scope reports in history per MA per OV policy     Review of Systems   Constitutional: Negative for appetite change, fatigue, fever and unexpected weight change. HENT: Negative for sore throat, trouble swallowing and voice change. Respiratory: Negative for cough and shortness of breath. Cardiovascular: Negative for chest pain, palpitations and leg swelling. Gastrointestinal: Negative for abdominal distention, abdominal pain, anal bleeding, blood in stool, constipation, diarrhea, nausea, rectal pain and vomiting. Genitourinary: Negative for hematuria. Musculoskeletal: Negative for arthralgias, back pain and neck pain. Neurological: Negative for dizziness, weakness, light-headedness and headaches. Psychiatric/Behavioral: Negative for dysphoric mood and sleep disturbance. The patient is not nervous/anxious. All other systems reviewed and are negative. Prior to Visit Medications    Medication Sig Taking?  Authorizing Provider   aspirin 81 MG chewable tablet Take 1 tablet by mouth daily  Tess Kaufman MD   atorvastatin (LIPITOR) 80 MG tablet Take 1 tablet by mouth nightly  Tess Kaufman MD clopidogrel (PLAVIX) 75 MG tablet Take 1 tablet by mouth daily  Guanako Archuleta MD       Social History     Tobacco Use    Smoking status: Former Smoker     Types: Cigarettes     Quit date: 2020     Years since quittin.1    Smokeless tobacco: Never Used   Substance Use Topics    Alcohol use: Yes     Comment: Very Rare     Drug use: No       PHYSICAL EXAMINATION:  [ INSTRUCTIONS:  \"[x]\" Indicates a positive item  \"[]\" Indicates a negative item  -- DELETE ALL ITEMS NOT EXAMINED]  Vital Signs: (As obtained by patient/caregiver or practitioner observation)    Blood pressure-  Heart rate-    Respiratory rate-    Temperature-  Pulse oximetry-     Constitutional: [x] Appears well-developed and well-nourished [x] No apparent distress      [] Abnormal-   Mental status  [x] Alert and awake  [x] Oriented to person/place/time [x]Able to follow commands      Eyes:  EOM    [x]  Normal  [] Abnormal-  Sclera  [x]  Normal  [] Abnormal -         Discharge [x]  None visible  [] Abnormal -    HENT:   [x] Normocephalic, atraumatic. [] Abnormal   [x] Mouth/Throat: Mucous membranes are moist.     External Ears [x] Normal  [] Abnormal-     Neck: [x] No visualized mass     Pulmonary/Chest: [x] Respiratory effort normal.  [x] No visualized signs of difficulty breathing or respiratory distress        [] Abnormal-      Musculoskeletal:   [] Normal gait with no signs of ataxia         [x] Normal range of motion of neck        [] Abnormal-       Neurological:        [x] No Facial Asymmetry (Cranial nerve 7 motor function) (limited exam to video visit)          [x] No gaze palsy        [] Abnormal-         Skin:        [x] No significant exanthematous lesions or discoloration noted on facial skin         [] Abnormal-            Psychiatric:       [x] Normal Affect [x] No Hallucinations        [] Abnormal-     Other pertinent observable physical exam findings-     ASSESSMENT/PLAN:  1.  Personal history of colonic polyps

## 2021-01-13 ENCOUNTER — TELEPHONE (OUTPATIENT)
Dept: NEUROLOGY | Age: 66
End: 2021-01-13

## 2021-01-13 NOTE — TELEPHONE ENCOUNTER
Patient was told that that she needs get office visit with Dr Mac Garcia that she get clear to stop her medication to get her Colonoscopy that scheduled for 1-26-21,please call the patient.

## 2021-01-14 ENCOUNTER — TELEPHONE (OUTPATIENT)
Dept: GASTROENTEROLOGY | Age: 66
End: 2021-01-14

## 2021-01-14 NOTE — TELEPHONE ENCOUNTER
1/8/21 -PATIENT NEEDS TO SEE DR PATEL IN OFFICE BEFORE HE WILL CLEAR - PATIENT IS AWARE  - PATIENT REQUESTED THAT I SEND THE CLEARANCE TO [facility] TO SEE IF SHE WOULD CLEAR HER.   I DID ADVISE HER TO STILL MAKE AN APT W/ JORGE, AS SHE NEEDS TO ESTABLISH W/ A CARDIOLOGIST - I DID SEND TO [facility] - ADVISED PATIENT SHE MAY NOT CLEAR HER - PATIENT VOICED UNDERSTANDING

## 2021-01-15 ENCOUNTER — TELEPHONE (OUTPATIENT)
Dept: NEUROLOGY | Age: 66
End: 2021-01-15

## 2021-01-15 ENCOUNTER — TELEPHONE (OUTPATIENT)
Dept: PRIMARY CARE CLINIC | Age: 66
End: 2021-01-15

## 2021-01-15 RX ORDER — ATORVASTATIN CALCIUM 80 MG/1
80 TABLET, FILM COATED ORAL NIGHTLY
Qty: 30 TABLET | Refills: 2 | Status: SHIPPED | OUTPATIENT
Start: 2021-01-15 | End: 2021-01-19 | Stop reason: SDUPTHER

## 2021-01-15 RX ORDER — CLOPIDOGREL BISULFATE 75 MG/1
75 TABLET ORAL DAILY
Qty: 30 TABLET | Refills: 2 | Status: SHIPPED | OUTPATIENT
Start: 2021-01-15 | End: 2021-01-19 | Stop reason: SDUPTHER

## 2021-01-15 RX ORDER — ASPIRIN 81 MG/1
81 TABLET, CHEWABLE ORAL DAILY
Qty: 30 TABLET | Refills: 2 | Status: SHIPPED | OUTPATIENT
Start: 2021-01-15

## 2021-01-15 NOTE — TELEPHONE ENCOUNTER
Ronak Woodall requests that someone return their call. The best time to reach her is Anytime. The pt is scheduled to have a CLN done. The pt will need to be off blood thinners in order to have the procedure done. The pt needs clearance from Dr. Lala Foster to stop taking the blood thinners. Thank you.

## 2021-01-15 NOTE — TELEPHONE ENCOUNTER
Called and spoke with Denver Gaskins and I told her Dr Yeyo Menjivar signed off on the clearance for gastro on 01/11/2021 and we faxed the information back to the [facility] provider then. Pt voiced understanding of this information.

## 2021-01-15 NOTE — TELEPHONE ENCOUNTER
Is there any way that they can postpone her colonoscopy or endoscopy? She really needs to be on her Plavix and aspirin for at least 3 months since she had a stroke back in November. After the 3-month window I feel more comfortable pulling her off of it but I do not know that she needs to come off of it this month.

## 2021-01-15 NOTE — TELEPHONE ENCOUNTER
Pt states she needs clearance to have her colonoscopy done and she also needs to know when she needs to stop the plavix for the procedure.  She states they need clearance from PCP

## 2021-01-19 RX ORDER — CLOPIDOGREL BISULFATE 75 MG/1
75 TABLET ORAL DAILY
Qty: 30 TABLET | Refills: 3 | Status: SHIPPED | OUTPATIENT
Start: 2021-01-19 | End: 2021-05-17

## 2021-01-19 RX ORDER — ATORVASTATIN CALCIUM 80 MG/1
80 TABLET, FILM COATED ORAL NIGHTLY
Qty: 30 TABLET | Refills: 3 | Status: SHIPPED | OUTPATIENT
Start: 2021-01-19 | End: 2021-05-17

## 2021-03-02 ENCOUNTER — APPOINTMENT (OUTPATIENT)
Dept: GENERAL RADIOLOGY | Age: 66
DRG: 312 | End: 2021-03-02
Payer: MEDICARE

## 2021-03-02 ENCOUNTER — APPOINTMENT (OUTPATIENT)
Dept: MRI IMAGING | Age: 66
DRG: 312 | End: 2021-03-02
Payer: MEDICARE

## 2021-03-02 ENCOUNTER — APPOINTMENT (OUTPATIENT)
Dept: CT IMAGING | Age: 66
DRG: 312 | End: 2021-03-02
Payer: MEDICARE

## 2021-03-02 ENCOUNTER — HOSPITAL ENCOUNTER (INPATIENT)
Age: 66
LOS: 1 days | Discharge: HOME | DRG: 312 | End: 2021-03-03
Attending: PEDIATRICS | Admitting: FAMILY MEDICINE
Payer: MEDICARE

## 2021-03-02 DIAGNOSIS — R55 NEAR SYNCOPE: ICD-10-CM

## 2021-03-02 DIAGNOSIS — R42 DIZZINESS: Primary | ICD-10-CM

## 2021-03-02 DIAGNOSIS — R15.9 FULL INCONTINENCE OF FECES: ICD-10-CM

## 2021-03-02 DIAGNOSIS — N39.0 ACUTE URINARY TRACT INFECTION: ICD-10-CM

## 2021-03-02 PROBLEM — Z87.891 FORMER SMOKER: Status: ACTIVE | Noted: 2021-03-02

## 2021-03-02 PROBLEM — E78.5 HYPERLIPIDEMIA: Status: ACTIVE | Noted: 2021-03-02

## 2021-03-02 PROBLEM — Z86.73 H/O: CVA (CEREBROVASCULAR ACCIDENT): Status: ACTIVE | Noted: 2021-03-02

## 2021-03-02 LAB
ALBUMIN SERPL-MCNC: 4.7 G/DL (ref 3.5–5.2)
ALP BLD-CCNC: 123 U/L (ref 35–104)
ALT SERPL-CCNC: 31 U/L (ref 5–33)
ANION GAP SERPL CALCULATED.3IONS-SCNC: 9 MMOL/L (ref 7–19)
AST SERPL-CCNC: 26 U/L (ref 5–32)
BACTERIA: ABNORMAL /HPF
BASOPHILS ABSOLUTE: 0.1 K/UL (ref 0–0.2)
BASOPHILS RELATIVE PERCENT: 0.5 % (ref 0–1)
BILIRUB SERPL-MCNC: 0.6 MG/DL (ref 0.2–1.2)
BILIRUBIN URINE: NEGATIVE
BLOOD, URINE: NEGATIVE
BUN BLDV-MCNC: 10 MG/DL (ref 8–23)
CALCIUM SERPL-MCNC: 9.5 MG/DL (ref 8.8–10.2)
CHLORIDE BLD-SCNC: 104 MMOL/L (ref 98–111)
CLARITY: CLEAR
CO2: 29 MMOL/L (ref 22–29)
COLOR: YELLOW
CREAT SERPL-MCNC: 0.8 MG/DL (ref 0.5–0.9)
CRYSTALS, UA: ABNORMAL /HPF
EOSINOPHILS ABSOLUTE: 0.2 K/UL (ref 0–0.6)
EOSINOPHILS RELATIVE PERCENT: 1.7 % (ref 0–5)
EPITHELIAL CELLS, UA: ABNORMAL /HPF
GFR AFRICAN AMERICAN: >59
GFR NON-AFRICAN AMERICAN: >60
GLUCOSE BLD-MCNC: 118 MG/DL (ref 74–109)
GLUCOSE URINE: NEGATIVE MG/DL
HCT VFR BLD CALC: 45.8 % (ref 37–47)
HEMOGLOBIN: 14.9 G/DL (ref 12–16)
IMMATURE GRANULOCYTES #: 0 K/UL
KETONES, URINE: NEGATIVE MG/DL
LACTIC ACID, SEPSIS: 1.1 MG/DL (ref 0.5–1.9)
LEUKOCYTE ESTERASE, URINE: ABNORMAL
LYMPHOCYTES ABSOLUTE: 1.2 K/UL (ref 1.1–4.5)
LYMPHOCYTES RELATIVE PERCENT: 12 % (ref 20–40)
MAGNESIUM: 2 MG/DL (ref 1.6–2.4)
MCH RBC QN AUTO: 29.7 PG (ref 27–31)
MCHC RBC AUTO-ENTMCNC: 32.5 G/DL (ref 33–37)
MCV RBC AUTO: 91.2 FL (ref 81–99)
MONOCYTES ABSOLUTE: 0.5 K/UL (ref 0–0.9)
MONOCYTES RELATIVE PERCENT: 4.9 % (ref 0–10)
NEUTROPHILS ABSOLUTE: 7.9 K/UL (ref 1.5–7.5)
NEUTROPHILS RELATIVE PERCENT: 80.5 % (ref 50–65)
NITRITE, URINE: NEGATIVE
PDW BLD-RTO: 13.3 % (ref 11.5–14.5)
PH UA: 5.5 (ref 5–8)
PLATELET # BLD: 309 K/UL (ref 130–400)
PMV BLD AUTO: 10.8 FL (ref 9.4–12.3)
POTASSIUM REFLEX MAGNESIUM: 3.4 MMOL/L (ref 3.5–5)
PROTEIN UA: NEGATIVE MG/DL
RBC # BLD: 5.02 M/UL (ref 4.2–5.4)
RBC UA: ABNORMAL /HPF (ref 0–2)
SARS-COV-2, NAAT: NOT DETECTED
SODIUM BLD-SCNC: 142 MMOL/L (ref 136–145)
SPECIFIC GRAVITY UA: 1.01 (ref 1–1.03)
TOTAL PROTEIN: 7.4 G/DL (ref 6.6–8.7)
TROPONIN: <0.01 NG/ML (ref 0–0.03)
UROBILINOGEN, URINE: 1 E.U./DL
WBC # BLD: 9.9 K/UL (ref 4.8–10.8)
WBC UA: ABNORMAL /HPF (ref 0–5)

## 2021-03-02 PROCEDURE — 81001 URINALYSIS AUTO W/SCOPE: CPT

## 2021-03-02 PROCEDURE — 80053 COMPREHEN METABOLIC PANEL: CPT

## 2021-03-02 PROCEDURE — 70551 MRI BRAIN STEM W/O DYE: CPT

## 2021-03-02 PROCEDURE — 93005 ELECTROCARDIOGRAM TRACING: CPT | Performed by: PEDIATRICS

## 2021-03-02 PROCEDURE — 6360000002 HC RX W HCPCS: Performed by: PEDIATRICS

## 2021-03-02 PROCEDURE — 1210000000 HC MED SURG R&B

## 2021-03-02 PROCEDURE — 2580000003 HC RX 258: Performed by: PEDIATRICS

## 2021-03-02 PROCEDURE — 96360 HYDRATION IV INFUSION INIT: CPT

## 2021-03-02 PROCEDURE — 84484 ASSAY OF TROPONIN QUANT: CPT

## 2021-03-02 PROCEDURE — 70450 CT HEAD/BRAIN W/O DYE: CPT

## 2021-03-02 PROCEDURE — 87635 SARS-COV-2 COVID-19 AMP PRB: CPT

## 2021-03-02 PROCEDURE — 83605 ASSAY OF LACTIC ACID: CPT

## 2021-03-02 PROCEDURE — 71045 X-RAY EXAM CHEST 1 VIEW: CPT

## 2021-03-02 PROCEDURE — 99283 EMERGENCY DEPT VISIT LOW MDM: CPT

## 2021-03-02 PROCEDURE — 36415 COLL VENOUS BLD VENIPUNCTURE: CPT

## 2021-03-02 PROCEDURE — 83735 ASSAY OF MAGNESIUM: CPT

## 2021-03-02 PROCEDURE — 85025 COMPLETE CBC W/AUTO DIFF WBC: CPT

## 2021-03-02 PROCEDURE — 6370000000 HC RX 637 (ALT 250 FOR IP): Performed by: INTERNAL MEDICINE

## 2021-03-02 RX ORDER — POTASSIUM CHLORIDE 7.45 MG/ML
10 INJECTION INTRAVENOUS PRN
Status: DISCONTINUED | OUTPATIENT
Start: 2021-03-02 | End: 2021-03-03 | Stop reason: HOSPADM

## 2021-03-02 RX ORDER — MAGNESIUM SULFATE IN WATER 40 MG/ML
2000 INJECTION, SOLUTION INTRAVENOUS PRN
Status: DISCONTINUED | OUTPATIENT
Start: 2021-03-02 | End: 2021-03-03 | Stop reason: HOSPADM

## 2021-03-02 RX ORDER — POTASSIUM CHLORIDE 20 MEQ/1
40 TABLET, EXTENDED RELEASE ORAL PRN
Status: DISCONTINUED | OUTPATIENT
Start: 2021-03-02 | End: 2021-03-03 | Stop reason: HOSPADM

## 2021-03-02 RX ORDER — POLYETHYLENE GLYCOL 3350 17 G/17G
17 POWDER, FOR SOLUTION ORAL DAILY PRN
Status: DISCONTINUED | OUTPATIENT
Start: 2021-03-02 | End: 2021-03-03 | Stop reason: HOSPADM

## 2021-03-02 RX ORDER — SODIUM CHLORIDE 0.9 % (FLUSH) 0.9 %
10 SYRINGE (ML) INJECTION EVERY 12 HOURS SCHEDULED
Status: DISCONTINUED | OUTPATIENT
Start: 2021-03-02 | End: 2021-03-03 | Stop reason: HOSPADM

## 2021-03-02 RX ORDER — ATORVASTATIN CALCIUM 80 MG/1
80 TABLET, FILM COATED ORAL NIGHTLY
Status: DISCONTINUED | OUTPATIENT
Start: 2021-03-02 | End: 2021-03-03 | Stop reason: HOSPADM

## 2021-03-02 RX ORDER — DEXTROSE MONOHYDRATE 50 MG/ML
100 INJECTION, SOLUTION INTRAVENOUS PRN
Status: DISCONTINUED | OUTPATIENT
Start: 2021-03-02 | End: 2021-03-03 | Stop reason: HOSPADM

## 2021-03-02 RX ORDER — ONDANSETRON 2 MG/ML
4 INJECTION INTRAMUSCULAR; INTRAVENOUS ONCE
Status: DISCONTINUED | OUTPATIENT
Start: 2021-03-02 | End: 2021-03-03 | Stop reason: HOSPADM

## 2021-03-02 RX ORDER — PROMETHAZINE HYDROCHLORIDE 25 MG/1
12.5 TABLET ORAL EVERY 6 HOURS PRN
Status: DISCONTINUED | OUTPATIENT
Start: 2021-03-02 | End: 2021-03-03 | Stop reason: HOSPADM

## 2021-03-02 RX ORDER — DEXTROSE MONOHYDRATE 25 G/50ML
12.5 INJECTION, SOLUTION INTRAVENOUS PRN
Status: DISCONTINUED | OUTPATIENT
Start: 2021-03-02 | End: 2021-03-03 | Stop reason: HOSPADM

## 2021-03-02 RX ORDER — ASPIRIN 81 MG/1
81 TABLET, CHEWABLE ORAL DAILY
Status: DISCONTINUED | OUTPATIENT
Start: 2021-03-02 | End: 2021-03-03 | Stop reason: HOSPADM

## 2021-03-02 RX ORDER — ACETAMINOPHEN 325 MG/1
650 TABLET ORAL EVERY 6 HOURS PRN
Status: DISCONTINUED | OUTPATIENT
Start: 2021-03-02 | End: 2021-03-03 | Stop reason: HOSPADM

## 2021-03-02 RX ORDER — ACETAMINOPHEN 650 MG/1
650 SUPPOSITORY RECTAL EVERY 6 HOURS PRN
Status: DISCONTINUED | OUTPATIENT
Start: 2021-03-02 | End: 2021-03-03 | Stop reason: HOSPADM

## 2021-03-02 RX ORDER — NICOTINE POLACRILEX 4 MG
15 LOZENGE BUCCAL PRN
Status: DISCONTINUED | OUTPATIENT
Start: 2021-03-02 | End: 2021-03-03 | Stop reason: HOSPADM

## 2021-03-02 RX ORDER — CLOPIDOGREL BISULFATE 75 MG/1
75 TABLET ORAL DAILY
Status: DISCONTINUED | OUTPATIENT
Start: 2021-03-02 | End: 2021-03-03 | Stop reason: HOSPADM

## 2021-03-02 RX ORDER — ONDANSETRON 2 MG/ML
4 INJECTION INTRAMUSCULAR; INTRAVENOUS EVERY 6 HOURS PRN
Status: DISCONTINUED | OUTPATIENT
Start: 2021-03-02 | End: 2021-03-03 | Stop reason: HOSPADM

## 2021-03-02 RX ORDER — SODIUM CHLORIDE 0.9 % (FLUSH) 0.9 %
10 SYRINGE (ML) INJECTION PRN
Status: DISCONTINUED | OUTPATIENT
Start: 2021-03-02 | End: 2021-03-03 | Stop reason: HOSPADM

## 2021-03-02 RX ORDER — 0.9 % SODIUM CHLORIDE 0.9 %
1000 INTRAVENOUS SOLUTION INTRAVENOUS ONCE
Status: COMPLETED | OUTPATIENT
Start: 2021-03-02 | End: 2021-03-02

## 2021-03-02 RX ADMIN — CEFTRIAXONE 1000 MG: 1 INJECTION, POWDER, FOR SOLUTION INTRAMUSCULAR; INTRAVENOUS at 18:23

## 2021-03-02 RX ADMIN — POTASSIUM CHLORIDE 40 MEQ: 1500 TABLET, EXTENDED RELEASE ORAL at 21:07

## 2021-03-02 RX ADMIN — SODIUM CHLORIDE 1000 ML: 9 INJECTION, SOLUTION INTRAVENOUS at 16:45

## 2021-03-02 ASSESSMENT — ENCOUNTER SYMPTOMS
BLOOD IN STOOL: 0
COUGH: 0
COLOR CHANGE: 0
EYE DISCHARGE: 0
SHORTNESS OF BREATH: 0
ABDOMINAL PAIN: 0
BACK PAIN: 1
NAUSEA: 1
VOMITING: 0
RHINORRHEA: 0

## 2021-03-03 VITALS
TEMPERATURE: 97.6 F | BODY MASS INDEX: 30.48 KG/M2 | RESPIRATION RATE: 16 BRPM | HEART RATE: 76 BPM | OXYGEN SATURATION: 95 % | WEIGHT: 172 LBS | DIASTOLIC BLOOD PRESSURE: 69 MMHG | SYSTOLIC BLOOD PRESSURE: 112 MMHG | HEIGHT: 63 IN

## 2021-03-03 LAB
ALBUMIN SERPL-MCNC: 3.9 G/DL (ref 3.5–5.2)
ALP BLD-CCNC: 103 U/L (ref 35–104)
ALT SERPL-CCNC: 24 U/L (ref 5–33)
ANION GAP SERPL CALCULATED.3IONS-SCNC: 9 MMOL/L (ref 7–19)
AST SERPL-CCNC: 21 U/L (ref 5–32)
BASOPHILS ABSOLUTE: 0 K/UL (ref 0–0.2)
BASOPHILS RELATIVE PERCENT: 0.7 % (ref 0–1)
BILIRUB SERPL-MCNC: 0.5 MG/DL (ref 0.2–1.2)
BUN BLDV-MCNC: 10 MG/DL (ref 8–23)
CALCIUM SERPL-MCNC: 9.1 MG/DL (ref 8.8–10.2)
CHLORIDE BLD-SCNC: 108 MMOL/L (ref 98–111)
CHOLESTEROL, TOTAL: 99 MG/DL (ref 160–199)
CO2: 26 MMOL/L (ref 22–29)
CREAT SERPL-MCNC: 0.6 MG/DL (ref 0.5–0.9)
EKG P AXIS: 63 DEGREES
EKG P-R INTERVAL: 150 MS
EKG Q-T INTERVAL: 380 MS
EKG QRS DURATION: 80 MS
EKG QTC CALCULATION (BAZETT): 418 MS
EKG T AXIS: 69 DEGREES
EOSINOPHILS ABSOLUTE: 0.2 K/UL (ref 0–0.6)
EOSINOPHILS RELATIVE PERCENT: 3.5 % (ref 0–5)
GFR AFRICAN AMERICAN: >59
GFR NON-AFRICAN AMERICAN: >60
GLUCOSE BLD-MCNC: 100 MG/DL (ref 74–109)
GLUCOSE BLD-MCNC: 111 MG/DL (ref 70–99)
GLUCOSE BLD-MCNC: 87 MG/DL (ref 70–99)
GLUCOSE BLD-MCNC: 90 MG/DL (ref 70–99)
HBA1C MFR BLD: 5.4 % (ref 4–6)
HCT VFR BLD CALC: 38.2 % (ref 37–47)
HDLC SERPL-MCNC: 48 MG/DL (ref 65–121)
HEMOGLOBIN: 12.6 G/DL (ref 12–16)
IMMATURE GRANULOCYTES #: 0 K/UL
LACTIC ACID, SEPSIS: 0.8 MG/DL (ref 0.5–1.9)
LDL CHOLESTEROL CALCULATED: 31 MG/DL
LV EF: 60 %
LVEF MODALITY: NORMAL
LYMPHOCYTES ABSOLUTE: 1.8 K/UL (ref 1.1–4.5)
LYMPHOCYTES RELATIVE PERCENT: 31.1 % (ref 20–40)
MAGNESIUM: 2 MG/DL (ref 1.6–2.4)
MCH RBC QN AUTO: 29.9 PG (ref 27–31)
MCHC RBC AUTO-ENTMCNC: 33 G/DL (ref 33–37)
MCV RBC AUTO: 90.5 FL (ref 81–99)
MONOCYTES ABSOLUTE: 0.5 K/UL (ref 0–0.9)
MONOCYTES RELATIVE PERCENT: 7.8 % (ref 0–10)
NEUTROPHILS ABSOLUTE: 3.4 K/UL (ref 1.5–7.5)
NEUTROPHILS RELATIVE PERCENT: 56.7 % (ref 50–65)
PDW BLD-RTO: 13.2 % (ref 11.5–14.5)
PERFORMED ON: ABNORMAL
PERFORMED ON: NORMAL
PERFORMED ON: NORMAL
PLATELET # BLD: 257 K/UL (ref 130–400)
PMV BLD AUTO: 10.5 FL (ref 9.4–12.3)
POTASSIUM SERPL-SCNC: 4.1 MMOL/L (ref 3.5–5)
RBC # BLD: 4.22 M/UL (ref 4.2–5.4)
SODIUM BLD-SCNC: 143 MMOL/L (ref 136–145)
TOTAL PROTEIN: 6.1 G/DL (ref 6.6–8.7)
TRIGL SERPL-MCNC: 100 MG/DL (ref 0–149)
TROPONIN: <0.01 NG/ML (ref 0–0.03)
WBC # BLD: 5.9 K/UL (ref 4.8–10.8)

## 2021-03-03 PROCEDURE — 95816 EEG AWAKE AND DROWSY: CPT

## 2021-03-03 PROCEDURE — 95819 EEG AWAKE AND ASLEEP: CPT | Performed by: PSYCHIATRY & NEUROLOGY

## 2021-03-03 PROCEDURE — 2580000003 HC RX 258: Performed by: INTERNAL MEDICINE

## 2021-03-03 PROCEDURE — 93306 TTE W/DOPPLER COMPLETE: CPT

## 2021-03-03 PROCEDURE — 84484 ASSAY OF TROPONIN QUANT: CPT

## 2021-03-03 PROCEDURE — 80053 COMPREHEN METABOLIC PANEL: CPT

## 2021-03-03 PROCEDURE — 82947 ASSAY GLUCOSE BLOOD QUANT: CPT

## 2021-03-03 PROCEDURE — 6360000002 HC RX W HCPCS: Performed by: INTERNAL MEDICINE

## 2021-03-03 PROCEDURE — 83036 HEMOGLOBIN GLYCOSYLATED A1C: CPT

## 2021-03-03 PROCEDURE — 97161 PT EVAL LOW COMPLEX 20 MIN: CPT

## 2021-03-03 PROCEDURE — 36415 COLL VENOUS BLD VENIPUNCTURE: CPT

## 2021-03-03 PROCEDURE — 83735 ASSAY OF MAGNESIUM: CPT

## 2021-03-03 PROCEDURE — 92610 EVALUATE SWALLOWING FUNCTION: CPT

## 2021-03-03 PROCEDURE — 80061 LIPID PANEL: CPT

## 2021-03-03 PROCEDURE — 93880 EXTRACRANIAL BILAT STUDY: CPT

## 2021-03-03 PROCEDURE — 83605 ASSAY OF LACTIC ACID: CPT

## 2021-03-03 PROCEDURE — 85025 COMPLETE CBC W/AUTO DIFF WBC: CPT

## 2021-03-03 PROCEDURE — 93010 ELECTROCARDIOGRAM REPORT: CPT | Performed by: INTERNAL MEDICINE

## 2021-03-03 PROCEDURE — 92523 SPEECH SOUND LANG COMPREHEN: CPT

## 2021-03-03 PROCEDURE — 6370000000 HC RX 637 (ALT 250 FOR IP): Performed by: INTERNAL MEDICINE

## 2021-03-03 PROCEDURE — 97116 GAIT TRAINING THERAPY: CPT

## 2021-03-03 RX ORDER — CEFDINIR 300 MG/1
300 CAPSULE ORAL 2 TIMES DAILY
Qty: 6 CAPSULE | Refills: 0 | Status: SHIPPED | OUTPATIENT
Start: 2021-03-03 | End: 2021-03-06

## 2021-03-03 RX ADMIN — ATORVASTATIN CALCIUM 80 MG: 80 TABLET, FILM COATED ORAL at 00:04

## 2021-03-03 RX ADMIN — Medication 10 ML: at 09:08

## 2021-03-03 RX ADMIN — CLOPIDOGREL BISULFATE 75 MG: 75 TABLET, FILM COATED ORAL at 09:07

## 2021-03-03 RX ADMIN — CEFTRIAXONE 1000 MG: 1 INJECTION, POWDER, FOR SOLUTION INTRAMUSCULAR; INTRAVENOUS at 09:06

## 2021-03-03 RX ADMIN — ASPIRIN 81 MG: 81 TABLET, CHEWABLE ORAL at 09:06

## 2021-03-03 RX ADMIN — ENOXAPARIN SODIUM 40 MG: 100 INJECTION SUBCUTANEOUS at 09:07

## 2021-03-03 NOTE — CARE COORDINATION
HH referral received. Spoke with patient regarding HH. Patient refused HH, stating she had plenty of assistance at home.   No further HH needs Electronically signed by Minus Lilly on 3/3/21 at 4:19 PM CST

## 2021-03-03 NOTE — DISCHARGE INSTR - DIET
? Good nutrition is important when healing from an illness, injury, or surgery. Follow any nutrition recommendations given to you during your hospital stay. ? If you were given an oral nutrition supplement while in the hospital, continue to take this supplement at home. You can take it with meals, in-between meals, and/or before bedtime. These supplements can be purchased at most local grocery stores, pharmacies, and chain [employer]-stores. ? If you have any questions about your diet or nutrition, call the hospital and ask for the dietitian.     Carb control diet

## 2021-03-03 NOTE — PROGRESS NOTES
Patient stable to discharge. Discharge instructions and medication reviewed without question. Pt discharged home.

## 2021-03-03 NOTE — PROGRESS NOTES
Avinashnadya arrived to room # 519. Presented with: syncope  Mental Status: Patient is oriented, alert, coherent, logical, thought processes intact and able to concentrate and follow conversation. Vitals:    03/02/21 2354   BP: 134/81   Pulse: 72   Resp: 18   Temp: 96.7 °F (35.9 °C)   SpO2: 95%     Patient safety contract and falls prevention contract reviewed with patient Yes. Oriented Patient to room. Call light within reach. Yes.   Needs, issues or concerns expressed at this time: no.      Electronically signed by Chuck Galan RN on 3/3/2021 at 12:17 AM

## 2021-03-03 NOTE — ED PROVIDER NOTES
[facility]  eMERGENCY dEPARTMENT eNCOUnter      Pt Name: Pollo Braun  MRN: [id number]  [place] 1955  Date of evaluation: 3/2/2021  Provider: Sharifa Casatñeda MD    CHIEF COMPLAINT       Chief Complaint   Patient presents with    Extremity Weakness     recived 1st vaccine yesterday.  Dizziness         HISTORY OF PRESENT ILLNESS   (Location/Symptom, Timing/Onset,Context/Setting, Quality, Duration, Modifying Factors, Severity)  Note limiting factors. Pollo Braun is a 77 y.o. female who presents to the emergency department after near syncopal episode at home. Patient states that she became dizzy around 1 PM today. \"I just finished my shower and I was drying when I almost passed out. \" Patient became lightheaded, nauseated, and developed weakness \"all over. \" Episode lasted approximately 10 min. Patient lost control of her bowels while standing in the shower. Patient states that even after she was able to get out of the bathtub that \"I sat on the closed toilet for a while until I could stand and make it back to the bedroom. \" Patient states \"I'm just glad I didn't fall down. \" Patient states that when she had her stroke x2 in November 2020 she had similar symptoms. Patient is currently on Plavix, aspirin, and Lipitor. Patient received her 1st Covid vaccine yesterday. Patient denies cough, congestion, chest pain, palpitations, difficulty breathing, difficulty speaking, facial asymmetry, numbness, fever, burning with urination, or difficulty urinating. HPI    NursingNotes were reviewed. REVIEW OF SYSTEMS    (2-9 systems for level 4, 10 or more for level 5)     Review of Systems   Constitutional: Negative for chills and fever. HENT: Negative for congestion and rhinorrhea. Eyes: Negative for discharge. Respiratory: Negative for cough and shortness of breath. Cardiovascular: Negative for chest pain and palpitations. Gastrointestinal: Positive for nausea. Negative for abdominal pain, blood in stool and vomiting. Genitourinary: Negative for difficulty urinating and dysuria. Musculoskeletal: Positive for back pain (Chronic without change). Negative for neck pain. Skin: Negative for color change and pallor. Neurological: Positive for dizziness, weakness and light-headedness. Negative for syncope. Psychiatric/Behavioral: Negative for agitation and confusion. All other systems reviewed and are negative. PAST MEDICALHISTORY     Past Medical History:   Diagnosis Date    Abnormal Pap smear of cervix         History of cryosurgery     Stroke [facility])          SURGICAL HISTORY       Past Surgical History:   Procedure Laterality Date    BREAST CYST EXCISION Bilateral     X4      SECTION      COLONOSCOPY      Amish, Benign Polyps per patient     JOINT REPLACEMENT      right shoulder    TONSILLECTOMY           CURRENT MEDICATIONS     Previous Medications    ASPIRIN 81 MG CHEWABLE TABLET    Take 1 tablet by mouth daily    ATORVASTATIN (LIPITOR) 80 MG TABLET    Take 1 tablet by mouth nightly    CLOPIDOGREL (PLAVIX) 75 MG TABLET    Take 1 tablet by mouth daily       ALLERGIES     Patient has no known allergies.     FAMILY HISTORY       Family History   Problem Relation Age of Onset    Heart Attack Maternal Grandmother     Colon Cancer Father     Colon Cancer Mother     Esophageal Cancer Neg Hx     Liver Cancer Neg Hx     Rectal Cancer Neg Hx     Stomach Cancer Neg Hx           SOCIAL HISTORY       Social History     Socioeconomic History    Marital status:      Spouse name: Not on file    Number of children: Not on file    Years of education: Not on file    Highest education level: Not on file   Occupational History    Not on file   Social Needs    Financial resource strain: Not on file    Food insecurity     Worry: Not on file     Inability: Not on file  Transportation needs     Medical: Not on file     Non-medical: Not on file   Tobacco Use    Smoking status: Former Smoker     Types: Cigarettes     Quit date: 2020     Years since quittin.2    Smokeless tobacco: Never Used   Substance and Sexual Activity    Alcohol use: Yes     Comment: Very Rare     Drug use: No    Sexual activity: Never   Lifestyle    Physical activity     Days per week: Not on file     Minutes per session: Not on file    Stress: Not on file   Relationships    Social connections     Talks on phone: Not on file     Gets together: Not on file     Attends Jehovah's witness service: Not on file     Active member of club or organization: Not on file     Attends meetings of clubs or organizations: Not on file     Relationship status: Not on file    Intimate partner violence     Fear of current or ex partner: Not on file     Emotionally abused: Not on file     Physically abused: Not on file     Forced sexual activity: Not on file   Other Topics Concern    Not on file   Social History Narrative    Not on file       SCREENINGS             PHYSICAL EXAM    (up to 7 for level 4, 8 or more for level 5)     ED Triage Vitals [21 1431]   BP Temp Temp Source Pulse Resp SpO2 Height Weight   (!) 125/91 97.4 °F (36.3 °C) Oral 105 16 93 % 5' 3\" (1.6 m) 172 lb (78 kg)       Physical Exam  Vitals signs and nursing note reviewed. Constitutional:       General: She is not in acute distress. Appearance: Normal appearance. HENT:      Head: Normocephalic and atraumatic. Right Ear: External ear normal.      Left Ear: External ear normal.      Nose: Nose normal.      Mouth/Throat:      Mouth: Mucous membranes are moist.      Pharynx: Oropharynx is clear. No oropharyngeal exudate. Eyes:      General: No scleral icterus. Conjunctiva/sclera: Conjunctivae normal.      Pupils: Pupils are equal, round, and reactive to light. Neck:      Musculoskeletal: Neck supple. No neck rigidity. Cardiovascular:      Rate and Rhythm: Normal rate and regular rhythm. Pulses: Normal pulses. Heart sounds: Normal heart sounds. Pulmonary:      Effort: Pulmonary effort is normal.      Breath sounds: Normal breath sounds. Abdominal:      General: Bowel sounds are normal.      Palpations: Abdomen is soft. Tenderness: There is no abdominal tenderness. There is no guarding. Musculoskeletal:         General: No tenderness or deformity. Skin:     General: Skin is warm and dry. Capillary Refill: Capillary refill takes less than 2 seconds. Coloration: Skin is pale. Skin is not jaundiced. Neurological:      General: No focal deficit present. Mental Status: She is alert and oriented to person, place, and time. Mental status is at baseline. Cranial Nerves: No cranial nerve deficit. Sensory: No sensory deficit. Motor: Weakness (Generalized, right lower extremity greater than left lower extremity.) present. Coordination: Coordination normal.   Psychiatric:         Mood and Affect: Mood normal.         Behavior: Behavior normal.         DIAGNOSTIC RESULTS     EKG: All EKG's areinterpreted by the Emergency Department Physician who either signs or Co-signs this chart in the absence of a cardiologist.    EKG dated 03/02/2021 at 1650 9 PM: Normal sinus rhythm, rate 82. Ossific T wave flattening in one, aVL. RADIOLOGY:  Non-plain film images such as CT, Ultrasound and MRI are read by the radiologist. Plain radiographic images are visualized and preliminarily interpreted bythe emergency physician with the below findings:      CT Head WO Contrast   Final Result   Mild cerebral and cerebellar volume loss with chronic microvascular   disease but no evidence of acute intracranial process.    Signed by Dr Lilliam Mckinney on 3/2/2021 6:06 PM              LABS:  Labs Reviewed   CBC WITH AUTO DIFFERENTIAL - Abnormal; Notable for the following components:       Result Value MCHC 32.5 (*)     Neutrophils % 80.5 (*)     Lymphocytes % 12.0 (*)     Neutrophils Absolute 7.9 (*)     All other components within normal limits   COMPREHENSIVE METABOLIC PANEL W/ REFLEX TO MG FOR LOW K - Abnormal; Notable for the following components:    Potassium reflex Magnesium 3.4 (*)     Glucose 118 (*)     Alkaline Phosphatase 123 (*)     All other components within normal limits   URINE RT REFLEX TO CULTURE - Abnormal; Notable for the following components:    Leukocyte Esterase, Urine SMALL (*)     All other components within normal limits   MICROSCOPIC URINALYSIS - Abnormal; Notable for the following components:    WBC, UA 3-5 (*)     Bacteria, UA ++++ (*)     Crystals, UA NEG (*)     All other components within normal limits   COVID-19, RAPID   TROPONIN   LACTATE, SEPSIS   MAGNESIUM   LACTATE, SEPSIS       All other labs were within normal range or not returned as of this dictation. EMERGENCY DEPARTMENT COURSE and DIFFERENTIAL DIAGNOSIS/MDM:   Vitals:    Vitals:    03/02/21 1431   BP: (!) 125/91   Pulse: 105   Resp: 16   Temp: 97.4 °F (36.3 °C)   TempSrc: Oral   SpO2: 93%   Weight: 172 lb (78 kg)   Height: 5' 3\" (1.6 m)       MDM  57-year-old female resents after near syncopal episode with loss of bowel control in her home. Lab, EKG, and radiology results reviewed. Patient diagnosed with acute urinary tract infection and mild orthostatic hypotension. Patient received IV antibiotics and IV fluids in the emergency department. Discussed with Dr. Amina Fam hospitalist, who will admit patient for observation for further evaluation and treatment as needed. Patient and daughter, [place], verbalized understanding and agreement with plan of care. CONSULTS:  None    PROCEDURES:  Unless otherwise noted below, none     Procedures    FINAL IMPRESSION      1. Dizziness    2. Near syncope    3. Acute urinary tract infection    4.  Full incontinence of feces          DISPOSITION/PLAN DISPOSITION Decision To Admit 03/02/2021 06:15:33 PM      PATIENT REFERRED TO:  No follow-up provider specified.     DISCHARGE MEDICATIONS:  New Prescriptions    No medications on file          (Please note that portions of this note were completed with a voice recognition program.  Efforts were made to edit thedictations but occasionally words are mis-transcribed.)    Ines Bourne MD (electronically signed)  Attending Emergency Physician          Ines Bourne MD  03/02/21 1300 [facility] Colleen Haider MD  03/02/21 1897

## 2021-03-03 NOTE — DISCHARGE SUMMARY
EEG was completed prior to DC will follow with results. Consultants:   IP CONSULT TO HOME CARE NEEDS    Time Spent on Discharge:  45  minutes were spent in patient examination, evaluation, counseling as well as medication reconciliation, prescriptions for required medications, discharge plan and follow up.       Surgeries/Procedures Performed:       Treatments:   IV hydration, antibiotics: ceftriaxone, analgesia: acetaminophen, cardiac meds: Lipitor, anticoagulation: Plavix and LMW heparin, insulin: Humalog, therapies: PT, OT and ST and electrolyte stabilization    Significant Diagnostic Studies:   Recent Labs:  CBC:   Lab Results   Component Value Date    WBC 5.9 03/03/2021    RBC 4.22 03/03/2021    HGB 12.6 03/03/2021    HCT 38.2 03/03/2021    MCV 90.5 03/03/2021    MCH 29.9 03/03/2021    MCHC 33.0 03/03/2021    RDW 13.2 03/03/2021     03/03/2021     BMP:    Lab Results   Component Value Date    GLUCOSE 100 03/03/2021     03/03/2021    K 4.1 03/03/2021    K 3.4 03/02/2021     03/03/2021    CO2 26 03/03/2021    ANIONGAP 9 03/03/2021    BUN 10 03/03/2021    CREATININE 0.6 03/03/2021    CALCIUM 9.1 03/03/2021    LABGLOM >60 03/03/2021    GFRAA >59 03/03/2021     HFP:    Lab Results   Component Value Date    PROT 6.1 03/03/2021     CMP:    Lab Results   Component Value Date    GLUCOSE 100 03/03/2021     03/03/2021    K 4.1 03/03/2021    K 3.4 03/02/2021     03/03/2021    CO2 26 03/03/2021    BUN 10 03/03/2021    CREATININE 0.6 03/03/2021    ANIONGAP 9 03/03/2021    ALKPHOS 103 03/03/2021    ALT 24 03/03/2021    AST 21 03/03/2021    BILITOT 0.5 03/03/2021    LABALBU 3.9 03/03/2021    LABGLOM >60 03/03/2021    GFRAA >59 03/03/2021    PROT 6.1 03/03/2021    CALCIUM 9.1 03/03/2021     PT/INR:    Lab Results   Component Value Date    PROTIME 13.5 12/01/2020    INR 1.04 12/01/2020     PTT: No results found for: APTT  FLP:    Lab Results   Component Value Date    CHOL 99 03/03/2021    TRIG and time. Mental status is at baseline. Motor: No weakness. Psychiatric:         Mood and Affect: Mood normal.           Discharge Plan   Disposition: Home    Provider Follow-Up:   Vanesa Faith MD  [address] [place] [address] Street  [phone]    On 3/9/2021  post hospital visit @ 80 with Cecilie Gottron           Patient Instructions   Diet: diabetic diet    Activity: activity as tolerated      Discharge Medications         Medication List      START taking these medications    cefdinir 300 MG capsule  Commonly known as: OMNICEF  Take 1 capsule by mouth 2 times daily for 3 days        CONTINUE taking these medications    aspirin 81 MG chewable tablet  Take 1 tablet by mouth daily     atorvastatin 80 MG tablet  Commonly known as: LIPITOR  Take 1 tablet by mouth nightly     clopidogrel 75 MG tablet  Commonly known as: PLAVIX  Take 1 tablet by mouth daily           Where to Get Your Medications      These medications were sent to [address] [place] [address] [address] [facility], [address] [id number]    Phone: [phone]   · cefdinir 300 MG capsule         EMR Dragon/Transcription disclaimer:   Much of this encounter note is an electronic transcription/translation of spoken language to printed text.  The electronic translation of spoken language may permit erroneous, or at times, nonsensical words or phrases to be inadvertently transcribed; although attempts have made to review the note for such errors, some may still exist.    Electronically signed by   Ric Webb   Internal Medicine Hospitalist  On 3/3/2021  At 4:28 PM

## 2021-03-03 NOTE — PROGRESS NOTES
4 Eyes Skin Assessment    Migdalia Grey is being assessed upon: Admission    I agree that I, Carmen Mullins, along with Evelyn Asher RN (either 2 RN's or 1 LPN and 1 RN) have performed a thorough Head to Toe Skin Assessment on the patient. ALL assessment sites listed below have been assessed. Areas assessed by both nurses:     [x]   Head, Face, and Ears   [x]   Shoulders, Back, and Chest  [x]   Arms, Elbows, and Hands   [x]   Coccyx, Sacrum, and Ischium  [x]   Legs, Feet, and Heels    Does the Patient have Skin Breakdown?  No    Ravi Prevention initiated: No  Wound Care Orders initiated: NA    [facility] nurse consulted for Pressure Injury (Stage 3,4, Unstageable, DTI, NWPT, and Complex wounds) and New or Established Ostomies: NA        Primary Nurse eSignature: Edward Camacho RN on 3/3/2021 at 12:18 AM      Co-Signer eSignature: Electronically signed by Evelyn Asher on 3/3/2021 at 2:32 AM

## 2021-03-03 NOTE — PROGRESS NOTES
[facility]  Personalized Stroke Treatment Plan  My Stroke Type:   [] Ischemic Stroke (Blockage of blood flow to the brain)     [x] TIA  Transient Ischemic Attack (mini-stroke)    Personal risk factors you can control include:    [] Alcohol Abuse: check with your physician before any alcohol consumption. [] Atrial fibrillation: may cause blood clots. [] Drug Abuse: Seek help, talk with your doctor  [] Clotting Disorder  [] Diabetes  [] Family history of stroke or heart disease  [] High Blood Pressure/Hypertension: work with your physician.  [] High cholesterol: monitor cholesterol levels with your physician.   [] Overweight/Obesity: work with your physician for your ideal body weight.  [] Physical Inactivity: get regular exercise as directed by your physician. [x] Personal history of previous TIA or stroke  [] Poor Diet; decrease salt (sodium) in your diet, follow diet directed by physician. [] Smoking: Cigarette/Cigar: stop smoking. Follow up with your physician is important after discharge. TAKE all medications as prescribed. Do not stop taking any medications   without talking to your physician. FAST is a simple way to remember the main symptoms of stroke. Recognizing these symptoms helps you know when to call for medical help. FAST stands for:        F  Face Drooping      A  Arm Weakness        S  Speech Difficulty      T  Time to Call 9-1-1  DO NOT DELAY THIS! Educated patient and/or family on personal risk factors for stroke/TIA. Included ways to reduce the risk for recurrent stroke. [facility]'s Stroke treatment and prevention, Managing your recovery  notebook  provided to patient. The notebook includes, but not limited to, sections addressing warning signs & symptoms of a stroke. The need to call EMS ([phone]) immediately if signs & symptoms occur is emphasized . Medication information will be reviewed at discharge. The notebook also provides education on Stroke community resources and stroke advocacy.     Electronically signed by Electronically signed by Edy Salinas RN on 3/3/2021 at 12:13 AM

## 2021-03-03 NOTE — PROGRESS NOTES
Physical Therapy    Facility/Department: [facility] SURG SERVICES  Initial Assessment    NAME: Sil Briones  : 1955  MRN: [id number]    Date of Service: 3/3/2021    Discharge Recommendations:  Continue to assess pending progress, Patient would benefit from continued therapy after discharge        Assessment   Body structures, Functions, Activity limitations: Decreased functional mobility ; Decreased balance;Decreased endurance;Decreased strength  Assessment: Pt will benefit from skilled therapy to address mobility deficits. She has a RW at home but does not use it often and uses touch support on wall or furniture when ambulating. She states she sometimes feels unsteady but uses touch support mostly for comfort. When amb during eval, demonstrates min LOB but able to recover independently. Will continue to follow. Left in bed w all needs within reach. Specific instructions for Next Treatment: Gait training, balance  Prognosis: Good  Decision Making: Low Complexity  PT Education: PT Role;Plan of Care;General Safety;Transfer Training;Gait Training;Functional Mobility Training  REQUIRES PT FOLLOW UP: Yes  Activity Tolerance  Activity Tolerance: Patient Tolerated treatment well       Patient Diagnosis(es): The primary encounter diagnosis was Dizziness. Diagnoses of Near syncope, Acute urinary tract infection, and Full incontinence of feces were also pertinent to this visit. has a past medical history of Abnormal Pap smear of cervix, History of cryosurgery, and Stroke ([facility] [address] has a past surgical history that includes  section (); Breast cyst excision (Bilateral); Tonsillectomy; joint replacement; and Colonoscopy ().     Restrictions  Restrictions/Precautions  Restrictions/Precautions: Fall Risk, Seizure  Vision/Hearing        Subjective  General  Patient assessed for rehabilitation services?: Yes  Response To Previous Treatment: Not applicable  Diagnosis: Near syncope Follows Commands: Within Functional Limits  Subjective  Subjective: Pt reports feeling \"really good\" today and denies pain. Willing to work w therapy this AM to walk.   Pain Screening  Patient Currently in Pain: Denies  Vital Signs  Patient Currently in Pain: Denies       Orientation     Social/Functional History  Social/Functional History  Lives With: Alone  Type of Home: House  Home Layout: One level  Bathroom Shower/Tub: Walk-in shower  Bathroom Equipment: Grab bars in shower  Home Equipment: Rolling walker  Receives Help From: Family  ADL Assistance: Independent  Homemaking Assistance: Independent  Ambulation Assistance: Independent  Transfer Assistance: Independent  Active : No  Cognition        Objective          AROM RLE (degrees)  RLE AROM: WFL  AROM LLE (degrees)  LLE AROM : WFL  Strength RLE  Strength RLE: WFL  Comment: Grossly antigravity  Strength LLE  Strength LLE: WFL  Comment: Grossly antigravity        Bed mobility  Supine to Sit: Modified independent  Sit to Supine: Modified independent  Transfers  Sit to Stand: Stand by assistance  Stand to sit: Stand by assistance  Ambulation  Ambulation?: Yes  WB Status: WBAT  Ambulation 1  Surface: level tile  Device: No Device  Assistance: Contact guard assistance  Gait Deviations: Slow Lexie;Decreased step height;Decreased arm swing  Distance: 150ft  Comments: Pt initially touching wall w hand but looked steadier when cued to ambulate w/o touch support; min LOB noted but able to recover independently     Balance  Posture: Good  Sitting - Static: Good  Sitting - Dynamic: Good  Standing - Static: Good  Standing - Dynamic: Fair;+        Plan   Plan  Times per week: 5-7x  Times per day: Daily  Plan weeks: 2 weeks  Specific instructions for Next Treatment: Gait training, balance  Current Treatment Recommendations: Strengthening, Balance Training, Functional Mobility Training, Transfer Training, Endurance Training, Gait Training, Stair training Safety Devices  Type of devices: Bed alarm in place, Call light within reach, Gait belt, Left in bed    G-Code       OutComes Score                                                  AM-PAC Score             Goals  Short term goals  Time Frame for Short term goals: 2 weeks  Short term goal 1: STS, ind  Short term goal 2: Amb 400 ft w sup and no LOB  Short term goal 3: Ascend/descend 4 stairs w sup       Therapy Time   Individual Concurrent Group Co-treatment   Time In           Time Out           Minutes                   Fuentes Osorio       Electronically signed by Fuentes Osorio on 3/3/2021 at 9:59 AM

## 2021-03-03 NOTE — PROGRESS NOTES
Speech Language Pathology  Facility/Department: [facility] SURG SERVICES  Initial Speech/Language/Cognitive/Swallow Assessment    NAME: Latonya Houston  : 1955   MRN: [id number]  ADMISSION DATE: 3/2/2021  ADMITTING DIAGNOSIS: has Acute ischemic stroke ([facility] [address] Weakness; Dysphagia due to recent cerebral infarction; Dysarthria; Chronic right shoulder pain; Smoker; Gait abnormality; Personal history of colonic polyps; Family history of colon cancer in father; Family history of colon cancer in mother; Abnormal swallowing; Near syncope; Former smoker; H/O: CVA (cerebrovascular accident); and Hyperlipidemia on their problem list.    Date of Eval: 3/3/2021   Evaluating Therapist: NANCY Santillan    Assessment:  Completed assessment. SLP ranked functional intelligibility of speech for unfamiliar listeners at 100% in conversation. Patient did exhibit mildly delayed (but appropriate) auditory comprehension and responsive speech. Also evaluated patient's swallowing function. Patient exhibits functional oral and pharyngeal stages of swallowing with no outward S/S penetration/aspiration noted with any regular solid consistency trial or thin H2O trial presented during assessment this date. At this time, recommend continuation regular solid consistency and thin liquids. Self-feed. Thank you for this consult. Subjective:  General  Chart Reviewed: Yes  Patient assessed for rehabilitation services?: Yes  Family / Caregiver Present: No    Objective:   Auditory Comprehension Comprehension:  (Patient demonstrated ability to answer simple yes/no questions regarding immediate environment and current state of being at independent level and with adequate processing speed. Patient demonstrated ability to follow simple 1 step commands independently and with adequate processing speed. While mild delays were noted, patient demonstrated ability to answer yes/no questions of increased complexity and to follow complex 1 and simple 2 step commands at independent level.)    Expression  Primary Mode of Expression:  (Confrontation naming of items in room was considered to be [facility]. Structured responsive speech was considered to be mildly delayed but appropriate. Responses in natural conversation were considered to be functional.)    Motor Speech:  (SLP ranked functional intelligibility of speech for unfamiliar listeners at 100% in verbalizations with background noise present.)    Overall Orientation Status:  (Patient demonstrated ability to verbalize name, birthday, age, address, phone number, city, state, hospital, month, ALMA, date, year, and situation at independent level.)    Attention:  (Patient demonstrated appropriate select and sustained attention during assessment.)    Memory:  (Patient demonstrated appropriate immediate memory with sequences of unrelated numbers/words set up to 5 items without repetitions provided. Patient demonstrated appropriate short-term/working memory with 10 minute delay + distractions present during assessment.)    Problem Solving:  (Patient verbalized appropriate solutions to situations that could occur during activities of daily living at independent level. Patient verbalized PCP and pharmacy independently.  Patient verbalized conditions in which she takes medications at independent level. )    Additional Assessments: Assessed patient's swallowing function. Patient exhibited functional oral prep and transit of regular solid consistency trials, presented independently, with timing of oral transit primarily measuring 1-2 seconds in length and with no oral cavity residue noted post swallows. Oral transit of thin H2O trials, presented independently via cup, primarily measured 1 second in length. Laryngeal elevation during swallow initiation was considered to be mildly sluggish in movement but functional with no outward S/S penetration/aspiration observed with any regular solid consistency trial or thin H2O trial presented during evaluation this date. At this time, recommend continuation current diet. Self-feed. Will follow to monitor swallow and cognitive-linguistic functioning.      Electronically signed by NANCY Zavaleta on 3/3/2021 at 12:44 PM

## 2021-03-03 NOTE — H&P
[facility]ara [facility] - History & Physical      PCP: Kvng Madrid MD  Date of Admission: 3/2/2021   Date of Service: Pt seen/examined on3/2021 and Admitted to Inpatient with expected LOS greater than two midnights due to medical therapy. Chief Complaint:  Near syncope, weakness, gait instability    History Of Present Illness: The patient is a 77 y.o. female with a past medical history of CVAs, hyperlipidemia who presented to the ED complaining of a near syncopal episode that occurred while she was in the shower today. Patient states she had sudden lightheadedness and dizziness associated with bowel incontinence. Patient denies loss of consciousness or head trauma and states that she got out of the shower and sat on the toilet to regain her composure. When she got up she noticed that she was stumbling with some gait instability but again denies fall, head trauma or loss of consciousness. Patient denies chest pain or shortness of breath prior to, during, or after these events. This prompted her to come to the hospital because she states that her previous CVAs were similar to this. Patient otherwise denies vomiting, diarrhea, constipation, fevers, chills, sweats, dysuria, cough, sick contacts, recent travel. Past Medical History:        Diagnosis Date    Abnormal Pap smear of cervix         History of cryosurgery     Stroke [facility])        Past Surgical History:        Procedure Laterality Date    BREAST CYST EXCISION Bilateral     X4      SECTION      COLONOSCOPY      Latter day, Benign Polyps per patient     JOINT REPLACEMENT      right shoulder    TONSILLECTOMY         Home Medications:  Prior to Admission medications    Medication Sig Start Date End Date Taking?  Authorizing Provider   atorvastatin (LIPITOR) 80 MG tablet Take 1 tablet by mouth nightly 21   Kvng Madrid MD clopidogrel (PLAVIX) 75 MG tablet Take 1 tablet by mouth daily 1/19/21   Joann Baker MD   aspirin 81 MG chewable tablet Take 1 tablet by mouth daily 1/15/21   Joann Baker MD       Allergies:    Patient has no known allergies. Social History:    Tobacco:   reports that she quit smoking about 3 months ago. Her smoking use included cigarettes. She has never used smokeless tobacco.  Alcohol:   reports current alcohol use. Illicit Drugs: denies    Family History:      Problem Relation Age of Onset    Heart Attack Maternal Grandmother     Colon Cancer Father     Colon Cancer Mother     Esophageal Cancer Neg Hx     Liver Cancer Neg Hx     Rectal Cancer Neg Hx     Stomach Cancer Neg Hx        Review of Systems:   Negative except as above. Physical Examination:  BP (!) 125/91   Pulse 105   Temp 97.4 °F (36.3 °C) (Oral)   Resp 16   Ht 5' 3\" (1.6 m)   Wt 172 lb (78 kg)   SpO2 93%   BMI 30.47 kg/m²   General appearance: AAOx3 (person, place, time)  Head: NC/AT  Eyes: conjunctivae/corneas clear   Ears: normal external ears  Neck: supple  Lungs: BLAE, no wheezing or crackles appreciated   Heart: regular rate and rhythm, no murmurs aprpeciated  Abdomen: BS+, soft, NT, ND  Extremities: no edema, pulses+  Skin: warm  Neurologic: Alert, gross motor function intact  Psychiatric:  calm, mood appropriate        Diagnostic Data:    CBC:  Recent Labs     03/02/21  1450   WBC 9.9   HGB 14.9   HCT 45.8        BMP:  Recent Labs     03/02/21  1450      K 3.4*      CO2 29   BUN 10   CREATININE 0.8   CALCIUM 9.5     Recent Labs     03/02/21  1450   AST 26   ALT 31   BILITOT 0.6   ALKPHOS 123*     Coag Panel: No results for input(s): INR, PROTIME, APTT in the last 72 hours.   Cardiac Enzymes:   Recent Labs     03/02/21  1450   TROPONINI <0.01     ABGs:No results found for: PHART, PO2ART, KJJ7JHS  Urinalysis:  Lab Results   Component Value Date    NITRU Negative 03/02/2021 WBCUA 3-5 03/02/2021    BACTERIA ++++ 03/02/2021    RBCUA 0-1 03/02/2021    BLOODU Negative 03/02/2021    SPECGRAV 1.015 03/02/2021    GLUCOSEU Negative 03/02/2021     RAD:     CT Head WO Contrast [[id number]] Resulted: 03/02/21 1806      Order Status: Completed Updated: 03/02/21 1808     Narrative:       EXAMINATION: CT HEAD WO CONTRAST 3/2/2021 6:05 PM   HISTORY: CT BRAIN without contrast 3/2/2021 4:18 PM   HISTORY: Syncope   COMPARISON: None   DLP: 743 mGy cm   TECHNIQUE: Serial axial tomographic images of the brain were obtained   without the use of intravenous contrast.   FINDINGS:   The midline structures are nondisplaced. There is mild cerebral and   cerebellar volume loss, with an associated increase in the prominence   of the ventricles and sulci. The basilar cisterns are normal in size   and configuration. There is no evidence of intracranial hemorrhage or   mass-effect. There is low attenuation in the periventricular white   matter, consistent with chronic ischemic change. There are no abnormal   extra-axial fluid collections. There is no evidence of tonsillar   herniation. The included orbits and their contents are unremarkable. The   visualized paranasal sinuses, mastoid air cells and middle ear   cavities are clear. The visualized osseous structures and overlying   soft tissues of the skull and face are intact.      Impression:       Mild cerebral and cerebellar volume loss with chronic microvascular   disease but no evidence of acute intracranial process.    Signed by Dr Ronnie Shultz on 3/2/2021 6:06 PM         Active Hospital Problems    Diagnosis Date Noted    Near syncope [R55] 03/02/2021    Former smoker [Z87.891] 03/02/2021    H/O: CVA (cerebrovascular accident) [Z86.73] 03/02/2021    Hyperlipidemia [E78.5] 03/02/2021    Gait abnormality [R26.9] 12/01/2020       MPRESSION / PLAN:  Active Problems:    Gait abnormality    Near syncope    Former smoker    H/O: CVA (cerebrovascular accident) Hyperlipidemia  Resolved Problems:    * No resolved hospital problems. *    Near syncope/Gait instability/h/o CVA: MRI brain/EEG/TTE/Telemetry/Trend troponin. Fall/Seizure precautions. DAPT/Statin. UTI: Rocephin. Follow-up urine culture. HLD: Statin. Supportive management. PT/OT/SLP. DVT ppx: Lovenox  Full code.       Guy Samson MD  3/2/2021

## 2021-03-04 NOTE — PROCEDURES
ADULT INPATIENT ELECTROENCEPHALOGRAM REPORT    Patient:   Morgan Barnes  MR#:    [id number]  Room #:    INPATIENT  YOB: 1955  Date of Evaluation:  3/3/2021  Primary Physician:     Deondre Kilpatrick MD   Referring Physician:   Alyssa Sullivan MD    CLINICAL INFORMATION:     This patient is a 77 y.o. female with a history of syncope. MEDICATIONS:     See MAR. RECORDING CONDITIONS:     This EEG was performed utilizing standard International 10-20 System of electrode placement, with additional channels monitored for eye movement. One channel electrocardiogram was monitored. Data was obtained, stored, and interpreted according to ACNS guidelines (J Clin Neurophysiol 2006;23(2):) utilizing referential montage recording, with reformatting to longitudinal, transverse bipolar, and referential montages as necessary for interpretation, along with the digital/automated EEG analysis. Patient tolerated entire procedure well. Photic stimulation and hyperventilation were utilized as activation procedures unless otherwise specified below. E.E.G. DESCRIPTION:     The resting predominant posterior background frequency is a 9-10 Hz 30-40 uV rhythm. No overt focal, lateralizing, or paroxysmal abnormalities were noted through the recording. Drowsiness was demonstrated by slow rolling eye movements followed by a loss of the background waking activities. Onset of stage I sleep was demonstrated by gradual disappearance of background waking rhythms with gradual symmetric mixed frequency 4-7 Hz slowing. Hyperventilation was not performed. Photic stimulation was performed and had little change on the recording. No ECG abnormalities were noted. Muscle, motion, and eye movement artifacts were noted. EEG INTERPRETATION:    Normal EEG for age in the awake, drowsy, and sleep states.        CLINICAL CORRELATION:     The absence of epileptiform abnormalities does not preclude a clinical diagnosis of seizures.        Tim Liu DO  Board Certified Neurologist

## 2021-03-05 ENCOUNTER — TELEPHONE (OUTPATIENT)
Dept: PRIMARY CARE CLINIC | Age: 66
End: 2021-03-05

## 2021-03-05 NOTE — TELEPHONE ENCOUNTER
[facility] Transitions Initial Follow Up Call    Outreach made within 2 business days of discharge: Yes    Patient: Arjun Barry Patient : 1955   MRN: [id number]  Reason for Admission: near Syncopal episode, Gait Abnormality, H/O CVA    Discharge Date: 3/3/21       Spoke with: No One answered the phone. Discharge department/facility: [facility]).        Scheduled appointment with PCP within 7-14 days    Follow Up  Future Appointments   Date Time Provider Stephanie Horvath   3/9/2021  2:15 PM AMOL Prado - AMANDA [place] P-[place]   3/24/2021  3:15 PM MD DINO Sanders P-JOSE MANUEL Lester LPN

## 2021-03-08 ENCOUNTER — TELEPHONE (OUTPATIENT)
Dept: PRIMARY CARE CLINIC | Age: 66
End: 2021-03-08

## 2021-03-09 ENCOUNTER — OFFICE VISIT (OUTPATIENT)
Dept: PRIMARY CARE CLINIC | Age: 66
End: 2021-03-09
Payer: MEDICARE

## 2021-03-09 VITALS
SYSTOLIC BLOOD PRESSURE: 122 MMHG | OXYGEN SATURATION: 97 % | HEART RATE: 70 BPM | TEMPERATURE: 98.6 F | BODY MASS INDEX: 30.94 KG/M2 | WEIGHT: 174.6 LBS | DIASTOLIC BLOOD PRESSURE: 82 MMHG | HEIGHT: 63 IN

## 2021-03-09 DIAGNOSIS — I63.9 ACUTE ISCHEMIC STROKE (HCC): ICD-10-CM

## 2021-03-09 DIAGNOSIS — R55 NEAR SYNCOPE: Primary | ICD-10-CM

## 2021-03-09 PROCEDURE — 99495 TRANSJ CARE MGMT MOD F2F 14D: CPT | Performed by: NURSE PRACTITIONER

## 2021-03-09 PROCEDURE — 1111F DSCHRG MED/CURRENT MED MERGE: CPT | Performed by: NURSE PRACTITIONER

## 2021-03-09 ASSESSMENT — ENCOUNTER SYMPTOMS
BLOOD IN STOOL: 0
DIARRHEA: 0
EYES NEGATIVE: 1
VOMITING: 0
RESPIRATORY NEGATIVE: 1
ALLERGIC/IMMUNOLOGIC NEGATIVE: 1
NAUSEA: 0
CONSTIPATION: 0
ABDOMINAL PAIN: 0

## 2021-03-09 ASSESSMENT — PATIENT HEALTH QUESTIONNAIRE - PHQ9
SUM OF ALL RESPONSES TO PHQ QUESTIONS 1-9: 0
SUM OF ALL RESPONSES TO PHQ QUESTIONS 1-9: 0
SUM OF ALL RESPONSES TO PHQ9 QUESTIONS 1 & 2: 0
SUM OF ALL RESPONSES TO PHQ QUESTIONS 1-9: 0

## 2021-03-09 NOTE — PROGRESS NOTES
Post-Discharge Transitional Care Management Services or Hospital Follow Up      [facility]   YOB: 1955    Date of Office Visit:  3/9/2021  Date of Hospital Admission: 3/2/21  Date of Hospital Discharge: 3/3/21  Readmission Risk Score(high >=14%.  Medium >=10%):Readmission Risk Score: 11      Care management risk score Rising risk (score 2-5) and Complex Care (Scores >=6): 0     Non face to face  following discharge, date last encounter closed (first attempt may have been earlier): 3/8/2021  6:15 PM 3/8/2021  6:15 PM    Call initiated 2 business days of discharge: Yes     Patient Active Problem List   Diagnosis    Acute ischemic stroke ([address]    Weakness    Dysphagia due to recent cerebral infarction    Dysarthria    Chronic right shoulder pain    Smoker    Gait abnormality    Personal history of colonic polyps    Family history of colon cancer in father    Family history of colon cancer in mother    Abnormal swallowing    Near syncope    Former smoker    H/O: CVA (cerebrovascular accident)    Hyperlipidemia       No Known Allergies    Medications listed as ordered at the time of discharge from [address] Medication Instructions ADALGISA:    Printed on:03/09/21 1798   Medication Information                      aspirin 81 MG chewable tablet  Take 1 tablet by mouth daily             atorvastatin (LIPITOR) 80 MG tablet  Take 1 tablet by mouth nightly             clopidogrel (PLAVIX) 75 MG tablet  Take 1 tablet by mouth daily                   Medications marked \"taking\" at this time  Outpatient Medications Marked as Taking for the 3/9/21 encounter (Office Visit) with AMOL Brambila NP   Medication Sig Dispense Refill    atorvastatin (LIPITOR) 80 MG tablet Take 1 tablet by mouth nightly 30 tablet 3    clopidogrel (PLAVIX) 75 MG tablet Take 1 tablet by mouth daily 30 tablet 3    aspirin 81 MG chewable tablet Take 1 tablet by mouth daily 30 tablet 2        Medications patient taking as of now reconciled against medications ordered at time of hospital discharge: Yes    Chief Complaint   Patient presents with   [address] from Hospital     Pt states she is still fatigued but other than that feels much better. HPI    Inpatient course: Discharge summary reviewed- see chart. Interval history/Current status: pt home since DC. She lives alone but has a daughter that lives 5 minutes away. Review of Systems   Constitutional: Negative for chills, fatigue and fever. HENT: Negative. Eyes: Negative. Respiratory: Negative. Cardiovascular: Negative. Gastrointestinal: Negative for abdominal pain, blood in stool, constipation, diarrhea, nausea and vomiting. Endocrine: Negative. Genitourinary: Negative for difficulty urinating and dysuria. Musculoskeletal: Negative. Skin: Negative. Allergic/Immunologic: Negative. Neurological: Negative for dizziness, syncope and headaches. Hematological: Negative. Psychiatric/Behavioral: Negative. Vitals:    03/09/21 1422   BP: 122/82   Site: Left Upper Arm   Position: Sitting   Cuff Size: Medium Adult   Pulse: 70   Temp: 98.6 °F (37 °C)   SpO2: 97%   Weight: 174 lb 9.6 oz (79.2 kg)   Height: 5' 3\" (1.6 m)     Body mass index is 30.93 kg/m². Wt Readings from Last 3 Encounters:   03/09/21 174 lb 9.6 oz (79.2 kg)   03/02/21 172 lb (78 kg)   12/23/20 172 lb (78 kg)     BP Readings from Last 3 Encounters:   03/09/21 122/82   03/03/21 112/69   12/23/20 122/70       Physical Exam  Nursing note reviewed. Constitutional:       General: She is not in acute distress. Appearance: Normal appearance. She is not ill-appearing or toxic-appearing. HENT:      Head: Normocephalic and atraumatic. Nose: Nose normal.   Eyes:      General:         Right eye: No discharge. Left eye: No discharge. Extraocular Movements: Extraocular movements intact.       Conjunctiva/sclera: Conjunctivae normal.      Pupils: Pupils are equal, round, and reactive to light. Neck:      Musculoskeletal: Normal range of motion and neck supple. No neck rigidity. Cardiovascular:      Rate and Rhythm: Normal rate. Pulses: Normal pulses. Heart sounds: Normal heart sounds. Pulmonary:      Effort: Pulmonary effort is normal. No respiratory distress. Breath sounds: Normal breath sounds. No wheezing, rhonchi or rales. Abdominal:      Palpations: Abdomen is soft. Musculoskeletal: Normal range of motion. General: No swelling or tenderness. Lymphadenopathy:      Cervical: No cervical adenopathy. Skin:     General: Skin is warm and dry. Capillary Refill: Capillary refill takes less than 2 seconds. Coloration: Skin is not jaundiced or pale. Findings: No erythema or rash. Neurological:      Mental Status: She is alert and oriented to person, place, and time. Psychiatric:         Mood and Affect: Mood normal.         Behavior: Behavior normal.         Thought Content: Thought content normal.         Judgment: Judgment normal.             Assessment/Plan:  1. Near syncope  Use shower chair to dry off. Monitor BP and pulse at home and keep a journal    2.  Acute ischemic stroke ([address]  continue Lipitor, ASA and plavix        Medical Decision Making: moderate complexity

## 2021-03-09 NOTE — TELEPHONE ENCOUNTER
[facility] Transitions Initial Follow Up Call    Outreach made within 2 business days of discharge: Yes    Patient: Omar Officer Patient : 1955   MRN: [id number]  Reason for Admission: GAIT ABNORMALITY, NEAR SYNCOPE, H/O CVA    Discharge Date: 3/3/21       Spoke with: MELIZA     Discharge department/facility: [address] [place] [address] Interactive Patient Contact:  Was patient able to fill all prescriptions: Yes  Was patient instructed to bring all medications to the follow-up visit: Yes  Is patient taking all medications as directed in the discharge summary? Yes  Does patient understand their discharge instructions: Yes  Does patient have questions or concerns that need addressed prior to 7-14 day follow up office visit. PT WANTS TO GET HER TEST RESULTS WHEN HERE FOR THE APPT.      Scheduled appointment with PCP within 7-14 days    Follow Up  Future Appointments   Date Time Provider Stephanie Horvath   3/9/2021  2:15 PM AMOL Schreiber NP, Ma KYUNG   3/24/2021  3:15 PM [facility]

## 2021-03-19 NOTE — PROGRESS NOTES
Physician Progress Note      PATIENT:               Vin Bradley  CSN #:                  [id number]  :                       1955  ADMIT DATE:       3/2/2021 2:33 PM  [address] [place] DATE:        3/3/2021 5:18 PM  RESPONDING  PROVIDER #:        Eddie Bennett MD          QUERY TEXT:    Patient admitted with near syncope . Noted documentation of UTI and treated   with Rocephin. In order to support the diagnosis of UTI, please include   additional clinical indicators in your documentation. Or please document if   the diagnosis of UTI  has been ruled out after further study. The medical record reflects the following:  Risk Factors: Near syncope, Hx of CVA  Clinical Indicators: UA positive for bacteria. Urine culture negative. No   urinary symptoms noted. WBC 9.9. Treatment: IVF 1 liter bolus, Rocephin,    Thank you    Bishop Somers RN, BSN, [facility]  [phone]  Options provided:  -- UTI present as evidenced by, Please document evidence. -- UTI was ruled out  -- Other - I will add my own diagnosis  -- Disagree - Not applicable / Not valid  -- Disagree - Clinically unable to determine / Unknown  -- Refer to Clinical Documentation Reviewer    PROVIDER RESPONSE TEXT:    UTI was ruled out after study.     Query created by: Alex Cifuentes on 3/18/2021 9:41 AM      Electronically signed by:  Eddie Bennett MD 3/19/2021 8:41 AM

## 2021-04-09 ENCOUNTER — OFFICE VISIT (OUTPATIENT)
Dept: PRIMARY CARE CLINIC | Age: 66
End: 2021-04-09
Payer: MEDICARE

## 2021-04-09 VITALS
OXYGEN SATURATION: 97 % | WEIGHT: 167.4 LBS | BODY MASS INDEX: 29.66 KG/M2 | HEART RATE: 68 BPM | TEMPERATURE: 96.6 F | DIASTOLIC BLOOD PRESSURE: 88 MMHG | HEIGHT: 63 IN | SYSTOLIC BLOOD PRESSURE: 138 MMHG

## 2021-04-09 DIAGNOSIS — R55 NEAR SYNCOPE: Primary | ICD-10-CM

## 2021-04-09 DIAGNOSIS — Z76.89 NEED FOR OCCUPATIONAL THERAPY ASSESSMENT: ICD-10-CM

## 2021-04-09 DIAGNOSIS — Z12.11 SCREEN FOR COLON CANCER: ICD-10-CM

## 2021-04-09 PROBLEM — R91.1 PULMONARY NODULE: Status: ACTIVE | Noted: 2020-11-30

## 2021-04-09 PROBLEM — S42.291A CLOSED 4-PART FRACTURE OF PROXIMAL END OF RIGHT HUMERUS: Status: ACTIVE | Noted: 2019-11-18

## 2021-04-09 PROBLEM — I63.9 CEREBROVASCULAR ACCIDENT (CVA) (HCC): Status: ACTIVE | Noted: 2020-11-29

## 2021-04-09 PROBLEM — Z72.0 TOBACCO USE: Status: ACTIVE | Noted: 2020-11-27

## 2021-04-09 PROCEDURE — 4040F PNEUMOC VAC/ADMIN/RCVD: CPT | Performed by: NURSE PRACTITIONER

## 2021-04-09 PROCEDURE — 3017F COLORECTAL CA SCREEN DOC REV: CPT | Performed by: NURSE PRACTITIONER

## 2021-04-09 PROCEDURE — G8417 CALC BMI ABV UP PARAM F/U: HCPCS | Performed by: NURSE PRACTITIONER

## 2021-04-09 PROCEDURE — 1123F ACP DISCUSS/DSCN MKR DOCD: CPT | Performed by: NURSE PRACTITIONER

## 2021-04-09 PROCEDURE — 99213 OFFICE O/P EST LOW 20 MIN: CPT | Performed by: NURSE PRACTITIONER

## 2021-04-09 PROCEDURE — G8427 DOCREV CUR MEDS BY ELIG CLIN: HCPCS | Performed by: NURSE PRACTITIONER

## 2021-04-09 PROCEDURE — 1090F PRES/ABSN URINE INCON ASSESS: CPT | Performed by: NURSE PRACTITIONER

## 2021-04-09 PROCEDURE — 1036F TOBACCO NON-USER: CPT | Performed by: NURSE PRACTITIONER

## 2021-04-09 PROCEDURE — G8400 PT W/DXA NO RESULTS DOC: HCPCS | Performed by: NURSE PRACTITIONER

## 2021-04-09 ASSESSMENT — ENCOUNTER SYMPTOMS
ALLERGIC/IMMUNOLOGIC NEGATIVE: 1
EYES NEGATIVE: 1
ABDOMINAL PAIN: 0
RESPIRATORY NEGATIVE: 1

## 2021-04-09 NOTE — PROGRESS NOTES
[facility] PHYSICIAN SERVICES  LPS  [facility]  [address] [place]  [address] [address]  Dept: [phone]  Dept Fax: [phone]  Loc: [phone]    Reji Garcia is a 77 y.o. female who presents today for her medical conditions/complaints as noted below. Reji Garcia is c/o of Follow-up (Syncope has been under control via patient. Patient does have a shower chair but not currently using.  ) and Health Maintenance (Patient refuses mammogram.  We are placing a referral to [facility] today for f/u colonoscopy. )        HPI:     HPI     This 59-year-old female presents today for 1 month follow-up. She denies any new episodes of syncope or near syncope. She states that she does have a shower chair available but has not been needing to use it. She states that she would like to follow-up with her GI for colonoscopy. She refuses a mammogram at this time. Chief Complaint   Patient presents with    Follow-up     Syncope has been under control via patient. Patient does have a shower chair but not currently using.  Health Maintenance     Patient refuses mammogram.  We are placing a referral to [facility] today for f/u colonoscopy.       Past Medical History:   Diagnosis Date    Abnormal Pap smear of cervix         History of cryosurgery     Stroke ([facility] [address]     Syncope       Past Surgical History:   Procedure Laterality Date    BREAST CYST EXCISION Bilateral     X4      SECTION      COLONOSCOPY      Hinduism, Benign Polyps per patient     JOINT REPLACEMENT      right shoulder    TONSILLECTOMY         Vitals 2021 3/9/2021 3/3/2021 3/3/2021 3/3/2021 5077   SYSTOLIC [phone] 106 - 492   DIASTOLIC 88 82 69 74 - 79   Site Left Upper Arm Left Upper Arm - - - -   Position - Sitting - - - -   Cuff Size - Medium Adult - - - -   Pulse 68 70 76 62 - 66   Temp 96.6 98.6 97.6 97.4 - 97   Resp - - 16 16 - 18   SpO2 97 97 95 98 - 95   Weight 167 lb 6.4 oz 174 lb 9.6 oz - - - -   Height 5' 3\" 5' 3\" - - - -   Body mass index 29.65 kg/m2 30.93 kg/m2 - - - -   Pain Level - - - - 0 -   Some recent data might be hidden       Family History   Problem Relation Age of Onset    Heart Attack Maternal Grandmother     Colon Cancer Father     Colon Cancer Mother     Esophageal Cancer Neg Hx     Liver Cancer Neg Hx     Rectal Cancer Neg Hx     Stomach Cancer Neg Hx        Social History     Tobacco Use    Smoking status: Former Smoker     Packs/day: 1.00     Years: 50.00     Pack years: 50.00     Types: Cigarettes     Quit date: 2020     Years since quittin.3    Smokeless tobacco: Never Used   Substance Use Topics    Alcohol use: Yes     Comment: Very Rare       Current Outpatient Medications   Medication Sig Dispense Refill    atorvastatin (LIPITOR) 80 MG tablet Take 1 tablet by mouth nightly 30 tablet 3    clopidogrel (PLAVIX) 75 MG tablet Take 1 tablet by mouth daily 30 tablet 3    aspirin 81 MG chewable tablet Take 1 tablet by mouth daily 30 tablet 2     No current facility-administered medications for this visit.       No Known Allergies    Health Maintenance   Topic Date Due    Colon cancer screen colonoscopy  Never done    Low dose CT lung screening  Never done    Annual Wellness Visit (AWV)  Never done    Breast cancer screen  2021    Pneumococcal 65+ years Vaccine (1 of 1 - PPSV23) 2021 (Originally 2018)    DEXA (modify frequency per FRAX score)  2021 (Originally 2008)    DTaP/Tdap/Td vaccine (1 - Tdap) 2021 (Originally 1972)    Flu vaccine (Season Ended) 2021 (Originally 2021)    Shingles Vaccine (1 of 2) 2021 (Originally 2003)    Lipid screen  2022    COVID-19 Vaccine  Completed    Hepatitis A vaccine  Aged Out    Hepatitis B vaccine  Aged Out    Hib vaccine  Aged Out    Meningococcal (ACWY) vaccine  Aged Out    Hepatitis C screen  Discontinued       Subjective: Review of Systems   Constitutional: Negative. HENT: Negative. Eyes: Negative. Respiratory: Negative. Cardiovascular: Negative. Gastrointestinal: Negative for abdominal pain. Genitourinary: Negative for difficulty urinating and dysuria. Musculoskeletal: Negative. Skin: Negative. Allergic/Immunologic: Negative. Neurological: Negative for dizziness, syncope and headaches. Hematological: Negative. Psychiatric/Behavioral: Negative. Objective:     Physical Exam  Vitals signs and nursing note reviewed. Constitutional:       General: She is not in acute distress. Appearance: Normal appearance. She is not ill-appearing or toxic-appearing. HENT:      Head: Normocephalic and atraumatic. Nose: Nose normal.      Mouth/Throat:      Mouth: Mucous membranes are moist.      Pharynx: Oropharynx is clear. Eyes:      Extraocular Movements: Extraocular movements intact. Conjunctiva/sclera: Conjunctivae normal.      Pupils: Pupils are equal, round, and reactive to light. Neck:      Musculoskeletal: Normal range of motion and neck supple. Cardiovascular:      Rate and Rhythm: Normal rate and regular rhythm. Pulses: Normal pulses. Heart sounds: Normal heart sounds. Pulmonary:      Effort: Pulmonary effort is normal. No respiratory distress. Breath sounds: Normal breath sounds. No wheezing, rhonchi or rales. Abdominal:      Palpations: Abdomen is soft. Musculoskeletal: Normal range of motion. General: No swelling or tenderness. Right lower leg: No edema. Left lower leg: No edema. Skin:     General: Skin is warm and dry. Capillary Refill: Capillary refill takes less than 2 seconds. Coloration: Skin is not jaundiced or pale. Findings: No erythema or rash. Neurological:      Mental Status: She is alert and oriented to person, place, and time.    Psychiatric:         Mood and Affect: Mood normal.         Behavior: Behavior normal.         Thought Content: Thought content normal.         Judgment: Judgment normal.       /88 (Site: Left Upper Arm)   Pulse 68   Temp 96.6 °F (35.9 °C)   Ht 5' 3\" (1.6 m)   Wt 167 lb 6.4 oz (75.9 kg)   SpO2 97%   BMI 29.65 kg/m²     Assessment:       Diagnosis Orders   1. Near syncope     2. Screen for colon cancer  Russel Sandhoff, MD, Gastroenterology, [place]   3. Need for occupational therapy assessment           Plan:   1. Stable. Patient reports no new episodes of near syncope. She states that she does have a shower chair handy in case she needs it but has not been using it. She states that she feels much better. Continue Plavix Lipitor and aspirin. 2.  Overdue. Patient had been referred in December for a follow-up colonoscopy. This had to be delayed because she had just had a stroke in November and was on Plavix therapy. Dr. Toyin Moreno wanted her to be at least 3 months from stroke before coming off of the medication for the procedure. 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 Stroke that she had November. She wishes for assistance that she can once again resume more activities. Patient given educational materials -see patient instructions. Discussed use, benefit, and side effects of prescribed medications. All patient questions answered. Pt voiced understanding. Reviewed health maintenance. Instructed to continue currentmedications, diet and exercise. Patient agreed with treatment plan. Follow up as directed. MEDICATIONS:  No orders of the defined types were placed in this encounter. ORDERS:  Orders Placed This Encounter   Procedures   Russel Sandhoff, MD, Gastroenterology, [place]       Follow-up:  Return in about 3 months (around 7/9/2021). PATIENT INSTRUCTIONS:  There are no Patient Instructions on file for this visit.   Electronically signed by Gita Mckinney AMOL Olsen NP on 4/9/2021 at 1:56 PM    EMR Dragon/transcription disclaimer:  Much of thisencounter note is electronic transcription/translation of spoken language to printed texts. The electronic translation of spoken language may be erroneous, or at times, nonsensical words or phrases may be inadvertentlytranscribed.   Although I have reviewed the note for such errors, some may still exist.

## 2021-04-27 ENCOUNTER — OFFICE VISIT (OUTPATIENT)
Dept: GASTROENTEROLOGY | Age: 66
End: 2021-04-27
Payer: MEDICARE

## 2021-04-27 VITALS
BODY MASS INDEX: 27.99 KG/M2 | DIASTOLIC BLOOD PRESSURE: 62 MMHG | OXYGEN SATURATION: 98 % | HEART RATE: 68 BPM | WEIGHT: 168 LBS | HEIGHT: 65 IN | SYSTOLIC BLOOD PRESSURE: 114 MMHG

## 2021-04-27 DIAGNOSIS — Z83.71 FAMILY HISTORY OF COLONIC POLYPS: Primary | ICD-10-CM

## 2021-04-27 DIAGNOSIS — R19.4 CHANGE IN BOWEL HABITS: ICD-10-CM

## 2021-04-27 DIAGNOSIS — K21.9 GASTROESOPHAGEAL REFLUX DISEASE, UNSPECIFIED WHETHER ESOPHAGITIS PRESENT: ICD-10-CM

## 2021-04-27 DIAGNOSIS — Z80.0 FAMILY HISTORY OF COLON CANCER: ICD-10-CM

## 2021-04-27 PROBLEM — Z83.719 FAMILY HISTORY OF COLONIC POLYPS: Status: ACTIVE | Noted: 2021-04-27

## 2021-04-27 PROCEDURE — 1036F TOBACCO NON-USER: CPT | Performed by: NURSE PRACTITIONER

## 2021-04-27 PROCEDURE — 3017F COLORECTAL CA SCREEN DOC REV: CPT | Performed by: NURSE PRACTITIONER

## 2021-04-27 PROCEDURE — G8427 DOCREV CUR MEDS BY ELIG CLIN: HCPCS | Performed by: NURSE PRACTITIONER

## 2021-04-27 PROCEDURE — G8417 CALC BMI ABV UP PARAM F/U: HCPCS | Performed by: NURSE PRACTITIONER

## 2021-04-27 PROCEDURE — 1090F PRES/ABSN URINE INCON ASSESS: CPT | Performed by: NURSE PRACTITIONER

## 2021-04-27 PROCEDURE — 1123F ACP DISCUSS/DSCN MKR DOCD: CPT | Performed by: NURSE PRACTITIONER

## 2021-04-27 PROCEDURE — G8400 PT W/DXA NO RESULTS DOC: HCPCS | Performed by: NURSE PRACTITIONER

## 2021-04-27 PROCEDURE — 99214 OFFICE O/P EST MOD 30 MIN: CPT | Performed by: NURSE PRACTITIONER

## 2021-04-27 PROCEDURE — 4040F PNEUMOC VAC/ADMIN/RCVD: CPT | Performed by: NURSE PRACTITIONER

## 2021-04-27 ASSESSMENT — ENCOUNTER SYMPTOMS
CONSTIPATION: 0
NAUSEA: 0
SHORTNESS OF BREATH: 0
TROUBLE SWALLOWING: 0
ABDOMINAL PAIN: 0
DIARRHEA: 0
ABDOMINAL DISTENTION: 0
BACK PAIN: 0
VOICE CHANGE: 0
COUGH: 0
VOMITING: 0
ANAL BLEEDING: 0
SORE THROAT: 0
BLOOD IN STOOL: 0
RECTAL PAIN: 0

## 2021-04-27 NOTE — PROGRESS NOTES
Subjective:      Dianelys Patel is a75 y.o. female  Chief Complaint   Patient presents with    Colonoscopy       HPI  PCP: Arielle Winn MD  Pt made an appt to schedule a colonoscopy. She reports that since her stroke in 2020 she doesn't have BMs daily, now she goes every few days on average. Stools are soft and she feels she is emptying fine. Has a personal hx of colon polyps and family hx of colon cancer in her mother and father. She reports she has has acid reflux if she lays flat. She put a wedge on her mattress and no further reflux since this time. She also reports that since her stroke she has a cough sometimes when drinking fluids, she has been taught to tuck her chin when drinking by speech pathology, and this helps. She would like an EGD for evaluation. Family HX:  Father had colon cancer; mother had colon cancer  Pt denies family hx of inflammatory bowel dx, gastric CA and esophageal CA.     Past Medical History:   Diagnosis Date    Abnormal Pap smear of cervix         History of cryosurgery     Stroke ([address]     Syncope           Past Surgical History:   Procedure Laterality Date    BREAST CYST EXCISION Bilateral     X4      SECTION      COLONOSCOPY      Church, Benign Polyps per patient     JOINT REPLACEMENT      right shoulder    TONSILLECTOMY         Social History     Socioeconomic History    Marital status:      Spouse name: None    Number of children: None    Years of education: None    Highest education level: None   Occupational History    None   Social Needs    Financial resource strain: None    Food insecurity     Worry: None     Inability: None    Transportation needs     Medical: None     Non-medical: None   Tobacco Use    Smoking status: Former Smoker     Years: 50.00     Types: Cigarettes     Quit date: 2020     Years since quittin.4    Smokeless tobacco: Never Used   Substance and Sexual Activity    Alcohol use: Yes     Comment: Very Rare     Drug use: No    Sexual activity: Never   Lifestyle    Physical activity     Days per week: None     Minutes per session: None    Stress: None   Relationships    Social connections     Talks on phone: None     Gets together: None     Attends Religion service: None     Active member of club or organization: None     Attends meetings of clubs or organizations: None     Relationship status: None    Intimate partner violence     Fear of current or ex partner: None     Emotionally abused: None     Physically abused: None     Forced sexual activity: None   Other Topics Concern    None   Social History Narrative    None       No Known Allergies    Current Outpatient Medications   Medication Sig Dispense Refill    atorvastatin (LIPITOR) 80 MG tablet Take 1 tablet by mouth nightly 30 tablet 3    clopidogrel (PLAVIX) 75 MG tablet Take 1 tablet by mouth daily 30 tablet 3    aspirin 81 MG chewable tablet Take 1 tablet by mouth daily 30 tablet 2     No current facility-administered medications for this visit. Review of Systems   Constitutional: Negative for appetite change, fatigue, fever and unexpected weight change. HENT: Negative for sore throat, trouble swallowing and voice change. Respiratory: Negative for cough and shortness of breath. Cardiovascular: Negative for chest pain, palpitations and leg swelling. Gastrointestinal: Negative for abdominal distention, abdominal pain, anal bleeding, blood in stool, constipation, diarrhea, nausea, rectal pain and vomiting. Genitourinary: Negative for hematuria. Musculoskeletal: Negative for arthralgias, back pain and neck pain. Neurological: Negative for dizziness, weakness, light-headedness and headaches. Hematological: Bruises/bleeds easily. Psychiatric/Behavioral: Negative for dysphoric mood and sleep disturbance. The patient is not nervous/anxious. All other systems reviewed and are negative. Objective:     Physical Exam  Vitals signs and nursing note reviewed. Constitutional:       Appearance: She is well-developed. Comments: /62   Pulse 68   Ht 5' 5\" (1.651 m)   Wt 168 lb (76.2 kg)   SpO2 98%   BMI 27.96 kg/m²    Eyes:      General: No scleral icterus. Conjunctiva/sclera: Conjunctivae normal.      Pupils: Pupils are equal, round, and reactive to light. Neck:      Musculoskeletal: Normal range of motion and neck supple. Thyroid: No thyromegaly. Cardiovascular:      Rate and Rhythm: Normal rate and regular rhythm. Heart sounds: Normal heart sounds. No murmur. No friction rub. No gallop. Pulmonary:      Effort: Pulmonary effort is normal. No respiratory distress. Breath sounds: Normal breath sounds. Abdominal:      General: Bowel sounds are normal. There is no distension. Palpations: Abdomen is soft. Tenderness: There is no abdominal tenderness. There is no rebound. Musculoskeletal: Normal range of motion. General: No deformity. Neurological:      Mental Status: She is alert and oriented to person, place, and time. Cranial Nerves: No cranial nerve deficit. Psychiatric:         Judgment: Judgment normal.           Assessment:       Diagnosis Orders   1. Family history of colonic polyps  COLONOSCOPY W/ OR W/O BIOPSY   2. Family history of colon cancer  COLONOSCOPY W/ OR W/O BIOPSY   3. Change in bowel habits  COLONOSCOPY W/ OR W/O BIOPSY   4. Gastroesophageal reflux disease, unspecified whether esophagitis present  ESOPHAGOSCOPY / EGD         Plan:      1. Schedule outpatient endoscopy. Patient advised no Aspirin, Fish Oil, Vit E or NSAIDs 5 (five) days before procedure. Follow-up Visit: per Dr. Bryson Zaldivar clearance from PCP  to stop plavix  Clearance for discontinuing anticoagulants is needed before scheduling procedure.  Increased r isks may be associated with discontinuation of this medication including stroke, TIA, and cardiac event. I have discussed this with patient. Patient voices understanding and agrees to proceed with scheduling. Pt Education:   Risks, benefits, and alternatives to endoscopy were discussed. Patient voices understanding of risks of, but not limited to, perforation, bleeding, and infection. The risk of perforation is increased with esophageal dilatation. All questions answered to patient's satisfaction. Patient is agreable to proceed. 2. Schedule outpatient colonoscopy. Patient advised no Aspirin, Fish Oil, Vit E or NSAIDs 5 (five) days before procedure. Follow-up Visit: per Dr Tracy Mora clearance from PCP  to stop  plavix  Clearance for discontinuing anticoagulants is needed before scheduling procedure. Increased r isks may be associated with discontinuation of this medication including stroke, TIA, and cardiac event. I have discussed this with patient. Patient voices understanding and agrees to proceed with scheduling. Pt education:  Risks, benefits, and alternatives to colonoscopy were discussed. Risks of colonoscopy include, but are not limited to, perforation, bleeding, and infection. We discussed that the risk for perforation is 1-3 in 5,000  at the time of colonoscopy;   and 1-2% risk of bleeding post-polypectomy. All questions answered to the satisfaction of the patient. Pt is agreeable to proceed.

## 2021-04-27 NOTE — PATIENT INSTRUCTIONS

## 2021-05-06 ENCOUNTER — TELEPHONE (OUTPATIENT)
Dept: GASTROENTEROLOGY | Age: 66
End: 2021-05-06

## 2021-05-10 ENCOUNTER — OFFICE VISIT (OUTPATIENT)
Age: 66
End: 2021-05-10

## 2021-05-10 ENCOUNTER — ANESTHESIA EVENT (OUTPATIENT)
Dept: OPERATING ROOM | Age: 66
End: 2021-05-10

## 2021-05-10 VITALS — HEART RATE: 74 BPM | OXYGEN SATURATION: 97 %

## 2021-05-10 DIAGNOSIS — Z11.59 SCREENING FOR VIRAL DISEASE: Primary | ICD-10-CM

## 2021-05-10 LAB — SARS-COV-2, PCR: NOT DETECTED

## 2021-05-10 PROCEDURE — 99999 PR OFFICE/OUTPT VISIT,PROCEDURE ONLY: CPT | Performed by: NURSE PRACTITIONER

## 2021-05-13 ENCOUNTER — APPOINTMENT (OUTPATIENT)
Dept: OPERATING ROOM | Age: 66
End: 2021-05-13

## 2021-05-13 ENCOUNTER — HOSPITAL ENCOUNTER (OUTPATIENT)
Age: 66
Setting detail: OUTPATIENT SURGERY
Discharge: HOME | End: 2021-05-13
Attending: INTERNAL MEDICINE | Admitting: INTERNAL MEDICINE
Payer: MEDICARE

## 2021-05-13 ENCOUNTER — ANESTHESIA (OUTPATIENT)
Dept: OPERATING ROOM | Age: 66
End: 2021-05-13

## 2021-05-13 ENCOUNTER — HOSPITAL ENCOUNTER (OUTPATIENT)
Age: 66
Setting detail: SPECIMEN
Discharge: HOME | End: 2021-05-13
Payer: MEDICARE

## 2021-05-13 VITALS
WEIGHT: 166 LBS | TEMPERATURE: 98.7 F | RESPIRATION RATE: 14 BRPM | BODY MASS INDEX: 27.66 KG/M2 | HEART RATE: 71 BPM | OXYGEN SATURATION: 100 % | SYSTOLIC BLOOD PRESSURE: 132 MMHG | HEIGHT: 65 IN | DIASTOLIC BLOOD PRESSURE: 80 MMHG

## 2021-05-13 VITALS — SYSTOLIC BLOOD PRESSURE: 126 MMHG | DIASTOLIC BLOOD PRESSURE: 71 MMHG | OXYGEN SATURATION: 98 %

## 2021-05-13 PROCEDURE — 43239 EGD BIOPSY SINGLE/MULTIPLE: CPT

## 2021-05-13 PROCEDURE — 43239 EGD BIOPSY SINGLE/MULTIPLE: CPT | Performed by: INTERNAL MEDICINE

## 2021-05-13 PROCEDURE — 88305 TISSUE EXAM BY PATHOLOGIST: CPT

## 2021-05-13 PROCEDURE — 88342 IMHCHEM/IMCYTCHM 1ST ANTB: CPT

## 2021-05-13 PROCEDURE — G8918 PT W/O PREOP ORDER IV AB PRO: HCPCS

## 2021-05-13 PROCEDURE — G8907 PT DOC NO EVENTS ON DISCHARG: HCPCS

## 2021-05-13 PROCEDURE — 45385 COLONOSCOPY W/LESION REMOVAL: CPT | Performed by: INTERNAL MEDICINE

## 2021-05-13 PROCEDURE — 45385 COLONOSCOPY W/LESION REMOVAL: CPT

## 2021-05-13 RX ORDER — PROPOFOL 10 MG/ML
INJECTION, EMULSION INTRAVENOUS PRN
Status: DISCONTINUED | OUTPATIENT
Start: 2021-05-13 | End: 2021-05-13 | Stop reason: SDUPTHER

## 2021-05-13 RX ORDER — PANTOPRAZOLE SODIUM 40 MG/1
40 TABLET, DELAYED RELEASE ORAL
Qty: 60 TABLET | Refills: 11 | Status: SHIPPED | OUTPATIENT
Start: 2021-05-13 | End: 2021-10-14

## 2021-05-13 RX ORDER — SODIUM CHLORIDE 9 MG/ML
INJECTION, SOLUTION INTRAVENOUS CONTINUOUS
Status: DISCONTINUED | OUTPATIENT
Start: 2021-05-13 | End: 2021-05-13 | Stop reason: HOSPADM

## 2021-05-13 RX ORDER — SODIUM CHLORIDE 9 MG/ML
INJECTION, SOLUTION INTRAVENOUS CONTINUOUS PRN
Status: DISCONTINUED | OUTPATIENT
Start: 2021-05-13 | End: 2021-05-13 | Stop reason: SDUPTHER

## 2021-05-13 RX ORDER — LIDOCAINE HYDROCHLORIDE 10 MG/ML
INJECTION, SOLUTION INFILTRATION; PERINEURAL PRN
Status: DISCONTINUED | OUTPATIENT
Start: 2021-05-13 | End: 2021-05-13 | Stop reason: SDUPTHER

## 2021-05-13 RX ADMIN — SODIUM CHLORIDE: 9 INJECTION, SOLUTION INTRAVENOUS at 11:37

## 2021-05-13 RX ADMIN — SODIUM CHLORIDE: 9 INJECTION, SOLUTION INTRAVENOUS at 12:06

## 2021-05-13 RX ADMIN — LIDOCAINE HYDROCHLORIDE 40 MG: 10 INJECTION, SOLUTION INFILTRATION; PERINEURAL at 12:10

## 2021-05-13 RX ADMIN — PROPOFOL 380 MG: 10 INJECTION, EMULSION INTRAVENOUS at 12:10

## 2021-05-13 NOTE — OP NOTE
Endoscopic Procedure Note    Patient: Hang Ellison : 1955  Med Rec#: [id number] Acc#: [id number]     Primary Care Provider Gayatri Stevens MD  Referring Provider: Kristan CARMICHAEL    Endoscopist: Dana Romero MD    Date of Procedure:  2021    Procedure:   1. EGD with biopsy    Indications:   1. Esophagitis   2. reflux    Anesthesia:  Sedation was administered by anesthesia who monitored the patient during the procedure. Estimated Blood Loss: minimal    Procedure:   After reviewing the patient's chart and obtaining informed consent, the patient was placed in the left lateral decubitus position. A forward-viewing Olympus endoscope was lubricated and inserted through the mouth into the oropharynx. Under direct visualization, the upper esophagus was intubated. The scope was advanced to the level of the third portion of duodenum. Scope was slowly withdrawn with careful inspection of the mucosal surfaces. The scope was retroflexed for inspection of the gastric fundus and incisura. Findings and maneuvers are listed in impression below. The patient tolerated the procedure well. The scope was removed. There were no immediate complications. Findings:   Esophagus: abnormal: mild esophagitis noted in the distal esophagus. No obvious ulceration. There is no hiatal hernia present. Stomach:  abnormal: mild mucosal changes suggestive of gastritis noted -  Gastric biopsies were taken from the antrum and body to rule out Helicobacter pylori infection. Duodenum: normal      IMPRESSION:  1. Esophagitis  2. Gastritis - biopsied      RECOMMENDATIONS:    1. Await path results, the patient will be contacted in 7-10 days with biopsy results. 2.  PPI twice daily x 3 months, then decrease to once daily   3. NSAID avoidance. The results were discussed with the patient and family. A copy of the images obtained were given to the patient.      Danya Waggoner MD  2021  12:38 PM

## 2021-05-13 NOTE — ANESTHESIA POSTPROCEDURE EVALUATION
Department of Anesthesiology  Postprocedure Note    Patient: Marylou Waite  MRN: [id number]  YOB: 1955  Date of evaluation: 5/13/2021  Time:  12:38 PM     Procedure Summary     Date: 05/13/21 Room / Location: [facility] ENDO 02 / [address] [place] [address]    Anesthesia Start: 1206 Anesthesia Stop:     Procedures:       EGD BIOPSY (N/A Esophagus)      COLONOSCOPY POLYPECTOMY SNARE/COLD BIOPSY (N/A Abdomen) Diagnosis: (REFLUX IF SHE LAYS FLAT, Diania Sago)    Surgeons: Prince Gentile MD Responsible Provider: AMOL Perry CRNA    Anesthesia Type: general, TIVA ASA Status: 3          Anesthesia Type: No value filed. Tanya Phase I:      Tanya Phase II:      Last vitals: Reviewed and per EMR flowsheets.        Anesthesia Post Evaluation    Patient location during evaluation: bedside  Patient participation: complete - patient participated  Level of consciousness: sleepy but conscious  Pain score: 0  Airway patency: patent  Nausea & Vomiting: no nausea and no vomiting  Complications: no  Cardiovascular status: hemodynamically stable and blood pressure returned to baseline  Respiratory status: acceptable and nasal cannula  Hydration status: stable

## 2021-05-13 NOTE — ANESTHESIA PRE PROCEDURE
Department of Anesthesiology  Preprocedure Note       Name:  Cleveland Paz   Age:  77 y.o.  :  1955                                          MRN:  [id number]         Date:  2021      Surgeon: Juliano Gay):  Jose Marley MD    Procedure: Procedure(s):  EGD BIOPSY  COLORECTAL CANCER SCREENING, NOT HIGH RISK    Medications prior to admission:   Prior to Admission medications    Medication Sig Start Date End Date Taking? Authorizing Provider   atorvastatin (LIPITOR) 80 MG tablet Take 1 tablet by mouth nightly 21   Molina Ambrocio MD   clopidogrel (PLAVIX) 75 MG tablet Take 1 tablet by mouth daily 21   Molina Ambrocio MD   aspirin 81 MG chewable tablet Take 1 tablet by mouth daily 1/15/21   Molina Ambrocio MD       Current medications:    No current facility-administered medications for this encounter.         Allergies:  No Known Allergies    Problem List:    Patient Active Problem List   Diagnosis Code    Acute ischemic stroke (HCC) I63.9    Weakness R53.1    Dysphagia due to recent cerebral infarction I69.391    Dysarthria R47.1    Chronic right shoulder pain M25.511, G89.29    Smoker F17.200    Gait abnormality R26.9    Personal history of colonic polyps Z86.010    Family history of colon cancer in father [de-identified]    Family history of colon cancer in mother [de-identified]    Abnormal swallowing R13.10    Near syncope R51    Former smoker Z80.0    H/O: CVA (cerebrovascular accident) Z80.78    Hyperlipidemia E78.5    Closed 4-part fracture of proximal end of right humerus S42.291A    Pulmonary nodule R91.1    Tobacco use Z72.0    Cerebrovascular accident (CVA) ([address] I63.9    Family history of colonic polyps Z83.71    Change in bowel habits R19.4    Family history of colon cancer Z80.0    Gastroesophageal reflux disease K21.9       Past Medical History:        Diagnosis Date    Abnormal Pap smear of cervix         History of cryosurgery     Stroke 1.04 12/01/2020       HCG (If Applicable): No results found for: PREGTESTUR, PREGSERUM, HCG, HCGQUANT     ABGs: No results found for: PHART, PO2ART, LLI7XTB, ADQ5PSZ, BEART, I6OZKUAS     Type & Screen (If Applicable):  No results found for: LABABO, LABRH    Drug/Infectious Status (If Applicable):  No results found for: HIV, HEPCAB    COVID-19 Screening (If Applicable):   Lab Results   Component Value Date    COVID19 Not Detected 05/10/2021           Anesthesia Evaluation  Patient summary reviewed history of anesthetic complications:   Airway: Mallampati: II  TM distance: >3 FB   Neck ROM: full  Mouth opening: < 3 FB Dental: normal exam         Pulmonary:normal exam                              ROS comment: Quit smoking 11/2020   Cardiovascular:    (+) hyperlipidemia         Beta Blocker:  Not on Beta Blocker         Neuro/Psych:   (+) CVA ():,             GI/Hepatic/Renal:   (+) GERD:,          ROS comment: etoh use per  Chart  dysphagia. Endo/Other:    (+) blood dyscrasia (Plavix stopped 5 days ago): anticoagulation therapy:., .                 Abdominal:           Vascular: negative vascular ROS. Anesthesia Plan      general and TIVA     ASA 3       Induction: intravenous. Anesthetic plan and risks discussed with patient.                       AMOL Rod - CRNA   5/13/2021

## 2021-05-13 NOTE — OP NOTE
Patient: Handy Devries : 1955  [facility] [place] Rec#: [id number] Acc#: [id number]   Primary Care Provider Ricardo Lerbon MD    Date of Procedure:  2021    Endoscopist: Tadeo Espinoza MD    Referring Provider: Ricardo Lebron MD,     Operation Performed: Colonoscopy with snare polypectomy    Indications: surveillance, hx of polyps    Anesthesia:  Sedation was administered by anesthesia who monitored the patient during the procedure. I met with Handy Devries prior to procedure. We discussed the procedure itself, and I have discussed the risks of endoscopy (including-- but not limited to-- pain, discomfort, bleeding potentially requiring second endoscopic procedure and/or blood transfusion, organ perforation requiring operative repair, damage to organs near the colon, infection, aspiration, cardiopulmonary/allergic reaction), benefits, indications to endoscopy. Additionally, we discussed options other than colonoscopy. The patient expressed understanding. All questions answered. The patient decided to proceed with the procedure. Signed informed consent was placed on the chart. Blood Loss: minimal    Withdrawal time: > 6 minutes  Bowel Prep: adequate     Complications: no immediate complications    DESCRIPTION OF PROCEDURE:     A time out was performed. After written informed consent was obtained, the patient was placed in the left lateral position. The perianal area was inspected, and a digital rectal exam was performed. A rectal exam was performed: normal tone, no palpable lesions. At this point, a forward viewing Olympus colonoscope was inserted into the anus and carefully advanced to the cecum. The cecum was identified by the ileocecal valve and the appendiceal orifice. The colonoscope was then slowly withdrawn with careful inspection of the mucosa in a linear and circumferential fashion. The scope was retroflexed in the rectum.  Suction was utilized during the procedure to remove as much air as possible from the bowel. The colonoscope was removed from the patient, and the procedure was terminated. Findings are listed below. Findings: The mucosa appeared normal throughout the entire examined colon     In the the ascending colon, a 6 mm sessile polyp was removed completely with cold snare polypectomy. Retroflexion in the rectum was normal and revealed no further abnormalities         Recommendations:  1. Repeat colonoscopy: pending pathology - max of 5 yrs for screening  2. Await biopsy results-you will receive a letter with your results within 7-10 days    Findings and recommendations were discussed w/ the patient. A copy of the images was provided.     Mulugeta Thomas MD  5/13/2021  12:41 PM

## 2021-05-14 NOTE — H&P
12    History of cryosurgery 12    Stroke ([facility] [address]     Syncope        Past Surgical History:  Past Surgical History:   Procedure Laterality Date    BREAST CYST EXCISION Bilateral     X4      SECTION      COLONOSCOPY  2007    [facility], Benign Polyps per patient     COLONOSCOPY  2021    Dr Kaden Cody- 5 yr recall    COLONOSCOPY N/A 2021    COLONOSCOPY POLYPECTOMY SNARE/COLD BIOPSY performed by Herbert Ruiz MD at [facility]      right shoulder    TONSILLECTOMY      UPPER GASTROINTESTINAL ENDOSCOPY  2021    Dr Samantha Reynolds-Esophagitis, gastritis    UPPER GASTROINTESTINAL ENDOSCOPY N/A 2021    EGD BIOPSY performed by Herbert [facility]iz [facility] at [facility]       Social History:  Social History     Tobacco Use    Smoking status: Former Smoker     Years: 50.00     Types: Cigarettes     Quit date: 2020     Years since quittin.4    Smokeless tobacco: Never Used   Substance Use Topics    Alcohol use: Yes     Comment: Very Rare     Drug use: No       Vital Signs:   Vitals:    21 1251   BP: 132/80   Pulse: 71   Resp: 14   Temp:    SpO2: 100%        Physical Exam:  Cardiac:  [x]WNL  []Comments:  Pulmonary:  [x]WNL   []Comments:  Neuro/Mental Status:  [x]WNL  []Comments:  Abdominal:  [x]WNL    []Comments:  Other:   []WNL  []Comments:    Informed Consent:  The risks and benefits of the procedure have been discussed with either the patient or if they cannot consent, their representative. Assessment:  Patient examined and appropriate for planned sedation and procedure. Plan:  Proceed with planned sedation and procedure as above.          Herbert Ruiz MD

## 2021-05-17 RX ORDER — ATORVASTATIN CALCIUM 80 MG/1
TABLET, FILM COATED ORAL
Qty: 30 TABLET | Refills: 3 | Status: SHIPPED | OUTPATIENT
Start: 2021-05-17 | End: 2021-08-18

## 2021-05-17 RX ORDER — CLOPIDOGREL BISULFATE 75 MG/1
TABLET ORAL
Qty: 30 TABLET | Refills: 3 | Status: SHIPPED | OUTPATIENT
Start: 2021-05-17 | End: 2021-08-18

## 2021-06-07 NOTE — DISCHARGE SUMMARY
MENTAL HEALTH VISIT NOTE    04/30/2020  Start time: 704    Stop Time: 754   Session # 3    Session Type: Patient is presenting for an Individual session.    Reji Powell is a 21 y.o. male is being seen today for    Chief Complaint   Patient presents with      Follow Up     Video Visit Mental Health   .     Telemedicine Visit: The patient's condition can be safely assessed and treated via synchronous audio and visual telemedicine encounter.      Reason for Telemedicine Visit: Services only offered telehealth    Originating Site (Patient Location): Patient's vehicle outside of home    Distant Site (Provider Location): Provider Remote Setting    Consent:  The patient/guardian has verbally consented to: the potential risks and benefits of telemedicine (video visit) versus in person care; bill my insurance or make self-payment for services provided; and responsibility for payment of non-covered services.     Mode of Communication:  Video Conference via apartum    As the provider I attest to compliance with applicable laws and regulations related to telemedicine.    Those present for this visit patient and therapist    Follow up in regards to ongoing symptom management of grief and anxiety.     New symptoms or complaints: None    Functional Impairment:   Personal: 3  Family: 3  Social: 2  Work: 3    Clinical assessment of mental status:   Reji Powell presented on time.   He was oriented x3, open and cooperative, and dressed appropriately for this session and weather. His memory was Normal cognitive functioning .  His speech was  Within normal.  Language was intact.  Concentration and focus is Within normal. Psychosis is not noted or reported. He reports his mood is anxious.  Affect is congruent with speech and is Congruent w/content of speech.  Fund of knowledge is adequate. Insight is adequate for therapy. Reviewed and changes made as needed.     Suicidal/Homicidal Ideation present: Patient denies  Physical Therapy Discharge Note      DATE:  2020  NAME:  Jarrod Enamorado  :  1955  (64 y.o.,female)  MRN:  [id number]    HEIGHT:  Height: 5' 6\" (167.6 cm)  WEIGHT:  Weight: 163 lb (73.9 kg)    PATIENT DIAGNOSIS(ES):    Diagnosis: CVA (acute ischemia bilateral brendon), R sided weakness   Additional Pertinent Hx: Hemiplegia/hemiparesis following cerebral infarction affecting left non-dominant side (R side UE and LE weakness); Dysarthria; Dysphagia, oropharyngeal phase; Hyperlipidemais, unspecified; Nicotine dependence, unspecified, uncomplicated; Surgical Hx of breast surgery x4, C-sectionx1, cyst removal from leg, tonsillectomy, R total shoulder arthroplasty w/ distal clavicle excision on      RESTRICTIONS/PRECAUTIONS:    Restrictions/Precautions  Restrictions/Precautions: Up Ad [place]     NEUROLOGICAL  Sensation  Overall Sensation Status: [facility]     STRENGTH  Strength RLE  Strength RLE: Exception  Comment: Grossly 3/5; DF 2-,+/5  Strength LLE  Strength LLE: Exception  Comment: Grossly 4/5     POSTURE/BALANCE  Balance  Sitting - Static: Fair, +  Sitting - Dynamic: Fair  Standing - Static: Fair, +  Standing - Dynamic: Fair        ACTIVITY TOLERANCE  Activity Tolerance  Activity Tolerance: Patient Tolerated treatment well        BED MOBILITY  Bed Mobility  Bridging: Modified independent  Rolling: Modified independent  Supine to Sit: Modified independent  Sit to Supine: Modified independent  Scooting: Modified independent     TRANSFERS  Sit to Stand: Independent      Bed to Chair: Modified independent  Car Transfer: Stand by assistance       WHEELCHAIR PROPULSION 1  Propulsion 1  Propulsion: Manual  Level: Level Tile  Method: SILVIANO FORTE  Level of Assistance: Independent  Description/ Details: Incorporated turns.   Distance: 200'     AMBULATION 1  Ambulation 1  Surface: level tile  Device: Rolling Walker  Other Apparatus: Wheelchair follow  Assistance: Stand by assistance Quality of Gait: Weakness noted in RLE, shaheen with fatigue. Gait Deviations: Decreased step length, Decreased step height  Distance: 200' x2  Comments: Incorporated turns. STAIRS  Stairs  # Steps : 12  Stairs Height: 4\"  Rails: Bilateral  Device: No Device  Assistance: Stand by assistance  Comment: Left first going up; Right first going downl. GOALS:  Short term goals  Time Frame for Short term goals: 1 week  Short term goal 1: pt amb 48' with RW, CGA, step-to, 3pt pattern, RLE leading  Short term goal 2: Pt CGA with bed mobility  Short term goal 3: Pt min A with supine <> sit  Short term goal 4: Pt min A with car transfer  Short term goal 5: Pt min A with 4, 4\" steps, step-to gait pattern, LLE leading ascending    Long term goals  Time Frame for Long term goals : 2 weeks  Long term goal 1: pt amb 150' with RW, ind, with step-to gait pattern, RLE leading  Long term goal 2: pt independent with bed mobility  Long term goal 3: pt independent with sit<>stand   Long term goal 4: Pt independent with car TF  Long term goal 5: Pt min A with 4, 4\" steps, step-to gait pattern, LLE leading ascending     ASSESSMENT (IMPAIRMENTS/BARRIERS):  Patient's remaining impairments are as follows:  Decreased strength, endurance, and safety awareness. Decreased gait and ability with steps.     PLAN:  Plan  Current Treatment Recommendations: Strengthening, ROM, Balance Training, Functional Mobility Training, Transfer Training, ADL/Self-care Training, Endurance Training, Wheelchair Mobility Training, Gait Training, Stair training, Cognitive Reorientation, Pain Management, Home Exercise Program, Safety Education & Training, Patient/Caregiver Education & Training, Equipment Evaluation, Education, & procurement, Modalities suicidal and homicidal ideations/means or plans.     Patient's impression of their current status: Patient reported feeling some anxiety. Patient indicated they are planning to move next week which is exciting but also causes the unknown. Patient talked more about his father passing away and wishing he would have talked to him that Saturday. Patient talked about the process of selling the house and having to go through all his stuff. Patient indicated this was another situation where he did not get the support he needed. Patient reported he continues daily to think about his dad and take on the reasonability of him passing away.     Therapist impression of patients current state: This 21 y.o. White or  male presents with depression. This therapist processed with patient looking at who is responsible for his father passing away. This therapist challenged patient on emotions he continues to feel due to asking for help and not getting the help he needed. This therapist processed with patient his role in caring for his dad.     Assessment tools used today include: None    Type of psychotherapeutic technique provided: Insight oriented, Client centered and CBT    Progress toward short term goals:Progress as expected with patient continuing therapy, continuing to process the grief from his father passing away    Review of long term goals: Treatment Plan Developed on 4/23/2020    Diagnosis:   1. Major depressive disorder, recurrent, moderate (H)    2. AUDREY (generalized anxiety disorder)        Plan and Follow-up: Patient continuing with weekly therapy. Patient would benefit from identifying ways he continues with guilt over his father passing away. Patient work on continuing to identify the different ways he has processed and continues to process the stages of grief.    Discharge Criteria/Planning: Patient will continue with follow-up until therapy can be discontinued without return of signs and  PATIENT REQUIRES AND IS REASONABLY EXPECTED TO ACTIVELY PARTICIPATE IN AT LEAST 3 HOURS OF INTENSIVE THERAPY PER DAY AT LEAST 5 DAYS PER WEEK, AND BE EXPECTED TO MAKE MEASURABLE IMPROVEMENT THAT WILL BE OF PRACTICAL VALUE TO IMPROVE THE PATIENT'S FUNCTIONAL CAPACITY OR ADAPTATION TO IMPAIRMENTS.      PATIENT GOAL FOR REHAB:  RETURN TO PRIOR LEVEL OF FUNCTION         IRF/DELTA  Roll Left and Right  Assistance Needed: Independent  CARE Score: 6  Discharge Goal: Independent    Sit to Lying  Assistance Needed: Independent  CARE Score: 6  Discharge Goal: Independent    Lying to Sitting on Side of Bed  Assistance Needed: Independent  CARE Score: 6  Discharge Goal: Independent    Sit to Stand  Assistance Needed: Independent  CARE Score: 6  Discharge Goal: Independent    Chair/Bed-to-Chair Transfer  Assistance Needed: Independent  CARE Score: 6  Discharge Goal: Independent    Car Transfer  Assistance Needed: Supervision or touching assistance  Reason if not Attempted: Not attempted due to medical condition or safety concerns  CARE Score: 4  Discharge Goal: Independent    Walk 10 Feet  Assistance Needed: Supervision or touching assistance  CARE Score: 4  Discharge Goal: Independent    Walk 50 Feet with Two Turns  Assistance Needed: Supervision or touching assistance  Reason if not Attempted: Not attempted due to medical condition or safety concerns  CARE Score: 4  Discharge Goal: Independent    Walk 150 Feet  Assistance Needed: Supervision or touching assistance  Reason if not Attempted: Not attempted due to medical condition or safety concerns  CARE Score: 4  Discharge Goal: Independent    Walking 10 Feet on Uneven Surfaces  Reason if not Attempted: Not attempted due to medical condition or safety concerns  CARE Score: 88  Discharge Goal: Not Attempted    1 Step (Curb)  Assistance Needed: Supervision or touching assistance  Reason if not Attempted: Not attempted due to medical condition or safety concerns  CARE Score: 4 symptoms.    Performed and documented by Sandra Mccarty LPC       Discharge Goal: Independent    4 Steps  Assistance Needed: Supervision or touching assistance  Reason if not Attempted: Not attempted due to medical condition or safety concerns  CARE Score: 4  Discharge Goal: Independent    12 Steps  Assistance Needed: Supervision or touching assistance  Reason if not Attempted: Not attempted due to medical condition or safety concerns  CARE Score: 4    Wheel 50 Feet with Two Turns  Assistance Needed: Independent  Reason if not Attempted: Not attempted due to medical condition or safety concerns  CARE Score: 6  Discharge Goal: Independent    Wheel 150 Feet  Assistance Needed: Independent  Reason if not Attempted: Not attempted due to medical condition or safety concerns  CARE Score: 6  Discharge Goal: Independent      LAST TREATMENT TIME  PT Individual Minutes  Time In: 1005  Time Out: 1100  Minutes: 55      Electronically signed by Felice Pierson PTA on 12/16/2020 at 4:09 PM

## 2021-07-13 ENCOUNTER — OFFICE VISIT (OUTPATIENT)
Dept: PRIMARY CARE CLINIC | Age: 66
End: 2021-07-13
Payer: MEDICARE

## 2021-07-13 VITALS
WEIGHT: 168 LBS | OXYGEN SATURATION: 98 % | HEART RATE: 75 BPM | TEMPERATURE: 97.3 F | HEIGHT: 65 IN | SYSTOLIC BLOOD PRESSURE: 110 MMHG | DIASTOLIC BLOOD PRESSURE: 62 MMHG | BODY MASS INDEX: 27.99 KG/M2

## 2021-07-13 DIAGNOSIS — R55 NEAR SYNCOPE: ICD-10-CM

## 2021-07-13 DIAGNOSIS — K21.9 GASTROESOPHAGEAL REFLUX DISEASE, UNSPECIFIED WHETHER ESOPHAGITIS PRESENT: ICD-10-CM

## 2021-07-13 DIAGNOSIS — R41.3 MEMORY DEFICIT: Primary | ICD-10-CM

## 2021-07-13 LAB
ALBUMIN SERPL-MCNC: 4.5 G/DL (ref 3.5–5.2)
ALP BLD-CCNC: 124 U/L (ref 35–104)
ALT SERPL-CCNC: 12 U/L (ref 5–33)
ANION GAP SERPL CALCULATED.3IONS-SCNC: 10 MMOL/L (ref 7–19)
AST SERPL-CCNC: 22 U/L (ref 5–32)
BILIRUB SERPL-MCNC: 0.5 MG/DL (ref 0.2–1.2)
BUN BLDV-MCNC: 20 MG/DL (ref 8–23)
CALCIUM SERPL-MCNC: 9.7 MG/DL (ref 8.8–10.2)
CHLORIDE BLD-SCNC: 104 MMOL/L (ref 98–111)
CO2: 29 MMOL/L (ref 22–29)
CREAT SERPL-MCNC: 0.8 MG/DL (ref 0.5–0.9)
FOLATE: 8.8 NG/ML (ref 4.8–37.3)
GFR AFRICAN AMERICAN: >59
GFR NON-AFRICAN AMERICAN: >60
GLUCOSE BLD-MCNC: 108 MG/DL (ref 74–109)
HCT VFR BLD CALC: 41.9 % (ref 37–47)
HEMOGLOBIN: 13.8 G/DL (ref 12–16)
MCH RBC QN AUTO: 30.5 PG (ref 27–31)
MCHC RBC AUTO-ENTMCNC: 32.9 G/DL (ref 33–37)
MCV RBC AUTO: 92.5 FL (ref 81–99)
PDW BLD-RTO: 13.5 % (ref 11.5–14.5)
PLATELET # BLD: 309 K/UL (ref 130–400)
PMV BLD AUTO: 10.7 FL (ref 9.4–12.3)
POTASSIUM SERPL-SCNC: 4.2 MMOL/L (ref 3.5–5)
RBC # BLD: 4.53 M/UL (ref 4.2–5.4)
SODIUM BLD-SCNC: 143 MMOL/L (ref 136–145)
TOTAL PROTEIN: 7.3 G/DL (ref 6.6–8.7)
VITAMIN B-12: 280 PG/ML (ref 211–946)
WBC # BLD: 6.4 K/UL (ref 4.8–10.8)

## 2021-07-13 PROCEDURE — 1036F TOBACCO NON-USER: CPT | Performed by: NURSE PRACTITIONER

## 2021-07-13 PROCEDURE — 3017F COLORECTAL CA SCREEN DOC REV: CPT | Performed by: NURSE PRACTITIONER

## 2021-07-13 PROCEDURE — G8400 PT W/DXA NO RESULTS DOC: HCPCS | Performed by: NURSE PRACTITIONER

## 2021-07-13 PROCEDURE — G8427 DOCREV CUR MEDS BY ELIG CLIN: HCPCS | Performed by: NURSE PRACTITIONER

## 2021-07-13 PROCEDURE — 36415 COLL VENOUS BLD VENIPUNCTURE: CPT | Performed by: NURSE PRACTITIONER

## 2021-07-13 PROCEDURE — 4040F PNEUMOC VAC/ADMIN/RCVD: CPT | Performed by: NURSE PRACTITIONER

## 2021-07-13 PROCEDURE — 1090F PRES/ABSN URINE INCON ASSESS: CPT | Performed by: NURSE PRACTITIONER

## 2021-07-13 PROCEDURE — 99214 OFFICE O/P EST MOD 30 MIN: CPT | Performed by: NURSE PRACTITIONER

## 2021-07-13 PROCEDURE — G8417 CALC BMI ABV UP PARAM F/U: HCPCS | Performed by: NURSE PRACTITIONER

## 2021-07-13 PROCEDURE — 1123F ACP DISCUSS/DSCN MKR DOCD: CPT | Performed by: NURSE PRACTITIONER

## 2021-07-13 ASSESSMENT — ENCOUNTER SYMPTOMS
ABDOMINAL PAIN: 0
EYES NEGATIVE: 1
SHORTNESS OF BREATH: 0
COUGH: 0
ALLERGIC/IMMUNOLOGIC NEGATIVE: 1

## 2021-07-13 NOTE — PROGRESS NOTES
[facility] PHYSICIAN SERVICES  [facility]  [address] [place]  [address] [place] [address]  Dept: [phone]  Dept Fax: [phone]  Loc: [phone]    Kierra Stout is a 77 y.o. female who presents today for her medical conditions/complaints as noted below. Kierra Stout is c/o of 3 Month Follow-Up (Pt is here for a 3 month follow up. Pt states everything has been going well since her last visit.)        HPI:     HPI     This 78 yo female presents today for 3 month follow up. Chief Complaint   Patient presents with    3 Month Follow-Up     Pt is here for a 3 month follow up. Pt states everything has been going well since her last visit.      Past Medical History:   Diagnosis Date    Abnormal Pap smear of cervix         History of cryosurgery     Stroke ([address]     Syncope       Past Surgical History:   Procedure Laterality Date    BREAST CYST EXCISION Bilateral     X4      SECTION      COLONOSCOPY      [facility], Benign Polyps per patient     COLONOSCOPY N/A 2021    Dr Rylan Martin, 5 yr recall    JOINT REPLACEMENT      right shoulder    TONSILLECTOMY      UPPER GASTROINTESTINAL ENDOSCOPY N/A 2021    Dr FELICITY Reynolds-Esophagitis, gastritis       Vitals 2021    SYSTOLIC [phone] - - 847   DIASTOLIC 62 80 67 - - 71   Site Left Upper Arm - - - - -   Position Sitting - - - - -   Cuff Size Medium Adult - - - - -   Pulse 75 71 70 - - -   Temp 97.3 - - - - -   Resp - 14 14 - - -   SpO2 98 100 100 98 99 99   Weight 168 lb - - - - -   Height 5' 5\" - - - - -   Body mass index 27.95 kg/m2 - - - - -   Some recent data might be hidden       Family History   Problem Relation Age of Onset    Heart Attack Maternal Grandmother     Colon Cancer Father     Colon Cancer Mother     Esophageal Cancer Neg Hx     Liver Cancer Neg Hx     Rectal Cancer Neg Hx     Stomach Cancer Neg Hx        Social History     Tobacco Use    Smoking status: Former Smoker     Packs/day: 0.50     Years: 50.00     Pack years: 25.00     Types: Cigarettes     Quit date: 2020     Years since quittin.6    Smokeless tobacco: Never Used   Substance Use Topics    Alcohol use: Yes     Comment: Very Rare       Current Outpatient Medications on File Prior to Visit   Medication Sig Dispense Refill    clopidogrel (PLAVIX) 75 MG tablet TAKE 1 TABLET BY MOUTH EVERY DAY 30 tablet 3    atorvastatin (LIPITOR) 80 MG tablet TAKE 1 TABLET BY MOUTH EVERY DAY AT NIGHT 30 tablet 3    pantoprazole (PROTONIX) 40 MG tablet Take 1 tablet by mouth 2 times daily (before meals) Take daily first thing in the morning on an empty stomach. 60 tablet 11    aspirin 81 MG chewable tablet Take 1 tablet by mouth daily 30 tablet 2     No current facility-administered medications on file prior to visit. No Known Allergies    Health Maintenance   Topic Date Due    Low dose CT lung screening  Never done    Annual Wellness Visit (AWV)  Never done    Breast cancer screen  2021    Pneumococcal 65+ years Vaccine (1 of 1 - PPSV23) 2021 (Originally 2018)    DEXA (modify frequency per FRAX score)  2021 (Originally 2008)    DTaP/Tdap/Td vaccine (1 - Tdap) 2021 (Originally 1972)    Shingles Vaccine (1 of 2) 2021 (Originally 2003)    Flu vaccine (1) 2021    Lipid screen  2022    Diabetes screen  2024    Colon cancer screen colonoscopy  2026    COVID-19 Vaccine  Completed    Hepatitis A vaccine  Aged Out    Hepatitis B vaccine  Aged Out    Hib vaccine  Aged Out    Meningococcal (ACWY) vaccine  Aged Out    Hepatitis C screen  Discontinued       Subjective:      Review of Systems   Constitutional: Negative for chills, fatigue and fever. HENT: Negative. Eyes: Negative. Respiratory: Negative for cough and shortness of breath. Cardiovascular: Negative for chest pain and palpitations. Gastrointestinal: Negative for abdominal pain. Endocrine: Negative. Genitourinary: Negative. Musculoskeletal: Negative. Skin: Negative. Allergic/Immunologic: Negative. Neurological: Negative for syncope. Psychiatric/Behavioral: Negative for confusion, self-injury and suicidal ideas. Objective:     Physical Exam  Vitals and nursing note reviewed. Constitutional:       General: She is not in acute distress. Appearance: Normal appearance. She is not ill-appearing or toxic-appearing. HENT:      Head: Normocephalic and atraumatic. Nose: Nose normal.      Mouth/Throat:      Mouth: Mucous membranes are moist.      Pharynx: Oropharynx is clear. Eyes:      Extraocular Movements: Extraocular movements intact. Pupils: Pupils are equal, round, and reactive to light. Cardiovascular:      Rate and Rhythm: Normal rate and regular rhythm. Pulses: Normal pulses. Pulmonary:      Effort: Pulmonary effort is normal. No respiratory distress. Breath sounds: Normal breath sounds. No wheezing, rhonchi or rales. Musculoskeletal:         General: Normal range of motion. Cervical back: Normal range of motion and neck supple. No rigidity or tenderness. Skin:     General: Skin is warm and dry. Coloration: Skin is not jaundiced or pale. Findings: No erythema or rash. Neurological:      Mental Status: She is alert and oriented to person, place, and time. Psychiatric:         Attention and Perception: Attention normal.         Mood and Affect: Mood normal.         Speech: Speech normal.         Behavior: Behavior normal.         Thought Content: Thought content normal.         Judgment: Judgment normal.       /62 (Site: Left Upper Arm, Position: Sitting, Cuff Size: Medium Adult)   Pulse 75   Temp 97.3 °F (36.3 °C)   Ht 5' 5\" (1.651 m)   Wt 168 lb (76.2 kg)   SpO2 98%   BMI 27.96 kg/m²     Assessment:       Diagnosis Orders   1.  Memory deficit  Vitamin B12 & Folate    Comprehensive Metabolic Panel    CBC   2. Near syncope     3. Gastroesophageal reflux disease, unspecified whether esophagitis present           Plan:     1. Labs today. Fortified rich foods. Consider referral to neurology for further work up   2. Stable . No new episodes or symptoms. 3. Improved. Continue protonix 40 mg twice a day. Patient given educational materials -see patient instructions. Discussed use, benefit, and side effects of prescribed medications. All patient questions answered. Pt voiced understanding. Reviewed health maintenance. Instructed to continue currentmedications, diet and exercise. Patient agreed with treatment plan. Follow up as directed. MEDICATIONS:  No orders of the defined types were placed in this encounter. ORDERS:  Orders Placed This Encounter   Procedures    Vitamin B12 & Folate    Comprehensive Metabolic Panel    CBC       Follow-up:  Return if symptoms worsen or fail to improve. PATIENT INSTRUCTIONS:  There are no Patient Instructions on file for this visit. Electronically signed by AMOL Laureano NP on 7/18/2021 at 1:23 PM    EMR Dragon/transcription disclaimer:  Much of thisencounter note is electronic transcription/translation of spoken language to printed texts. The electronic translation of spoken language may be erroneous, or at times, nonsensical words or phrases may be inadvertentlytranscribed.   Although I have reviewed the note for such errors, some may still exist.

## 2021-07-21 ENCOUNTER — TELEPHONE (OUTPATIENT)
Dept: PRIMARY CARE CLINIC | Age: 66
End: 2021-07-21

## 2021-08-04 ENCOUNTER — TELEPHONE (OUTPATIENT)
Dept: PRIMARY CARE CLINIC | Age: 66
End: 2021-08-04

## 2021-08-18 RX ORDER — ATORVASTATIN CALCIUM 80 MG/1
TABLET, FILM COATED ORAL
Qty: 30 TABLET | Refills: 3 | Status: SHIPPED | OUTPATIENT
Start: 2021-08-18 | End: 2021-12-23

## 2021-08-18 RX ORDER — CLOPIDOGREL BISULFATE 75 MG/1
TABLET ORAL
Qty: 30 TABLET | Refills: 3 | Status: SHIPPED | OUTPATIENT
Start: 2021-08-18 | End: 2021-12-23

## 2021-09-08 ENCOUNTER — TELEPHONE (OUTPATIENT)
Dept: PRIMARY CARE CLINIC | Age: 66
End: 2021-09-08

## 2021-10-14 ENCOUNTER — OFFICE VISIT (OUTPATIENT)
Dept: PRIMARY CARE CLINIC | Age: 66
End: 2021-10-14
Payer: MEDICARE

## 2021-10-14 VITALS
TEMPERATURE: 97.8 F | HEART RATE: 61 BPM | HEIGHT: 65 IN | DIASTOLIC BLOOD PRESSURE: 76 MMHG | BODY MASS INDEX: 27.16 KG/M2 | SYSTOLIC BLOOD PRESSURE: 124 MMHG | WEIGHT: 163 LBS | OXYGEN SATURATION: 98 %

## 2021-10-14 DIAGNOSIS — K21.9 GASTROESOPHAGEAL REFLUX DISEASE, UNSPECIFIED WHETHER ESOPHAGITIS PRESENT: ICD-10-CM

## 2021-10-14 DIAGNOSIS — R55 NEAR SYNCOPE: Primary | ICD-10-CM

## 2021-10-14 DIAGNOSIS — R53.1 WEAKNESS: ICD-10-CM

## 2021-10-14 PROCEDURE — 3017F COLORECTAL CA SCREEN DOC REV: CPT | Performed by: NURSE PRACTITIONER

## 2021-10-14 PROCEDURE — G8427 DOCREV CUR MEDS BY ELIG CLIN: HCPCS | Performed by: NURSE PRACTITIONER

## 2021-10-14 PROCEDURE — G8400 PT W/DXA NO RESULTS DOC: HCPCS | Performed by: NURSE PRACTITIONER

## 2021-10-14 PROCEDURE — 99213 OFFICE O/P EST LOW 20 MIN: CPT | Performed by: NURSE PRACTITIONER

## 2021-10-14 PROCEDURE — G8417 CALC BMI ABV UP PARAM F/U: HCPCS | Performed by: NURSE PRACTITIONER

## 2021-10-14 PROCEDURE — 1036F TOBACCO NON-USER: CPT | Performed by: NURSE PRACTITIONER

## 2021-10-14 PROCEDURE — 4040F PNEUMOC VAC/ADMIN/RCVD: CPT | Performed by: NURSE PRACTITIONER

## 2021-10-14 PROCEDURE — G8484 FLU IMMUNIZE NO ADMIN: HCPCS | Performed by: NURSE PRACTITIONER

## 2021-10-14 PROCEDURE — 1123F ACP DISCUSS/DSCN MKR DOCD: CPT | Performed by: NURSE PRACTITIONER

## 2021-10-14 PROCEDURE — 1090F PRES/ABSN URINE INCON ASSESS: CPT | Performed by: NURSE PRACTITIONER

## 2021-10-14 ASSESSMENT — ENCOUNTER SYMPTOMS
EYES NEGATIVE: 1
ALLERGIC/IMMUNOLOGIC NEGATIVE: 1
RESPIRATORY NEGATIVE: 1
GASTROINTESTINAL NEGATIVE: 1

## 2021-10-14 NOTE — PROGRESS NOTES
[facility] PHYSICIAN SERVICES  LPS  PC [facility]  [address]  [address] [address]  Dept: [phone]  Dept Fax: [phone]  Loc: [phone]    Patrice Arzate is a 77 y.o. female who presents today for her medical conditions/complaints as noted below. Patrice Arzate is c/o of 3 Month Follow-Up (She is here for a 3 month follow up. She has no complaints.)        HPI:     HPI   64-year-old female presents today for 3-month follow-up. She states she has no new complaints or concerns. Chief Complaint   Patient presents with    3 Month Follow-Up     She is here for a 3 month follow up. She has no complaints.      Past Medical History:   Diagnosis Date    Abnormal Pap smear of cervix         History of cryosurgery     Stroke ([address]     Syncope       Past Surgical History:   Procedure Laterality Date    BREAST CYST EXCISION Bilateral     X4      SECTION      COLONOSCOPY      [facility], Benign Polyps per patient     COLONOSCOPY N/A 2021    Dr Kevin Saleh, 5 yr recall    JOINT REPLACEMENT      right shoulder    TONSILLECTOMY      UPPER GASTROINTESTINAL ENDOSCOPY N/A 2021    Dr FELICITY Reynolds-Esophagitis, gastritis       Vitals 10/14/2021 2021 2021 2021 2021    SYSTOLIC 564 [phone] - -   DIASTOLIC 76 62 80 67 - -   Site Left Upper Arm Left Upper Arm - - - -   Position Sitting Sitting - - - -   Cuff Size Medium Adult Medium Adult - - - -   Pulse 61 75 71 70 - -   Temp 97.8 97.3 - - - -   Resp - -  - -   SpO2 98 98 100 100 98 99   Weight 163 lb 168 lb - - - -   Height 5' 5\" 5' 5\" - - - -   Body mass index 27.12 kg/m2 27.95 kg/m2 - - - -   Some recent data might be hidden       Family History   Problem Relation Age of Onset    Heart Attack Maternal Grandmother     Colon Cancer Father     Colon Cancer Mother     Esophageal Cancer Neg Hx     Liver Cancer Neg Hx     Rectal Cancer Neg Hx     Stomach Cancer Neg Hx        Social History     Tobacco Use    Smoking status: Former Smoker     Packs/day: 0.50     Years: 50.00     Pack years: 25.00     Types: Cigarettes     Quit date: 2020     Years since quittin.8    Smokeless tobacco: Never Used   Substance Use Topics    Alcohol use: Yes     Comment: Very Rare       Current Outpatient Medications on File Prior to Visit   Medication Sig Dispense Refill    clopidogrel (PLAVIX) 75 MG tablet TAKE 1 TABLET BY MOUTH EVERY DAY 30 tablet 3    atorvastatin (LIPITOR) 80 MG tablet TAKE 1 TABLET BY MOUTH EVERY DAY AT NIGHT 30 tablet 3    aspirin 81 MG chewable tablet Take 1 tablet by mouth daily 30 tablet 2     No current facility-administered medications on file prior to visit. No Known Allergies    Health Maintenance   Topic Date Due    Low dose CT lung screening  Never done    Pneumococcal 65+ years Vaccine (1 of 1 - PPSV23) Never done    Annual Wellness Visit (AWV)  Never done    Breast cancer screen  2021    Flu vaccine (1) Never done    DEXA (modify frequency per FRAX score)  2021 (Originally 2008)    DTaP/Tdap/Td vaccine (1 - Tdap) 2021 (Originally 1972)    Shingles Vaccine (1 of 2) 2021 (Originally 2003)    Lipid screen  2022    Diabetes screen  2024    Colon cancer screen colonoscopy  2026    COVID-19 Vaccine  Completed    Hepatitis A vaccine  Aged Out    Hepatitis B vaccine  Aged Out    Hib vaccine  Aged Out    Meningococcal (ACWY) vaccine  Aged Out    Hepatitis C screen  Discontinued       Subjective:      Review of Systems   Constitutional: Negative. HENT: Negative. Eyes: Negative. Respiratory: Negative. Cardiovascular: Negative. Gastrointestinal: Negative. Endocrine: Negative. Genitourinary: Negative. Musculoskeletal: Negative. Skin: Negative. Allergic/Immunologic: Negative. Neurological: Negative. Hematological: Negative. Psychiatric/Behavioral: Negative. Objective:     Physical Exam  Vitals and nursing note reviewed. Constitutional:       General: She is not in acute distress. Appearance: Normal appearance. She is not ill-appearing or toxic-appearing. HENT:      Head: Normocephalic and atraumatic. Nose: Nose normal.      Mouth/Throat:      Mouth: Mucous membranes are moist.      Pharynx: Posterior oropharyngeal erythema present. Eyes:      Extraocular Movements: Extraocular movements intact. Conjunctiva/sclera: Conjunctivae normal.      Pupils: Pupils are equal, round, and reactive to light. Cardiovascular:      Rate and Rhythm: Normal rate and regular rhythm. Pulses: Normal pulses. Heart sounds: Normal heart sounds. Pulmonary:      Effort: Pulmonary effort is normal. No respiratory distress. Breath sounds: Normal breath sounds. No wheezing or rhonchi. Abdominal:      General: Bowel sounds are normal.      Palpations: Abdomen is soft. Musculoskeletal:         General: Normal range of motion. Cervical back: Normal range of motion and neck supple. No rigidity or tenderness. Right lower leg: No edema. Left lower leg: No edema. Lymphadenopathy:      Cervical: No cervical adenopathy. Skin:     General: Skin is warm and dry. Coloration: Skin is not jaundiced or pale. Findings: No erythema or rash. Neurological:      Mental Status: She is alert and oriented to person, place, and time. Mental status is at baseline. Psychiatric:         Mood and Affect: Mood normal.         Behavior: Behavior normal.       /76 (Site: Left Upper Arm, Position: Sitting, Cuff Size: Medium Adult)   Pulse 61   Temp 97.8 °F (36.6 °C)   Ht 5' 5\" (1.651 m)   Wt 163 lb (73.9 kg)   SpO2 98%   BMI 27.12 kg/m²     Assessment:       Diagnosis Orders   1. Near syncope     2. Weakness     3. Gastroesophageal reflux disease, unspecified whether esophagitis present           Plan:   1.-2. Resolved.   Patient and family both report no new episodes. 3.  Stable. Patient states she is no longer taking her Protonix. She is not having any issues with heartburn from reflux       Patient given educational materials -see patient instructions. Discussed use, benefit, and side effects of prescribed medications. All patient questions answered. Pt voiced understanding. Reviewed health maintenance. Instructed to continue currentmedications, diet and exercise. Patient agreed with treatment plan. Follow up as directed. MEDICATIONS:  No orders of the defined types were placed in this encounter. ORDERS:  No orders of the defined types were placed in this encounter. Follow-up:  Return in about 6 months (around 4/14/2022). PATIENT INSTRUCTIONS:  There are no Patient Instructions on file for this visit. Electronically signed by AMOL Shelton NP on 10/14/2021 at 3:11 PM    EMR Dragon/transcription disclaimer:  Much of thisencounter note is electronic transcription/translation of spoken language to printed texts. The electronic translation of spoken language may be erroneous, or at times, nonsensical words or phrases may be inadvertentlytranscribed.   Although I have reviewed the note for such errors, some may still exist.

## 2021-10-18 ENCOUNTER — TELEPHONE (OUTPATIENT)
Dept: NEUROLOGY | Age: 66
End: 2021-10-18

## 2021-10-18 DIAGNOSIS — Z86.73 STATUS POST STROKE: Primary | ICD-10-CM

## 2021-10-18 NOTE — TELEPHONE ENCOUNTER
Contacted by Wanda Young today stating that patients family had called and left messages for the Neurology office with Dr. Matti Bruce and no return call. Stated that patient needs form to drive filled out post stroke. Looked in the patient chart and no documentation of a phone call. Per Mirella patient was seen in Dr. Lakshmi Moe this past week and stated that Dr. Matti Bruce need to fill out the paper work for patient to be able to drive post stroke (don't see any documentation in the chart note for this on 10/14/2021). Patient was last seen by Dr. Matti Bruce 11/2020 in the Rehab unit of the hospital and has never seen Dr. Matti Bruce in the office. Please put in a referral to Dr. Matti Bruce. Patient will need an office visit for this form to be filled out and patient will need to bring the form. Dr. Matti Bruce will want to do an exam on the patient due to has not seen in almost a year. Please let me know if any further questions.     Thank you,  Azalea Gutierrez

## 2021-10-18 NOTE — PROGRESS NOTES
Was contacted to place order for referral so that patient may be scheduled with Dr. Orville Vargas. He last saw her in November 2020 in the rehab unit following her stroke. She is now requesting documentation to allow her to resume driving privileges. She will need to be evaluated for that in his office. Referral order placed.

## 2021-10-19 NOTE — TELEPHONE ENCOUNTER
Referral Order was put in to Dr. Vanessa iRddle by AMOL Vázquez. The order is unsigned and cannot make appointment until the order has been signed.  sh

## 2021-10-20 ENCOUNTER — TELEPHONE (OUTPATIENT)
Dept: NEUROLOGY | Age: 66
End: 2021-10-20

## 2021-10-20 NOTE — TELEPHONE ENCOUNTER
Called and left a voicemail with the patient to let them know we have got the referral appointment scheduled. Left appointment time, date, and the location on the voicemail with a call back number.  Sent Welcome Letter

## 2021-10-27 ENCOUNTER — NURSE ONLY (OUTPATIENT)
Dept: PRIMARY CARE CLINIC | Age: 66
End: 2021-10-27
Payer: MEDICARE

## 2021-10-27 DIAGNOSIS — M54.50 LOW BACK PAIN, UNSPECIFIED BACK PAIN LATERALITY, UNSPECIFIED CHRONICITY, UNSPECIFIED WHETHER SCIATICA PRESENT: Primary | ICD-10-CM

## 2021-10-27 DIAGNOSIS — R82.998 LEUKOCYTES IN URINE: ICD-10-CM

## 2021-10-27 DIAGNOSIS — R50.9 FEVER, UNSPECIFIED FEVER CAUSE: ICD-10-CM

## 2021-10-27 DIAGNOSIS — R30.0 DYSURIA: ICD-10-CM

## 2021-10-27 LAB
APPEARANCE FLUID: ABNORMAL
BILIRUBIN, POC: ABNORMAL
BLOOD URINE, POC: ABNORMAL
CLARITY, POC: ABNORMAL
COLOR, POC: YELLOW
GLUCOSE URINE, POC: ABNORMAL
KETONES, POC: ABNORMAL
LEUKOCYTE EST, POC: ABNORMAL
NITRITE, POC: ABNORMAL
PH, POC: 5
PROTEIN, POC: ABNORMAL
SPECIFIC GRAVITY, POC: 1.01
UROBILINOGEN, POC: ABNORMAL

## 2021-10-27 PROCEDURE — 99211 OFF/OP EST MAY X REQ PHY/QHP: CPT | Performed by: FAMILY MEDICINE

## 2021-10-27 PROCEDURE — 81002 URINALYSIS NONAUTO W/O SCOPE: CPT | Performed by: FAMILY MEDICINE

## 2021-10-27 NOTE — PROGRESS NOTES
Patient was seen today for a nurse's visit for UA. Results showed sign of infection. Results forwarded to PCP. Specimen sent to lab for culture. Patient notified that we will contact with results and any medications that are sent to pharmacy. Patient voiced understanding.

## 2021-10-29 LAB — URINE CULTURE, ROUTINE: NORMAL

## 2021-11-09 ENCOUNTER — TELEPHONE (OUTPATIENT)
Dept: PRIMARY CARE CLINIC | Age: 66
End: 2021-11-09

## 2021-12-15 ENCOUNTER — OFFICE VISIT (OUTPATIENT)
Dept: NEUROLOGY | Age: 66
End: 2021-12-15
Payer: MEDICARE

## 2021-12-15 VITALS
BODY MASS INDEX: 26.66 KG/M2 | DIASTOLIC BLOOD PRESSURE: 81 MMHG | HEART RATE: 65 BPM | WEIGHT: 160 LBS | HEIGHT: 65 IN | SYSTOLIC BLOOD PRESSURE: 150 MMHG

## 2021-12-15 DIAGNOSIS — R26.9 GAIT ABNORMALITY: ICD-10-CM

## 2021-12-15 DIAGNOSIS — Z86.73 H/O: CVA (CEREBROVASCULAR ACCIDENT): Primary | ICD-10-CM

## 2021-12-15 DIAGNOSIS — I10 ESSENTIAL HYPERTENSION: ICD-10-CM

## 2021-12-15 DIAGNOSIS — E78.5 HYPERLIPIDEMIA, UNSPECIFIED HYPERLIPIDEMIA TYPE: ICD-10-CM

## 2021-12-15 DIAGNOSIS — R53.1 WEAKNESS: ICD-10-CM

## 2021-12-15 PROCEDURE — 1090F PRES/ABSN URINE INCON ASSESS: CPT | Performed by: PSYCHIATRY & NEUROLOGY

## 2021-12-15 PROCEDURE — 1036F TOBACCO NON-USER: CPT | Performed by: PSYCHIATRY & NEUROLOGY

## 2021-12-15 PROCEDURE — 4040F PNEUMOC VAC/ADMIN/RCVD: CPT | Performed by: PSYCHIATRY & NEUROLOGY

## 2021-12-15 PROCEDURE — 3017F COLORECTAL CA SCREEN DOC REV: CPT | Performed by: PSYCHIATRY & NEUROLOGY

## 2021-12-15 PROCEDURE — G8484 FLU IMMUNIZE NO ADMIN: HCPCS | Performed by: PSYCHIATRY & NEUROLOGY

## 2021-12-15 PROCEDURE — 1123F ACP DISCUSS/DSCN MKR DOCD: CPT | Performed by: PSYCHIATRY & NEUROLOGY

## 2021-12-15 PROCEDURE — G8400 PT W/DXA NO RESULTS DOC: HCPCS | Performed by: PSYCHIATRY & NEUROLOGY

## 2021-12-15 PROCEDURE — G8417 CALC BMI ABV UP PARAM F/U: HCPCS | Performed by: PSYCHIATRY & NEUROLOGY

## 2021-12-15 PROCEDURE — 99214 OFFICE O/P EST MOD 30 MIN: CPT | Performed by: PSYCHIATRY & NEUROLOGY

## 2021-12-15 PROCEDURE — G8427 DOCREV CUR MEDS BY ELIG CLIN: HCPCS | Performed by: PSYCHIATRY & NEUROLOGY

## 2021-12-15 NOTE — PROGRESS NOTES
Review of Systems    Constitutional - No fever or chills. No diaphoresis or significant fatigue. HENT -  No tinnitus or significant hearing loss. Eyes - no sudden vision change or eye pain  Respiratory - yes significant shortness of breath or cough  Cardiovascular - no chest pain No palpitations or significant leg swelling  Gastrointestinal - no abdominal swelling or pain. Genitourinary - No difficulty urinating, dysuria  Musculoskeletal - yes back pain or myalgia. Skin - no color change or rash  Neurologic - No seizures. No lateralizing weakness. Hematologic - yes easy bruising or excessive bleeding. Psychiatric - no severe anxiety or nervousness. All other review of systems are negative.
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
## 2021-12-23 RX ORDER — CLOPIDOGREL BISULFATE 75 MG/1
TABLET ORAL
Qty: 30 TABLET | Refills: 3 | Status: SHIPPED | OUTPATIENT
Start: 2021-12-23 | End: 2022-04-26

## 2021-12-23 RX ORDER — ATORVASTATIN CALCIUM 80 MG/1
TABLET, FILM COATED ORAL
Qty: 30 TABLET | Refills: 3 | Status: SHIPPED | OUTPATIENT
Start: 2021-12-23 | End: 2022-04-26

## 2022-02-02 ENCOUNTER — TELEPHONE (OUTPATIENT)
Dept: PRIMARY CARE CLINIC | Age: 67
End: 2022-02-02

## 2022-03-09 ENCOUNTER — HOSPITAL ENCOUNTER (EMERGENCY)
Age: 67
Discharge: HOME | End: 2022-03-09
Payer: OTHER MISCELLANEOUS

## 2022-03-09 ENCOUNTER — APPOINTMENT (OUTPATIENT)
Dept: GENERAL RADIOLOGY | Age: 67
End: 2022-03-09
Payer: OTHER MISCELLANEOUS

## 2022-03-09 VITALS
HEART RATE: 81 BPM | DIASTOLIC BLOOD PRESSURE: 80 MMHG | OXYGEN SATURATION: 99 % | RESPIRATION RATE: 16 BRPM | BODY MASS INDEX: 25.83 KG/M2 | SYSTOLIC BLOOD PRESSURE: 140 MMHG | HEIGHT: 65 IN | TEMPERATURE: 98.7 F | WEIGHT: 155 LBS
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
## 2022-03-09 DIAGNOSIS — M25.511 ACUTE PAIN OF RIGHT SHOULDER: ICD-10-CM

## 2022-03-09 PROCEDURE — 99282 EMERGENCY DEPT VISIT SF MDM: CPT

## 2022-03-09 PROCEDURE — 73030 X-RAY EXAM OF SHOULDER: CPT

## 2022-03-09 RX ORDER — CYCLOBENZAPRINE HCL 10 MG
10 TABLET ORAL 3 TIMES DAILY PRN
Qty: 30 TABLET | Refills: 0 | Status: SHIPPED | OUTPATIENT
Start: 2022-03-09 | End: 2022-03-19

## 2022-03-09 RX ORDER — PREDNISONE 10 MG/1
10 TABLET ORAL DAILY
Qty: 10 TABLET | Refills: 0 | Status: SHIPPED | OUTPATIENT
Start: 2022-03-09 | End: 2022-03-19

## 2022-03-09 ASSESSMENT — ENCOUNTER SYMPTOMS
RHINORRHEA: 0
EYE DISCHARGE: 0
SORE THROAT: 0
COLOR CHANGE: 0
BACK PAIN: 0
ABDOMINAL PAIN: 0
EYE PAIN: 0
COUGH: 0
ABDOMINAL DISTENTION: 0
PHOTOPHOBIA: 0
NAUSEA: 0
SHORTNESS OF BREATH: 0
APNEA: 0

## 2022-03-09 ASSESSMENT — PAIN SCALES - GENERAL: PAINLEVEL_OUTOF10: 3

## 2022-03-09 ASSESSMENT — PAIN DESCRIPTION - LOCATION: LOCATION: SHOULDER;ARM

## 2022-03-09 ASSESSMENT — PAIN DESCRIPTION - PAIN TYPE: TYPE: ACUTE PAIN

## 2022-03-09 ASSESSMENT — PAIN DESCRIPTION - ORIENTATION: ORIENTATION: RIGHT
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
## 2022-04-26 RX ORDER — CLOPIDOGREL BISULFATE 75 MG/1
TABLET ORAL
Qty: 30 TABLET | Refills: 3 | Status: SHIPPED | OUTPATIENT
Start: 2022-04-26 | End: 2022-04-27 | Stop reason: SDUPTHER

## 2022-04-26 RX ORDER — ATORVASTATIN CALCIUM 80 MG/1
TABLET, FILM COATED ORAL
Qty: 30 TABLET | Refills: 3 | Status: SHIPPED | OUTPATIENT
Start: 2022-04-26 | End: 2022-04-27 | Stop reason: SDUPTHER

## 2022-04-27 ENCOUNTER — OFFICE VISIT (OUTPATIENT)
Dept: PRIMARY CARE CLINIC | Age: 67
End: 2022-04-27
Payer: MEDICARE

## 2022-04-27 VITALS
DIASTOLIC BLOOD PRESSURE: 72 MMHG | SYSTOLIC BLOOD PRESSURE: 124 MMHG | HEIGHT: 66 IN | WEIGHT: 163.8 LBS | RESPIRATION RATE: 20 BRPM | BODY MASS INDEX: 26.33 KG/M2 | HEART RATE: 68 BPM | OXYGEN SATURATION: 96 % | TEMPERATURE: 98.3 F

## 2022-04-27 DIAGNOSIS — I10 ESSENTIAL HYPERTENSION: Primary | ICD-10-CM

## 2022-04-27 DIAGNOSIS — E78.5 HYPERLIPIDEMIA, UNSPECIFIED HYPERLIPIDEMIA TYPE: ICD-10-CM

## 2022-04-27 DIAGNOSIS — Z00.00 INITIAL MEDICARE ANNUAL WELLNESS VISIT: ICD-10-CM

## 2022-04-27 PROCEDURE — G0438 PPPS, INITIAL VISIT: HCPCS | Performed by: FAMILY MEDICINE

## 2022-04-27 PROCEDURE — 4040F PNEUMOC VAC/ADMIN/RCVD: CPT | Performed by: FAMILY MEDICINE

## 2022-04-27 PROCEDURE — 3017F COLORECTAL CA SCREEN DOC REV: CPT | Performed by: FAMILY MEDICINE

## 2022-04-27 PROCEDURE — 1123F ACP DISCUSS/DSCN MKR DOCD: CPT | Performed by: FAMILY MEDICINE

## 2022-04-27 RX ORDER — CLOPIDOGREL BISULFATE 75 MG/1
75 TABLET ORAL DAILY
Qty: 30 TABLET | Refills: 3 | Status: SHIPPED | OUTPATIENT
Start: 2022-04-27

## 2022-04-27 RX ORDER — ATORVASTATIN CALCIUM 80 MG/1
80 TABLET, FILM COATED ORAL DAILY
Qty: 30 TABLET | Refills: 3 | Status: SHIPPED | OUTPATIENT
Start: 2022-04-27

## 2022-04-27 RX ORDER — M-VIT,TX,IRON,MINS/CALC/FOLIC 27MG-0.4MG
1 TABLET ORAL DAILY
COMMUNITY

## 2022-04-27 SDOH — ECONOMIC STABILITY: FOOD INSECURITY: WITHIN THE PAST 12 MONTHS, YOU WORRIED THAT YOUR FOOD WOULD RUN OUT BEFORE YOU GOT MONEY TO BUY MORE.: NEVER TRUE

## 2022-04-27 SDOH — ECONOMIC STABILITY: FOOD INSECURITY: WITHIN THE PAST 12 MONTHS, THE FOOD YOU BOUGHT JUST DIDN'T LAST AND YOU DIDN'T HAVE MONEY TO GET MORE.: NEVER TRUE

## 2022-04-27 ASSESSMENT — LIFESTYLE VARIABLES
HOW OFTEN DO YOU HAVE A DRINK CONTAINING ALCOHOL: MONTHLY OR LESS
HOW MANY STANDARD DRINKS CONTAINING ALCOHOL DO YOU HAVE ON A TYPICAL DAY: 1 OR 2

## 2022-04-27 ASSESSMENT — PATIENT HEALTH QUESTIONNAIRE - PHQ9
SUM OF ALL RESPONSES TO PHQ QUESTIONS 1-9: 8
SUM OF ALL RESPONSES TO PHQ QUESTIONS 1-9: 8
2. FEELING DOWN, DEPRESSED OR HOPELESS: 1
8. MOVING OR SPEAKING SO SLOWLY THAT OTHER PEOPLE COULD HAVE NOTICED. OR THE OPPOSITE, BEING SO FIGETY OR RESTLESS THAT YOU HAVE BEEN MOVING AROUND A LOT MORE THAN USUAL: 0
10. IF YOU CHECKED OFF ANY PROBLEMS, HOW DIFFICULT HAVE THESE PROBLEMS MADE IT FOR YOU TO DO YOUR WORK, TAKE CARE OF THINGS AT HOME, OR GET ALONG WITH OTHER PEOPLE: 0
9. THOUGHTS THAT YOU WOULD BE BETTER OFF DEAD, OR OF HURTING YOURSELF: 0
6. FEELING BAD ABOUT YOURSELF - OR THAT YOU ARE A FAILURE OR HAVE LET YOURSELF OR YOUR FAMILY DOWN: 1
1. LITTLE INTEREST OR PLEASURE IN DOING THINGS: 3
3. TROUBLE FALLING OR STAYING ASLEEP: 3
7. TROUBLE CONCENTRATING ON THINGS, SUCH AS READING THE NEWSPAPER OR WATCHING TELEVISION: 0
SUM OF ALL RESPONSES TO PHQ QUESTIONS 1-9: 8
5. POOR APPETITE OR OVEREATING: 0
SUM OF ALL RESPONSES TO PHQ QUESTIONS 1-9: 8
4. FEELING TIRED OR HAVING LITTLE ENERGY: 0
SUM OF ALL RESPONSES TO PHQ9 QUESTIONS 1 & 2: 4

## 2022-04-27 ASSESSMENT — SOCIAL DETERMINANTS OF HEALTH (SDOH): HOW HARD IS IT FOR YOU TO PAY FOR THE VERY BASICS LIKE FOOD, HOUSING, MEDICAL CARE, AND HEATING?: NOT HARD AT ALL

## 2022-04-27 NOTE — PROGRESS NOTES
Medicare Annual Wellness Visit    Tawanda Wilkins is here for Medicare AWV    Assessment & Plan   Essential hypertension  -     Comprehensive Metabolic Panel; Future  -     CBC with Auto Differential; Future  Hyperlipidemia, unspecified hyperlipidemia type  -     Lipid Panel; Future  Initial Medicare annual wellness visit      Recommendations for Preventive Services Due: see orders and patient instructions/AVS.  Recommended screening schedule for the next 5-10 years is provided to the patient in written form: see Patient Instructions/AVS.     No follow-ups on file. Ayden Francis is doing good. She stays active with her grandchildren and family. She states she prefers to be a loner. She does not eat a lot, because she is by herself. Not good to cook for one. Too much trouble to go out. Patient's complete Health Risk Assessment and screening values have been reviewed and are found in Flowsheets. The following problems were reviewed today and where indicated follow up appointments were made and/or referrals ordered. Positive Risk Factor Screenings with Interventions:    Fall Risk:  Do you feel unsteady or are you worried about falling? : (!) yes  2 or more falls in past year?: no  Fall with injury in past year?: no     Fall Risk Interventions:    · Home safety tips provided  · Home exercises provided to promote strength and balance  · Patient declines any further evaluation/treatment for this issue     Depression:  PHQ-2 Score: 4  PHQ-9 Total Score: 8    Severity:1-4 = minimal depression, 5-9 = mild depression, 10-14 = moderate depression, 15-19 = moderately severe depression, 20-27 = severe depression    Depression Interventions:  · LPN INTERVENTION GUIDE: SCORE 5-14 = MODERATE DEPRESSION: FOLLOW UP IN 1 WEEK  · Pt is doing better now that the weather has gotten better.    · Regular exercise recommended- 3-5 times per week, 30-45 minutes per session  · Relaxation techniques discussed      Drug Use Screening: DAST-10 Score: 1  DAST-10 Score Interpretation:  1-2: Low level - Monitor, re-assess at a later date; 3-5: Moderate level - Further Investigation; 6-8: Substantial level - Intensive Assessment; 9-10: Severe level - Intensive Assessment    Substance Use - Drug Use Interventions:  educational materials provided, patient is not ready to change his/her recreational drug use behavior at this time, She states she does not do this regularly or buy it. She states if someone has a \"gummy\" she will take one of those, but that is very rarely. General Health and ACP:  General  In general, how would you say your health is?: Good  In the past 7 days, have you experienced any of the following: New or Increased Pain, New or Increased Fatigue, Loneliness, Social Isolation, Stress or Anger?: (!) Yes  Select all that apply: (!) New or Increased Fatigue,Social Isolation  Do you get the social and emotional support that you need?: Yes  Do you have a Living Will?: Yes    Advance Directives     Power of  Living Will ACP-Advance Directive ACP-Power of     Not on File Not on File Not on File Not on File      General Health Risk Interventions:  · Fatigue: regular exercise recommended- 3-5 times per week, 30-45 minutes per session, patient declines any further evaluation/treatment for this issue  · Social isolation: patient's comments regarding inadequate social support: she prefers to be alone.  , patient declines any further intervention for this issue    Health Habits/Nutrition:     Physical Activity: Inactive    Days of Exercise per Week: 0 days    Minutes of Exercise per Session: 0 min     Have you lost any weight without trying in the past 3 months?: No  Body mass index: (!) 26.84  Have you seen the dentist within the past year?: (!) No    Health Habits/Nutrition Interventions:  · Inadequate physical activity:  educational materials provided to promote increased physical activity, patient is not ready to increase his/her physical activity level at this time  · Nutritional issues:  educational materials for healthy, well-balanced diet provided, patient is not ready to address his/her nutritional/weight issues at this time  · Dental exam overdue:  patient encouraged to make appointment with his/her dentist    Hearing/Vision:  Do you or your family notice any trouble with your hearing that hasn't been managed with hearing aids?: (!) Yes  Do you have difficulty driving, watching TV, or doing any of your daily activities because of your eyesight?: No  Have you had an eye exam within the past year?: (!) No  No exam data present    Hearing/Vision Interventions:  · Hearing concerns:  patient declines any further evaluation/treatment for hearing issues  · Vision concerns:  patient encouraged to make appointment with his/her eye specialist    Safety:  Do you have working smoke detectors?: Yes  Do you have any tripping hazards - loose or unsecured carpets or rugs?: (!) Yes  Do you have any tripping hazards - clutter in doorways, halls, or stairs?: No  Do you have either shower bars, grab bars, non-slip mats or non-slip surfaces in your shower or bathtub?: Yes  Do all of your stairways have a railing or banister?: (!) No  Do you always fasten your seatbelt when you are in a car?: Yes    Safety Interventions:  · Home safety tips provided  · Patient declines any further evaluation/treatment for this issue           Objective   Vitals:    04/27/22 1322   BP: 124/72   Site: Left Upper Arm   Position: Sitting   Cuff Size: Large Adult   Pulse: 68   Resp: 20   Temp: 98.3 °F (36.8 °C)   TempSrc: Temporal   SpO2: 96%   Weight: 163 lb 12.8 oz (74.3 kg)   Height: 5' 5.5\" (1.664 m)      Body mass index is 26.84 kg/m². Recent and remote Memory are both intact. No Known Allergies  Prior to Visit Medications    Medication Sig Taking?  Authorizing Provider   Multiple Vitamins-Minerals (THERAPEUTIC MULTIVITAMIN-MINERALS) tablet Take 1 tablet by mouth daily Yes Historical Provider, MD   Elderberry 500 MG CAPS Take by mouth Yes Historical Provider, MD   clopidogrel (PLAVIX) 75 MG tablet TAKE 1 TABLET BY MOUTH EVERY DAY Yes Esteban Villasenor MD   atorvastatin (LIPITOR) 80 MG tablet TAKE 1 TABLET BY MOUTH EVERY DAY AT NIGHT Yes Esteban Villasenor MD   Cyanocobalamin (VITAMIN B 12 PO) Take by mouth Yes Historical Provider, MD   aspirin 81 MG chewable tablet Take 1 tablet by mouth daily Yes Esteban Villasenor MD       CareTeam (Including outside providers/suppliers regularly involved in providing care):   Patient Care Team:  Esteban Villasenor MD as PCP - General (Family Medicine)  Esteban Villasenor MD as PCP - [facility]  Adam Dougherty MD as Consulting Physician (Neurology)    Reviewed and updated this visit:  Tobacco  Allergies  Meds  Med Hx  Surg Hx  Soc Hx  Fam Hx      Labs ordered today for patient to come in fasting at a later date. Health Maintenance reviewed and updated. Nusrat Moscoso LPN, 3/77/1204, performed the documented evaluation under the direct supervision of the attending physician.

## 2022-04-27 NOTE — PATIENT INSTRUCTIONS
Personalized Preventive Plan for Adin Nelson - 4/27/2022  Medicare offers a range of preventive health benefits. Some of the tests and screenings are paid in full while other may be subject to a deductible, co-insurance, and/or copay. Some of these benefits include a comprehensive review of your medical history including lifestyle, illnesses that may run in your family, and various assessments and screenings as appropriate. After reviewing your medical record and screening and assessments performed today your provider may have ordered immunizations, labs, imaging, and/or referrals for you. A list of these orders (if applicable) as well as your Preventive Care list are included within your After Visit Summary for your review. Other Preventive Recommendations:    · A preventive eye exam performed by an eye specialist is recommended every 1-2 years to screen for glaucoma; cataracts, macular degeneration, and other eye disorders. · A preventive dental visit is recommended every 6 months. · Try to get at least 150 minutes of exercise per week or 10,000 steps per day on a pedometer . · Order or download the FREE \"Exercise & Physical Activity: Your Everyday Guide\" from [employer]. Call 1-101.915.6655 or search [employer] online. · You need 4776-9258 mg of calcium and 4207-0507 IU of vitamin D per day. It is possible to meet your calcium requirement with diet alone, but a vitamin D supplement is usually necessary to meet this goal.  · When exposed to the sun, use a sunscreen that protects against both UVA and UVB radiation with an SPF of 30 or greater. Reapply every 2 to 3 hours or after sweating, drying off with a towel, or swimming. · Always wear a seat belt when traveling in a car. Always wear a helmet when riding a bicycle or motorcycle. Heart-Healthy Diet   Sodium, Fat, and Cholesterol Controlled Diet       What Is a Heart Healthy Diet?    A heart-healthy diet is one that limits sodium , certain types of fat , and cholesterol . This type of diet is recommended for:   People with any form of cardiovascular disease (eg, coronary heart disease , peripheral vascular disease , previous heart attack , previous stroke )   People with risk factors for cardiovascular disease, such as high blood pressure , high cholesterol , or diabetes   Anyone who wants to lower their risk of developing cardiovascular disease   Sodium    Sodium is a mineral found in many foods. In general, most people consume much more sodium than they need. Diets high in sodium can increase blood pressure and lead to edema (water retention). On a heart-healthy diet, you should consume no more than 2,300 mg (milligrams) of sodium per dayabout the amount in one teaspoon of table salt. The foods highest in sodium include table salt (about 50% sodium), processed foods, convenience foods, and preserved foods. Cholesterol    Cholesterol is a fat-like, waxy substance in your blood. Our bodies make some cholesterol. It is also found in animal products, with the highest amounts in fatty meat, egg yolks, whole milk, cheese, shellfish, and organ meats. On a heart-healthy diet, you should limit your cholesterol intake to less than 200 mg per day. It is normal and important to have some cholesterol in your bloodstream. But too much cholesterol can cause plaque to build up within your arteries, which can eventually lead to a heart attack or stroke. The two types of cholesterol that are most commonly referred to are:   Low-density lipoprotein (LDL) cholesterol  Also known as bad cholesterol, this is the cholesterol that tends to build up along your arteries. Bad cholesterol levels are increased by eating fats that are saturated or hydrogenated. Optimal level of this cholesterol is less than 100. Over 130 starts to get risky for heart disease.    High-density lipoprotein (HDL) cholesterol  Also known as good cholesterol, this type of cholesterol actually carries cholesterol away from your arteries and may, therefore, help lower your risk of having a heart attack. You want this level to be high (ideally greater than 60). It is a risk to have a level less than 40. You can raise this good cholesterol by eating olive oil, canola oil, avocados, or nuts. Exercise raises this level, too. Fat    Fat is calorie dense and packs a lot of calories into a small amount of food. Even though fats should be limited due to their high calorie content, not all fats are bad. In fact, some fats are quite healthful. Fat can be broken down into four main types. The good-for-you fats are:   Monounsaturated fat  found in oils such as olive and canola, avocados, and nuts and natural nut butters; can decrease cholesterol levels, while keeping levels of HDL cholesterol high   Polyunsaturated fat  found in oils such as safflower, sunflower, soybean, corn, and sesame; can decrease total cholesterol and LDL cholesterol   Omega-3 fatty acids  particularly those found in fatty fish (such as salmon, trout, tuna, mackerel, herring, and sardines); can decrease risk of arrhythmias, decrease triglyceride levels, and slightly lower blood pressure   The fats that you want to limit are:   Saturated fat  found in animal products, many fast foods, and a few vegetables; increases total blood cholesterol, including LDL levels   Animal fats that are saturated include: butter, lard, whole-milk dairy products, meat fat, and poultry skin   Vegetable fats that are saturated include: hydrogenated shortening, palm oil, coconut oil, cocoa butter   Hydrogenated or trans fat  found in margarine and vegetable shortening, most shelf stable snack foods, and fried foods; increases LDL and decreases HDL     It is generally recommended that you limit your total fat for the day to less than 30% of your total calories.  If you follow an 1800-calorie heart healthy diet, for example, this would mean 60 grams of fat or less per day. Saturated fat and trans fat in your diet raises your blood cholesterol the most, much more than dietary cholesterol does. For this reason, on a heart-healthy diet, less than 7% of your calories should come from saturated fat and ideally 0% from trans fat. On an 1800-calorie diet, this translates into less than 14 grams of saturated fat per day, leaving 46 grams of fat to come from mono- and polyunsaturated fats.    Food Choices on a Heart Healthy Diet   Food Category   Foods Recommended   Foods to Avoid   Grains   Breads and rolls without salted tops Most dry and cooked cereals Unsalted crackers and breadsticks Low-sodium or homemade breadcrumbs or stuffing All rice and pastas   Breads, rolls, and crackers with salted tops High-fat baked goods (eg, muffins, donuts, pastries) Quick breads, self-rising flour, and biscuit mixes Regular bread crumbs Instant hot cereals Commercially prepared rice, pasta, or stuffing mixes   Vegetables   Most fresh, frozen, and low-sodium canned vegetables Low-sodium and salt-free vegetable juices Canned vegetables if unsalted or rinsed   Regular canned vegetables and juices, including sauerkraut and pickled vegetables Frozen vegetables with sauces Commercially prepared potato and vegetable mixes   Fruits   Most fresh, frozen, and canned fruits All fruit juices   Fruits processed with salt or sodium   Milk   Nonfat or low-fat (1%) milk Nonfat or low-fat yogurt Cottage cheese, low-fat ricotta, cheeses labeled as low-fat and low-sodium   Whole milk Reduced-fat (2%) milk Malted and chocolate milk Full fat yogurt Most cheeses (unless low-fat and low salt) Buttermilk (no more than 1 cup per week)   Meats and Beans   Lean cuts of fresh or frozen beef, veal, lamb, or pork (look for the word loin) Fresh or frozen poultry without the skin Fresh or frozen fish and some shellfish Egg whites and egg substitutes (Limit whole eggs to three per week) Tofu Nuts or seeds (unsalted, dry-roasted), low-sodium peanut butter Dried peas, beans, and lentils   Any smoked, cured, salted, or canned meat, fish, or poultry (including gutiérrez, chipped beef, cold cuts, hot dogs, sausages, sardines, and anchovies) Poultry skins Breaded and/or fried fish or meats Canned peas, beans, and lentils Salted nuts   Fats and Oils   Olive oil and canola oil Low-sodium, low-fat salad dressings and mayonnaise   Butter, margarine, coconut and palm oils, gutiérrez fat   Snacks, Sweets, and Condiments   Low-sodium or unsalted versions of broths, soups, soy sauce, and condiments Pepper, herbs, and spices; vinegar, lemon, or lime juice Low-fat frozen desserts (yogurt, sherbet, fruit bars) Sugar, cocoa powder, honey, syrup, jam, and preserves Low-fat, trans-fat free cookies, cakes, and pies Demarco and animal crackers, fig bars, ruth snaps   High-fat desserts Broth, soups, gravies, and sauces, made from instant mixes or other high-sodium ingredients Salted snack foods Canned olives Meat tenderizers, seasoning salt, and most flavored vinegars   Beverages   Low-sodium carbonated beverages Tea and coffee in moderation Soy milk   Commercially softened water   Suggestions   Make whole grains, fruits, and vegetables the base of your diet. Choose heart-healthy fats such as canola, olive, and flaxseed oil, and foods high in heart-healthy fats, such as nuts, seeds, soybeans, tofu, and fish. Eat fish at least twice per week; the fish highest in omega-3 fatty acids and lowest in mercury include salmon, herring, mackerel, sardines, and canned chunk light tuna. If you eat fish less than twice per week or have high triglycerides, talk to your doctor about taking fish oil supplements. Read food labels.    For products low in fat and cholesterol, look for fat free, low-fat, cholesterol free, saturated fat free, and trans fat freeAlso scan the Nutrition Facts Label, which lists saturated fat, trans fat, and cholesterol amounts. For products low in sodium, look for sodium free, very low sodium, low sodium, no added salt, and unsalted   Skip the salt when cooking or at the table; if food needs more flavor, get creative and try out different herbs and spices. Garlic and onion also add substantial flavor to foods. Trim any visible fat off meat and poultry before cooking, and drain the fat off after huitron. Use cooking methods that require little or no added fat, such as grilling, boiling, baking, poaching, broiling, roasting, steaming, stir-frying, and sauting. Avoid fast food and convenience food. They tend to be high in saturated and trans fat and have a lot of added salt. Talk to a registered dietitian for individualized diet advice. Last Reviewed: March 2011 Lyric Caputo MS, MPH, RD   Updated: 3/29/2011   ·     Heart-Healthy Diet   Sodium, Fat, and Cholesterol Controlled Diet       What Is a Heart Healthy Diet? A heart-healthy diet is one that limits sodium , certain types of fat , and cholesterol . This type of diet is recommended for:   People with any form of cardiovascular disease (eg, coronary heart disease , peripheral vascular disease , previous heart attack , previous stroke )   People with risk factors for cardiovascular disease, such as high blood pressure , high cholesterol , or diabetes   Anyone who wants to lower their risk of developing cardiovascular disease   Sodium    Sodium is a mineral found in many foods. In general, most people consume much more sodium than they need. Diets high in sodium can increase blood pressure and lead to edema (water retention). On a heart-healthy diet, you should consume no more than 2,300 mg (milligrams) of sodium per dayabout the amount in one teaspoon of table salt. The foods highest in sodium include table salt (about 50% sodium), processed foods, convenience foods, and preserved foods.    Cholesterol    Cholesterol is a fat-like, waxy substance in your blood. Our bodies make some cholesterol. It is also found in animal products, with the highest amounts in fatty meat, egg yolks, whole milk, cheese, shellfish, and organ meats. On a heart-healthy diet, you should limit your cholesterol intake to less than 200 mg per day. It is normal and important to have some cholesterol in your bloodstream. But too much cholesterol can cause plaque to build up within your arteries, which can eventually lead to a heart attack or stroke. The two types of cholesterol that are most commonly referred to are:   Low-density lipoprotein (LDL) cholesterol  Also known as bad cholesterol, this is the cholesterol that tends to build up along your arteries. Bad cholesterol levels are increased by eating fats that are saturated or hydrogenated. Optimal level of this cholesterol is less than 100. Over 130 starts to get risky for heart disease. High-density lipoprotein (HDL) cholesterol  Also known as good cholesterol, this type of cholesterol actually carries cholesterol away from your arteries and may, therefore, help lower your risk of having a heart attack. You want this level to be high (ideally greater than 60). It is a risk to have a level less than 40. You can raise this good cholesterol by eating olive oil, canola oil, avocados, or nuts. Exercise raises this level, too. Fat    Fat is calorie dense and packs a lot of calories into a small amount of food. Even though fats should be limited due to their high calorie content, not all fats are bad. In fact, some fats are quite healthful. Fat can be broken down into four main types.    The good-for-you fats are:   Monounsaturated fat  found in oils such as olive and canola, avocados, and nuts and natural nut butters; can decrease cholesterol levels, while keeping levels of HDL cholesterol high   Polyunsaturated fat  found in oils such as safflower, sunflower, soybean, corn, and sesame; can decrease total cholesterol and LDL cholesterol   Omega-3 fatty acids  particularly those found in fatty fish (such as salmon, trout, tuna, mackerel, herring, and sardines); can decrease risk of arrhythmias, decrease triglyceride levels, and slightly lower blood pressure   The fats that you want to limit are:   Saturated fat  found in animal products, many fast foods, and a few vegetables; increases total blood cholesterol, including LDL levels   Animal fats that are saturated include: butter, lard, whole-milk dairy products, meat fat, and poultry skin   Vegetable fats that are saturated include: hydrogenated shortening, palm oil, coconut oil, cocoa butter   Hydrogenated or trans fat  found in margarine and vegetable shortening, most shelf stable snack foods, and fried foods; increases LDL and decreases HDL     It is generally recommended that you limit your total fat for the day to less than 30% of your total calories. If you follow an 1800-calorie heart healthy diet, for example, this would mean 60 grams of fat or less per day. Saturated fat and trans fat in your diet raises your blood cholesterol the most, much more than dietary cholesterol does. For this reason, on a heart-healthy diet, less than 7% of your calories should come from saturated fat and ideally 0% from trans fat. On an 1800-calorie diet, this translates into less than 14 grams of saturated fat per day, leaving 46 grams of fat to come from mono- and polyunsaturated fats.    Food Choices on a Heart Healthy Diet   Food Category   Foods Recommended   Foods to Avoid   Grains   Breads and rolls without salted tops Most dry and cooked cereals Unsalted crackers and breadsticks Low-sodium or homemade breadcrumbs or stuffing All rice and pastas   Breads, rolls, and crackers with salted tops High-fat baked goods (eg, muffins, donuts, pastries) Quick breads, self-rising flour, and biscuit mixes Regular bread crumbs Instant hot cereals Commercially prepared rice, pasta, or stuffing mixes Vegetables   Most fresh, frozen, and low-sodium canned vegetables Low-sodium and salt-free vegetable juices Canned vegetables if unsalted or rinsed   Regular canned vegetables and juices, including sauerkraut and pickled vegetables Frozen vegetables with sauces Commercially prepared potato and vegetable mixes   Fruits   Most fresh, frozen, and canned fruits All fruit juices   Fruits processed with salt or sodium   Milk   Nonfat or low-fat (1%) milk Nonfat or low-fat yogurt Cottage cheese, low-fat ricotta, cheeses labeled as low-fat and low-sodium   Whole milk Reduced-fat (2%) milk Malted and chocolate milk Full fat yogurt Most cheeses (unless low-fat and low salt) Buttermilk (no more than 1 cup per week)   Meats and Beans   Lean cuts of fresh or frozen beef, veal, lamb, or pork (look for the word loin) Fresh or frozen poultry without the skin Fresh or frozen fish and some shellfish Egg whites and egg substitutes (Limit whole eggs to three per week) Tofu Nuts or seeds (unsalted, dry-roasted), low-sodium peanut butter Dried peas, beans, and lentils   Any smoked, cured, salted, or canned meat, fish, or poultry (including gutiérrez, chipped beef, cold cuts, hot dogs, sausages, sardines, and anchovies) Poultry skins Breaded and/or fried fish or meats Canned peas, beans, and lentils Salted nuts   Fats and Oils   Olive oil and canola oil Low-sodium, low-fat salad dressings and mayonnaise   Butter, margarine, coconut and palm oils, gutiérrez fat   Snacks, Sweets, and Condiments   Low-sodium or unsalted versions of broths, soups, soy sauce, and condiments Pepper, herbs, and spices; vinegar, lemon, or lime juice Low-fat frozen desserts (yogurt, sherbet, fruit bars) Sugar, cocoa powder, honey, syrup, jam, and preserves Low-fat, trans-fat free cookies, cakes, and pies Demarco and animal crackers, fig bars, ruth snaps   High-fat desserts Broth, soups, gravies, and sauces, made from instant mixes or other high-sodium ingredients Salted snack foods Canned olives Meat tenderizers, seasoning salt, and most flavored vinegars   Beverages   Low-sodium carbonated beverages Tea and coffee in moderation Soy milk   Commercially softened water   Suggestions   Make whole grains, fruits, and vegetables the base of your diet. Choose heart-healthy fats such as canola, olive, and flaxseed oil, and foods high in heart-healthy fats, such as nuts, seeds, soybeans, tofu, and fish. Eat fish at least twice per week; the fish highest in omega-3 fatty acids and lowest in mercury include salmon, herring, mackerel, sardines, and canned chunk light tuna. If you eat fish less than twice per week or have high triglycerides, talk to your doctor about taking fish oil supplements. Read food labels. For products low in fat and cholesterol, look for fat free, low-fat, cholesterol free, saturated fat free, and trans fat freeAlso scan the Nutrition Facts Label, which lists saturated fat, trans fat, and cholesterol amounts. For products low in sodium, look for sodium free, very low sodium, low sodium, no added salt, and unsalted   Skip the salt when cooking or at the table; if food needs more flavor, get creative and try out different herbs and spices. Garlic and onion also add substantial flavor to foods. Trim any visible fat off meat and poultry before cooking, and drain the fat off after huitron. Use cooking methods that require little or no added fat, such as grilling, boiling, baking, poaching, broiling, roasting, steaming, stir-frying, and sauting. Avoid fast food and convenience food. They tend to be high in saturated and trans fat and have a lot of added salt. Talk to a registered dietitian for individualized diet advice.       Last Reviewed: March 2011 Yue Cortes MS, MPH, RD   Updated: 3/29/2011   ·     Preventing Osteoporosis: After Your Visit  Your Care Instructions  Osteoporosis means the bones are weak and thin enough that they can break easily. The older you are, the more likely you are to get osteoporosis. But with plenty of calcium, vitamin D, and exercise, you can help prevent osteoporosis. The preteen and teen years are a key time for bone building. With the help of calcium, vitamin D, and exercise in those early years and beyond, the bones reach their peak density and strength by age 27. After age 27, your bones naturally start to thin and weaken. The stronger your bones are at around age 27, the lower your risk for osteoporosis. But no matter what your age and risk are, your bones still need calcium, vitamin D, and exercise to stay strong. Also avoid smoking, and limit alcohol. Smoking and heavy alcohol use can make your bones thinner. Talk to your doctor about any special risks you might have, such as having a close relative with osteoporosis or taking a medicine that can weaken bones. Your doctor can tell you the best ways to protect your bones from thinning. Follow-up care is a key part of your treatment and safety. Be sure to make and go to all appointments, and call your doctor if you are having problems. It's also a good idea to know your test results and keep a list of the medicines you take. How can you care for yourself at home? Get enough calcium and vitamin D. The [place] [employer] recommends adults younger than age 46 need 1,000 mg of calcium and 600 IU of vitamin D each day. Women ages 46 to 79 need 1,200 mg of calcium and 600 IU of vitamin D each day. Men ages 46 to 79 need 1,000 mg of calcium and 600 IU of vitamin D each day. Adults 71 and older need 1,200 mg of calcium and 800 IU of vitamin D each day. Eat foods rich in calcium, like yogurt, cheese, milk, and dark green vegetables. Eat foods rich in vitamin D, like eggs, fatty fish, cereal, and fortified milk. Get some sunshine. Your body uses sunshine to make its own vitamin D. The safest time to be out in the sun is before 10 a.m. or after 3 p.m.  Avoid getting sunburned. Sunburn can increase your risk of skin cancer. Talk to your doctor about taking a calcium plus vitamin D supplement. Ask about what type of calcium is right for you, and how much to take at a time. Adults ages 23 to 48 should not get more than 2,500 mg of calcium and 4,000 IU of vitamin D each day, whether it is from supplements and/or food. Adults ages 46 and older should not get more than 2,000 mg of calcium and 4,000 IU of vitamin D each day from supplements and/or food. Get regular bone-building exercise. Weight-bearing and resistance exercises keep bones healthy by working the muscles and bones against gravity. Start out at an exercise level that feels right for you. Add a little at a time until you can do the following:  Do 30 minutes of weight-bearing exercise on most days of the week. Walking, jogging, stair climbing, and dancing are good choices. Do resistance exercises with weights or elastic bands 2 to 3 days a week. Limit alcohol. Drink no more than 1 alcohol drink a day if you are a woman. Drink no more than 2 alcohol drinks a day if you are a man. Do not smoke. Smoking can make bones thin faster. If you need help quitting, talk to your doctor about stop-smoking programs and medicines. These can increase your chances of quitting for good. When should you call for help? Watch closely for changes in your health, and be sure to contact your doctor if:  You need help with a healthy eating plan. You need help with an exercise plan    © 8939-0315 [employer]. Care instructions adapted under license by [facility]. This care instruction is for use with your licensed healthcare professional. If you have questions about a medical condition or this instruction, always ask your healthcare professional. [place] [address] any warranty or liability for your use of this information. Content Version: 9.4.89367;  Last Revised: June 20, 2011              ·     Keep Your Memory Fitoaneesh Pretty       Many factors can affect your ability to remembera hectic lifestyle, aging, stress, chronic disease, and certain medicines. But, there are steps you can take to sharpen your mind and help preserve your memory. Challenge Your Brain   Regularly challenging your mind may help keeps it in top shape. Good mental exercises include:   Crossword puzzlesUse a dictionary if you need it; you will learn more that way. Brainteasers Try some! Crafts, such as wood working and sewing   Hobbies, such as gardening and building model airplanes   SocializingVisit old friends or join groups to meet new ones. Reading   Learning a new language   Taking a class, whether it be art history or tamra chi   TravelingExperience the food, history, and culture of your destination   Learning to use a computer   Going to museums, the theater, or thought-provoking movies   Changing things in your daily life, such as reversing your pattern in the grocery store or brushing your teeth using your nondominant hand   Use Memory Aids   There is no need to remember every detail on your own. These memory aids can help:   Calendars and day planners   Electronic organizers to store all sorts of helpful informationThese devices can \"beep\" to remind you of appointments. A book of days to record birthdays, anniversaries, and other occasions that occur on the same date every year   Detailed \"to-do\" lists and strategically placed sticky notes   Quick \"study\" sessionsBefore a gathering, review who will be there so their names will be fresh in your mind. Establish routinesFor example, keep your keys, wallet, and umbrella in the same place all the time or take medicine with your 8:00 AM glass of juice   Live a Healthy Life   Many actions that will keep your body strong will do the same for your mind.  For example:   Talk to Your Doctor About Herbs and Supplements    Malnutrition and vitamin deficiencies can impair your mental function. For example, vitamin B12 deficiency can cause a range of symptoms, including confusion. But, what if your nutritional needs are being met? Can herbs and supplements still offer a benefit? Researchers have investigated a range of natural remedies, such as ginkgo , ginseng , and the supplement phosphatidylserine (PS). So far, though, the evidence is inconsistent as to whether these products can improve memory or thinking. If you are interested in taking herbs and supplements, talk to your doctor first because they may interact with other medicines that you are taking. Exercise Regularly    Among the many benefits of regular exercise are increased blood flow to the brain and decreased risk of certain diseases that can interfere with memory function. One study found that even moderate exercise has a beneficial effect. Examples of \"moderate\" exercise include:   Playing 18 holes of golf once a week, without a cart   Playing tennis twice a week   Walking one mile per day   Manage Stress    It can be tough to remember what is important when your mind is cluttered. Make time for relaxation. Choose activities that calm you down, and make it routine. Manage Chronic Conditions    Side effects of high blood pressure , diabetes, and heart disease can interfere with mental function. Many of the lifestyle steps discussed here can help manage these conditions. Strive to eat a healthy diet, exercise regularly, get stress under control, and follow your doctor's advice for your condition. Minimize Medications    Talk to your doctor about the medicines that you take. Some may be unnecessary. Also, healthy lifestyle habits may lower the need for certain drugs.      Last Reviewed: April 2010 Km Lindsey MD   Updated: 4/13/2010   ·     [address] [place] [address]       As we get older, changes in balance, gait, strength, vision, hearing, and cognition make even the most youthful senior more prone to accidents. Falls are one of the leading health risks for older people. This increased risk of falling is related to:   Aging process (eg, decreased muscle strength, slowed reflexes)   Higher incidence of chronic health problems (eg, arthritis, diabetes) that may limit mobility, agility or sensory awareness   Side effects of medicine (eg, dizziness, blurred vision)especially medicines like prescription pain medicines and drugs used to treat mental health conditions   Depending on the brittleness of your bones, the consequences of a fall can be serious and long lasting. Home Life   Research by the Association of Aging [facility]) shows that some home accidents among older adults can be prevented by making simple lifestyle changes and basic modifications and repairs to the home environment. Here are some lifestyle changes that experts recommend:   Have your hearing and vision checked regularly. Be sure to wear prescription glasses that are right for you. Speak to your doctor or pharmacist about the possible side effects of your medicines. A number of medicines can cause dizziness. If you have problems with sleep, talk to your doctor. Limit your intake of alcohol. If necessary, use a cane or walker to help maintain your balance. Wear supportive, rubber-soled shoes, even at home. If you live in a region that gets wintry weather, you may want to put special cleats on your shoes to prevent you from slipping on the snow and ice. Exercise regularly to help maintain muscle tone, agility, and balance. Always hold the banister when going up or down stairs. Also, use  bars when getting in or out of the bath or shower, or using the toilet. To avoid dizziness, get up slowly from a lying down position. Sit up first, dangling your legs for a minute or two before rising to a standing position.    Overall Home Safety Check   According to the Consumer Product Safety Commision's \"Older Consumer Home Safety Checklist,\" it is important to check for potential hazards in each room. And remember, proper lighting is an essential factor in home safety. If you cannot see clearly, you are more likely to fall. Important questions to ask yourself include:   Are lamp, electric, extension, and telephone cords placed out of the flow of traffic and maintained in good condition? Have frayed cords been replaced? Are all small rugs and runners slip resistant? If not, you can secure them to the floor with a special double-sided carpet tape. Are smoke detectors properly locatedone on every floor of your home and one outside of every sleeping area? Are they in good working order? Are batteries replaced at least once a year? Do you have a well-maintained carbon monoxide detector outside every sleeping are in your home? Does your furniture layout leave plenty of space to maneuver between and around chairs, tables, beds, and sofas? Are hallways, stairs and passages between rooms well lit? Can you reach a lamp without getting out of bed? Are floor surfaces well maintained? Shag rugs, high-pile carpeting, tile floors, and polished wood floors can be particularly slippery. Stairs should always have handrails and be carpeted or fitted with a non-skid tread. Is your telephone easily reachable. Is the cord safely tucked away? Room by Room   According to the Association of Aging, bathrooms and bryant are the two most potentially hazardous rooms in your home. In the Kitchen    Be sure your stove is in proper working order and always make sure burners and the oven are off before you go out or go to sleep. Keep pots on the back burners, turn handles away from the front of the stove, and keep stove clean and free of grease build-up. Kitchen ventilation systems and range exhausts should be working properly. Keep flammable objects such as towels and pot holders away from the cooking area except when in use.  Make sure kitchen curtains are tied back. Move cords and appliances away from the sink and hot surfaces. If extension cords are needed, install wiring guides so they do not hang over the sink, range, or working areas. Look for coffee pots, kettles and toaster ovens with automatic shut-offs. Keep a mop handy in the kitchen so you can wipe up spills instantly. You should also have a small fire extinguisher. Arrange your kitchen with frequently used items on lower shelves to avoid the need to stand on a stepstool to reach them. Make sure countertops are well-lit to avoid injuries while cutting and preparing food. In the Bathroom    Use a non-slip mat or decals in the tub and shower, since wet, soapy tile or porcelain surfaces are extremely slippery. Make sure bathroom rugs are non-skid or tape them firmly to the floor. Bathtubs should have at least one, preferably two, grab bars, firmly attached to structural supports in the wall. (Do not use built-in soap holders or glass shower doors as grab bars.)    Tub seats fitted with non-slip material on the legs allow you to wash sitting down. For people with limited mobility, bathtub transfer benches allow you to slide safely into the tub. Raised toilet seats and toilet safety rails are helpful for those with knee or hip problems. In the [facility]    Make sure you use a nightlight and that the area around your bed is clear of potential obstacles. Be careful with electric blankets and never go to sleep with a heating pad, which can cause serious burns even if on a low setting. Use fire-resistant mattress covers and pillows, and NEVER smoke in bed. Keep a phone next to the bed that is programmed to dial 911 at the push of a button. If you have a chronic condition, you may want to sign on with an automatic call-in service. Typically the system includes a small pendant that connects directly to an emergency medical voice-response system.  You should also make arrangements to stay in contact with someonefriend, neighbor, family memberon a regular schedule. Fire Prevention   According to the [employer]. (Smoke Alarms for Every) [address] [facility], senior citizens are one of the two highest risk groups for death and serious injuries due to residential fires. When cooking, wear short-sleeved items, never a bulky long-sleeved robe. The [facility]'s Safety Checklist for Older Consumers emphasizes the importance of checking basements, garages, workshops and storage areas for fire hazards, such as volatile liquids, piles of old rags or clothing and overloaded circuits. Never smoke in bed or when lying down on a couch or recliner chair. Small portable electric or kerosene heaters are responsible for many home fires and should be used cautiously if at all. If you do use one, be sure to keep them away from flammable materials. In case of fire, make sure you have a pre-established emergency exit plan. Have a professional check your fireplace and other fuel-burning appliances yearly. Helping Hands   Baby boomers entering the hunt years will continue to see the development of new products to help older adults live safely and independently in spite of age-related changes. Making Life More Livable  , by Marycarmen Juarez, lists over 1,000 products for \"living well in the mature years,\" such as bathing and mobility aids, household security devices, ergonomically designed knives and peelers, and faucet valves and knobs for temperature control. Medical supply stores and organizations are good sources of information about products that improve your quality of life and insure your safety. Last Reviewed: November 2009 Daphne Costa MD   Updated: 3/7/2011     ·        Advance Care Planning: Care Instructions  Your Care Instructions     It can be hard to live with an illness that cannot be cured.  But if your health is getting worse, you may want to make decisions about end-of-life care. Planning for the end of your life does not mean that you are giving up. It is a way to make sure that your wishes are met. Clearly stating your wishes can make it easier for your loved ones. Making plans while you are still able may alsoease your mind and make your final days less stressful and more meaningful. Follow-up care is a key part of your treatment and safety. Be sure to make and go to all appointments, and call your doctor if you are having problems. It's also a good idea to know your test results and keep alist of the medicines you take. What can you do to plan for the end of life? You can bring these issues up with your doctor. You do not need to wait until your doctor starts the conversation. You might start with, \"What makes life worth living for me is. Heddy  Heddy  \" And then follow it with, \"I would not be willing to live with . Heddy  Heddy  Heddy  \" When you complete this sentence it helps your doctor understand your wishes. Talk openly and honestly with your doctor. This is the best way to understand the decisions you will need to make as your health changes. Know that you can always change your mind. Ask your doctor about commonly used life-support measures. These include tube feedings, breathing machines, and fluids given through a vein (IV). Understanding these treatments will help you decide whether you want them. You may choose to have these life-supporting treatments for a limited time. This allows a trial period to see whether they will help you. You may also decide that you want your doctor to take only certain measures to keep you alive. It may help to think about the big picture, like what makes life worth living for you or what your values and goals are. Talk to your doctor about how long you are likely to live. Your doctor may be able to give you an idea of what usually happens with your specific illness. Think about preparing papers that state your wishes. These papers are called advance directives. If you do this early and review them often, there will not be any confusion about what you want. You can change your instructions at any time. Which papers should you prepare? Advance directives are legal papers that tell doctors how you want to be cared for at the end of your life. You do not need a  to write these papers. Ask your doctor or your state health department for information on how to write your advance directives. They may have the forms for each of these types of papers. Make sure your doctor has a copy of these on file, and give a copy to afamily member or close friend. Consider a do-not-resuscitate order (DNR). This order asks that no extra treatments be done if your heart stops or you stop breathing. Extra treatments may include cardiopulmonary resuscitation (CPR), electrical shock to restart your heart, or a machine to breathe for you. If you decide to have a DNR order, ask your doctor to explain and write it. Place the order in your home where everyone can easily see it. Consider a living will. A living will explains your wishes about life support and other treatments at the end of your life if you become unable to speak for yourself. Living merlos tell doctors to use or not use treatments that would keep you alive. You must have one or two witnesses or a notary present when you sign this form. A living will may be called something else in your state. Consider a medical power of . This form allows you to name a person to make decisions about your care if you are not able to. Most people ask a close friend or family member. Talk to this person about the kinds of treatments you want and those that you do not want. Make sure this person understands your wishes. A medical power of  may be called something else in your state. These legal papers are simple to change. Tell your doctor what you want to change, and ask him or her to make a note in your medical file.  Give yourfamily updated copies of the papers. Where can you learn more? Go to https://chpepiceweb.healthbeatlab. org and sign in to your [employer] account. Enter P184 in the [facility] box to learn more about \"Advance Care Planning: Care Instructions. \"     If you do not have an account, please click on the \"Sign Up Now\" link. Current as of: October 18, 2021               Content Version: 13.2  © 2006-2022 Healthwise, [employer]. Care instructions adapted under license by [facility] ([facility]). If you have questions about a medical condition or this instruction, always ask your healthcare professional. [address] any warranty or liability for your use of this information. ·        Learning About Living Perroy  What is a living will? A living will, also called a declaration, is a legal form. It tells your family and your doctor your wishes when you can't speak for yourself. It's used by the health professionals who will treat you as you near the end of your life or ifyou get seriously hurt or ill. If you put your wishes in writing, your loved ones and others will know what kind of care you want. They won't need to guess. This can ease your mind and behelpful to others. And you can change or cancel your living will at any time. A living will is not the same as an estate or property will. An estate willexplains what you want to happen with your money and property after you die. How do you use it? Keep these facts in mind about how a living will is used. Your living will is used only if you can't speak or make decisions for yourself. Most often, one or more doctors must certify that you can't speak or decide for yourself before your living will takes effect. If you get better and can speak for yourself again, you can accept or refuse any treatment. It doesn't matter what you said in your living will. Some states may limit your right to refuse treatment in certain cases.  For example, you may need to clearly state in your living will that you don't want artificial hydration and nutrition, such as being fed through a tube. Is a living will a legal document? A living will is a legal document. Each state has its own laws about livingwills. And a living will may be called something else in your state. Here are some things to know about living merlos. You don't need an  to complete a living will. But legal advice can be helpful if your state's laws are unclear. It can also help if your health history is complicated or your family can't agree on what should be in your living will. You can change your living will at any time. Some people find that their wishes about end-of-life care change as their health changes. If you make big changes to your living will, complete a new form. If you move to another state, make sure that your living will is legal in the state where you now live. In most cases, doctors will respect your wishes even if you have a form from a different state. You might use a universal form that has been approved by many states. This kind of form can sometimes be filled out and stored online. Your digital copy will then be available wherever you have a connection to the internet. The doctors and nurses who need to treat you can find it right away. Your state may offer an online registry. This is another place where you can store your living will online. It's a good idea to get your living will notarized. This means using a person called a  to watch two people sign, or witness, your living will. What should you know when you create a living will? Here are some questions to ask yourself as you make your living will. Do you know enough about life support methods that might be used? If not, talk to your doctor so you know what might be done if you can't breathe on your own, your heart stops, or you can't swallow.   What things would you still want to be able to do after you receive life-support methods? Would you want to be able to walk? To speak? To eat on your own? To live without the help of machines? Do you want certain Anabaptism practices performed if you become very ill? If you have a choice, where do you want to be cared for? In your home? At a hospital or nursing home? If you have a choice at the end of your life, where would you prefer to die? At home? In a hospital or nursing home? Somewhere else? Would you prefer to be buried or cremated? Do you want your organs to be donated after you die? What should you do with your living will? Make sure that your family members and your health care agent have copies of your living will (also called a declaration). Give your doctor a copy of your living will. Ask to have it kept as part of your medical record. If you have more than one doctor, make sure that each one has a copy. Put a copy of your living will where it can be easily found. For example, some people may put a copy on their refrigerator door. If you are using a digital copy, be sure your doctor, family members, and health care agent know how to find and access it. Where can you learn more? Go to https://Beacon Enterprise Solutionspepiceweb.[email or web address]. org and sign in to your Just[employer]ing account. Enter P395 in the [employer] box to learn more about \"Learning About Living Perrogera. \"     If you do not have an account, please click on the \"Sign Up Now\" link. Current as of: October 18, 2021               Content Version: 13.2  © 2006-2022 Healthwise, Incorporated. Care instructions adapted under license by [facility] ([facility]). If you have questions about a medical condition or this instruction, always ask your healthcare professional. [place] [address] any warranty or liability for your use of this information. ·        Learning About Medical Power of   What is a medical power of ?      A medical power of , also called a durable power of  for health care, is one type of the legal forms called advance directives. It lets you name the person you want to make treatment decisions for you if you can't speak or decide for yourself. The person you choose is called your health care agent. This person is also called a health care proxy or health care surrogate. A medical power of  may be called something else in your state. How do you choose a health care agent? Choose your health care agent carefully. This person may or may not be a familymember. Talk to the person before you make your final decision. Make sure he or she iscomfortable with this responsibility. It's a good idea to choose someone who:  Is at least 25years old. Knows you well and understands what makes life meaningful for you. Understands your Sabianism and moral values. Will do what you want, not what he or she wants. Will be able to make difficult choices at a stressful time. Will be able to refuse or stop treatment, if that is what you would want, even if you could die. Will be firm and confident with health professionals if needed. Will ask questions to get needed information. Lives near you or agrees to travel to you if needed. Your family may help you make medical decisions while you can still be part of that process. But it's important to choose one person to be your health careagent in case you aren't able to make decisions for yourself. If you don't fill out the legal form and name a health care agent, thedecisions your family can make may be limited. A health care agent may be called something else in your state. Who will make decisions for you if you don't have a health care agent? If you don't have a health care agent or a living will, you may not get the care you want. Decisions may be made by family members who disagree about your medical care. Or decisions may be made by a medical professional who doesn'tknow you well.  In some cases, a  makes the decisions. When you name a health care agent, it is very clear who has the power to [place] decisions for you. How do you name a health care agent? You name your health care agent on a legal form. This form is usually called a medical power of . Ask your hospital, state bar association, or officeon aging where to find these forms. You must sign the form to make it legal. Some states require you to get the form notarized. This means that a person called a  watches you sign the form and then he or she signs the form. Some states also require thattwo or more witnesses sign the form. Be sure to tell your family members and doctors who your health care agent is. Where can you learn more? Go to https://KE2 Therm SolutionspeEckard Recovery Serviceseweb.[employer]. org and sign in to your [employer] | [employer] account. Enter [id number] in the [place].tv box to learn more about \"Learning About [address]. \"     If you do not have an account, please click on the \"Sign Up Now\" link. Current as of: October 18, 2021               Content Version: 13.2  © 2006-2022 Healthwise, Incorporated. Care instructions adapted under license by [facility] ([facility]). If you have questions about a medical condition or this instruction, always ask your healthcare professional. [place] [address] any warranty or liability for your use of this information.     ·

## 2022-05-03 DIAGNOSIS — I10 ESSENTIAL HYPERTENSION: ICD-10-CM

## 2022-05-03 DIAGNOSIS — E78.5 HYPERLIPIDEMIA, UNSPECIFIED HYPERLIPIDEMIA TYPE: ICD-10-CM

## 2022-05-03 LAB
ALBUMIN SERPL-MCNC: 4.4 G/DL (ref 3.5–5.2)
ALP BLD-CCNC: 93 U/L (ref 35–104)
ALT SERPL-CCNC: 13 U/L (ref 5–33)
ANION GAP SERPL CALCULATED.3IONS-SCNC: 11 MMOL/L (ref 7–19)
AST SERPL-CCNC: 19 U/L (ref 5–32)
BASOPHILS ABSOLUTE: 0.1 K/UL (ref 0–0.2)
BASOPHILS RELATIVE PERCENT: 0.9 % (ref 0–1)
BILIRUB SERPL-MCNC: 0.9 MG/DL (ref 0.2–1.2)
BUN BLDV-MCNC: 12 MG/DL (ref 8–23)
CALCIUM SERPL-MCNC: 9.2 MG/DL (ref 8.8–10.2)
CHLORIDE BLD-SCNC: 106 MMOL/L (ref 98–111)
CHOLESTEROL, TOTAL: 141 MG/DL (ref 160–199)
CO2: 28 MMOL/L (ref 22–29)
CREAT SERPL-MCNC: 0.6 MG/DL (ref 0.5–0.9)
EOSINOPHILS ABSOLUTE: 0.2 K/UL (ref 0–0.6)
EOSINOPHILS RELATIVE PERCENT: 2.9 % (ref 0–5)
GFR AFRICAN AMERICAN: >59
GFR NON-AFRICAN AMERICAN: >60
GLUCOSE BLD-MCNC: 91 MG/DL (ref 74–109)
HCT VFR BLD CALC: 40.6 % (ref 37–47)
HDLC SERPL-MCNC: 63 MG/DL (ref 65–121)
HEMOGLOBIN: 13 G/DL (ref 12–16)
IMMATURE GRANULOCYTES #: 0 K/UL
LDL CHOLESTEROL CALCULATED: 61 MG/DL
LYMPHOCYTES ABSOLUTE: 1.7 K/UL (ref 1.1–4.5)
LYMPHOCYTES RELATIVE PERCENT: 25 % (ref 20–40)
MCH RBC QN AUTO: 29.3 PG (ref 27–31)
MCHC RBC AUTO-ENTMCNC: 32 G/DL (ref 33–37)
MCV RBC AUTO: 91.4 FL (ref 81–99)
MONOCYTES ABSOLUTE: 0.4 K/UL (ref 0–0.9)
MONOCYTES RELATIVE PERCENT: 5.5 % (ref 0–10)
NEUTROPHILS ABSOLUTE: 4.5 K/UL (ref 1.5–7.5)
NEUTROPHILS RELATIVE PERCENT: 65.3 % (ref 50–65)
PDW BLD-RTO: 13.8 % (ref 11.5–14.5)
PLATELET # BLD: 299 K/UL (ref 130–400)
PMV BLD AUTO: 10.6 FL (ref 9.4–12.3)
POTASSIUM SERPL-SCNC: 3.6 MMOL/L (ref 3.5–5)
RBC # BLD: 4.44 M/UL (ref 4.2–5.4)
SODIUM BLD-SCNC: 145 MMOL/L (ref 136–145)
TOTAL PROTEIN: 6.6 G/DL (ref 6.6–8.7)
TRIGL SERPL-MCNC: 87 MG/DL (ref 0–149)
WBC # BLD: 6.9 K/UL (ref 4.8–10.8)

## 2022-09-16 ENCOUNTER — OFFICE VISIT (OUTPATIENT)
Dept: PRIMARY CARE CLINIC | Age: 67
End: 2022-09-16
Payer: MEDICARE

## 2022-09-16 VITALS
HEIGHT: 66 IN | SYSTOLIC BLOOD PRESSURE: 114 MMHG | RESPIRATION RATE: 18 BRPM | WEIGHT: 161.2 LBS | OXYGEN SATURATION: 96 % | DIASTOLIC BLOOD PRESSURE: 76 MMHG | TEMPERATURE: 97.6 F | BODY MASS INDEX: 25.91 KG/M2 | HEART RATE: 79 BPM

## 2022-09-16 DIAGNOSIS — F33.2 SEVERE EPISODE OF RECURRENT MAJOR DEPRESSIVE DISORDER, WITHOUT PSYCHOTIC FEATURES (HCC): ICD-10-CM

## 2022-09-16 DIAGNOSIS — M35.3 POLYMYALGIA (HCC): ICD-10-CM

## 2022-09-16 PROCEDURE — 1123F ACP DISCUSS/DSCN MKR DOCD: CPT | Performed by: NURSE PRACTITIONER

## 2022-09-16 PROCEDURE — 1036F TOBACCO NON-USER: CPT | Performed by: NURSE PRACTITIONER

## 2022-09-16 PROCEDURE — 1090F PRES/ABSN URINE INCON ASSESS: CPT | Performed by: NURSE PRACTITIONER

## 2022-09-16 PROCEDURE — G8427 DOCREV CUR MEDS BY ELIG CLIN: HCPCS | Performed by: NURSE PRACTITIONER

## 2022-09-16 PROCEDURE — G8417 CALC BMI ABV UP PARAM F/U: HCPCS | Performed by: NURSE PRACTITIONER

## 2022-09-16 PROCEDURE — 3017F COLORECTAL CA SCREEN DOC REV: CPT | Performed by: NURSE PRACTITIONER

## 2022-09-16 PROCEDURE — G8400 PT W/DXA NO RESULTS DOC: HCPCS | Performed by: NURSE PRACTITIONER

## 2022-09-16 PROCEDURE — 99214 OFFICE O/P EST MOD 30 MIN: CPT | Performed by: NURSE PRACTITIONER

## 2022-09-16 RX ORDER — MELOXICAM 7.5 MG/1
7.5 TABLET ORAL DAILY PRN
Qty: 30 TABLET | Refills: 0 | Status: SHIPPED | OUTPATIENT
Start: 2022-09-16 | End: 2022-10-13

## 2022-09-16 RX ORDER — TIZANIDINE 4 MG/1
4 TABLET ORAL NIGHTLY PRN
Qty: 30 TABLET | Refills: 0 | Status: SHIPPED | OUTPATIENT
Start: 2022-09-16 | End: 2022-10-13

## 2022-09-16 RX ORDER — DULOXETIN HYDROCHLORIDE 30 MG/1
30 CAPSULE, DELAYED RELEASE ORAL DAILY
Qty: 30 CAPSULE | Refills: 0 | Status: SHIPPED | OUTPATIENT
Start: 2022-09-16 | End: 2022-10-14 | Stop reason: SDUPTHER

## 2022-09-16 ASSESSMENT — ENCOUNTER SYMPTOMS
EYES NEGATIVE: 1
GASTROINTESTINAL NEGATIVE: 1
ALLERGIC/IMMUNOLOGIC NEGATIVE: 1
COUGH: 0
RESPIRATORY NEGATIVE: 1
SHORTNESS OF BREATH: 0

## 2022-09-16 NOTE — PROGRESS NOTES
[facility] PHYSICIAN SERVICES  [facility]  [address]  [address] [address]  Dept: [phone]  Dept Fax: [phone]  Loc: [phone]    Vincent Chen is a 74 y.o. female who presents today for her medical conditions/complaints as noted below. Vincent Chen is c/o of Depression (Pt is here to discuss her depression.) and Referral - General (Pt is wanting a referral for pain management.)        HPI:     HPI this 67-year-old female presents today for depression and chronic pain to her right side. She thinks that the pain is residual from her stroke that she had in the fall 2020. She states that it has been there since that time. But seems to be worsening. She is mainly down to her right arm her right side and hip. She states it feels like either very hot burning or very cold. Denies any falls or trauma. She also states her children have become very concerned about her due to depression. States that she very rarely goes out and denies any type of interaction with society. She states that she prefers to be at home alone. However she says when you have that time by her self reaching to get in your head. She states that recent event did cause her to have a lot of self reflection which led her to a very dark place. She states that time she did have some suicidal ideation. However she states she never could act upon it because she is too much of a chicken. She states that she is not having suicidal thoughts now but does remain severely depressed. She does states that her children did come and remove the gun and some other items from her house to try to promote her safety. She states that she does get out of the house to go to the grocery and every morning she does go and help her grandchildren get on the bus for school. She would like to get better so that she felt more comfortable to get out and be more active. She does report that she is recently stopped smoking.   She did this on her own. She states that when she had the stroke they could not find any blockages anywhere and told her that the best indication of the cause was her cigarette smoking. Chief Complaint   Patient presents with    Depression     Pt is here to discuss her depression. Referral - General     Pt is wanting a referral for pain management.      Past Medical History:   Diagnosis Date    Abnormal Pap smear of cervix         History of cryosurgery     Stroke [facility])     Syncope       Past Surgical History:   Procedure Laterality Date    BREAST CYST EXCISION Bilateral     X4      SECTION      COLONOSCOPY  2007    Denominational, Benign Polyps per patient     COLONOSCOPY N/A 2021    Dr Brandee Escudero, 5 yr recall    JOINT REPLACEMENT      right shoulder    TONSILLECTOMY      UPPER GASTROINTESTINAL ENDOSCOPY N/A 2021    Dr FELICITY Reynolds-Esophagitis, gastritis       Vitals 2022 2022 3/9/2022 12/15/2021 12/15/2021    SYSTOLIC 223 [id number] [id number] [phone]   DIASTOLIC 76 72 80 81 83 76   Site Left Upper Arm Left Upper Arm - - - Left Upper Arm   Position Sitting Sitting - - - Sitting   Cuff Size Medium Adult Large Adult - - - Medium Adult   Pulse 79 68 81 - 65 61   Temp 97.6 98.3 98.7 - - 97.8   Resp 18 20 16 - - -   SpO2 96 96 99 - - 98   Weight 161 lb 3.2 oz 163 lb 12.8 oz 155 lb - 160 lb 163 lb   Height 5' 5.5\" 5' 5.5\" 5' 5\" - 5' 5\" 5' 5\"   Body mass index 26.41 kg/m2 26.84 kg/m2 25.79 kg/m2 - 26.62 kg/m2 27.12 kg/m2   Pain Level - - 3 - - -   Some recent data might be hidden       Family History   Problem Relation Age of Onset    Heart Attack Maternal Grandmother     Colon Cancer Father     Colon Cancer Mother     Esophageal Cancer Neg Hx     Liver Cancer Neg Hx     Rectal Cancer Neg Hx     Stomach Cancer Neg Hx        Social History     Tobacco Use    Smoking status: Former     Packs/day: 0.50     Years: 50.00     Pack years: 25.00     Types: Cigarettes     Quit date: 2020     Years since quittin.8    Smokeless tobacco: Never   Substance Use Topics    Alcohol use: Yes     Comment: Very Rare       Current Outpatient Medications on File Prior to Visit   Medication Sig Dispense Refill    Multiple Vitamins-Minerals (THERAPEUTIC MULTIVITAMIN-MINERALS) tablet Take 1 tablet by mouth daily      atorvastatin (LIPITOR) 80 MG tablet Take 1 tablet by mouth daily 30 tablet 3    clopidogrel (PLAVIX) 75 MG tablet Take 1 tablet by mouth daily 30 tablet 3    aspirin 81 MG chewable tablet Take 1 tablet by mouth daily 30 tablet 2     No current facility-administered medications on file prior to visit. No Known Allergies    Health Maintenance   Topic Date Due    Pneumococcal 65+ years Vaccine (1 - PCV) Never done    COVID-19 Vaccine (4 - Booster for Moderna series) 2022    Breast cancer screen  2022    Flu vaccine (1) Never done    DEXA (modify frequency per FRAX score)  2023 (Originally 2008)    Shingles vaccine (1 of 2) 2023 (Originally 2003)    DTaP/Tdap/Td vaccine (1 - Tdap) 2023 (Originally 1972)    Depression Screen  2023    Lipids  2023    Colorectal Cancer Screen  2026    Hepatitis A vaccine  Aged Out    Hepatitis B vaccine  Aged Out    Hib vaccine  Aged Out    Meningococcal (ACWY) vaccine  Aged Out    Hepatitis C screen  Discontinued    Low dose CT lung screening  Discontinued       Subjective:      Review of Systems   Constitutional: Negative. Negative for fatigue and fever. HENT: Negative. Eyes: Negative. Respiratory: Negative. Negative for cough and shortness of breath. Cardiovascular: Negative. Negative for chest pain. Gastrointestinal: Negative. Endocrine: Negative. Genitourinary: Negative. Negative for difficulty urinating and dysuria. Musculoskeletal:  Positive for gait problem and myalgias. Skin: Negative. Allergic/Immunologic: Negative. Neurological:  Negative for headaches.    Hematological: Negative. Psychiatric/Behavioral:  Positive for dysphoric mood. Negative for self-injury, sleep disturbance and suicidal ideas. Objective:     Physical Exam  Vitals and nursing note reviewed. Constitutional:       General: She is not in acute distress. Appearance: Normal appearance. She is not ill-appearing or toxic-appearing. HENT:      Head: Normocephalic and atraumatic. Nose: Nose normal.      Mouth/Throat:      Mouth: Mucous membranes are moist.   Eyes:      Pupils: Pupils are equal, round, and reactive to light. Cardiovascular:      Rate and Rhythm: Normal rate and regular rhythm. Pulses: Normal pulses. Heart sounds: Normal heart sounds. Pulmonary:      Effort: Pulmonary effort is normal. No respiratory distress. Breath sounds: Normal breath sounds. No wheezing, rhonchi or rales. Abdominal:      General: Bowel sounds are normal.      Palpations: Abdomen is soft. Musculoskeletal:         General: Normal range of motion. Cervical back: Normal range of motion and neck supple. Skin:     General: Skin is warm and dry. Coloration: Skin is not jaundiced or pale. Findings: No erythema or rash. Neurological:      Mental Status: She is alert and oriented to person, place, and time. Mental status is at baseline. Psychiatric:         Mood and Affect: Mood normal.         Behavior: Behavior normal.         Thought Content: Thought content normal.         Judgment: Judgment normal.     /76 (Site: Left Upper Arm, Position: Sitting, Cuff Size: Medium Adult)   Pulse 79   Temp 97.6 °F (36.4 °C) (Temporal)   Resp 18   Ht 5' 5.5\" (1.664 m)   Wt 161 lb 3.2 oz (73.1 kg)   SpO2 96%   BMI 26.42 kg/m²     Assessment:       Diagnosis Orders   1. Severe episode of recurrent major depressive disorder, without psychotic features ([facility]        2.  Polymyalgia ([facility] [facility]              Plan:   Chronic condition uncontrolled with recent exacerbation even with suicidal ideations. Patient denies any suicidal ideations at this time. She also reports chronic pain. Start Cymbalta 30 mg daily. Patient was instructed to monitor her thoughts and emotions to make sure that it does not worsen with starting SSRI. She verbalized understanding. 2.  Chronic condition uncontrolled. Patient was instructed to start meloxicam 7.5 mg daily. I would happen to my stomach to take this medication. Do not take any other NSAIDs with this but you may take Tylenol. Start. Patient was instructed to add pillows under her knees to offload pressure off of her low back. She states she does sleep with a wedge to help control her acid reflux   tizanidine 4 mg tablets nightly before bed to         Patient given educational materials -see patient instructions. Discussed use, benefit, and side effects of prescribed medications. All patient questions answered. Pt voiced understanding. Reviewed health maintenance. Instructed to continue currentmedications, diet and exercise. Patient agreed with treatment plan. Follow up as directed. MEDICATIONS:  Orders Placed This Encounter   Medications    DULoxetine (CYMBALTA) 30 MG extended release capsule     Sig: Take 1 capsule by mouth daily     Dispense:  30 capsule     Refill:  0    meloxicam (MOBIC) 7.5 MG tablet     Sig: Take 1 tablet by mouth daily as needed for Pain     Dispense:  30 tablet     Refill:  0    tiZANidine (ZANAFLEX) 4 MG tablet     Sig: Take 1 tablet by mouth nightly as needed (muscle spasm)     Dispense:  30 tablet     Refill:  0         ORDERS:  No orders of the defined types were placed in this encounter. Follow-up:  Return in about 4 weeks (around 10/14/2022). PATIENT INSTRUCTIONS:  Patient Instructions   Sprain/strain   First 24 hours, use ice to injury site for 15 minutes at a time. After 48 hours, may alternate ice/heat 15 minutes each. R.I. C.E. therapy = rest, ice, compression and elevation.   May use ace wrap to injured area for compression. Use Ibuprofen or other antiinflammatory for pain and inflammation. If no open skin areas, OTC topical lidocaine such as Icy Hot or capsaicin creams may be applied for pain relief. Slow stretches of area may help reduce spasms and improve range of motion. Electronically signed by AMOL Merino NP on 9/16/2022 at 9:46 AM    EMR Dragon/transcription disclaimer:  Much of thisencounter note is electronic transcription/translation of spoken language to printed texts. The electronic translation of spoken language may be erroneous, or at times, nonsensical words or phrases may be inadvertentlytranscribed.   Although I have reviewed the note for such errors, some may still exist.

## 2022-10-13 RX ORDER — MELOXICAM 7.5 MG/1
TABLET ORAL
Qty: 30 TABLET | Refills: 0 | Status: SHIPPED | OUTPATIENT
Start: 2022-10-13 | End: 2022-10-14 | Stop reason: SDUPTHER

## 2022-10-13 RX ORDER — TIZANIDINE 4 MG/1
4 TABLET ORAL NIGHTLY PRN
Qty: 30 TABLET | Refills: 0 | Status: SHIPPED | OUTPATIENT
Start: 2022-10-13 | End: 2022-10-14 | Stop reason: SDUPTHER

## 2022-10-14 ENCOUNTER — OFFICE VISIT (OUTPATIENT)
Dept: PRIMARY CARE CLINIC | Age: 67
End: 2022-10-14
Payer: MEDICARE

## 2022-10-14 VITALS
SYSTOLIC BLOOD PRESSURE: 120 MMHG | HEIGHT: 65 IN | HEART RATE: 60 BPM | BODY MASS INDEX: 26.99 KG/M2 | OXYGEN SATURATION: 99 % | TEMPERATURE: 97.3 F | WEIGHT: 162 LBS | DIASTOLIC BLOOD PRESSURE: 80 MMHG

## 2022-10-14 DIAGNOSIS — R47.1 DYSARTHRIA: ICD-10-CM

## 2022-10-14 DIAGNOSIS — F33.2 SEVERE EPISODE OF RECURRENT MAJOR DEPRESSIVE DISORDER, WITHOUT PSYCHOTIC FEATURES (HCC): Primary | ICD-10-CM

## 2022-10-14 DIAGNOSIS — Z23 NEED FOR INFLUENZA VACCINATION: ICD-10-CM

## 2022-10-14 DIAGNOSIS — M35.3 POLYMYALGIA (HCC): ICD-10-CM

## 2022-10-14 PROCEDURE — 1123F ACP DISCUSS/DSCN MKR DOCD: CPT | Performed by: NURSE PRACTITIONER

## 2022-10-14 PROCEDURE — 1036F TOBACCO NON-USER: CPT | Performed by: NURSE PRACTITIONER

## 2022-10-14 PROCEDURE — G8417 CALC BMI ABV UP PARAM F/U: HCPCS | Performed by: NURSE PRACTITIONER

## 2022-10-14 PROCEDURE — G8400 PT W/DXA NO RESULTS DOC: HCPCS | Performed by: NURSE PRACTITIONER

## 2022-10-14 PROCEDURE — 1090F PRES/ABSN URINE INCON ASSESS: CPT | Performed by: NURSE PRACTITIONER

## 2022-10-14 PROCEDURE — G8484 FLU IMMUNIZE NO ADMIN: HCPCS | Performed by: NURSE PRACTITIONER

## 2022-10-14 PROCEDURE — 3017F COLORECTAL CA SCREEN DOC REV: CPT | Performed by: NURSE PRACTITIONER

## 2022-10-14 PROCEDURE — 90694 VACC AIIV4 NO PRSRV 0.5ML IM: CPT | Performed by: NURSE PRACTITIONER

## 2022-10-14 PROCEDURE — G0008 ADMIN INFLUENZA VIRUS VAC: HCPCS | Performed by: NURSE PRACTITIONER

## 2022-10-14 PROCEDURE — G8427 DOCREV CUR MEDS BY ELIG CLIN: HCPCS | Performed by: NURSE PRACTITIONER

## 2022-10-14 PROCEDURE — 99214 OFFICE O/P EST MOD 30 MIN: CPT | Performed by: NURSE PRACTITIONER

## 2022-10-14 RX ORDER — TIZANIDINE 4 MG/1
4 TABLET ORAL NIGHTLY PRN
Qty: 30 TABLET | Refills: 0 | Status: SHIPPED | OUTPATIENT
Start: 2022-10-14

## 2022-10-14 RX ORDER — DULOXETIN HYDROCHLORIDE 30 MG/1
30 CAPSULE, DELAYED RELEASE ORAL DAILY
Qty: 30 CAPSULE | Refills: 0 | Status: SHIPPED | OUTPATIENT
Start: 2022-10-14

## 2022-10-14 RX ORDER — MELOXICAM 7.5 MG/1
TABLET ORAL
Qty: 30 TABLET | Refills: 0 | Status: SHIPPED | OUTPATIENT
Start: 2022-10-14

## 2022-10-14 ASSESSMENT — ENCOUNTER SYMPTOMS
RHINORRHEA: 0
RESPIRATORY NEGATIVE: 1
CONSTIPATION: 0
VOMITING: 0
DIARRHEA: 0
SHORTNESS OF BREATH: 0
CHEST TIGHTNESS: 0
EYES NEGATIVE: 1
PHOTOPHOBIA: 0
EYE DISCHARGE: 0
TROUBLE SWALLOWING: 0
WHEEZING: 0
ALLERGIC/IMMUNOLOGIC NEGATIVE: 1
GASTROINTESTINAL NEGATIVE: 1
ABDOMINAL PAIN: 0

## 2022-10-14 ASSESSMENT — PATIENT HEALTH QUESTIONNAIRE - PHQ9
6. FEELING BAD ABOUT YOURSELF - OR THAT YOU ARE A FAILURE OR HAVE LET YOURSELF OR YOUR FAMILY DOWN: 1
SUM OF ALL RESPONSES TO PHQ QUESTIONS 1-9: 3
SUM OF ALL RESPONSES TO PHQ QUESTIONS 1-9: 3
9. THOUGHTS THAT YOU WOULD BE BETTER OFF DEAD, OR OF HURTING YOURSELF: 0
7. TROUBLE CONCENTRATING ON THINGS, SUCH AS READING THE NEWSPAPER OR WATCHING TELEVISION: 0
4. FEELING TIRED OR HAVING LITTLE ENERGY: 0
10. IF YOU CHECKED OFF ANY PROBLEMS, HOW DIFFICULT HAVE THESE PROBLEMS MADE IT FOR YOU TO DO YOUR WORK, TAKE CARE OF THINGS AT HOME, OR GET ALONG WITH OTHER PEOPLE: 1
8. MOVING OR SPEAKING SO SLOWLY THAT OTHER PEOPLE COULD HAVE NOTICED. OR THE OPPOSITE, BEING SO FIGETY OR RESTLESS THAT YOU HAVE BEEN MOVING AROUND A LOT MORE THAN USUAL: 0
SUM OF ALL RESPONSES TO PHQ9 QUESTIONS 1 & 2: 2
2. FEELING DOWN, DEPRESSED OR HOPELESS: 1
3. TROUBLE FALLING OR STAYING ASLEEP: 0
1. LITTLE INTEREST OR PLEASURE IN DOING THINGS: 1
SUM OF ALL RESPONSES TO PHQ QUESTIONS 1-9: 3
SUM OF ALL RESPONSES TO PHQ QUESTIONS 1-9: 3
5. POOR APPETITE OR OVEREATING: 0

## 2022-10-14 NOTE — PROGRESS NOTES
[facility] PHYSICIAN SERVICES  [facility]  [address] [place] [address]  [address] [place] [address]  Dept: [phone]  Dept Fax: [phone]  Loc: [phone]    Chun Stahl is a 74 y.o. female who presents today for her medical conditions/complaints as noted below. Chun Stahl is c/o of Follow-up (Patient started Cymbalta, Mobic and Zanaflex last visit - Helping patient and needs refilled today.  )        HPI:     HPI   Is a 78-year-old  female presenting to the office today for follow-up on Cymbalta Mobic and Zanaflex prescribed at her last visit. Patient states the medications are helping, and she is requesting refills. She states this past week she and her daughters took a trip to [place] to visit another daughter and her family. Patient states that prior to starting the current medication regimen she was depressed and did not go out as much. Patient voices her children have even expressed how her mental health has improved. Patient continues to have some pain that she voices is improved. Chief Complaint   Patient presents with    Follow-up     Patient started Cymbalta, Mobic and Zanaflex last visit - Helping patient and needs refilled today.        Past Medical History:   Diagnosis Date    Abnormal Pap smear of cervix         History of cryosurgery     Stroke [facility])     Syncope       Past Surgical History:   Procedure Laterality Date    BREAST CYST EXCISION Bilateral     X4      SECTION      COLONOSCOPY      Jewish, Benign Polyps per patient     COLONOSCOPY N/A 2021    Dr Paulino Tovar, 5 yr recall    JOINT REPLACEMENT      right shoulder    TONSILLECTOMY      UPPER GASTROINTESTINAL ENDOSCOPY N/A 2021    Dr FELICITY Reynolds-Esophagitis, gastritis       Vitals 10/14/2022 2022 2022 3/9/2022 12/15/2021    SYSTOLIC 668 [phone]   DIASTOLIC 80 76 72 80 81 83   Site - Left Upper Arm Left Upper Arm - - -   Position - Sitting Sitting - - - Cuff Size - Medium Adult Large Adult - - -   Pulse 60 79 68 81 - 65   Temp 97.3 97.6 98.3 98.7 - -   Resp - 18 20 16 - -   SpO2 99 96 96 99 - -   Weight 162 lb 161 lb 3.2 oz 163 lb 12.8 oz 155 lb - 160 lb   Height 5' 5\" 5' 5.5\" 5' 5.5\" 5' 5\" - 5' 5\"   Body mass index 26.96 kg/m2 26.41 kg/m2 26.84 kg/m2 25.79 kg/m2 - 26.62 kg/m2   Pain Level - - - 3 - -   Some recent data might be hidden       Family History   Problem Relation Age of Onset    Heart Attack Maternal Grandmother     Colon Cancer Father     Colon Cancer Mother     Esophageal Cancer Neg Hx     Liver Cancer Neg Hx     Rectal Cancer Neg Hx     Stomach Cancer Neg Hx        Social History     Tobacco Use    Smoking status: Former     Packs/day: 0.50     Years: 50.00     Pack years: 25.00     Types: Cigarettes     Quit date: 2020     Years since quittin.8    Smokeless tobacco: Never   Substance Use Topics    Alcohol use: Yes     Comment: Very Rare       Current Outpatient Medications on File Prior to Visit   Medication Sig Dispense Refill    Multiple Vitamins-Minerals (THERAPEUTIC MULTIVITAMIN-MINERALS) tablet Take 1 tablet by mouth daily      atorvastatin (LIPITOR) 80 MG tablet Take 1 tablet by mouth daily 30 tablet 3    clopidogrel (PLAVIX) 75 MG tablet Take 1 tablet by mouth daily 30 tablet 3    aspirin 81 MG chewable tablet Take 1 tablet by mouth daily 30 tablet 2     No current facility-administered medications on file prior to visit.      No Known Allergies    Health Maintenance   Topic Date Due    Pneumococcal 65+ years Vaccine (1 - PCV) Never done    COVID-19 Vaccine (4 - Booster for Derl Fitch series) 2022    Breast cancer screen  2022    DEXA (modify frequency per FRAX score)  2023 (Originally 2008)    Shingles vaccine (1 of 2) 2023 (Originally 2003)    DTaP/Tdap/Td vaccine (1 - Tdap) 2023 (Originally 1972)    Depression Monitoring  2023    Lipids  2023    Colorectal Cancer Screen 05/13/2026    Flu vaccine  Completed    Hepatitis A vaccine  Aged Out    Hib vaccine  Aged Out    Meningococcal (ACWY) vaccine  Aged Out    Hepatitis C screen  Discontinued    Low dose CT lung screening  Discontinued       Subjective:      Review of Systems   Constitutional:  Positive for activity change. Negative for appetite change and fatigue. Improved drive and energy with medication   HENT:  Negative for congestion, ear pain, rhinorrhea, tinnitus and trouble swallowing. Eyes: Negative. Negative for photophobia, discharge and visual disturbance. Respiratory: Negative. Negative for chest tightness, shortness of breath and wheezing. Cardiovascular: Negative. Negative for chest pain, palpitations and leg swelling. Gastrointestinal: Negative. Negative for abdominal pain, constipation, diarrhea and vomiting. Endocrine: Negative. Negative for polydipsia, polyphagia and polyuria. Genitourinary: Negative. Negative for decreased urine volume, difficulty urinating, dysuria, hematuria and urgency. Allergic/Immunologic: Negative. Neurological:  Negative for dizziness, weakness, light-headedness and headaches. Hematological: Negative. Does not bruise/bleed easily. Psychiatric/Behavioral:  Negative for agitation, confusion, decreased concentration and sleep disturbance. The patient is not nervous/anxious. Objective:     Physical Exam  Vitals and nursing note reviewed. Constitutional:       General: She is awake. Appearance: Normal appearance. She is normal weight. She is not ill-appearing or toxic-appearing. HENT:      Head: Normocephalic and atraumatic. Right Ear: Hearing and tympanic membrane normal.      Left Ear: Hearing and tympanic membrane normal.      Nose: Nose normal. No congestion or rhinorrhea. Mouth/Throat:      Lips: Pink. Mouth: Mucous membranes are moist. No injury. Pharynx: Oropharynx is clear. No posterior oropharyngeal erythema.    Eyes: General: Lids are normal.      Extraocular Movements: Extraocular movements intact. Pupils: Pupils are equal, round, and reactive to light. Neck:      Thyroid: No thyroid tenderness. Trachea: Trachea normal.   Cardiovascular:      Rate and Rhythm: Normal rate and regular rhythm. Pulses: Normal pulses. Heart sounds: Normal heart sounds, S1 normal and S2 normal. No murmur heard. No gallop. Pulmonary:      Effort: Pulmonary effort is normal.      Breath sounds: Normal breath sounds and air entry. Abdominal:      General: Bowel sounds are normal.      Palpations: Abdomen is soft. Tenderness: There is no abdominal tenderness. There is no guarding or rebound. Hernia: No hernia is present. Musculoskeletal:         General: Tenderness present. Normal range of motion. Cervical back: Full passive range of motion without pain, normal range of motion and neck supple. No tenderness. Right lower leg: No edema. Left lower leg: No edema. Skin:     General: Skin is warm and dry. Capillary Refill: Capillary refill takes 2 to 3 seconds. Coloration: Skin is not pale. Findings: No bruising or erythema. Neurological:      Mental Status: She is alert and oriented to person, place, and time. Mental status is at baseline. GCS: GCS eye subscore is 4. GCS verbal subscore is 5. GCS motor subscore is 6. Motor: Weakness present. Coordination: Coordination is intact. Gait: Gait is intact. Comments: Weakness related to prior CVA   Psychiatric:         Attention and Perception: Attention and perception normal.         Mood and Affect: Mood normal.         Speech: Speech normal.         Behavior: Behavior normal. Behavior is cooperative. Thought Content:  Thought content normal.         Cognition and Memory: Cognition and memory normal.         Judgment: Judgment normal.     /80   Pulse 60   Temp 97.3 °F (36.3 °C)   Ht 5' 5\" (1.651 m)   Wt 162 lb (73.5 kg)   SpO2 99%   BMI 26.96 kg/m²     Assessment:       Diagnosis Orders   1. Severe episode of recurrent major depressive disorder, without psychotic features ([facility] [address]        2. Polymyalgia ([facility] [address]        3. Dysarthria        4. Need for influenza vaccination  Influenza, FLUAD, (age 72 y+), IM, PF, 0.5 mL            Plan:     Chronic condition improving  Continue Cymbalta 30 mg daily. New prescription sent to pharmacy. 2.-3. Chronic condition improved  Continue Mobic 7.5 mg daily. New prescription sent to pharmacy. Continue tizanidine 4 mg nightly new prescription sent to pharmacy  Continue Cymbalta 30 mg daily. 4. Influenza vaccination given in office today     Patient given educational materials -see patient instructions. Discussed use, benefit, and side effects of prescribed medications. All patient questions answered. Pt voiced understanding. Reviewed health maintenance. Instructed to continue currentmedications, diet and exercise. Patient agreed with treatment plan. Follow up as directed. MEDICATIONS:  Orders Placed This Encounter   Medications    tiZANidine (ZANAFLEX) 4 MG tablet     Sig: Take 1 tablet by mouth nightly as needed (muscle spasm)     Dispense:  30 tablet     Refill:  0    meloxicam (MOBIC) 7.5 MG tablet     Sig: TAKE 1 TABLET BY MOUTH EVERY DAY AS NEEDED FOR PAIN     Dispense:  30 tablet     Refill:  0    DULoxetine (CYMBALTA) 30 MG extended release capsule     Sig: Take 1 capsule by mouth daily     Dispense:  30 capsule     Refill:  0         ORDERS:  Orders Placed This Encounter   Procedures    Influenza, FLUAD, (age 72 y+), IM, PF, 0.5 mL       Follow-up:  Return in about 3 months (around 1/14/2023). PATIENT INSTRUCTIONS:  There are no Patient Instructions on file for this visit.   Electronically signed by AMOL Simpson NP on 10/14/2022 at 11:30 AM    EMR Dragon/transcription disclaimer:  Much of thisencounter note is electronic transcription/translation of spoken language to printed texts. The electronic translation of spoken language may be erroneous, or at times, nonsensical words or phrases may be inadvertentlytranscribed.   Although I have reviewed the note for such errors, some may still exist.

## 2022-11-08 ENCOUNTER — OFFICE VISIT (OUTPATIENT)
Dept: PRIMARY CARE CLINIC | Age: 67
End: 2022-11-08
Payer: MEDICARE

## 2022-11-08 VITALS
HEART RATE: 96 BPM | OXYGEN SATURATION: 98 % | HEIGHT: 65 IN | SYSTOLIC BLOOD PRESSURE: 110 MMHG | RESPIRATION RATE: 16 BRPM | WEIGHT: 158.2 LBS | BODY MASS INDEX: 26.36 KG/M2 | TEMPERATURE: 98 F | DIASTOLIC BLOOD PRESSURE: 70 MMHG

## 2022-11-08 DIAGNOSIS — R50.9 FEVER, UNSPECIFIED FEVER CAUSE: ICD-10-CM

## 2022-11-08 DIAGNOSIS — H66.90 ACUTE OTITIS MEDIA, UNSPECIFIED OTITIS MEDIA TYPE: ICD-10-CM

## 2022-11-08 DIAGNOSIS — R52 BODY ACHES: Primary | ICD-10-CM

## 2022-11-08 LAB
INFLUENZA A ANTIBODY: POSITIVE
INFLUENZA B ANTIBODY: NEGATIVE

## 2022-11-08 PROCEDURE — 1123F ACP DISCUSS/DSCN MKR DOCD: CPT | Performed by: NURSE PRACTITIONER

## 2022-11-08 PROCEDURE — 87804 INFLUENZA ASSAY W/OPTIC: CPT | Performed by: NURSE PRACTITIONER

## 2022-11-08 PROCEDURE — 1090F PRES/ABSN URINE INCON ASSESS: CPT | Performed by: NURSE PRACTITIONER

## 2022-11-08 PROCEDURE — G8484 FLU IMMUNIZE NO ADMIN: HCPCS | Performed by: NURSE PRACTITIONER

## 2022-11-08 PROCEDURE — 3074F SYST BP LT 130 MM HG: CPT | Performed by: NURSE PRACTITIONER

## 2022-11-08 PROCEDURE — 3078F DIAST BP <80 MM HG: CPT | Performed by: NURSE PRACTITIONER

## 2022-11-08 PROCEDURE — 1036F TOBACCO NON-USER: CPT | Performed by: NURSE PRACTITIONER

## 2022-11-08 PROCEDURE — 99213 OFFICE O/P EST LOW 20 MIN: CPT | Performed by: NURSE PRACTITIONER

## 2022-11-08 PROCEDURE — G8417 CALC BMI ABV UP PARAM F/U: HCPCS | Performed by: NURSE PRACTITIONER

## 2022-11-08 PROCEDURE — 3017F COLORECTAL CA SCREEN DOC REV: CPT | Performed by: NURSE PRACTITIONER

## 2022-11-08 PROCEDURE — G8400 PT W/DXA NO RESULTS DOC: HCPCS | Performed by: NURSE PRACTITIONER

## 2022-11-08 PROCEDURE — G8427 DOCREV CUR MEDS BY ELIG CLIN: HCPCS | Performed by: NURSE PRACTITIONER

## 2022-11-08 RX ORDER — BENZONATATE 200 MG/1
200 CAPSULE ORAL 3 TIMES DAILY PRN
Qty: 30 CAPSULE | Refills: 0 | Status: SHIPPED | OUTPATIENT
Start: 2022-11-08 | End: 2022-11-15

## 2022-11-08 RX ORDER — METHYLPREDNISOLONE 4 MG/1
TABLET ORAL
Qty: 1 KIT | Refills: 0 | Status: SHIPPED | OUTPATIENT
Start: 2022-11-08 | End: 2022-11-14

## 2022-11-08 RX ORDER — AMOXICILLIN AND CLAVULANATE POTASSIUM 875; 125 MG/1; MG/1
1 TABLET, FILM COATED ORAL 2 TIMES DAILY
Qty: 14 TABLET | Refills: 0 | Status: SHIPPED | OUTPATIENT
Start: 2022-11-08 | End: 2022-11-15

## 2022-11-08 ASSESSMENT — ENCOUNTER SYMPTOMS
RHINORRHEA: 1
EYES NEGATIVE: 1
RESPIRATORY NEGATIVE: 1
DIARRHEA: 0
GASTROINTESTINAL NEGATIVE: 1
NAUSEA: 0
SORE THROAT: 1
VOMITING: 0
ALLERGIC/IMMUNOLOGIC NEGATIVE: 1

## 2022-11-08 NOTE — PATIENT INSTRUCTIONS
Viral syndrome   Generally, I recommend Flonase daily for 14 days along with a potent antihistamine such as Allegra D, Zyrtec D or Claritin D for 14 days. If you have Blood pressure problems you may not be able to tolerate the D version. Other OTC nasal sprays such as saline spray, Vicks or Naso shannan can also be helpful. People with high blood pressure may use Coricidin HBP or Benadryl for sinus   symptoms. Oscillococcinum may be helpful for flu or flu-like symptoms. Supplement with Vit D3 5000 units daily, Vit C 1000 mg daily and Zinc 50 mg daily. Many illnesses are caused by viruses. These conditions usually run their course in 7-14 days. Antibiotics do not help fight viral infections and are not needed at this time. Viral syndromes are treated with symptomatic support. You may take tylenol or ibuprofen for fever or aches and pains. Stay hydrated by taking sips of water or non caffeinated, noncarbonated, and nonalcoholic beverages throughout the day. For sore throat, you may gargle with warm salt water, use lozenges or sprays. Hot tea with honey may also be soothing to the throat. Using a daily antihistamine such as Claritin, Zyrtec or Allegra can help with upper respiratory symptoms. Benadryl can be sedating but is helpful at drying secretions and may be taken at night. For earaches, microwave a wet washcloth for 2 mins then using tongs (do not touch as it may scald you ) place cloth in ceramic cup and hold up to ear. The moist heat can be soothing to the ear. Call if you have a fever greater than 102 F or if symptoms do not improve. Your provider may also send you in prescription medications depending on your symptoms and their severity. Take all medications as directed on package unless specifically told otherwise.

## 2022-11-08 NOTE — PROGRESS NOTES
[facility]  [facility]  [address] [place] [address]  [address] [place] [address]  Dept: [phone]  Dept Fax: [phone]  Loc: [phone]    Karol Adkins is a 74 y.o. female who presents today for her medical conditions/complaints as noted below.   Karol Adkins is c/o of Pharyngitis (Symptoms started yesterday morning), Generalized Body Aches (/), Fever, Headache, Cough, and Nasal Congestion        HPI:     HPI   Chief Complaint   Patient presents with    Pharyngitis     Symptoms started yesterday morning    Generalized Body Aches           Fever    Headache    Cough    Nasal Congestion     Past Medical History:   Diagnosis Date    Abnormal Pap smear of cervix     1991    History of cryosurgery 1991    Stroke [facility])     Syncope       Past Surgical History:   Procedure Laterality Date    BREAST CYST EXCISION Bilateral     X4     [address] [facility]    COLONOSCOPY  2007    Rastafari, Benign Polyps per patient     COLONOSCOPY N/A 05/13/2021    Dr Bladimir Loya, 5 yr recall    JOINT REPLACEMENT      right shoulder    TONSILLECTOMY      UPPER GASTROINTESTINAL ENDOSCOPY N/A 05/13/2021    Dr FELICITY Reynolds-Esophagitis, gastritis       Vitals 11/8/2022 10/14/2022 9/16/2022 4/27/2022 3/9/2022 16/56/1824   SYSTOLIC 932 [phone]   DIASTOLIC 70 80 76 72 80 81   Site - - Left Upper Arm Left Upper Arm - -   Position - - Sitting Sitting - -   Cuff Size - - Medium Adult Large Adult - -   Pulse 96 60 79 68 81 -   Temp 98 97.3 97.6 98.3 98.7 -   Resp 16 - 18 20 16 -   SpO2 98 99 96 96 99 -   Weight 158 lb 3.2 oz 162 lb 161 lb 3.2 oz 163 lb 12.8 oz 155 lb -   Height 5' 5\" 5' 5\" 5' 5.5\" 5' 5.5\" 5' 5\" -   Body mass index 26.32 kg/m2 26.96 kg/m2 26.41 kg/m2 26.84 kg/m2 25.79 kg/m2 -   Pain Level - - - - 3 -   Some recent data might be hidden       Family History   Problem Relation Age of Onset    Heart Attack Maternal Grandmother     Colon Cancer Father     Colon Cancer Mother     Esophageal Cancer Neg Hx     Liver Cancer Neg Hx     Rectal Cancer Neg Hx     Stomach Cancer Neg Hx        Social History     Tobacco Use    Smoking status: Former     Packs/day: 0.50     Years: 50.00     Pack years: 25.00     Types: Cigarettes     Quit date: 2020     Years since quittin.9    Smokeless tobacco: Never   Substance Use Topics    Alcohol use: Yes     Comment: Very Rare       Current Outpatient Medications on File Prior to Visit   Medication Sig Dispense Refill    tiZANidine (ZANAFLEX) 4 MG tablet Take 1 tablet by mouth nightly as needed (muscle spasm) 30 tablet 0    meloxicam (MOBIC) 7.5 MG tablet TAKE 1 TABLET BY MOUTH EVERY DAY AS NEEDED FOR PAIN 30 tablet 0    DULoxetine (CYMBALTA) 30 MG extended release capsule Take 1 capsule by mouth daily 30 capsule 0    Multiple Vitamins-Minerals (THERAPEUTIC MULTIVITAMIN-MINERALS) tablet Take 1 tablet by mouth daily      atorvastatin (LIPITOR) 80 MG tablet Take 1 tablet by mouth daily 30 tablet 3    clopidogrel (PLAVIX) 75 MG tablet Take 1 tablet by mouth daily 30 tablet 3    aspirin 81 MG chewable tablet Take 1 tablet by mouth daily 30 tablet 2     No current facility-administered medications on file prior to visit.      No Known Allergies    Health Maintenance   Topic Date Due    Pneumococcal 65+ years Vaccine (1 - PCV) Never done    Breast cancer screen  2022    DEXA (modify frequency per FRAX score)  2023 (Originally 2008)    Shingles vaccine (1 of 2) 2023 (Originally 2003)    DTaP/Tdap/Td vaccine (1 - Tdap) 2023 (Originally 1972)    Lipids  2023    Depression Monitoring  10/14/2023    Colorectal Cancer Screen  2026    Flu vaccine  Completed    COVID-19 Vaccine  Completed    Hepatitis A vaccine  Aged Out    Hib vaccine  Aged Out    Meningococcal (ACWY) vaccine  Aged Out    Hepatitis C screen  Discontinued    Low dose CT lung screening  Discontinued       Subjective:      Review of Systems   Constitutional:  Positive for chills, fatigue and fever. HENT:  Positive for congestion, postnasal drip, rhinorrhea and sore throat. Eyes: Negative. Respiratory: Negative. Cardiovascular: Negative. Gastrointestinal: Negative. Negative for diarrhea, nausea and vomiting. Endocrine: Negative. Genitourinary: Negative. Musculoskeletal:  Positive for myalgias. Skin: Negative. Allergic/Immunologic: Negative. Neurological:  Positive for headaches. Hematological: Negative. Psychiatric/Behavioral: Negative. Objective:     Physical Exam  Vitals and nursing note reviewed. Constitutional:       General: She is not in acute distress. Appearance: Normal appearance. She is ill-appearing. She is not toxic-appearing. HENT:      Head: Normocephalic and atraumatic. Right Ear: A middle ear effusion is present. Tympanic membrane is erythematous and bulging. Left Ear: There is impacted cerumen. Nose: Rhinorrhea present. Mouth/Throat:      Mouth: Mucous membranes are moist.   Eyes:      Extraocular Movements: Extraocular movements intact. Conjunctiva/sclera: Conjunctivae normal.      Pupils: Pupils are equal, round, and reactive to light. Cardiovascular:      Rate and Rhythm: Normal rate and regular rhythm. Pulses: Normal pulses. Heart sounds: Normal heart sounds. Pulmonary:      Effort: Pulmonary effort is normal. No respiratory distress. Breath sounds: Normal breath sounds. No wheezing, rhonchi or rales. Abdominal:      General: Bowel sounds are normal.      Palpations: Abdomen is soft. Musculoskeletal:         General: Normal range of motion. Cervical back: Normal range of motion and neck supple. No rigidity. Lymphadenopathy:      Cervical: No cervical adenopathy. Skin:     General: Skin is warm and dry. Coloration: Skin is not jaundiced or pale. Findings: No erythema or rash.    Neurological:      Mental Status: She is alert and oriented to person, place, and time. Mental status is at baseline. Psychiatric:         Mood and Affect: Mood normal.         Behavior: Behavior normal.         Thought Content: Thought content normal.         Judgment: Judgment normal.     /70   Pulse 96   Temp 98 °F (36.7 °C)   Resp 16   Ht 5' 5\" (1.651 m)   Wt 158 lb 3.2 oz (71.8 kg)   SpO2 98%   BMI 26.33 kg/m²     Assessment:       Diagnosis Orders   1. Body aches  POCT Influenza A/B      2. Fever, unspecified fever cause  POCT Influenza A/B      3. Acute otitis media, unspecified otitis media type              Plan:   POCT influenza in office today results reviewed positive for flu A    Viral syndrome   Generally, I recommend Flonase daily for 14 days along with a potent antihistamine such as Allegra D, Zyrtec D or Claritin D for 14 days. If you have Blood pressure problems you may not be able to tolerate the D version. Other OTC nasal sprays such as saline spray, Vicks or Naso shannan can also be helpful. People with high blood pressure may use Coricidin HBP or Benadryl for sinus   symptoms. Oscillococcinum may be helpful for flu or flu-like symptoms. Supplement with Vit D3 5000 units daily, Vit C 1000 mg daily and Zinc 50 mg daily. Many illnesses are caused by viruses. These conditions usually run their course in 7-14 days. Antibiotics do not help fight viral infections and are not needed at this time. Viral syndromes are treated with symptomatic support. You may take tylenol or ibuprofen for fever or aches and pains. Stay hydrated by taking sips of water or non caffeinated, noncarbonated, and nonalcoholic beverages throughout the day. For sore throat, you may gargle with warm salt water, use lozenges or sprays. Hot tea with honey may also be soothing to the throat. Using a daily antihistamine such as Claritin, Zyrtec or Allegra can help with upper respiratory symptoms.  Benadryl can be sedating but is helpful at drying secretions and may be taken at night. For earaches, microwave a wet washcloth for 2 mins then using tongs (do not touch as it may scald you ) place cloth in ceramic cup and hold up to ear. The moist heat can be soothing to the ear. Call if you have a fever greater than 102 F or if symptoms do not improve. Your provider may also send you in prescription medications depending on your symptoms and their severity. Take all medications as directed on package unless specifically told otherwise. 3.  Augmentin take as directed to complete  Antibiotic Therapy  Take your antibiotic as prescribed. Take all of your antibiotics. You should not have any left over. Many antibiotics may cause diarrhea. . you can start an OTC probiotic to support normal gut bacteria. If you are on birth control, you need to use an alternative method of contraceptive for one month as antibiotics can reduce the effectiveness of oral BC. Always monitor for signs of allergic reaction such as itching, hives or any difficulty swallowing or breathing STOP THE MEDICATION IMMEDIATELY. If difficulty breathing, call 911 and go to the ER. If hives and itching, contact provider through [address] or phone for alternative antibiotics or be seen if necessary. Medrol Dosepak take as directed to complete. Tessalon Perles may be taken up to 3 times a day as needed for cough. Patient given educational materials -see patient instructions. Discussed use, benefit, and side effects of prescribed medications. All patient questions answered. Pt voiced understanding. Reviewed health maintenance. Instructed to continue currentmedications, diet and exercise. Patient agreed with treatment plan. Follow up as directed. MEDICATIONS:  Orders Placed This Encounter   Medications    methylPREDNISolone (MEDROL DOSEPACK) 4 MG tablet     Sig: Take by mouth.      Dispense:  1 kit     Refill:  0    benzonatate (TESSALON) 200 MG capsule     Sig: Take 1 capsule by mouth 3 times daily as needed for Cough     Dispense:  30 capsule     Refill:  0    amoxicillin-clavulanate (AUGMENTIN) 875-125 MG per tablet     Sig: Take 1 tablet by mouth 2 times daily for 7 days     Dispense:  14 tablet     Refill:  0         ORDERS:  Orders Placed This Encounter   Procedures    POCT Influenza A/B       Follow-up:  Return if symptoms worsen or fail to improve. PATIENT INSTRUCTIONS:  Patient Instructions   Viral syndrome   Generally, I recommend Flonase daily for 14 days along with a potent antihistamine such as Allegra D, Zyrtec D or Claritin D for 14 days. If you have Blood pressure problems you may not be able to tolerate the D version. Other OTC nasal sprays such as saline spray, Vicks or Naso shannan can also be helpful. People with high blood pressure may use Coricidin HBP or Benadryl for sinus   symptoms. Oscillococcinum may be helpful for flu or flu-like symptoms. Supplement with Vit D3 5000 units daily, Vit C 1000 mg daily and Zinc 50 mg daily. Many illnesses are caused by viruses. These conditions usually run their course in 7-14 days. Antibiotics do not help fight viral infections and are not needed at this time. Viral syndromes are treated with symptomatic support. You may take tylenol or ibuprofen for fever or aches and pains. Stay hydrated by taking sips of water or non caffeinated, noncarbonated, and nonalcoholic beverages throughout the day. For sore throat, you may gargle with warm salt water, use lozenges or sprays. Hot tea with honey may also be soothing to the throat. Using a daily antihistamine such as Claritin, Zyrtec or Allegra can help with upper respiratory symptoms. Benadryl can be sedating but is helpful at drying secretions and may be taken at night. For earaches, microwave a wet washcloth for 2 mins then using tongs (do not touch as it may scald you ) place cloth in ceramic cup and hold up to ear. The moist heat can be soothing to the ear.      Call if you have a fever greater than 102 F or if symptoms do not improve. Your provider may also send you in prescription medications depending on your symptoms and their severity. Take all medications as directed on package unless specifically told otherwise. Electronically signed by AMOL Lake NP on 11/10/2022 at 8:08 AM    EMR Dragon/transcription disclaimer:  Much of thisencounter note is electronic transcription/translation of spoken language to printed texts. The electronic translation of spoken language may be erroneous, or at times, nonsensical words or phrases may be inadvertentlytranscribed.   Although I have reviewed the note for such errors, some may still exist.

## 2022-11-18 RX ORDER — DULOXETIN HYDROCHLORIDE 30 MG/1
CAPSULE, DELAYED RELEASE ORAL
Qty: 30 CAPSULE | Refills: 0 | Status: SHIPPED | OUTPATIENT
Start: 2022-11-18

## 2022-12-05 RX ORDER — MELOXICAM 7.5 MG/1
TABLET ORAL
Qty: 30 TABLET | Refills: 3 | Status: SHIPPED | OUTPATIENT
Start: 2022-12-05

## 2022-12-27 RX ORDER — CLOPIDOGREL BISULFATE 75 MG/1
TABLET ORAL
Qty: 30 TABLET | Refills: 3 | Status: SHIPPED | OUTPATIENT
Start: 2022-12-27

## 2022-12-27 RX ORDER — ATORVASTATIN CALCIUM 80 MG/1
TABLET, FILM COATED ORAL
Qty: 30 TABLET | Refills: 3 | Status: SHIPPED | OUTPATIENT
Start: 2022-12-27

## 2023-04-21 RX ORDER — ATORVASTATIN CALCIUM 80 MG/1
TABLET, FILM COATED ORAL
Qty: 30 TABLET | Refills: 3 | Status: SHIPPED | OUTPATIENT
Start: 2023-04-21

## 2023-04-24 RX ORDER — CLOPIDOGREL BISULFATE 75 MG/1
TABLET ORAL
Qty: 30 TABLET | Refills: 3 | Status: SHIPPED | OUTPATIENT
Start: 2023-04-24

## 2023-06-07 NOTE — TELEPHONE ENCOUNTER
Called patient at [phone] to notify about the referral and no answer - message left to return call.  sh Ciro is a 17 year old who is being evaluated via a billable video visit.      How would you like to obtain your AVS? Mail a copy  If the video visit is dropped, the invitation should be resent by: Text to cell phone: [phone]  Will anyone else be joining your video visit? No          Assessment & Plan      Sore throat  Advised Ibuprofen 600 mg every 6-8 hours for sore throat and fever.  COVID home testing negative - declined PCR.    - Streptococcus A Rapid Screen w/Reflex to PCR - Clinic Collect            See Patient Instructions    Ingris Cha NP  [facility]    Subjective   Ciro is a 17 year old, presenting for the following health issues:  URI        6/7/2023    11:21 AM   Additional Questions   Roomed by Kiera SEQUEIRA   Accompanied by self     URI    History of Present Illness       Reason for visit:  URI, sore throat, headache  Symptom onset:  1-3 days ago  Symptoms include:  Sore throat, headache, fever  Symptom intensity:  Mild  Symptom progression:  Improving    He eats 0-1 servings of fruits and vegetables daily.He consumes 1 sweetened beverage(s) daily.He exercises with enough effort to increase his heart rate 9 or less minutes per day.  He exercises with enough effort to increase his heart rate 3 or less days per week.   He is taking medications regularly.       Acute Illness  Acute illness concerns: sore throat, headache, fever  Onset/Duration: started yesterday  Symptoms:  Fever: YES  Chills/Sweats: YES  Headache (location?): YES  Sinus Pressure: No  Conjunctivitis:  No  Ear Pain: no  Rhinorrhea: No  Congestion: YES  Sore Throat: YES  Cough: YES - slight  Wheeze: No  Decreased Appetite: YES  Nausea: No  Vomiting: No  Diarrhea: No  Dysuria/Freq.: No  Dysuria or Hematuria: No  Fatigue/Achiness: No  Sick/Strep Exposure: No  Therapies tried and outcome: tylenol    At home covid test was negative - today.  Fever started yesterday - highest temp 100.6.  Taking Tylenol for  fever/symptoms - some relief.      Review of Systems   Constitutional, HEENT, cardiovascular, pulmonary, GI, , musculoskeletal, neuro, skin, endocrine and psych systems are negative, except as otherwise noted.      Objective    Vitals - Patient Reported  Temperature (Patient Reported): 100.6  F (38.1  C)  Pain Score: Severe Pain (7)  Pain Loc: Other - see comment (throa, head)        Physical Exam   GENERAL: Healthy, alert and no distress  EYES: Eyes grossly normal to inspection.  No discharge or erythema, or obvious scleral/conjunctival abnormalities.  RESP: No audible wheeze, cough, or visible cyanosis.  No visible retractions or increased work of breathing.    SKIN: Visible skin clear. No significant rash, abnormal pigmentation or lesions.  NEURO: Cranial nerves grossly intact.  Mentation and speech appropriate for age.  PSYCH: Mentation appears normal, affect normal/bright, judgement and insight intact, normal speech and appearance well-groomed.          Video-Visit Details    Type of service:  Video Visit   Originating Location (pt. Location): Home   Distant Location (provider location):  On-site  Platform used for Video Visit: [employer]elaWell

## 2023-08-21 RX ORDER — ATORVASTATIN CALCIUM 80 MG/1
TABLET, FILM COATED ORAL
Qty: 30 TABLET | Refills: 0 | Status: SHIPPED | OUTPATIENT
Start: 2023-08-21 | End: 2023-08-24 | Stop reason: SDUPTHER

## 2023-08-21 RX ORDER — CLOPIDOGREL BISULFATE 75 MG/1
TABLET ORAL
Qty: 30 TABLET | Refills: 0 | Status: SHIPPED | OUTPATIENT
Start: 2023-08-21 | End: 2023-08-24 | Stop reason: SDUPTHER

## 2023-08-24 ENCOUNTER — OFFICE VISIT (OUTPATIENT)
Dept: PRIMARY CARE CLINIC | Age: 68
End: 2023-08-24
Payer: MEDICARE

## 2023-08-24 VITALS
BODY MASS INDEX: 26.82 KG/M2 | TEMPERATURE: 98.4 F | HEART RATE: 63 BPM | HEIGHT: 65 IN | OXYGEN SATURATION: 99 % | SYSTOLIC BLOOD PRESSURE: 124 MMHG | DIASTOLIC BLOOD PRESSURE: 76 MMHG | WEIGHT: 161 LBS | RESPIRATION RATE: 18 BRPM

## 2023-08-24 DIAGNOSIS — G89.29 CHRONIC RIGHT HIP PAIN: ICD-10-CM

## 2023-08-24 DIAGNOSIS — M54.16 LUMBAR RADICULOPATHY: ICD-10-CM

## 2023-08-24 DIAGNOSIS — R82.998 LEUKOCYTES IN URINE: ICD-10-CM

## 2023-08-24 DIAGNOSIS — E78.5 HYPERLIPIDEMIA, UNSPECIFIED HYPERLIPIDEMIA TYPE: ICD-10-CM

## 2023-08-24 DIAGNOSIS — R33.9 URINARY RETENTION: ICD-10-CM

## 2023-08-24 DIAGNOSIS — M35.3 POLYMYALGIA (HCC): ICD-10-CM

## 2023-08-24 DIAGNOSIS — M25.551 CHRONIC RIGHT HIP PAIN: ICD-10-CM

## 2023-08-24 DIAGNOSIS — R39.15 URINARY URGENCY: Primary | ICD-10-CM

## 2023-08-24 DIAGNOSIS — R31.9 HEMATURIA, UNSPECIFIED TYPE: ICD-10-CM

## 2023-08-24 DIAGNOSIS — I63.9 CEREBROVASCULAR ACCIDENT (CVA), UNSPECIFIED MECHANISM (HCC): ICD-10-CM

## 2023-08-24 LAB
ALBUMIN SERPL-MCNC: 4.1 G/DL (ref 3.5–5.2)
ALP SERPL-CCNC: 87 U/L (ref 35–104)
ALT SERPL-CCNC: 12 U/L (ref 5–33)
ANION GAP SERPL CALCULATED.3IONS-SCNC: 10 MMOL/L (ref 7–19)
APPEARANCE FLUID: CLEAR
AST SERPL-CCNC: 19 U/L (ref 5–32)
BASOPHILS # BLD: 0.1 K/UL (ref 0–0.2)
BASOPHILS NFR BLD: 0.7 % (ref 0–1)
BILIRUB SERPL-MCNC: 0.8 MG/DL (ref 0.2–1.2)
BILIRUBIN, POC: NORMAL
BLOOD URINE, POC: NORMAL
BUN SERPL-MCNC: 11 MG/DL (ref 8–23)
CALCIUM SERPL-MCNC: 8.9 MG/DL (ref 8.8–10.2)
CHLORIDE SERPL-SCNC: 106 MMOL/L (ref 98–111)
CHOLEST SERPL-MCNC: 119 MG/DL (ref 160–199)
CLARITY, POC: NORMAL
CO2 SERPL-SCNC: 30 MMOL/L (ref 22–29)
COLOR, POC: YELLOW
CREAT SERPL-MCNC: 0.6 MG/DL (ref 0.5–0.9)
EOSINOPHIL # BLD: 0.2 K/UL (ref 0–0.6)
EOSINOPHIL NFR BLD: 2.1 % (ref 0–5)
ERYTHROCYTE [DISTWIDTH] IN BLOOD BY AUTOMATED COUNT: 13.4 % (ref 11.5–14.5)
GLUCOSE SERPL-MCNC: 96 MG/DL (ref 74–109)
GLUCOSE URINE, POC: NORMAL
HCT VFR BLD AUTO: 38.3 % (ref 37–47)
HDLC SERPL-MCNC: 57 MG/DL (ref 65–121)
HGB BLD-MCNC: 12.8 G/DL (ref 12–16)
IMM GRANULOCYTES # BLD: 0 K/UL
KETONES, POC: NORMAL
LDLC SERPL CALC-MCNC: 44 MG/DL
LEUKOCYTE EST, POC: NORMAL
LYMPHOCYTES # BLD: 1.8 K/UL (ref 1.1–4.5)
LYMPHOCYTES NFR BLD: 25.7 % (ref 20–40)
MCH RBC QN AUTO: 29.9 PG (ref 27–31)
MCHC RBC AUTO-ENTMCNC: 33.4 G/DL (ref 33–37)
MCV RBC AUTO: 89.5 FL (ref 81–99)
MONOCYTES # BLD: 0.4 K/UL (ref 0–0.9)
MONOCYTES NFR BLD: 5.6 % (ref 0–10)
NEUTROPHILS # BLD: 4.7 K/UL (ref 1.5–7.5)
NEUTS SEG NFR BLD: 65.6 % (ref 50–65)
NITRITE, POC: NORMAL
PH, POC: 6
PLATELET # BLD AUTO: 300 K/UL (ref 130–400)
PMV BLD AUTO: 10 FL (ref 9.4–12.3)
POTASSIUM SERPL-SCNC: 3.7 MMOL/L (ref 3.5–5)
PROT SERPL-MCNC: 6.5 G/DL (ref 6.6–8.7)
PROTEIN, POC: NORMAL
RBC # BLD AUTO: 4.28 M/UL (ref 4.2–5.4)
SODIUM SERPL-SCNC: 146 MMOL/L (ref 136–145)
SPECIFIC GRAVITY, POC: 1.02
TRIGL SERPL-MCNC: 92 MG/DL (ref 0–149)
UROBILINOGEN, POC: 0.2
WBC # BLD AUTO: 7.2 K/UL (ref 4.8–10.8)

## 2023-08-24 PROCEDURE — 3078F DIAST BP <80 MM HG: CPT | Performed by: NURSE PRACTITIONER

## 2023-08-24 PROCEDURE — 81002 URINALYSIS NONAUTO W/O SCOPE: CPT | Performed by: NURSE PRACTITIONER

## 2023-08-24 PROCEDURE — 1036F TOBACCO NON-USER: CPT | Performed by: NURSE PRACTITIONER

## 2023-08-24 PROCEDURE — 96372 THER/PROPH/DIAG INJ SC/IM: CPT | Performed by: NURSE PRACTITIONER

## 2023-08-24 PROCEDURE — 1090F PRES/ABSN URINE INCON ASSESS: CPT | Performed by: NURSE PRACTITIONER

## 2023-08-24 PROCEDURE — 3017F COLORECTAL CA SCREEN DOC REV: CPT | Performed by: NURSE PRACTITIONER

## 2023-08-24 PROCEDURE — 3074F SYST BP LT 130 MM HG: CPT | Performed by: NURSE PRACTITIONER

## 2023-08-24 PROCEDURE — G8417 CALC BMI ABV UP PARAM F/U: HCPCS | Performed by: NURSE PRACTITIONER

## 2023-08-24 PROCEDURE — 1123F ACP DISCUSS/DSCN MKR DOCD: CPT | Performed by: NURSE PRACTITIONER

## 2023-08-24 PROCEDURE — G8400 PT W/DXA NO RESULTS DOC: HCPCS | Performed by: NURSE PRACTITIONER

## 2023-08-24 PROCEDURE — G8427 DOCREV CUR MEDS BY ELIG CLIN: HCPCS | Performed by: NURSE PRACTITIONER

## 2023-08-24 PROCEDURE — 99214 OFFICE O/P EST MOD 30 MIN: CPT | Performed by: NURSE PRACTITIONER

## 2023-08-24 RX ORDER — TIZANIDINE 4 MG/1
4 TABLET ORAL NIGHTLY PRN
Qty: 30 TABLET | Refills: 0 | Status: SHIPPED | OUTPATIENT
Start: 2023-08-24

## 2023-08-24 RX ORDER — CLOPIDOGREL BISULFATE 75 MG/1
75 TABLET ORAL DAILY
Qty: 30 TABLET | Refills: 5 | Status: SHIPPED | OUTPATIENT
Start: 2023-08-24

## 2023-08-24 RX ORDER — NITROFURANTOIN 25; 75 MG/1; MG/1
100 CAPSULE ORAL 2 TIMES DAILY
Qty: 20 CAPSULE | Refills: 0 | Status: SHIPPED | OUTPATIENT
Start: 2023-08-24 | End: 2023-09-03

## 2023-08-24 RX ORDER — ATORVASTATIN CALCIUM 80 MG/1
80 TABLET, FILM COATED ORAL DAILY
Qty: 30 TABLET | Refills: 5 | Status: SHIPPED | OUTPATIENT
Start: 2023-08-24

## 2023-08-24 RX ORDER — KETOROLAC TROMETHAMINE 30 MG/ML
60 INJECTION, SOLUTION INTRAMUSCULAR; INTRAVENOUS ONCE
Status: COMPLETED | OUTPATIENT
Start: 2023-08-24 | End: 2023-08-24

## 2023-08-24 RX ORDER — KETOROLAC TROMETHAMINE 10 MG/1
10 TABLET, FILM COATED ORAL EVERY 6 HOURS PRN
Qty: 20 TABLET | Refills: 0 | Status: SHIPPED | OUTPATIENT
Start: 2023-08-24 | End: 2024-08-23

## 2023-08-24 RX ADMIN — KETOROLAC TROMETHAMINE 60 MG: 30 INJECTION, SOLUTION INTRAMUSCULAR; INTRAVENOUS at 15:19

## 2023-08-24 SDOH — ECONOMIC STABILITY: INCOME INSECURITY: HOW HARD IS IT FOR YOU TO PAY FOR THE VERY BASICS LIKE FOOD, HOUSING, MEDICAL CARE, AND HEATING?: NOT HARD AT ALL

## 2023-08-24 SDOH — ECONOMIC STABILITY: FOOD INSECURITY: WITHIN THE PAST 12 MONTHS, YOU WORRIED THAT YOUR FOOD WOULD RUN OUT BEFORE YOU GOT MONEY TO BUY MORE.: NEVER TRUE

## 2023-08-24 SDOH — ECONOMIC STABILITY: FOOD INSECURITY: WITHIN THE PAST 12 MONTHS, THE FOOD YOU BOUGHT JUST DIDN'T LAST AND YOU DIDN'T HAVE MONEY TO GET MORE.: NEVER TRUE

## 2023-08-24 SDOH — ECONOMIC STABILITY: HOUSING INSECURITY
IN THE LAST 12 MONTHS, WAS THERE A TIME WHEN YOU DID NOT HAVE A STEADY PLACE TO SLEEP OR SLEPT IN A SHELTER (INCLUDING NOW)?: NO

## 2023-08-24 ASSESSMENT — PATIENT HEALTH QUESTIONNAIRE - PHQ9
4. FEELING TIRED OR HAVING LITTLE ENERGY: 0
6. FEELING BAD ABOUT YOURSELF - OR THAT YOU ARE A FAILURE OR HAVE LET YOURSELF OR YOUR FAMILY DOWN: 0
3. TROUBLE FALLING OR STAYING ASLEEP: 0
2. FEELING DOWN, DEPRESSED OR HOPELESS: 0
SUM OF ALL RESPONSES TO PHQ QUESTIONS 1-9: 0
SUM OF ALL RESPONSES TO PHQ QUESTIONS 1-9: 0
9. THOUGHTS THAT YOU WOULD BE BETTER OFF DEAD, OR OF HURTING YOURSELF: 0
SUM OF ALL RESPONSES TO PHQ QUESTIONS 1-9: 0
10. IF YOU CHECKED OFF ANY PROBLEMS, HOW DIFFICULT HAVE THESE PROBLEMS MADE IT FOR YOU TO DO YOUR WORK, TAKE CARE OF THINGS AT HOME, OR GET ALONG WITH OTHER PEOPLE: 0
SUM OF ALL RESPONSES TO PHQ QUESTIONS 1-9: 0
SUM OF ALL RESPONSES TO PHQ9 QUESTIONS 1 & 2: 0
8. MOVING OR SPEAKING SO SLOWLY THAT OTHER PEOPLE COULD HAVE NOTICED. OR THE OPPOSITE, BEING SO FIGETY OR RESTLESS THAT YOU HAVE BEEN MOVING AROUND A LOT MORE THAN USUAL: 0
7. TROUBLE CONCENTRATING ON THINGS, SUCH AS READING THE NEWSPAPER OR WATCHING TELEVISION: 0
5. POOR APPETITE OR OVEREATING: 0
1. LITTLE INTEREST OR PLEASURE IN DOING THINGS: 0

## 2023-08-24 ASSESSMENT — ENCOUNTER SYMPTOMS
GASTROINTESTINAL NEGATIVE: 1
RESPIRATORY NEGATIVE: 1
WHEEZING: 0
ABDOMINAL PAIN: 0
COUGH: 0
SHORTNESS OF BREATH: 0
EYES NEGATIVE: 1
ALLERGIC/IMMUNOLOGIC NEGATIVE: 1

## 2023-08-24 NOTE — PROGRESS NOTES
After obtaining consent, and per orders of Mely Maciel, injection of toradol 60 mg given in Left upper quad. gluteus by Quang Cotter. Patient tolerated well.
states that since she had her stroke that was when the pain above really started with her hip. Does not know if it was related to injury to the brain or other trauma. 1.-9. Labs today to include lipid panel, CBC and CMP. Patient given educational materials -see patient instructions. Discussed use, benefit, and side effects of prescribed medications. All patient questions answered. Pt voiced understanding. Reviewed health maintenance. Instructed to continue currentmedications, diet and exercise. Patient agreed with treatment plan. Follow up as directed. MEDICATIONS:  Orders Placed This Encounter   Medications    clopidogrel (PLAVIX) 75 MG tablet     Sig: Take 1 tablet by mouth daily     Dispense:  30 tablet     Refill:  5    atorvastatin (LIPITOR) 80 MG tablet     Sig: Take 1 tablet by mouth daily     Dispense:  30 tablet     Refill:  5    ketorolac (TORADOL) injection 60 mg    ketorolac (TORADOL) 10 MG tablet     Sig: Take 1 tablet by mouth every 6 hours as needed for Pain     Dispense:  20 tablet     Refill:  0    tiZANidine (ZANAFLEX) 4 MG tablet     Sig: Take 1 tablet by mouth nightly as needed (muscle spasm)     Dispense:  30 tablet     Refill:  0         ORDERS:  Orders Placed This Encounter   Procedures    Culture, Urine    Lipid Panel    CBC with Auto Differential    Comprehensive Metabolic Panel    POCT Urinalysis no Micro       Follow-up:  Return in about 6 months (around 2/24/2024). PATIENT INSTRUCTIONS:  Patient Instructions   RePhresh     Sprain/strain   First 24 hours, use ice to injury site for 15 minutes at a time. After 48 hours, may alternate ice/heat 15 minutes each. R.I. C.E. therapy = rest, ice, compression and elevation. May use ace wrap to injured area for compression. Use Ibuprofen or other antiinflammatory for pain and inflammation. If no open skin areas, OTC topical lidocaine such as Icy Hot or capsaicin creams may be applied for pain relief.   Slow stretches of

## 2023-08-24 NOTE — PATIENT INSTRUCTIONS
RePhresh     Sprain/strain   First 24 hours, use ice to injury site for 15 minutes at a time. After 48 hours, may alternate ice/heat 15 minutes each. R.I. C.E. therapy = rest, ice, compression and elevation. May use ace wrap to injured area for compression. Use Ibuprofen or other antiinflammatory for pain and inflammation. If no open skin areas, OTC topical lidocaine such as Icy Hot or capsaicin creams may be applied for pain relief. Slow stretches of area may help reduce spasms and improve range of motion. Alternate Voltaren cream with Biofreeze gel to site 2 to 3 times a day . Toradol injection in office today 60 mg followed by Toradol tablets to start tomorrow. You may take 1 tablet every 6 hours as needed for pain and inflammation for 5 days. Do not take any other NSAIDs while on the Toradol regimen. When she finished the Toradol regimen you may resume use of ibuprofen or Advil as needed for inflammation and pain. Taper dose as pain and inflammation subside.

## 2023-08-26 LAB
BACTERIA UR CULT: ABNORMAL
BACTERIA UR CULT: ABNORMAL
ORGANISM: ABNORMAL

## 2024-02-26 ENCOUNTER — OFFICE VISIT (OUTPATIENT)
Dept: PRIMARY CARE CLINIC | Age: 69
End: 2024-02-26
Payer: MEDICARE

## 2024-02-26 VITALS
WEIGHT: 151 LBS | RESPIRATION RATE: 18 BRPM | HEART RATE: 72 BPM | OXYGEN SATURATION: 98 % | BODY MASS INDEX: 25.16 KG/M2 | DIASTOLIC BLOOD PRESSURE: 68 MMHG | TEMPERATURE: 97.5 F | SYSTOLIC BLOOD PRESSURE: 102 MMHG | HEIGHT: 65 IN

## 2024-02-26 DIAGNOSIS — F32.A DEPRESSION, UNSPECIFIED DEPRESSION TYPE: ICD-10-CM

## 2024-02-26 DIAGNOSIS — R39.81 FUNCTIONAL URINARY INCONTINENCE: Primary | ICD-10-CM

## 2024-02-26 DIAGNOSIS — Z78.0 POSTMENOPAUSE: ICD-10-CM

## 2024-02-26 DIAGNOSIS — Z12.31 ENCOUNTER FOR SCREENING MAMMOGRAM FOR BREAST CANCER: ICD-10-CM

## 2024-02-26 PROCEDURE — 99213 OFFICE O/P EST LOW 20 MIN: CPT | Performed by: NURSE PRACTITIONER

## 2024-02-26 PROCEDURE — G8484 FLU IMMUNIZE NO ADMIN: HCPCS | Performed by: NURSE PRACTITIONER

## 2024-02-26 PROCEDURE — 1123F ACP DISCUSS/DSCN MKR DOCD: CPT | Performed by: NURSE PRACTITIONER

## 2024-02-26 PROCEDURE — G8400 PT W/DXA NO RESULTS DOC: HCPCS | Performed by: NURSE PRACTITIONER

## 2024-02-26 PROCEDURE — 3078F DIAST BP <80 MM HG: CPT | Performed by: NURSE PRACTITIONER

## 2024-02-26 PROCEDURE — G8417 CALC BMI ABV UP PARAM F/U: HCPCS | Performed by: NURSE PRACTITIONER

## 2024-02-26 PROCEDURE — 1036F TOBACCO NON-USER: CPT | Performed by: NURSE PRACTITIONER

## 2024-02-26 PROCEDURE — 1090F PRES/ABSN URINE INCON ASSESS: CPT | Performed by: NURSE PRACTITIONER

## 2024-02-26 PROCEDURE — 3017F COLORECTAL CA SCREEN DOC REV: CPT | Performed by: NURSE PRACTITIONER

## 2024-02-26 PROCEDURE — G8427 DOCREV CUR MEDS BY ELIG CLIN: HCPCS | Performed by: NURSE PRACTITIONER

## 2024-02-26 PROCEDURE — 3074F SYST BP LT 130 MM HG: CPT | Performed by: NURSE PRACTITIONER

## 2024-02-26 ASSESSMENT — ENCOUNTER SYMPTOMS
WHEEZING: 0
ABDOMINAL PAIN: 0
SORE THROAT: 0
BACK PAIN: 1
GASTROINTESTINAL NEGATIVE: 1
NAUSEA: 0
ALLERGIC/IMMUNOLOGIC NEGATIVE: 1
VOMITING: 0
SINUS PRESSURE: 0
COUGH: 0
SHORTNESS OF BREATH: 0
SINUS PAIN: 0
EYES NEGATIVE: 1
DIARRHEA: 0
RESPIRATORY NEGATIVE: 1
CONSTIPATION: 0

## 2024-02-26 ASSESSMENT — PATIENT HEALTH QUESTIONNAIRE - PHQ9
3. TROUBLE FALLING OR STAYING ASLEEP: 0
7. TROUBLE CONCENTRATING ON THINGS, SUCH AS READING THE NEWSPAPER OR WATCHING TELEVISION: 0
6. FEELING BAD ABOUT YOURSELF - OR THAT YOU ARE A FAILURE OR HAVE LET YOURSELF OR YOUR FAMILY DOWN: 0
9. THOUGHTS THAT YOU WOULD BE BETTER OFF DEAD, OR OF HURTING YOURSELF: 0
SUM OF ALL RESPONSES TO PHQ QUESTIONS 1-9: 0
SUM OF ALL RESPONSES TO PHQ QUESTIONS 1-9: 0
8. MOVING OR SPEAKING SO SLOWLY THAT OTHER PEOPLE COULD HAVE NOTICED. OR THE OPPOSITE, BEING SO FIGETY OR RESTLESS THAT YOU HAVE BEEN MOVING AROUND A LOT MORE THAN USUAL: 0
2. FEELING DOWN, DEPRESSED OR HOPELESS: 0
SUM OF ALL RESPONSES TO PHQ QUESTIONS 1-9: 0
SUM OF ALL RESPONSES TO PHQ9 QUESTIONS 1 & 2: 0
1. LITTLE INTEREST OR PLEASURE IN DOING THINGS: 0
4. FEELING TIRED OR HAVING LITTLE ENERGY: 0
5. POOR APPETITE OR OVEREATING: 0
SUM OF ALL RESPONSES TO PHQ QUESTIONS 1-9: 0
10. IF YOU CHECKED OFF ANY PROBLEMS, HOW DIFFICULT HAVE THESE PROBLEMS MADE IT FOR YOU TO DO YOUR WORK, TAKE CARE OF THINGS AT HOME, OR GET ALONG WITH OTHER PEOPLE: 0

## 2024-02-26 NOTE — PROGRESS NOTES
fact that she feels some degree of fullness after she urinates.  She has not attempted to reposition and start her stream again.  She does not have dribbling of urine that she knows about.  She is aware that she has to pee and is not having incontinence related to not having the adequate sensation.  It is more so functional incontinence in which she cannot reach the toilet in time from the time that she realizes she has to pee.  She is agreeable to leave us a urine specimen to test for UTI.  We discussed that the symptoms of urinary tract infection are harder to detect with age and women.   POCT urinalysis ordered in office today however patient had difficulty with urination.  She had gone before she got here she did drink a glass of water and then when she tried to obtain her sample she was unable to pee in the cup as soon as she stood up she had incontinence all over her pants uncontrollably.  Patient was sent with a collection kit and will return a sample.  We discussed that this is potentially a symptom of prolapse.  We discussed that there are medications and pessaries that are utilized in this condition.  She is not interested in having a vaginal exam performed for prolapse at today's visit.  She reports that she would be willing to trial medications designed to target the spasming of the bladder that may be contributing to her incontinence.  Instructions on Kegel's exercises were provided  2.  Chronic condition, controlled  Patient is not currently on any medication or therapeutic regimen for depression.  She reports that she is feeling well, and is not currently depressed.  She states that she enjoys spending time alone and does not depressed to be at her house.  She does watch her grandchildren 2-3 times a week in the summertime and is looking forward to doing so again.  She states that she has not in the past and noticed a pattern to when she has episodes of depression, however patient was encouraged to

## 2024-03-01 ENCOUNTER — HOSPITAL ENCOUNTER (OUTPATIENT)
Dept: WOMENS IMAGING | Age: 69
Discharge: HOME | End: 2024-03-01
Payer: MEDICARE

## 2024-03-01 DIAGNOSIS — Z78.0 POSTMENOPAUSE: ICD-10-CM

## 2024-03-01 DIAGNOSIS — Z12.31 ENCOUNTER FOR SCREENING MAMMOGRAM FOR BREAST CANCER: ICD-10-CM

## 2024-03-01 PROCEDURE — 77080 DXA BONE DENSITY AXIAL: CPT

## 2024-03-01 PROCEDURE — 77063 BREAST TOMOSYNTHESIS BI: CPT

## 2024-03-25 DIAGNOSIS — E78.5 HYPERLIPIDEMIA, UNSPECIFIED HYPERLIPIDEMIA TYPE: ICD-10-CM

## 2024-03-25 DIAGNOSIS — I63.9 CEREBROVASCULAR ACCIDENT (CVA), UNSPECIFIED MECHANISM (HCC): ICD-10-CM

## 2024-03-25 RX ORDER — CLOPIDOGREL BISULFATE 75 MG/1
75 TABLET ORAL DAILY
Qty: 30 TABLET | Refills: 5 | Status: SHIPPED | OUTPATIENT
Start: 2024-03-25

## 2024-03-25 RX ORDER — ATORVASTATIN CALCIUM 80 MG/1
80 TABLET, FILM COATED ORAL DAILY
Qty: 90 TABLET | Refills: 1 | Status: SHIPPED | OUTPATIENT
Start: 2024-03-25

## 2024-04-30 ENCOUNTER — OFFICE VISIT (OUTPATIENT)
Dept: PRIMARY CARE CLINIC | Age: 69
End: 2024-04-30
Payer: MEDICARE

## 2024-04-30 VITALS
BODY MASS INDEX: 25.16 KG/M2 | OXYGEN SATURATION: 98 % | WEIGHT: 151 LBS | HEART RATE: 81 BPM | HEIGHT: 65 IN | RESPIRATION RATE: 18 BRPM | SYSTOLIC BLOOD PRESSURE: 96 MMHG | DIASTOLIC BLOOD PRESSURE: 68 MMHG | TEMPERATURE: 97.3 F

## 2024-04-30 DIAGNOSIS — I95.1 ORTHOSTATIC HYPOTENSION: Primary | ICD-10-CM

## 2024-04-30 DIAGNOSIS — R41.3 MEMORY DEFICIT: ICD-10-CM

## 2024-04-30 DIAGNOSIS — N39.46 MIXED STRESS AND URGE URINARY INCONTINENCE: ICD-10-CM

## 2024-04-30 PROCEDURE — 1036F TOBACCO NON-USER: CPT | Performed by: NURSE PRACTITIONER

## 2024-04-30 PROCEDURE — G8417 CALC BMI ABV UP PARAM F/U: HCPCS | Performed by: NURSE PRACTITIONER

## 2024-04-30 PROCEDURE — 99214 OFFICE O/P EST MOD 30 MIN: CPT | Performed by: NURSE PRACTITIONER

## 2024-04-30 PROCEDURE — G8427 DOCREV CUR MEDS BY ELIG CLIN: HCPCS | Performed by: NURSE PRACTITIONER

## 2024-04-30 PROCEDURE — G8399 PT W/DXA RESULTS DOCUMENT: HCPCS | Performed by: NURSE PRACTITIONER

## 2024-04-30 PROCEDURE — 1123F ACP DISCUSS/DSCN MKR DOCD: CPT | Performed by: NURSE PRACTITIONER

## 2024-04-30 PROCEDURE — 3078F DIAST BP <80 MM HG: CPT | Performed by: NURSE PRACTITIONER

## 2024-04-30 PROCEDURE — 3017F COLORECTAL CA SCREEN DOC REV: CPT | Performed by: NURSE PRACTITIONER

## 2024-04-30 PROCEDURE — 1090F PRES/ABSN URINE INCON ASSESS: CPT | Performed by: NURSE PRACTITIONER

## 2024-04-30 PROCEDURE — 0509F URINE INCON PLAN DOCD: CPT | Performed by: NURSE PRACTITIONER

## 2024-04-30 PROCEDURE — 3074F SYST BP LT 130 MM HG: CPT | Performed by: NURSE PRACTITIONER

## 2024-04-30 NOTE — PROGRESS NOTES
[facility]  [place] [id number]  [place] [address]  Dept: [phone]  Dept Fax: [phone]  Loc: [phone]    Di Brown is a 68 y.o. female who presents today for her medical conditions/complaints as noted below.  Di Brown is c/o of Follow-up (Pt is here for a follow up on urinary incontinence. Pt states she is doing well. Pt states she does have some pain. Pt states she still has the urge to pee. She states it is a sudden urge and sometimes does not make it to the bathroom in time. ) and Dizziness (Pt c/o dizziness. Pt states she started have dizzy spells for a while. She states she was told by others that she is kaking showers that are too hot that are causing her to get dizzy. )        HPI:     HPI this 68-year-old female presents today for follow-up.  States she is noticing ongoing issues with urinary incontinence.  She states she will get a sudden urge and she sometimes does not make it in the bathroom in time.  Also reports she has been having some recent issues with dizziness.  Her family tells her that is because she is taking too hot showers because the last time she took a shower she became very dizzy at that time.  She denies any loss of consciousness or falls.  Patient does report that she does not drink much water during the day.  She does report that she has had 1 episode of getting turned around but she was able to get home fine she thinks she just missed a turn.  She does state that course she has been doing puzzles to try and help memory and has had mild memory issues but does not feel it they are interacting with her ability to take care of herself or to do her activities of daily living.  Chief Complaint   Patient presents with    Follow-up     Pt is here for a follow up on urinary incontinence. Pt states she is doing well. Pt states she does have some pain. Pt states she still has the urge to pee. She states it is a sudden urge and

## 2024-04-30 NOTE — PATIENT INSTRUCTIONS
Increase water to at least 64oz a day .    Get 10 - 15 min of sunshine every morning between 7-9 am     Bladder training setting a time every 1 to 2 hours , be intentional with urination     Learn something new, new puzzles     May want to use shower chair during showers for safety if using hot water. This can cause vessel dilation and decreased BP    Get BP machine and check pressures and HR

## 2024-05-01 NOTE — THERAPY DISCHARGE NOTE
Acute Care - Speech Language Pathology Discharge Summary  [facility]       Patient Name: Martha Bauer  : 1955  MRN: [id number]    Today's Date: 2020  Onset of Illness/Injury or Date of Surgery: 20       Referring Physician: Dr. Maxwell        Admit Date: 2020      SLP Recommendation and Plan  Mechanical soft solids and nectar thick liquids    Visit Dx:    ICD-10-CM ICD-9-CM   1. TIA (transient ischemic attack)  G45.9 435.9   2. Decreased activities of daily living (ADL)  Z78.9 V49.89   3. Oropharyngeal dysphagia  R13.12 787.22   4. Impaired mobility  Z74.09 799.89   5. Gait abnormality  R26.9 781.2   6. Pulmonary nodule  R91.1 793.11               SLP GOALS     Row Name 20 1508 20 0945          Oral Nutrition/Hydration Goal 1 (SLP)    Oral Nutrition/Hydration Goal 1, SLP  LTG: Patient will tolerate LRD with no overt s/s of aspiration.  -MB  LTG: Patient will tolerate LRD with no overt s/s of aspiration.  -KW     Time Frame (Oral Nutrition/Hydration Goal 1, SLP)  short term goal (STG);by discharge  -MB  short term goal (STG);by discharge  -KW     Barriers (Oral Nutrition/Hydration Goal 1, SLP)  n/a  -MB  n/a  -KW     Progress/Outcomes (Oral Nutrition/Hydration Goal 1, SLP)  goal partially met  -MB  continuing progress toward goal  -KW        Lingual Strengthening Goal 1 (SLP)    Activity (Lingual Strengthening Goal 1, SLP)  increase lingual tone/sensation/control/coordination/movement  -MB  increase lingual tone/sensation/control/coordination/movement  -KW     Increase Lingual Tone/Sensation/Control/Coordination/Movement  lingual movement exercises  -MB  lingual movement exercises  -KW     Sharon/Accuracy (Lingual Strengthening Goal 1, SLP)  independently (over 90% accuracy)  -MB  independently (over 90% accuracy)  -KW     Time Frame (Lingual Strengthening Goal 1, SLP)  short term goal (STG);by discharge  -MB  short term goal (STG);by discharge  -KW     Barriers  (Lingual Strengthening Goal 1, SLP)  n/a  -MB  n/a  -KW     Progress/Outcomes (Lingual Strengthening Goal 1, SLP)  goal partially met  -MB  continuing progress toward goal  -KW        Pharyngeal Strengthening Exercise Goal 1 (SLP)    Activity (Pharyngeal Strengthening Goal 1, SLP)  increase timing;increase epiglottic inversion and retroflexion;increase tongue base retraction  -MB  increase timing;increase epiglottic inversion and retroflexion;increase tongue base retraction  -KW     Increase Timing  gustatory stimulation (sour/cold)  -MB  gustatory stimulation (sour/cold)  -KW     Increase Epiglottic Inversion and Retroflexion  Mendelsohn;falsetto  -MB  Mendelsohn;falsetto  -KW     Increase Tongue Base Retraction  hard effortful swallow;breath hold exercises  -MB  hard effortful swallow;breath hold exercises  -KW     Outlook/Accuracy (Pharyngeal Strengthening Goal 1, SLP)  independently (over 90% accuracy)  -MB  independently (over 90% accuracy)  -KW     Time Frame (Pharyngeal Strengthening Goal 1, SLP)  short term goal (STG);by discharge  -MB  short term goal (STG);by discharge  -KW     Barriers (Pharyngeal Strengthening Goal 1, SLP)  n/a  -MB  n/a  -KW     Progress/Outcomes (Pharyngeal Strengthening Goal 1, SLP)  goal partially met  -MB  continuing progress toward goal  -KW       User Key  (r) = Recorded By, (t) = Taken By, (c) = Cosigned By    Initials Name Provider Type    Omi Mesa, CCC-SLP Speech and Language Pathologist    Chani Alonzo MS CCC-SLP Speech and Language Pathologist                  SLP Discharge Summary  Anticipated Discharge Disposition (SLP): unknown  Reason for Discharge: discharge from this facility  Progress Toward Achieving Short/long Term Goals: goals partially met within established timelines  Discharge Destination: [facility] CCC-SLP  12/1/2020   No

## 2024-05-02 ENCOUNTER — TELEPHONE (OUTPATIENT)
Dept: PRIMARY CARE CLINIC | Age: 69
End: 2024-05-02

## 2024-05-02 PROBLEM — N39.46 MIXED STRESS AND URGE URINARY INCONTINENCE: Status: ACTIVE | Noted: 2024-05-02

## 2024-05-02 PROBLEM — R41.3 MEMORY DEFICIT: Status: ACTIVE | Noted: 2024-05-02

## 2024-05-02 ASSESSMENT — ENCOUNTER SYMPTOMS
WHEEZING: 0
DIARRHEA: 0
RESPIRATORY NEGATIVE: 1
ALLERGIC/IMMUNOLOGIC NEGATIVE: 1
GASTROINTESTINAL NEGATIVE: 1
EYES NEGATIVE: 1
SHORTNESS OF BREATH: 0
ABDOMINAL PAIN: 0
VOMITING: 0
COUGH: 0
CONSTIPATION: 0
NAUSEA: 0
ABDOMINAL DISTENTION: 0

## 2024-05-02 NOTE — TELEPHONE ENCOUNTER
----- Message from AMOL Betancourt NP sent at 5/2/2024  6:23 AM CDT -----  Please change her follow-up appointment in 2 months to a Medicare annual wellness

## 2024-06-28 ENCOUNTER — OFFICE VISIT (OUTPATIENT)
Dept: PRIMARY CARE CLINIC | Age: 69
End: 2024-06-28
Payer: MEDICARE

## 2024-06-28 VITALS
HEIGHT: 65 IN | WEIGHT: 150.2 LBS | OXYGEN SATURATION: 97 % | HEART RATE: 76 BPM | TEMPERATURE: 97.3 F | SYSTOLIC BLOOD PRESSURE: 122 MMHG | BODY MASS INDEX: 25.02 KG/M2 | RESPIRATION RATE: 18 BRPM | DIASTOLIC BLOOD PRESSURE: 84 MMHG

## 2024-06-28 DIAGNOSIS — M54.16 LUMBAR RADICULOPATHY: ICD-10-CM

## 2024-06-28 DIAGNOSIS — Z51.81 ENCOUNTER FOR MONITORING ANTIPLATELET THERAPY: ICD-10-CM

## 2024-06-28 DIAGNOSIS — Z79.02 ENCOUNTER FOR MONITORING ANTIPLATELET THERAPY: ICD-10-CM

## 2024-06-28 DIAGNOSIS — R10.84 GENERALIZED ABDOMINAL PAIN: ICD-10-CM

## 2024-06-28 DIAGNOSIS — M35.3 POLYMYALGIA (HCC): ICD-10-CM

## 2024-06-28 DIAGNOSIS — R10.32 ACUTE LEFT LOWER QUADRANT PAIN: ICD-10-CM

## 2024-06-28 DIAGNOSIS — Z00.00 MEDICARE ANNUAL WELLNESS VISIT, SUBSEQUENT: Primary | ICD-10-CM

## 2024-06-28 DIAGNOSIS — N39.46 MIXED STRESS AND URGE URINARY INCONTINENCE: ICD-10-CM

## 2024-06-28 DIAGNOSIS — M25.551 CHRONIC RIGHT HIP PAIN: ICD-10-CM

## 2024-06-28 DIAGNOSIS — Z87.891 PERSONAL HISTORY OF TOBACCO USE: ICD-10-CM

## 2024-06-28 DIAGNOSIS — G89.29 CHRONIC RIGHT HIP PAIN: ICD-10-CM

## 2024-06-28 DIAGNOSIS — R19.7 DIARRHEA, UNSPECIFIED TYPE: ICD-10-CM

## 2024-06-28 LAB
ALBUMIN SERPL-MCNC: 4.4 G/DL (ref 3.5–5.2)
ALP SERPL-CCNC: 97 U/L (ref 35–104)
ALT SERPL-CCNC: 16 U/L (ref 5–33)
ANION GAP SERPL CALCULATED.3IONS-SCNC: 10 MMOL/L (ref 7–19)
AST SERPL-CCNC: 20 U/L (ref 5–32)
BASOPHILS # BLD: 0.1 K/UL (ref 0–0.2)
BASOPHILS NFR BLD: 1.1 % (ref 0–1)
BILIRUB SERPL-MCNC: 0.4 MG/DL (ref 0.2–1.2)
BUN SERPL-MCNC: 13 MG/DL (ref 8–23)
CALCIUM SERPL-MCNC: 9.5 MG/DL (ref 8.8–10.2)
CHLORIDE SERPL-SCNC: 101 MMOL/L (ref 98–111)
CO2 SERPL-SCNC: 28 MMOL/L (ref 22–29)
CREAT SERPL-MCNC: 0.6 MG/DL (ref 0.5–0.9)
EOSINOPHIL # BLD: 0.2 K/UL (ref 0–0.6)
EOSINOPHIL NFR BLD: 2.9 % (ref 0–5)
ERYTHROCYTE [DISTWIDTH] IN BLOOD BY AUTOMATED COUNT: 13 % (ref 11.5–14.5)
GLUCOSE SERPL-MCNC: 96 MG/DL (ref 74–109)
HCT VFR BLD AUTO: 40.5 % (ref 37–47)
HGB BLD-MCNC: 13.5 G/DL (ref 12–16)
IMM GRANULOCYTES # BLD: 0 K/UL
LIPASE SERPL-CCNC: 32 U/L (ref 13–60)
LYMPHOCYTES # BLD: 2.3 K/UL (ref 1.1–4.5)
LYMPHOCYTES NFR BLD: 41.6 % (ref 20–40)
MCH RBC QN AUTO: 30.2 PG (ref 27–31)
MCHC RBC AUTO-ENTMCNC: 33.3 G/DL (ref 33–37)
MCV RBC AUTO: 90.6 FL (ref 81–99)
MONOCYTES # BLD: 0.3 K/UL (ref 0–0.9)
MONOCYTES NFR BLD: 6.3 % (ref 0–10)
NEUTROPHILS # BLD: 2.6 K/UL (ref 1.5–7.5)
NEUTS SEG NFR BLD: 47.9 % (ref 50–65)
PLATELET # BLD AUTO: 323 K/UL (ref 130–400)
PMV BLD AUTO: 10.2 FL (ref 9.4–12.3)
POTASSIUM SERPL-SCNC: 3.8 MMOL/L (ref 3.5–5)
PROT SERPL-MCNC: 6.8 G/DL (ref 6.6–8.7)
RBC # BLD AUTO: 4.47 M/UL (ref 4.2–5.4)
SODIUM SERPL-SCNC: 139 MMOL/L (ref 136–145)
WBC # BLD AUTO: 5.4 K/UL (ref 4.8–10.8)

## 2024-06-28 PROCEDURE — G0296 VISIT TO DETERM LDCT ELIG: HCPCS | Performed by: NURSE PRACTITIONER

## 2024-06-28 PROCEDURE — 3079F DIAST BP 80-89 MM HG: CPT | Performed by: NURSE PRACTITIONER

## 2024-06-28 PROCEDURE — G0439 PPPS, SUBSEQ VISIT: HCPCS | Performed by: NURSE PRACTITIONER

## 2024-06-28 PROCEDURE — 1123F ACP DISCUSS/DSCN MKR DOCD: CPT | Performed by: NURSE PRACTITIONER

## 2024-06-28 PROCEDURE — 3017F COLORECTAL CA SCREEN DOC REV: CPT | Performed by: NURSE PRACTITIONER

## 2024-06-28 PROCEDURE — 3074F SYST BP LT 130 MM HG: CPT | Performed by: NURSE PRACTITIONER

## 2024-06-28 RX ORDER — TIZANIDINE 4 MG/1
4 TABLET ORAL NIGHTLY PRN
Qty: 30 TABLET | Refills: 0 | Status: SHIPPED | OUTPATIENT
Start: 2024-06-28

## 2024-06-28 ASSESSMENT — PATIENT HEALTH QUESTIONNAIRE - PHQ9
SUM OF ALL RESPONSES TO PHQ QUESTIONS 1-9: 1
2. FEELING DOWN, DEPRESSED OR HOPELESS: NOT AT ALL
6. FEELING BAD ABOUT YOURSELF - OR THAT YOU ARE A FAILURE OR HAVE LET YOURSELF OR YOUR FAMILY DOWN: NOT AT ALL
10. IF YOU CHECKED OFF ANY PROBLEMS, HOW DIFFICULT HAVE THESE PROBLEMS MADE IT FOR YOU TO DO YOUR WORK, TAKE CARE OF THINGS AT HOME, OR GET ALONG WITH OTHER PEOPLE: NOT DIFFICULT AT ALL
SUM OF ALL RESPONSES TO PHQ QUESTIONS 1-9: 1
1. LITTLE INTEREST OR PLEASURE IN DOING THINGS: NOT AT ALL
SUM OF ALL RESPONSES TO PHQ QUESTIONS 1-9: 1
SUM OF ALL RESPONSES TO PHQ9 QUESTIONS 1 & 2: 0
5. POOR APPETITE OR OVEREATING: NOT AT ALL
7. TROUBLE CONCENTRATING ON THINGS, SUCH AS READING THE NEWSPAPER OR WATCHING TELEVISION: NOT AT ALL
9. THOUGHTS THAT YOU WOULD BE BETTER OFF DEAD, OR OF HURTING YOURSELF: NOT AT ALL
4. FEELING TIRED OR HAVING LITTLE ENERGY: SEVERAL DAYS
SUM OF ALL RESPONSES TO PHQ QUESTIONS 1-9: 1
3. TROUBLE FALLING OR STAYING ASLEEP: NOT AT ALL
8. MOVING OR SPEAKING SO SLOWLY THAT OTHER PEOPLE COULD HAVE NOTICED. OR THE OPPOSITE, BEING SO FIGETY OR RESTLESS THAT YOU HAVE BEEN MOVING AROUND A LOT MORE THAN USUAL: NOT AT ALL

## 2024-06-28 ASSESSMENT — LIFESTYLE VARIABLES
HOW MANY STANDARD DRINKS CONTAINING ALCOHOL DO YOU HAVE ON A TYPICAL DAY: 1 OR 2
HOW OFTEN DO YOU HAVE A DRINK CONTAINING ALCOHOL: MONTHLY OR LESS

## 2024-06-28 NOTE — PATIENT INSTRUCTIONS
64 oz water daily     Probiotic daily     Magnesium supplement     Miralax Bowel Cleanse    Take Miralax every two hours for a total of three doses.   Drink at least 8 Oz of Gatorade with each dose.  After 3 doses, drink one bottle of magnesium citrate, epsom's salt laxative cleanse, or 2 ducolax.      May continue to use Miralax and colace daily or twice a day as needed to maintain regular bowel movements.        Preventing Falls: Care Instructions  Injuries and health problems such as trouble walking or poor eyesight can increase your risk of falling. So can some medicines. But there are things you can do to help prevent falls. You can exercise to get stronger. You can also arrange your home to make it safer.    Talk to your doctor about the medicines you take. Ask if any of them increase the risk of falls and whether they can be changed or stopped.   Try to exercise regularly. It can help improve your strength and balance. This can help lower your risk of falling.         Practice fall safety and prevention.   Wear low-heeled shoes that fit well and give your feet good support. Talk to your doctor if you have foot problems that make this hard.  Carry a cellphone or wear a medical alert device that you can use to call for help.  Use stepladders instead of chairs to reach high objects. Don't climb if you're at risk for falls. Ask for help, if needed.  Wear the correct eyeglasses, if you need them.        Make your home safer.   Remove rugs, cords, clutter, and furniture from walkways.  Keep your house well lit. Use night-lights in hallways and bathrooms.  Install and use sturdy handrails on stairways.  Wear nonskid footwear, even inside. Don't walk barefoot or in socks without shoes.        Be safe outside.   Use handrails, curb cuts, and ramps whenever possible.  Keep your hands free by using a shoulder bag or backpack.  Try to walk in well-lit areas. Watch out for uneven ground, changes in pavement, and

## 2024-06-28 NOTE — PROGRESS NOTES
Medicare Annual Wellness Visit    Di Brown is here for Medicare AWV (Pt is here for annual wellness visit. No new concerns. Pt is not fasting.)    Assessment & Plan   Medicare annual wellness visit, subsequent  Mixed stress and urge urinary incontinence  Encounter for monitoring antiplatelet therapy  Acute left lower quadrant pain  Diarrhea, unspecified type  New undiagnosed condition of uncertain prognosis  Patient does have generalized tenderness on palp but reports left lower quadrant pain that is not intense today but is new.  She did have some bouts with diarrhea.  Patient encouraged to start a daily probiotic  Avoid dairy products, sugary drinks such as sodas and fruit juices, citrus juices and tomato based products for 1 week  -     CBC with Auto Differential  -     Comprehensive Metabolic Panel  -     Lipase  Generalized abdominal pain  New undiagnosed condition of uncertain prognosis see diarrhea note  -     CBC with Auto Differential  -     Comprehensive Metabolic Panel  -     Lipase  Polymyalgia (HCC)  Chronic condition uncontrolled  -     tiZANidine (ZANAFLEX) 4 MG tablet; Take 1 tablet by mouth nightly as needed (muscle spasm), Disp-30 tablet, R-0Normal  Chronic right hip pain  -     tiZANidine (ZANAFLEX) 4 MG tablet; Take 1 tablet by mouth nightly as needed (muscle spasm), Disp-30 tablet, R-0Normal  Lumbar radiculopathy  -     tiZANidine (ZANAFLEX) 4 MG tablet; Take 1 tablet by mouth nightly as needed (muscle spasm), Disp-30 tablet, R-0Normal  Personal history of tobacco use  -     OK VISIT TO DISCUSS LUNG CA SCREEN W LDCT  -     CT Lung Screen (Initial/Annual/Baseline); Future    Recommendations for Preventive Services Due: see orders and patient instructions/AVS.  Recommended screening schedule for the next 5-10 years is provided to the patient in written form: see Patient Instructions/AVS.     Return in 1 month (on 7/28/2024).     Subjective   The following acute and/or chronic problems were

## 2024-08-12 ENCOUNTER — NURSE ONLY (OUTPATIENT)
Dept: PRIMARY CARE CLINIC | Age: 69
End: 2024-08-12
Payer: MEDICARE

## 2024-08-12 DIAGNOSIS — R30.0 DYSURIA: Primary | ICD-10-CM

## 2024-08-12 LAB
APPEARANCE FLUID: ABNORMAL
BILIRUBIN, POC: ABNORMAL
BLOOD URINE, POC: ABNORMAL
CLARITY, POC: ABNORMAL
COLOR, POC: ABNORMAL
GLUCOSE URINE, POC: ABNORMAL
KETONES, POC: 40
LEUKOCYTE EST, POC: ABNORMAL
NITRITE, POC: POSITIVE
PH, POC: 5.5
PROTEIN, POC: 300
SPECIFIC GRAVITY, POC: 1.03
UROBILINOGEN, POC: 1

## 2024-08-12 PROCEDURE — 81002 URINALYSIS NONAUTO W/O SCOPE: CPT | Performed by: NURSE PRACTITIONER

## 2024-08-12 RX ORDER — NITROFURANTOIN 25; 75 MG/1; MG/1
100 CAPSULE ORAL 2 TIMES DAILY
Qty: 20 CAPSULE | Refills: 0 | Status: SHIPPED | OUTPATIENT
Start: 2024-08-12 | End: 2024-08-22

## 2024-08-14 LAB
BACTERIA UR CULT: ABNORMAL
BACTERIA UR CULT: ABNORMAL
ORGANISM: ABNORMAL

## 2024-08-26 ENCOUNTER — NURSE ONLY (OUTPATIENT)
Dept: PRIMARY CARE CLINIC | Age: 69
End: 2024-08-26
Payer: MEDICARE

## 2024-08-26 DIAGNOSIS — E78.5 HYPERLIPIDEMIA, UNSPECIFIED HYPERLIPIDEMIA TYPE: ICD-10-CM

## 2024-08-26 DIAGNOSIS — M35.3 POLYMYALGIA (HCC): ICD-10-CM

## 2024-08-26 DIAGNOSIS — G89.29 CHRONIC RIGHT HIP PAIN: ICD-10-CM

## 2024-08-26 DIAGNOSIS — R30.0 DYSURIA: Primary | ICD-10-CM

## 2024-08-26 DIAGNOSIS — M54.16 LUMBAR RADICULOPATHY: ICD-10-CM

## 2024-08-26 DIAGNOSIS — M25.551 CHRONIC RIGHT HIP PAIN: ICD-10-CM

## 2024-08-26 LAB
APPEARANCE FLUID: ABNORMAL
BILIRUBIN, POC: ABNORMAL
BLOOD URINE, POC: ABNORMAL
CLARITY, POC: ABNORMAL
COLOR, POC: ABNORMAL
GLUCOSE URINE, POC: ABNORMAL
KETONES, POC: ABNORMAL
LEUKOCYTE EST, POC: ABNORMAL
NITRITE, POC: POSITIVE
PH, POC: 6.5
PROTEIN, POC: 100
SPECIFIC GRAVITY, POC: 1.03
UROBILINOGEN, POC: 0.2

## 2024-08-26 PROCEDURE — 81002 URINALYSIS NONAUTO W/O SCOPE: CPT | Performed by: NURSE PRACTITIONER

## 2024-08-26 RX ORDER — ATORVASTATIN CALCIUM 80 MG/1
80 TABLET, FILM COATED ORAL DAILY
Qty: 90 TABLET | Refills: 1 | Status: SHIPPED | OUTPATIENT
Start: 2024-08-26

## 2024-08-28 LAB
BACTERIA UR CULT: ABNORMAL
BACTERIA UR CULT: ABNORMAL
ORGANISM: ABNORMAL

## 2024-08-29 RX ORDER — CIPROFLOXACIN 500 MG/1
500 TABLET, FILM COATED ORAL 2 TIMES DAILY
Qty: 20 TABLET | Refills: 0 | Status: SHIPPED | OUTPATIENT
Start: 2024-08-29 | End: 2024-09-08

## 2024-09-01 ENCOUNTER — APPOINTMENT (OUTPATIENT)
Dept: GENERAL RADIOLOGY | Facility: HOSPITAL | Age: 69
End: 2024-09-01
Payer: MEDICARE

## 2024-09-01 ENCOUNTER — HOSPITAL ENCOUNTER (EMERGENCY)
Facility: HOSPITAL | Age: 69
Discharge: HOME | End: 2024-09-01
Attending: EMERGENCY MEDICINE | Admitting: EMERGENCY MEDICINE
Payer: MEDICARE

## 2024-09-01 VITALS
HEIGHT: 65 IN | DIASTOLIC BLOOD PRESSURE: 50 MMHG | HEART RATE: 72 BPM | TEMPERATURE: 97.5 F | WEIGHT: 143.3 LBS | OXYGEN SATURATION: 99 % | BODY MASS INDEX: 23.88 KG/M2 | RESPIRATION RATE: 18 BRPM | SYSTOLIC BLOOD PRESSURE: 122 MMHG

## 2024-09-01 DIAGNOSIS — I95.9 TRANSIENT HYPOTENSION: ICD-10-CM

## 2024-09-01 DIAGNOSIS — N39.0 URINARY TRACT INFECTION WITHOUT HEMATURIA, SITE UNSPECIFIED: Primary | ICD-10-CM

## 2024-09-01 LAB
ALBUMIN SERPL-MCNC: 4.2 G/DL (ref 3.5–5.2)
ALBUMIN/GLOB SERPL: 1.4 G/DL
ALP SERPL-CCNC: 92 U/L (ref 39–117)
ALT SERPL W P-5'-P-CCNC: 18 U/L (ref 1–33)
ANION GAP SERPL CALCULATED.3IONS-SCNC: 12 MMOL/L (ref 5–15)
AST SERPL-CCNC: 30 U/L (ref 1–32)
BACTERIA UR QL AUTO: ABNORMAL /HPF
BASOPHILS # BLD AUTO: 0.03 10*3/MM3 (ref 0–0.2)
BASOPHILS NFR BLD AUTO: 0.4 % (ref 0–1.5)
BILIRUB SERPL-MCNC: 1.1 MG/DL (ref 0–1.2)
BILIRUB UR QL STRIP: NEGATIVE
BUN SERPL-MCNC: 21 MG/DL (ref 8–23)
BUN/CREAT SERPL: 15.9 (ref 7–25)
CALCIUM SPEC-SCNC: 9.6 MG/DL (ref 8.6–10.5)
CHLORIDE SERPL-SCNC: 96 MMOL/L (ref 98–107)
CLARITY UR: CLEAR
CO2 SERPL-SCNC: 26 MMOL/L (ref 22–29)
COLOR UR: YELLOW
CREAT SERPL-MCNC: 1.32 MG/DL (ref 0.57–1)
D-LACTATE SERPL-SCNC: 1.4 MMOL/L (ref 0.5–2)
DEPRECATED RDW RBC AUTO: 40.9 FL (ref 37–54)
EGFRCR SERPLBLD CKD-EPI 2021: 43.5 ML/MIN/1.73
EOSINOPHIL # BLD AUTO: 0.1 10*3/MM3 (ref 0–0.4)
EOSINOPHIL NFR BLD AUTO: 1.5 % (ref 0.3–6.2)
ERYTHROCYTE [DISTWIDTH] IN BLOOD BY AUTOMATED COUNT: 12.6 % (ref 12.3–15.4)
GEN 5 2HR TROPONIN T REFLEX: 10 NG/L
GLOBULIN UR ELPH-MCNC: 3.1 GM/DL
GLUCOSE SERPL-MCNC: 102 MG/DL (ref 65–99)
GLUCOSE UR STRIP-MCNC: NEGATIVE MG/DL
HCT VFR BLD AUTO: 38.8 % (ref 34–46.6)
HGB BLD-MCNC: 13.2 G/DL (ref 12–15.9)
HGB UR QL STRIP.AUTO: NEGATIVE
HYALINE CASTS UR QL AUTO: ABNORMAL /LPF
IMM GRANULOCYTES # BLD AUTO: 0.02 10*3/MM3 (ref 0–0.05)
IMM GRANULOCYTES NFR BLD AUTO: 0.3 % (ref 0–0.5)
INR PPP: 1 (ref 0.91–1.09)
KETONES UR QL STRIP: ABNORMAL
LEUKOCYTE ESTERASE UR QL STRIP.AUTO: ABNORMAL
LYMPHOCYTES # BLD AUTO: 1 10*3/MM3 (ref 0.7–3.1)
LYMPHOCYTES NFR BLD AUTO: 14.8 % (ref 19.6–45.3)
MAGNESIUM SERPL-MCNC: 2.2 MG/DL (ref 1.6–2.4)
MCH RBC QN AUTO: 30 PG (ref 26.6–33)
MCHC RBC AUTO-ENTMCNC: 34 G/DL (ref 31.5–35.7)
MCV RBC AUTO: 88.2 FL (ref 79–97)
MONOCYTES # BLD AUTO: 0.65 10*3/MM3 (ref 0.1–0.9)
MONOCYTES NFR BLD AUTO: 9.6 % (ref 5–12)
NEUTROPHILS NFR BLD AUTO: 4.94 10*3/MM3 (ref 1.7–7)
NEUTROPHILS NFR BLD AUTO: 73.4 % (ref 42.7–76)
NITRITE UR QL STRIP: NEGATIVE
NRBC BLD AUTO-RTO: 0 /100 WBC (ref 0–0.2)
PH UR STRIP.AUTO: <=5 [PH] (ref 5–8)
PLATELET # BLD AUTO: 296 10*3/MM3 (ref 140–450)
PMV BLD AUTO: 10 FL (ref 6–12)
POTASSIUM SERPL-SCNC: 4.2 MMOL/L (ref 3.5–5.2)
PROCALCITONIN SERPL-MCNC: 0.77 NG/ML (ref 0–0.25)
PROT SERPL-MCNC: 7.3 G/DL (ref 6–8.5)
PROT UR QL STRIP: ABNORMAL
PROTHROMBIN TIME: 13.6 SECONDS (ref 11.8–14.8)
RBC # BLD AUTO: 4.4 10*6/MM3 (ref 3.77–5.28)
RBC # UR STRIP: ABNORMAL /HPF
REF LAB TEST METHOD: ABNORMAL
SARS-COV-2 RNA RESP QL NAA+PROBE: NOT DETECTED
SODIUM SERPL-SCNC: 134 MMOL/L (ref 136–145)
SP GR UR STRIP: 1.01 (ref 1–1.03)
SQUAMOUS #/AREA URNS HPF: ABNORMAL /HPF
TROPONIN T DELTA: -1 NG/L
TROPONIN T SERPL HS-MCNC: 11 NG/L
UROBILINOGEN UR QL STRIP: ABNORMAL
WBC # UR STRIP: ABNORMAL /HPF
WBC NRBC COR # BLD AUTO: 6.74 10*3/MM3 (ref 3.4–10.8)

## 2024-09-01 PROCEDURE — 96365 THER/PROPH/DIAG IV INF INIT: CPT

## 2024-09-01 PROCEDURE — 99284 EMERGENCY DEPT VISIT MOD MDM: CPT

## 2024-09-01 PROCEDURE — 84145 PROCALCITONIN (PCT): CPT | Performed by: EMERGENCY MEDICINE

## 2024-09-01 PROCEDURE — 25010000002 CEFTRIAXONE PER 250 MG: Performed by: EMERGENCY MEDICINE

## 2024-09-01 PROCEDURE — 71045 X-RAY EXAM CHEST 1 VIEW: CPT

## 2024-09-01 PROCEDURE — 83605 ASSAY OF LACTIC ACID: CPT | Performed by: EMERGENCY MEDICINE

## 2024-09-01 PROCEDURE — 85610 PROTHROMBIN TIME: CPT | Performed by: EMERGENCY MEDICINE

## 2024-09-01 PROCEDURE — 93005 ELECTROCARDIOGRAM TRACING: CPT | Performed by: EMERGENCY MEDICINE

## 2024-09-01 PROCEDURE — 80053 COMPREHEN METABOLIC PANEL: CPT | Performed by: EMERGENCY MEDICINE

## 2024-09-01 PROCEDURE — 85025 COMPLETE CBC W/AUTO DIFF WBC: CPT | Performed by: EMERGENCY MEDICINE

## 2024-09-01 PROCEDURE — 93010 ELECTROCARDIOGRAM REPORT: CPT | Performed by: HOSPITALIST

## 2024-09-01 PROCEDURE — 83735 ASSAY OF MAGNESIUM: CPT | Performed by: EMERGENCY MEDICINE

## 2024-09-01 PROCEDURE — 36415 COLL VENOUS BLD VENIPUNCTURE: CPT

## 2024-09-01 PROCEDURE — 87635 SARS-COV-2 COVID-19 AMP PRB: CPT | Performed by: EMERGENCY MEDICINE

## 2024-09-01 PROCEDURE — 87040 BLOOD CULTURE FOR BACTERIA: CPT | Performed by: EMERGENCY MEDICINE

## 2024-09-01 PROCEDURE — 84484 ASSAY OF TROPONIN QUANT: CPT | Performed by: EMERGENCY MEDICINE

## 2024-09-01 PROCEDURE — 81001 URINALYSIS AUTO W/SCOPE: CPT | Performed by: EMERGENCY MEDICINE

## 2024-09-01 PROCEDURE — 96375 TX/PRO/DX INJ NEW DRUG ADDON: CPT

## 2024-09-01 RX ORDER — CEFDINIR 300 MG/1
300 CAPSULE ORAL 2 TIMES DAILY
Qty: 14 CAPSULE | Refills: 0 | Status: SHIPPED | OUTPATIENT
Start: 2024-09-01

## 2024-09-01 RX ADMIN — SODIUM CHLORIDE, POTASSIUM CHLORIDE, SODIUM LACTATE AND CALCIUM CHLORIDE 1950 ML: 600; 310; 30; 20 INJECTION, SOLUTION INTRAVENOUS at 12:47

## 2024-09-01 RX ADMIN — CEFTRIAXONE SODIUM 2000 MG: 2 INJECTION, POWDER, FOR SOLUTION INTRAMUSCULAR; INTRAVENOUS at 13:53

## 2024-09-01 NOTE — ED PROVIDER NOTES
Subjective   History of Present Illness  68-year-old female presents to the ED with complaint of generalized weakness, fatigue, low blood pressure, UTI.  Patient reports onset of suprapubic discomfort, urinary frequency and urgency that began about a week ago.  She saw her primary doctor who prescribed Cipro.  She has not quite finished her antibiotics.  Daughter came to check on her today and felt she looked weak and tired, concerned about her health wellbeing so brought her to the ED for evaluation.  No reports of fever, cough, sore throat, flank pain.  Has nausea but no vomiting.  She states her appetites been okay.  Has had decreased oral intake.    History provided by:  Patient      Review of Systems   All other systems reviewed and are negative.      Past Medical History:   Diagnosis Date    Cataract     BIALTERAL       Allergies   Allergen Reactions    Barium-Containing Compounds Unknown - High Severity       Past Surgical History:   Procedure Laterality Date    BREAST SURGERY      TIMES 4     SECTION      CYST REMOVAL      LEG    TONSILLECTOMY      TOTAL SHOULDER ARTHROPLASTY W/ DISTAL CLAVICLE EXCISION Right 2019    Procedure: RIGHT REVERSE TOTAL SHOULDER REPLACEMENT;  Surgeon: Prateek Finley MD;  Location: [facility] OR;  Service: Orthopedics       Family History   Problem Relation Age of Onset    Dementia Mother     Cancer Father        Social History     Socioeconomic History    Marital status: Legally    Tobacco Use    Smoking status: Former     Current packs/day: 1.00     Average packs/day: 1 pack/day for 50.0 years (50.0 ttl pk-yrs)     Types: Cigarettes    Smokeless tobacco: Never   Vaping Use    Vaping status: Never Used   Substance and Sexual Activity    Alcohol use: Not Currently     Comment: occasional now, prior hx daily    Drug use: Not Currently     Comment:  CLEAN     Sexual activity: Defer           Objective   Physical Exam  Vitals and nursing note  reviewed.   Constitutional:       Comments: Generally weak appearing female, ill-appearing but not in distress   HENT:      Head: Normocephalic and atraumatic.      Nose: Nose normal. No congestion or rhinorrhea.      Mouth/Throat:      Mouth: Mucous membranes are moist.      Pharynx: No oropharyngeal exudate or posterior oropharyngeal erythema.   Cardiovascular:      Rate and Rhythm: Normal rate and regular rhythm.      Heart sounds: Normal heart sounds. No murmur heard.  Pulmonary:      Effort: Pulmonary effort is normal.      Breath sounds: Normal breath sounds. No wheezing, rhonchi or rales.   Abdominal:      General: Abdomen is flat. Bowel sounds are normal.      Palpations: Abdomen is soft.      Tenderness: There is no abdominal tenderness. There is no right CVA tenderness or left CVA tenderness.      Comments: No suprapubic tenderness, no CVA tenderness   Musculoskeletal:         General: Normal range of motion.      Cervical back: Normal range of motion and neck supple.      Right lower leg: No edema.      Left lower leg: No edema.   Skin:     General: Skin is warm.      Capillary Refill: Capillary refill takes less than 2 seconds.   Neurological:      General: No focal deficit present.      Mental Status: She is oriented to person, place, and time. Mental status is at baseline.         Procedures       Lab Results (last 24 hours)       Procedure Component Value Units Date/Time    CBC & Differential [[id number]]  (Abnormal) Collected: 09/01/24 1248    Specimen: Blood Updated: 09/01/24 1300    Narrative:      The following orders were created for panel order CBC & Differential.  Procedure                               Abnormality         Status                     ---------                               -----------         ------                     CBC Auto Differential[[id number]]        Abnormal            Final result                 Please view results for these tests on the individual orders.     Comprehensive Metabolic Panel [[id number]]  (Abnormal) Collected: 09/01/24 1248    Specimen: Blood Updated: 09/01/24 1320     Glucose 102 mg/dL      BUN 21 mg/dL      Creatinine 1.32 mg/dL      Sodium 134 mmol/L      Potassium 4.2 mmol/L      Chloride 96 mmol/L      CO2 26.0 mmol/L      Calcium 9.6 mg/dL      Total Protein 7.3 g/dL      Albumin 4.2 g/dL      ALT (SGPT) 18 U/L      AST (SGOT) 30 U/L      Alkaline Phosphatase 92 U/L      Total Bilirubin 1.1 mg/dL      Globulin 3.1 gm/dL      A/G Ratio 1.4 g/dL      BUN/Creatinine Ratio 15.9     Anion Gap 12.0 mmol/L      eGFR 43.5 mL/min/1.73     Narrative:      GFR Normal >60  Chronic Kidney Disease <60  Kidney Failure <15      Protime-INR [[id number]]  (Normal) Collected: 09/01/24 1248    Specimen: Blood Updated: 09/01/24 1310     Protime 13.6 Seconds      INR 1.00    Magnesium [[id number]]  (Normal) Collected: 09/01/24 1248    Specimen: Blood Updated: 09/01/24 1318     Magnesium 2.2 mg/dL     Blood Culture - Blood, Arm, Right [[id number]] Collected: 09/01/24 1248    Specimen: Blood from Arm, Right Updated: 09/01/24 1300    Lactic Acid, Plasma [[id number]]  (Normal) Collected: 09/01/24 1248    Specimen: Blood Updated: 09/01/24 1318     Lactate 1.4 mmol/L     Procalcitonin [[id number]]  (Abnormal) Collected: 09/01/24 1248    Specimen: Blood Updated: 09/01/24 1326     Procalcitonin 0.77 ng/mL     Narrative:      As a Marker for Sepsis (Non-Neonates):    1. <0.5 ng/mL represents a low risk of severe sepsis and/or septic shock.  2. >2 ng/mL represents a high risk of severe sepsis and/or septic shock.    As a Marker for Lower Respiratory Tract Infections that require antibiotic therapy:    PCT on Admission    Antibiotic Therapy       6-12 Hrs later    >0.5                Strongly Recommended  >0.25 - <0.5        Recommended   0.1 - 0.25          Discouraged              Remeasure/reassess PCT  <0.1                Strongly Discouraged     Remeasure/reassess PCT    As 28 day  "mortality risk marker: \"Change in Procalcitonin Result\" (>80% or <=80%) if Day 0 (or Day 1) and Day 4 values are available. Refer to http://www.[facility]-pct-calculator.com    Change in PCT <=80%  A decrease of PCT levels below or equal to 80% defines a positive change in PCT test result representing a higher risk for 28-day all-cause mortality of patients diagnosed with severe sepsis for septic shock.    Change in PCT >80%  A decrease of PCT levels of more than 80% defines a negative change in PCT result representing a lower risk for 28-day all-cause mortality of patients diagnosed with severe sepsis or septic shock.       High Sensitivity Troponin T [[id number]]  (Normal) Collected: 09/01/24 1248    Specimen: Blood Updated: 09/01/24 1318     HS Troponin T 11 ng/L     Narrative:      High Sensitive Troponin T Reference Range:  <14.0 ng/L- Negative Female for AMI  <22.0 ng/L- Negative Male for AMI  >=14 - Abnormal Female indicating possible myocardial injury.  >=22 - Abnormal Male indicating possible myocardial injury.   Clinicians would have to utilize clinical acumen, EKG, Troponin, and serial changes to determine if it is an Acute Myocardial Infarction or myocardial injury due to an underlying chronic condition.         CBC Auto Differential [[id number]]  (Abnormal) Collected: 09/01/24 1248    Specimen: Blood Updated: 09/01/24 1300     WBC 6.74 10*3/mm3      RBC 4.40 10*6/mm3      Hemoglobin 13.2 g/dL      Hematocrit 38.8 %      MCV 88.2 fL      MCH 30.0 pg      MCHC 34.0 g/dL      RDW 12.6 %      RDW-SD 40.9 fl      MPV 10.0 fL      Platelets 296 10*3/mm3      Neutrophil % 73.4 %      Lymphocyte % 14.8 %      Monocyte % 9.6 %      Eosinophil % 1.5 %      Basophil % 0.4 %      Immature Grans % 0.3 %      Neutrophils, Absolute 4.94 10*3/mm3      Lymphocytes, Absolute 1.00 10*3/mm3      Monocytes, Absolute 0.65 10*3/mm3      Eosinophils, Absolute 0.10 10*3/mm3      Basophils, Absolute 0.03 10*3/mm3      Immature Grans, " Absolute 0.02 10*3/mm3      nRBC 0.0 /100 WBC     Blood Culture - Blood, Arm, Left [[id number]] Collected: 09/01/24 1250    Specimen: Blood from Arm, Left Updated: 09/01/24 1300    COVID-19,CEPHEID/MICHELLE,COR/CAMILLE/PAD/SIRIA/LAG/ADITYA IN-HOUSE,NP SWAB IN TRANSPORT MEDIA 1 HR TAT, RT-PCR - Swab, Nasopharynx [[id number]]  (Normal) Collected: 09/01/24 1253    Specimen: Swab from Nasopharynx Updated: 09/01/24 1319     COVID19 Not Detected    Narrative:      Fact sheet for providers: https://www.fda.gov/media/377060/download     Fact sheet for patients: https://www.fda.gov/media/266874/download  Fact sheet for providers: https://www.fda.gov/media/850109/download    Fact sheet for patients: https://www.fda.gov/media/790907/download    Test performed by PCR.    Urinalysis With Culture If Indicated - Urine, Clean Catch [[id number]]  (Abnormal) Collected: 09/01/24 1338    Specimen: Urine, Clean Catch Updated: 09/01/24 1407     Color, UA Yellow     Appearance, UA Clear     pH, UA <=5.0     Specific Gravity, UA 1.008     Glucose, UA Negative     Ketones, UA 15 mg/dL (1+)     Bilirubin, UA Negative     Blood, UA Negative     Protein, UA Trace     Leuk Esterase, UA Moderate (2+)     Nitrite, UA Negative     Urobilinogen, UA 1.0 E.U./dL    Narrative:      In absence of clinical symptoms, the presence of pyuria, bacteria, and/or nitrites on the urinalysis result does not correlate with infection.    Urinalysis, Microscopic Only - Urine, Clean Catch [[id number]]  (Abnormal) Collected: 09/01/24 1338    Specimen: Urine, Clean Catch Updated: 09/01/24 1407     RBC, UA 0-2 /HPF      WBC, UA 3-5 /HPF      Comment: Urine culture not indicated.        Bacteria, UA Trace /HPF      Squamous Epithelial Cells, UA None Seen /HPF      Hyaline Casts, UA 0-2 /LPF      Methodology Manual Light Microscopy    High Sensitivity Troponin T 2Hr [[id number]]  (Normal) Collected: 09/01/24 1444    Specimen: Blood Updated: 09/01/24 1509     HS Troponin T 10 ng/L       Troponin T Delta -1 ng/L     Narrative:      High Sensitive Troponin T Reference Range:  <14.0 ng/L- Negative Female for AMI  <22.0 ng/L- Negative Male for AMI  >=14 - Abnormal Female indicating possible myocardial injury.  >=22 - Abnormal Male indicating possible myocardial injury.   Clinicians would have to utilize clinical acumen, EKG, Troponin, and serial changes to determine if it is an Acute Myocardial Infarction or myocardial injury due to an underlying chronic condition.              XR Chest 1 View    Result Date: 9/1/2024  EXAM: XR CHEST 1 VW-  DATE: 9/1/2024 11:33 AM  HISTORY: generalized weakness   COMPARISON: 11/26/2020.  TECHNIQUE:  Frontal view(s) of the chest submitted.  FINDINGS:  Mild linear opacity at the left lung base is noted likely atelectasis. No effusion or pneumothorax is seen. Heart and mediastinum are unremarkable. There is reverse right total shoulder arthroplasty.        1. Probable mild atelectasis at the left lung base.  This report was signed and finalized on 9/1/2024 12:39 PM by Timbo Miller.        ED Course  ED Course as of 09/01/24 1709   Sun Sep 01, 2024   1706 68-year-old female presents to the ED with complaint of generalized weakness fatigue, transient hypotension; currently being treated for urinary tract infection.  Has had no reports of fever.  Afebrile on arrival to the ED, initial blood pressure 80s/50s, received fluids and has been 110s/50s.  White count within normal limits, lactate 1.4.  Minimally elevated procalcitonin at 0.77.  Blood culture sent and pending.  Creatinine was elevated to 1.32, which is elevated from baseline.  Patient is feeling much better following fluids.  She received dose of IV antibiotics in the ED.  Will give trial of treatment at home with cefdinir, have her return if she has recurrent weakness and fatigue, or for any additional concern. [AW]      ED Course User Index  [AW] Alverto Mace MD                                              Medical Decision Making  Amount and/or Complexity of Data Reviewed  Labs: ordered. Decision-making details documented in ED Course.  Radiology: ordered.  ECG/medicine tests: ordered.        Final diagnoses:   Urinary tract infection without hematuria, site unspecified   Transient hypotension       ED Disposition  ED Disposition       ED Disposition   Discharge    Condition   Stable    Comment   --                 Follow-up with your primary doctor as an outpatient             Medication List        New Prescriptions      cefdinir 300 MG capsule  Commonly known as: OMNICEF  Take 1 capsule by mouth 2 (Two) Times a Day.               Where to Get Your Medications        These medications were sent to [facility]/pharmacy #[id number] - [place], [place] - [address] - [phone]  - [phone]   [address] [place] [address]      Phone: [phone]   cefdinir 300 MG capsule            Alverto Mace MD  09/01/24 3679

## 2024-09-03 LAB
QT INTERVAL: 434 MS
QTC INTERVAL: 415 MS

## 2024-09-06 LAB
BACTERIA SPEC AEROBE CULT: NORMAL
BACTERIA SPEC AEROBE CULT: NORMAL

## 2024-09-10 ENCOUNTER — OFFICE VISIT (OUTPATIENT)
Dept: PRIMARY CARE CLINIC | Age: 69
End: 2024-09-10
Payer: MEDICARE

## 2024-09-10 VITALS
SYSTOLIC BLOOD PRESSURE: 106 MMHG | BODY MASS INDEX: 24.19 KG/M2 | OXYGEN SATURATION: 99 % | DIASTOLIC BLOOD PRESSURE: 64 MMHG | WEIGHT: 145.2 LBS | HEIGHT: 65 IN | TEMPERATURE: 97.4 F | HEART RATE: 70 BPM | RESPIRATION RATE: 18 BRPM

## 2024-09-10 DIAGNOSIS — Z87.440 RECENT URINARY TRACT INFECTION: ICD-10-CM

## 2024-09-10 DIAGNOSIS — R82.998 LEUKOCYTES IN URINE: ICD-10-CM

## 2024-09-10 DIAGNOSIS — N17.9 ACUTE KIDNEY INJURY (HCC): Primary | ICD-10-CM

## 2024-09-10 LAB
APPEARANCE FLUID: NORMAL
BILIRUBIN, POC: NORMAL
BLOOD URINE, POC: NORMAL
CLARITY, POC: NORMAL
COLOR, POC: YELLOW
GLUCOSE URINE, POC: NORMAL MG/DL
KETONES, POC: NORMAL MG/DL
LEUKOCYTE EST, POC: NORMAL
NITRITE, POC: NORMAL
PH, POC: 5
PROTEIN, POC: NORMAL MG/DL
SPECIFIC GRAVITY, POC: 1.03
UROBILINOGEN, POC: 0.2 MG/DL

## 2024-09-10 PROCEDURE — G8420 CALC BMI NORM PARAMETERS: HCPCS | Performed by: NURSE PRACTITIONER

## 2024-09-10 PROCEDURE — 1123F ACP DISCUSS/DSCN MKR DOCD: CPT | Performed by: NURSE PRACTITIONER

## 2024-09-10 PROCEDURE — 1036F TOBACCO NON-USER: CPT | Performed by: NURSE PRACTITIONER

## 2024-09-10 PROCEDURE — G8427 DOCREV CUR MEDS BY ELIG CLIN: HCPCS | Performed by: NURSE PRACTITIONER

## 2024-09-10 PROCEDURE — 3017F COLORECTAL CA SCREEN DOC REV: CPT | Performed by: NURSE PRACTITIONER

## 2024-09-10 PROCEDURE — 1090F PRES/ABSN URINE INCON ASSESS: CPT | Performed by: NURSE PRACTITIONER

## 2024-09-10 PROCEDURE — 99214 OFFICE O/P EST MOD 30 MIN: CPT | Performed by: NURSE PRACTITIONER

## 2024-09-10 PROCEDURE — 3078F DIAST BP <80 MM HG: CPT | Performed by: NURSE PRACTITIONER

## 2024-09-10 PROCEDURE — G8399 PT W/DXA RESULTS DOCUMENT: HCPCS | Performed by: NURSE PRACTITIONER

## 2024-09-10 PROCEDURE — 81002 URINALYSIS NONAUTO W/O SCOPE: CPT | Performed by: NURSE PRACTITIONER

## 2024-09-10 PROCEDURE — 3074F SYST BP LT 130 MM HG: CPT | Performed by: NURSE PRACTITIONER

## 2024-09-10 RX ORDER — CEFDINIR 300 MG/1
300 CAPSULE ORAL 2 TIMES DAILY
COMMUNITY
Start: 2024-09-01 | End: 2024-09-10

## 2024-09-10 RX ORDER — CIPROFLOXACIN 500 MG/1
500 TABLET, FILM COATED ORAL
COMMUNITY
Start: 2024-08-29 | End: 2024-09-10

## 2024-09-10 SDOH — ECONOMIC STABILITY: FOOD INSECURITY: WITHIN THE PAST 12 MONTHS, THE FOOD YOU BOUGHT JUST DIDN'T LAST AND YOU DIDN'T HAVE MONEY TO GET MORE.: NEVER TRUE

## 2024-09-10 SDOH — ECONOMIC STABILITY: TRANSPORTATION INSECURITY
IN THE PAST 12 MONTHS, HAS LACK OF TRANSPORTATION KEPT YOU FROM MEETINGS, WORK, OR FROM GETTING THINGS NEEDED FOR DAILY LIVING?: NO

## 2024-09-10 SDOH — ECONOMIC STABILITY: INCOME INSECURITY: HOW HARD IS IT FOR YOU TO PAY FOR THE VERY BASICS LIKE FOOD, HOUSING, MEDICAL CARE, AND HEATING?: NOT HARD AT ALL

## 2024-09-10 SDOH — ECONOMIC STABILITY: FOOD INSECURITY: WITHIN THE PAST 12 MONTHS, YOU WORRIED THAT YOUR FOOD WOULD RUN OUT BEFORE YOU GOT MONEY TO BUY MORE.: NEVER TRUE

## 2024-09-10 ASSESSMENT — ENCOUNTER SYMPTOMS
RESPIRATORY NEGATIVE: 1
ABDOMINAL PAIN: 0
DIARRHEA: 1
ABDOMINAL DISTENTION: 0
EYES NEGATIVE: 1
WHEEZING: 0
ALLERGIC/IMMUNOLOGIC NEGATIVE: 1
SHORTNESS OF BREATH: 0
COUGH: 0

## 2024-09-12 LAB — BACTERIA UR CULT: NORMAL

## 2024-09-21 DIAGNOSIS — M25.551 CHRONIC RIGHT HIP PAIN: ICD-10-CM

## 2024-09-21 DIAGNOSIS — I63.9 CEREBROVASCULAR ACCIDENT (CVA), UNSPECIFIED MECHANISM (HCC): ICD-10-CM

## 2024-09-21 DIAGNOSIS — M35.3 POLYMYALGIA (HCC): ICD-10-CM

## 2024-09-21 DIAGNOSIS — G89.29 CHRONIC RIGHT HIP PAIN: ICD-10-CM

## 2024-09-21 DIAGNOSIS — M54.16 LUMBAR RADICULOPATHY: ICD-10-CM

## 2024-09-23 RX ORDER — CLOPIDOGREL BISULFATE 75 MG/1
75 TABLET ORAL DAILY
Qty: 30 TABLET | Refills: 5 | Status: SHIPPED | OUTPATIENT
Start: 2024-09-23

## 2024-09-24 ENCOUNTER — OFFICE VISIT (OUTPATIENT)
Dept: PRIMARY CARE CLINIC | Age: 69
End: 2024-09-24
Payer: MEDICARE

## 2024-09-24 VITALS
TEMPERATURE: 97.1 F | DIASTOLIC BLOOD PRESSURE: 70 MMHG | HEART RATE: 62 BPM | HEIGHT: 65 IN | BODY MASS INDEX: 23.86 KG/M2 | OXYGEN SATURATION: 99 % | WEIGHT: 143.2 LBS | SYSTOLIC BLOOD PRESSURE: 110 MMHG | RESPIRATION RATE: 18 BRPM

## 2024-09-24 DIAGNOSIS — I10 ESSENTIAL HYPERTENSION: ICD-10-CM

## 2024-09-24 DIAGNOSIS — Z23 NEED FOR INFLUENZA VACCINATION: Primary | ICD-10-CM

## 2024-09-24 DIAGNOSIS — R26.9 ALTERED GAIT: ICD-10-CM

## 2024-09-24 DIAGNOSIS — Z87.440 RECENT URINARY TRACT INFECTION: ICD-10-CM

## 2024-09-24 DIAGNOSIS — N17.9 ACUTE KIDNEY INJURY (HCC): ICD-10-CM

## 2024-09-24 PROBLEM — I63.9 ACUTE ISCHEMIC STROKE (HCC): Status: RESOLVED | Noted: 2020-11-30 | Resolved: 2024-09-24

## 2024-09-24 LAB
ALBUMIN SERPL-MCNC: 4.7 G/DL (ref 3.5–5.2)
ALP SERPL-CCNC: 83 U/L (ref 35–104)
ALT SERPL-CCNC: 12 U/L (ref 5–33)
ANION GAP SERPL CALCULATED.3IONS-SCNC: 9 MMOL/L (ref 7–19)
APPEARANCE FLUID: CLEAR
AST SERPL-CCNC: 20 U/L (ref 5–32)
BASOPHILS # BLD: 0.1 K/UL (ref 0–0.2)
BASOPHILS NFR BLD: 0.8 % (ref 0–1)
BILIRUB SERPL-MCNC: 1 MG/DL (ref 0.2–1.2)
BILIRUBIN, POC: NORMAL
BLOOD URINE, POC: NORMAL
BUN SERPL-MCNC: 17 MG/DL (ref 8–23)
CALCIUM SERPL-MCNC: 9.8 MG/DL (ref 8.8–10.2)
CHLORIDE SERPL-SCNC: 102 MMOL/L (ref 98–111)
CLARITY, POC: CLEAR
CO2 SERPL-SCNC: 30 MMOL/L (ref 22–29)
COLOR, POC: YELLOW
CREAT SERPL-MCNC: 0.7 MG/DL (ref 0.5–0.9)
EOSINOPHIL # BLD: 0.2 K/UL (ref 0–0.6)
EOSINOPHIL NFR BLD: 2.5 % (ref 0–5)
ERYTHROCYTE [DISTWIDTH] IN BLOOD BY AUTOMATED COUNT: 13.4 % (ref 11.5–14.5)
GLUCOSE SERPL-MCNC: 105 MG/DL (ref 70–99)
GLUCOSE URINE, POC: NORMAL MG/DL
HCT VFR BLD AUTO: 43.2 % (ref 37–47)
HGB BLD-MCNC: 14 G/DL (ref 12–16)
IMM GRANULOCYTES # BLD: 0 K/UL
KETONES, POC: NORMAL MG/DL
LEUKOCYTE EST, POC: NORMAL
LYMPHOCYTES # BLD: 1.8 K/UL (ref 1.1–4.5)
LYMPHOCYTES NFR BLD: 29.5 % (ref 20–40)
MCH RBC QN AUTO: 30.3 PG (ref 27–31)
MCHC RBC AUTO-ENTMCNC: 32.4 G/DL (ref 33–37)
MCV RBC AUTO: 93.5 FL (ref 81–99)
MONOCYTES # BLD: 0.3 K/UL (ref 0–0.9)
MONOCYTES NFR BLD: 5 % (ref 0–10)
NEUTROPHILS # BLD: 3.7 K/UL (ref 1.5–7.5)
NEUTS SEG NFR BLD: 62 % (ref 50–65)
NITRITE, POC: NORMAL
PH, POC: 5
PLATELET # BLD AUTO: 297 K/UL (ref 130–400)
PMV BLD AUTO: 10.7 FL (ref 9.4–12.3)
POTASSIUM SERPL-SCNC: 4.1 MMOL/L (ref 3.5–5)
PROT SERPL-MCNC: 7.4 G/DL (ref 6.4–8.3)
PROTEIN, POC: NORMAL MG/DL
RBC # BLD AUTO: 4.62 M/UL (ref 4.2–5.4)
SODIUM SERPL-SCNC: 141 MMOL/L (ref 136–145)
SPECIFIC GRAVITY, POC: 1.02
UROBILINOGEN, POC: 0.2 MG/DL
WBC # BLD AUTO: 6 K/UL (ref 4.8–10.8)

## 2024-09-24 PROCEDURE — G0008 ADMIN INFLUENZA VIRUS VAC: HCPCS | Performed by: NURSE PRACTITIONER

## 2024-09-24 PROCEDURE — 81002 URINALYSIS NONAUTO W/O SCOPE: CPT | Performed by: NURSE PRACTITIONER

## 2024-09-24 PROCEDURE — 99214 OFFICE O/P EST MOD 30 MIN: CPT | Performed by: NURSE PRACTITIONER

## 2024-09-24 PROCEDURE — 90653 IIV ADJUVANT VACCINE IM: CPT | Performed by: NURSE PRACTITIONER

## 2024-09-24 PROCEDURE — G8399 PT W/DXA RESULTS DOCUMENT: HCPCS | Performed by: NURSE PRACTITIONER

## 2024-09-24 PROCEDURE — 3074F SYST BP LT 130 MM HG: CPT | Performed by: NURSE PRACTITIONER

## 2024-09-24 PROCEDURE — G8427 DOCREV CUR MEDS BY ELIG CLIN: HCPCS | Performed by: NURSE PRACTITIONER

## 2024-09-24 PROCEDURE — 1090F PRES/ABSN URINE INCON ASSESS: CPT | Performed by: NURSE PRACTITIONER

## 2024-09-24 PROCEDURE — G8420 CALC BMI NORM PARAMETERS: HCPCS | Performed by: NURSE PRACTITIONER

## 2024-09-24 PROCEDURE — 1123F ACP DISCUSS/DSCN MKR DOCD: CPT | Performed by: NURSE PRACTITIONER

## 2024-09-24 PROCEDURE — 1036F TOBACCO NON-USER: CPT | Performed by: NURSE PRACTITIONER

## 2024-09-24 PROCEDURE — 3078F DIAST BP <80 MM HG: CPT | Performed by: NURSE PRACTITIONER

## 2024-09-24 PROCEDURE — 3017F COLORECTAL CA SCREEN DOC REV: CPT | Performed by: NURSE PRACTITIONER

## 2024-09-24 ASSESSMENT — ENCOUNTER SYMPTOMS
EYES NEGATIVE: 1
GASTROINTESTINAL NEGATIVE: 1
ABDOMINAL DISTENTION: 0
DIARRHEA: 0
COUGH: 0
SHORTNESS OF BREATH: 0
VOMITING: 0
NAUSEA: 0
WHEEZING: 0
RESPIRATORY NEGATIVE: 1
CONSTIPATION: 0
ABDOMINAL PAIN: 0
ALLERGIC/IMMUNOLOGIC NEGATIVE: 1

## 2024-11-19 DIAGNOSIS — M54.16 LUMBAR RADICULOPATHY: ICD-10-CM

## 2024-11-19 DIAGNOSIS — G89.29 CHRONIC RIGHT HIP PAIN: ICD-10-CM

## 2024-11-19 DIAGNOSIS — M35.3 POLYMYALGIA (HCC): ICD-10-CM

## 2024-11-19 DIAGNOSIS — M25.551 CHRONIC RIGHT HIP PAIN: ICD-10-CM

## 2025-01-11 DIAGNOSIS — G89.29 CHRONIC RIGHT HIP PAIN: ICD-10-CM

## 2025-01-11 DIAGNOSIS — M35.3 POLYMYALGIA (HCC): ICD-10-CM

## 2025-01-11 DIAGNOSIS — M54.16 LUMBAR RADICULOPATHY: ICD-10-CM

## 2025-01-11 DIAGNOSIS — M25.551 CHRONIC RIGHT HIP PAIN: ICD-10-CM

## 2025-03-06 DIAGNOSIS — M54.16 LUMBAR RADICULOPATHY: ICD-10-CM

## 2025-03-06 DIAGNOSIS — M25.551 CHRONIC RIGHT HIP PAIN: ICD-10-CM

## 2025-03-06 DIAGNOSIS — M35.3 POLYMYALGIA: ICD-10-CM

## 2025-03-06 DIAGNOSIS — G89.29 CHRONIC RIGHT HIP PAIN: ICD-10-CM

## 2025-03-18 DIAGNOSIS — E78.5 HYPERLIPIDEMIA, UNSPECIFIED HYPERLIPIDEMIA TYPE: ICD-10-CM

## 2025-03-18 RX ORDER — ATORVASTATIN CALCIUM 80 MG/1
80 TABLET, FILM COATED ORAL DAILY
Qty: 90 TABLET | Refills: 1 | Status: SHIPPED | OUTPATIENT
Start: 2025-03-18

## 2025-03-20 DIAGNOSIS — I63.9 CEREBROVASCULAR ACCIDENT (CVA), UNSPECIFIED MECHANISM (HCC): ICD-10-CM

## 2025-03-20 RX ORDER — CLOPIDOGREL BISULFATE 75 MG/1
75 TABLET ORAL DAILY
Qty: 30 TABLET | Refills: 5 | Status: SHIPPED | OUTPATIENT
Start: 2025-03-20

## 2025-04-03 ENCOUNTER — RESULTS FOLLOW-UP (OUTPATIENT)
Dept: PRIMARY CARE CLINIC | Age: 70
End: 2025-04-03

## 2025-04-03 ENCOUNTER — HOSPITAL ENCOUNTER (OUTPATIENT)
Dept: WOMENS IMAGING | Age: 70
Discharge: HOME | End: 2025-04-03
Payer: MEDICARE

## 2025-04-03 VITALS — BODY MASS INDEX: 24.13 KG/M2 | WEIGHT: 145 LBS

## 2025-04-03 DIAGNOSIS — Z12.31 ENCOUNTER FOR SCREENING MAMMOGRAM FOR MALIGNANT NEOPLASM OF BREAST: ICD-10-CM

## 2025-04-03 PROCEDURE — 77063 BREAST TOMOSYNTHESIS BI: CPT

## 2025-05-09 DIAGNOSIS — M25.551 CHRONIC RIGHT HIP PAIN: ICD-10-CM

## 2025-05-09 DIAGNOSIS — M54.16 LUMBAR RADICULOPATHY: ICD-10-CM

## 2025-05-09 DIAGNOSIS — G89.29 CHRONIC RIGHT HIP PAIN: ICD-10-CM

## 2025-05-09 DIAGNOSIS — M35.3 POLYMYALGIA: ICD-10-CM

## 2025-09-04 ENCOUNTER — HOSPITAL ENCOUNTER (OUTPATIENT)
Dept: CT IMAGING | Age: 70
Discharge: HOME | End: 2025-09-04
Payer: MEDICARE

## 2025-09-04 DIAGNOSIS — Z86.73 HISTORY OF CVA IN ADULTHOOD: ICD-10-CM

## 2025-09-04 DIAGNOSIS — R41.3 MEMORY DEFICIT: ICD-10-CM

## 2025-09-04 DIAGNOSIS — F33.2 SEVERE EPISODE OF RECURRENT MAJOR DEPRESSIVE DISORDER, WITHOUT PSYCHOTIC FEATURES (HCC): ICD-10-CM

## 2025-09-04 PROCEDURE — 70450 CT HEAD/BRAIN W/O DYE: CPT

## (undated) DEVICE — 3M™ IOBAN™ 2 ANTIMICROBIAL INCISE DRAPE 6651EZ: Brand: IOBAN™ 2

## (undated) DEVICE — 4-PORT MANIFOLD: Brand: NEPTUNE 2

## (undated) DEVICE — FORCEPS BX 240CM 2.4MM L NDL RAD JAW 4 M00513334

## (undated) DEVICE — SYS SKIN CLS DERMABOND PRINEO W/22CM MESH TP

## (undated) DEVICE — SHOULDER STABILIZATION KIT,                                    DISPOSABLE 12 PER BOX

## (undated) DEVICE — SYR LUERLOK 30CC

## (undated) DEVICE — DRSNG BRDR MEPILEXLITE SLFADHR SIL 2X5

## (undated) DEVICE — GLV SURG TRIUMPH ORTHO W/ALOE PF LTX 8.5 STRL

## (undated) DEVICE — GLV SURG SENSICARE PI PF LF 8.5 GRN STRL

## (undated) DEVICE — ST PIN FIX TEMP UNIVERS REVERS W/OSTEO/GUIDE/PIN 2.4MM STRL

## (undated) DEVICE — ANTIBACTERIAL UNDYED BRAIDED (POLYGLACTIN 910), SYNTHETIC ABSORBABLE SUTURE: Brand: COATED VICRYL

## (undated) DEVICE — ELECTRD BLD EXT EDGE 1P COAT 6.5IN STRL

## (undated) DEVICE — PK TURNOVER RM ADV

## (undated) DEVICE — T-MAX DISPOSABLE FACE MASK 8 PER BOX

## (undated) DEVICE — DUAL CUT SAGITTAL BLADE

## (undated) DEVICE — SUT ETHIB 1 CT1 30IN  X425H

## (undated) DEVICE — SHORT LENS-STERILE

## (undated) DEVICE — ENDO KIT,LOURDES HOSPITAL: Brand: MEDLINE INDUSTRIES, INC.

## (undated) DEVICE — COVER,MAYO STAND,STERILE: Brand: MEDLINE

## (undated) DEVICE — NDL HYPO PRECISIONGLIDE REG 22G 1 1/2

## (undated) DEVICE — GOWN,PREVENTION PLUS,XLONG/XLARGE,STRL: Brand: MEDLINE

## (undated) DEVICE — PK SHLDR 30

## (undated) DEVICE — HANDPIECE SET WITH HIGH FLOW TIP AND SUCTION TUBE: Brand: INTERPULSE

## (undated) DEVICE — BIPOLAR SEALER 23-112-1 AQM 6.0: Brand: AQUAMANTYS™